# Patient Record
Sex: MALE | Race: WHITE | Employment: OTHER | URBAN - METROPOLITAN AREA
[De-identification: names, ages, dates, MRNs, and addresses within clinical notes are randomized per-mention and may not be internally consistent; named-entity substitution may affect disease eponyms.]

---

## 2017-08-16 ENCOUNTER — OFFICE VISIT (OUTPATIENT)
Dept: INTERNAL MEDICINE CLINIC | Age: 67
End: 2017-08-16

## 2017-08-16 VITALS
HEART RATE: 70 BPM | SYSTOLIC BLOOD PRESSURE: 132 MMHG | TEMPERATURE: 99.1 F | WEIGHT: 235 LBS | DIASTOLIC BLOOD PRESSURE: 87 MMHG | OXYGEN SATURATION: 96 % | BODY MASS INDEX: 34.8 KG/M2 | HEIGHT: 69 IN

## 2017-08-16 DIAGNOSIS — J01.00 ACUTE MAXILLARY SINUSITIS, RECURRENCE NOT SPECIFIED: Primary | ICD-10-CM

## 2017-08-16 DIAGNOSIS — R10.11 RIGHT UPPER QUADRANT ABDOMINAL PAIN: ICD-10-CM

## 2017-08-16 LAB
ALBUMIN SERPL-MCNC: 4.3 G/DL (ref 3.9–5.4)
ALKALINE PHOS POC: 58 U/L (ref 38–126)
ALT SERPL-CCNC: 31 U/L (ref 9–52)
AMYLASE, POCT, AMYLPOCT: 43 U/L (ref 30–100)
AST SERPL-CCNC: 23 U/L (ref 14–36)
BUN BLD-MCNC: 26 MG/DL (ref 9–20)
CALCIUM BLD-MCNC: 9.6 MG/DL (ref 8.4–10.2)
CHLORIDE BLD-SCNC: 103 MMOL/L (ref 98–107)
CO2 POC: 28 MMOL/L (ref 22–32)
CREAT BLD-MCNC: 0.8 MG/DL (ref 0.8–1.5)
EGFR (POC): 93.7
GLUCOSE POC: 68 MG/DL (ref 75–110)
GRAN# POC: 4.2 K/UL (ref 2–7.8)
GRAN% POC: 60.8 % (ref 37–92)
HCT VFR BLD CALC: 42.7 % (ref 37–51)
HGB BLD-MCNC: 14.7 G/DL (ref 12–18)
LY# POC: 2.2 K/UL (ref 0.6–4.1)
LY% POC: 34.7 % (ref 10–58.5)
MCH RBC QN: 31.5 PG (ref 26–32)
MCHC RBC-ENTMCNC: 34.4 G/DL (ref 30–36)
MCV RBC: 92 FL (ref 80–97)
MID #, POC: 0.2 K/UL (ref 0–1.8)
MID% POC: 4.5 % (ref 0.1–24)
PLATELET # BLD: 198 K/UL (ref 140–440)
POTASSIUM SERPL-SCNC: 5 MMOL/L (ref 3.6–5)
PROT SERPL-MCNC: 7 G/DL (ref 6.3–8.2)
RBC # BLD: 4.66 M/UL (ref 4.2–6.3)
SODIUM SERPL-SCNC: 144 MMOL/L (ref 137–145)
TOTAL BILIRUBIN POC: 0.8 MG/DL (ref 0.2–1.3)
WBC # BLD: 6.6 K/UL (ref 4.1–10.9)

## 2017-08-16 RX ORDER — CEFUROXIME AXETIL 500 MG/1
500 TABLET ORAL 2 TIMES DAILY
Qty: 20 TAB | Refills: 0 | Status: SHIPPED | OUTPATIENT
Start: 2017-08-16 | End: 2017-09-07

## 2017-08-16 NOTE — PROGRESS NOTES
Subjective:  Mr. Tasha Maloney is a pleasant 74year old gentleman who comes in today with a 4-5 day history of nasal congestion, greenish nasal drainage and some postnasal drainage. He denies any shortness of breath, cough, wheezing or hemoptysis. Denies any nausea or vomiting. Denies any chills or fever. In addition today, he complains of some right upper quadrant pain. All of his difficulties started on Saturday. Since then it has gradually gotten a little bit worse. He tells me that a few years ago he was seen in the ER because he thought he was having a heart attack and was told at that time that he had gallstones. He has never been bothered since. Physical Examination:  GENERAL:  On exam he is a pleasant gentleman in no acute distress. He is alert and oriented. VITALS:  BP: 132/87. P: 70 and regular. T: 99.1. O2 sat: 96%. HEENT:  Normocephalic, atraumatic. TMs normal.  Mouth mucosa pink. Tongue midline. Pharynx minimally injected without presence of exudates. No sinus tenderness. NECK:  Supple without adenopathy. CHEST:  Lungs were clear to auscultation, no rales or wheezes. CARDIAC:  Heart regular rhythm without murmur or gallop. ABDOMEN:  Bowel sounds present in four quadrants, soft, non distended. He is tender in his right upper quadrant on deep palpation. No rebound or guarding. No  organomegaly or masses. No CVA tenderness. EXTREMITIES:  No edema or calf tenderness. Distal pulses were present. Impression:  1. Acute sinusitis. 2. Right upper quadrant pain, rule out acute cholecystitis. Plan:  1. In terms of his acute sinusitis it was opted to start him on Ceftin 500 mg twice daily for ten days. 2. He may continue with Mucinex. 3. He is to increase fluids and rest.  4. In terms of his abdominal pain, he is referred to [facility] for ultrasound of his gallbladder. We will call him as soon as we have the results.   5. In the meantime we will get a CBC, comprehensive metabolic and amylase.

## 2017-08-16 NOTE — MR AVS SNAPSHOT
Visit Information Date & Time Provider Department Dept. Phone Encounter #  
 8/16/2017  9:15 AM Vu Johnson NP [address] [place] [address] [phone] [id number] Follow-up Instructions Return if symptoms worsen or fail to improve. Your Appointments 10/26/2017  8:30 AM  
Follow Up with MD BRENDON Odom [facility] ([facility]) Appt Note: [address] [id number] [address] [address] Upcoming Health Maintenance Date Due DTaP/Tdap/Td series (1 - Tdap) 6/29/1973 FOBT Q 1 YEAR AGE 50-75 6/29/2002 ZOSTER VACCINE AGE 60> 4/29/2012 GLAUCOMA SCREENING Q2Y 6/29/2017 Pneumococcal 65+ Low/Medium Risk (1 of 2 - PCV13) 6/29/2017 MEDICARE YEARLY EXAM 6/29/2017 INFLUENZA AGE 9 TO ADULT 8/1/2017 Allergies as of 8/16/2017  Review Complete On: 8/16/2017 By: Vu Johnson NP Severity Noted Reaction Type Reactions Claritin-d 12 Hour [Loratadine-pseudoephedrine]  02/01/2012    Shortness of Breath Iodine  02/01/2012    Shortness of Breath Levaquin [Levofloxacin]  02/01/2012    Hives Seafood [Shellfish Containing Products]  02/01/2012    Shortness of Breath Sulfa (Sulfonamide Antibiotics)  02/01/2012    Hives Current Immunizations  Never Reviewed No immunizations on file. Not reviewed this visit You Were Diagnosed With   
  
 Codes Comments Acute maxillary sinusitis, recurrence not specified    -  Primary ICD-10-CM: J01.00 ICD-9-CM: 461.0 Right upper quadrant abdominal pain     ICD-10-CM: R10.11 ICD-9-CM: 789.01 Vitals BP Pulse Temp Height(growth percentile) Weight(growth percentile) SpO2  
 132/87 (BP 1 Location: Left arm, BP Patient Position: Sitting) 70 99.1 °F (37.3 °C) (Oral) 5' 9.25\" (1.759 m) 235 lb (106.6 kg) 96% BMI Smoking Status 34.45 kg/m2 Never Smoker BMI and BSA Data Body Mass Index Body Surface Area 34.45 kg/m 2 2.28 m 2 Preferred Pharmacy Pharmacy Name Phone [id number] [address] [place] [address] [phone] Your Updated Medication List  
  
   
This list is accurate as of: 8/16/17 12:53 PM.  Always use your most recent med list.  
  
  
  
  
 aspirin 81 mg chewable tablet Take 81 mg by mouth daily. cefUROXime 500 mg tablet Commonly known as:  CEFTIN Take 1 Tab by mouth two (2) times a day. coenzyme q10 10 mg Cap Take  by mouth. finasteride 5 mg tablet Commonly known as:  PROSCAR  
  
 levocetirizine 5 mg tablet Commonly known as:  XYZAL  
  
 multivitamin tablet Commonly known as:  ONE A DAY Take 1 Tab by mouth daily. nabumetone 500 mg tablet Commonly known as:  RELAFEN PriLOSEC OTC 20 mg tablet Generic drug:  omeprazole  
  
 spironolactone-hydrochlorothiazide 25-25 mg per tablet Commonly known as:  ALDACTAZIDE  
  
 TAZTIA  mg SR capsule Generic drug:  dilTIAZem WELCHOL 625 mg tablet Generic drug:  colesevelam  
  
 ZETIA 10 mg tablet Generic drug:  ezetimibe Prescriptions Sent to Pharmacy Refills  
 cefUROXime (CEFTIN) 500 mg tablet 0 Sig: Take 1 Tab by mouth two (2) times a day. Class: Normal  
 Pharmacy: [id number] [address] [place] [address] Ph #: [phone] Route: Oral  
  
We Performed the Following AMB POC AMYLASE [82989 CPT(R)] AMB POC COMPLETE CBC,AUTOMATED ENTER [id number] CPT(R)] AMB POC COMPREHENSIVE METABOLIC PANEL [27883 CPT(R)] Follow-up Instructions Return if symptoms worsen or fail to improve. To-Do List   
 08/16/2017 Imaging:  US ABD COMP   
  
 08/17/2017 9:30 AM  
  Appointment with Devaughn Hutchins at [facility] Ultrasound ([phone]) General  NPO DIET RESTICTIONS Please be NPO (nothing by mouth) for 6- 8 hours prior to procedure. GENERAL INSTRUCTIONS 1. Bring any non [facility] facility films/reports pertaining to the area being studied with you on the day of appointment. 2. A written order with a valid diagnosis and Physicians signature is required for all scheduled tests. 3. Check in at registration 30 minutes before your appointment time unless you were instructed to do otherwise. Patient Instructions Abdominal Pain: Care Instructions Your Care Instructions Abdominal pain has many possible causes. Some aren't serious and get better on their own in a few days. Others need more testing and treatment. If your pain continues or gets worse, you need to be rechecked and may need more tests to find out what is wrong. You may need surgery to correct the problem. Don't ignore new symptoms, such as fever, nausea and vomiting, urination problems, pain that gets worse, and dizziness. These may be signs of a more serious problem. Your doctor may have recommended a follow-up visit in the next 8 to 12 hours. If you are not getting better, you may need more tests or treatment. The doctor has checked you carefully, but problems can develop later. If you notice any problems or new symptoms, get medical treatment right away. Follow-up care is a key part of your treatment and safety. Be sure to make and go to all appointments, and call your doctor if you are having problems. It's also a good idea to know your test results and keep a list of the medicines you take. How can you care for yourself at home? · Rest until you feel better. · To prevent dehydration, drink plenty of fluids, enough so that your urine is light yellow or clear like water. Choose water and other caffeine-free clear liquids until you feel better.  If you have kidney, heart, or liver disease and have to limit fluids, talk with your doctor before you increase the amount of fluids you drink. · If your stomach is upset, eat mild foods, such as rice, dry toast or crackers, bananas, and applesauce. Try eating several small meals instead of two or three large ones. · Wait until 48 hours after all symptoms have gone away before you have spicy foods, alcohol, and drinks that contain caffeine. · Do not eat foods that are high in fat. · Avoid anti-inflammatory medicines such as aspirin, ibuprofen (Advil, Motrin), and naproxen (Aleve). These can cause stomach upset. Talk to your doctor if you take daily aspirin for another health problem. When should you call for help? Call 911 anytime you think you may need emergency care. For example, call if: 
· You passed out (lost consciousness). · You pass maroon or very bloody stools. · You vomit blood or what looks like coffee grounds. · You have new, severe belly pain. Call your doctor now or seek immediate medical care if: 
· Your pain gets worse, especially if it becomes focused in one area of your belly. · You have a new or higher fever. · Your stools are black and look like tar, or they have streaks of blood. · You have unexpected vaginal bleeding. · You have symptoms of a urinary tract infection. These may include: 
¨ Pain when you urinate. ¨ Urinating more often than usual. 
¨ Blood in your urine. · You are dizzy or lightheaded, or you feel like you may faint. Watch closely for changes in your health, and be sure to contact your doctor if: 
· You are not getting better after 1 day (24 hours). Where can you learn more? Go to http://sergey-phyllis.info/. Enter C404 in the search box to learn more about \"Abdominal Pain: Care Instructions. \" Current as of: March 20, 2017 Content Version: 11.3 © 1784-4539 [employer].  Care instructions adapted under license by [employer] (which disclaims liability or warranty for this information). If you have questions about a medical condition or this instruction, always ask your healthcare professional. [address] any warranty or liability for your use of this information. Introducing [facility] SERVICES! Foster Gonzales introduces [employer] patient portal. Now you can access parts of your medical record, email your doctor's office, and request medication refills online. 1. In your internet browser, go to [email or web address] 2. Click on the First Time User? Click Here link in the Sign In box. You will see the New Member Sign Up page. 3. Enter your [employer] Access Code exactly as it appears below. You will not need to use this code after youve completed the sign-up process. If you do not sign up before the expiration date, you must request a new code. · [employer] Access Code: [email or web address] Expires: 11/14/2017  8:50 AM 
 
4. Enter the last four digits of your Social Security Number (xxxx) and Date of Birth (mm/dd/yyyy) as indicated and click Submit. You will be taken to the next sign-up page. 5. Create a [employer] ID. This will be your [employer] login ID and cannot be changed, so think of one that is secure and easy to remember. 6. Create a [employer] password. You can change your password at any time. 7. Enter your Password Reset Question and Answer. This can be used at a later time if you forget your password. 8. Enter your e-mail address. You will receive e-mail notification when new information is available in [address] 9. Click Sign Up. You can now view and download portions of your medical record. 10. Click the Download Summary menu link to download a portable copy of your medical information. If you have questions, please visit the Frequently Asked Questions section of the [employer] website. Remember, [employer] is NOT to be used for urgent needs. For medical emergencies, dial 911. Now available from your iPhone and Android! Please provide this summary of care documentation to your next provider. Your primary care clinician is listed as JEANNETTE Son. If you have any questions after today's visit, please call [phone].

## 2017-08-16 NOTE — PATIENT INSTRUCTIONS

## 2017-08-16 NOTE — PROGRESS NOTES
Vince Way presents with   Chief Complaint   Patient presents with    Sinus Infection    Abdominal Pain     Patient of Dr Archana Coreaots here with two complaint. First, sinus pain, drainage & cough. Second, right sided abdominal pain since Saturday, that radiates across stomach area. Also complains of sweating profusely & feeling like his temperature is elevated. 1. Have you been to the ER, urgent care clinic since your last visit? Hospitalized since your last visit? No    2. Have you seen or consulted any other health care providers outside of the [address] [place] [address] since your last visit? Include any pap smears or colon screening.  No

## 2017-08-17 ENCOUNTER — HOSPITAL ENCOUNTER (OUTPATIENT)
Dept: ULTRASOUND IMAGING | Age: 67
Discharge: HOME | End: 2017-08-17
Attending: NURSE PRACTITIONER
Payer: COMMERCIAL

## 2017-08-17 DIAGNOSIS — R10.11 RIGHT UPPER QUADRANT ABDOMINAL PAIN: ICD-10-CM

## 2017-08-17 PROCEDURE — 76700 US EXAM ABDOM COMPLETE: CPT

## 2017-09-07 ENCOUNTER — OFFICE VISIT (OUTPATIENT)
Dept: PRIMARY CARE CLINIC | Age: 67
End: 2017-09-07

## 2017-09-07 VITALS
SYSTOLIC BLOOD PRESSURE: 146 MMHG | RESPIRATION RATE: 16 BRPM | BODY MASS INDEX: 35.6 KG/M2 | TEMPERATURE: 97.8 F | HEIGHT: 69 IN | WEIGHT: 240.4 LBS | DIASTOLIC BLOOD PRESSURE: 80 MMHG | HEART RATE: 63 BPM | OXYGEN SATURATION: 96 %

## 2017-09-07 DIAGNOSIS — J01.01 ACUTE RECURRENT MAXILLARY SINUSITIS: Primary | ICD-10-CM

## 2017-09-07 RX ORDER — DOXYCYCLINE 100 MG/1
100 TABLET ORAL 2 TIMES DAILY
Qty: 20 TAB | Refills: 0 | Status: SHIPPED | OUTPATIENT
Start: 2017-09-07 | End: 2017-09-17

## 2017-09-07 NOTE — PROGRESS NOTES
Subjective:      Mason Landis III is a 74 y.o. male who presents for evaluation of possible sinus infection. He was treated for a sinus infection 3 weeks ago and felt fine after finishing course of ceftin. Symptoms started 6 days ago. Sinus pressure, cough, nasal congestion. Cough productive of thick mucus. He feels chills. Bilateral ear fullness. He has tried mucinex which helped some and nasal saline gel. No sick contacts. Past Medical History:   Diagnosis Date    Arthritis     CAD (coronary artery disease)     Hypercholesterolemia      Family History   Problem Relation Age of Onset    No Known Problems Mother      Current Outpatient Prescriptions   Medication Sig Dispense Refill    WELCHOL 625 mg tablet   0    TAZTIA  mg SR capsule   0    ZETIA 10 mg tablet   0    finasteride (PROSCAR) 5 mg tablet   0    levocetirizine (XYZAL) 5 mg tablet   0    nabumetone (RELAFEN) 500 mg tablet   0    PRILOSEC OTC 20 mg tablet   1    spironolactone-hydrochlorothiazide (ALDACTAZIDE) 25-25 mg per tablet   0    aspirin 81 mg chewable tablet Take 81 mg by mouth daily.  coenzyme q10 10 mg cap Take  by mouth.  multivitamin (ONE A DAY) tablet Take 1 Tab by mouth daily. Allergies   Allergen Reactions    Claritin-D 12 Hour [Loratadine-Pseudoephedrine] Shortness of Breath    Iodine Shortness of Breath    Levaquin [Levofloxacin] Hives    Seafood [Shellfish Containing Products] Shortness of Breath    Sulfa (Sulfonamide Antibiotics) Hives     Social History     Social History    Marital status:      Spouse name: N/A    Number of children: N/A    Years of education: N/A     Occupational History    Not on file.      Social History Main Topics    Smoking status: Never Smoker    Smokeless tobacco: Former User    Alcohol use No    Drug use: No    Sexual activity: Not on file     Other Topics Concern    Not on file     Social History Narrative       Review of Systems   Constitutional: Positive for chills. Negative for fever. HENT: Positive for congestion and sinus pain. Negative for ear pain and sore throat. Eyes: Negative for blurred vision, double vision, photophobia and pain. Respiratory: Positive for cough. Negative for shortness of breath and stridor. Cardiovascular: Negative for chest pain. Gastrointestinal: Negative for abdominal pain, diarrhea, nausea and vomiting. Skin: Negative for rash. Neurological: Negative for tingling, sensory change, focal weakness and headaches. Objective:     Visit Vitals    /80 (BP 1 Location: Left arm, BP Patient Position: Sitting)    Pulse 63    Temp 97.8 °F (36.6 °C) (Oral)    Resp 16    Ht 5' 9.25\" (1.759 m)    Wt 240 lb 6.4 oz (109 kg)    SpO2 96%    BMI 35.25 kg/m2     General: Alert and oriented and in no acute distress. Responds to all questions appropriately. SKIN: No rash. HEAD: bilaterally maxillary sinus tenderness. EYES: Sclera and conjunctiva clear; pupils round and reactive to light. EARS: External normal, canals clear, tympanic membranes normal.     NOSE: Edema and erythema of the turbinates with clear-yellow mucous drainage. OROPHARYNX: Slight tonsil edema and erythema with no exudate. NECK: Supple; no masses; normal lymphadenopathy. LUNGS: Clear to auscultation bilaterally, no wheeze, rales and rhonchi. CARDIOVASCULAR: Regular, rate, and rhythm without murmurs, gallops or rubs. NEUROLOGIC: Speech intact; face symmetrical; moves all extremities equally. Assessment:       ICD-10-CM ICD-9-CM    1. Acute recurrent maxillary sinusitis J01.01 461.0 doxycycline (ADOXA) 100 mg tablet     Was on cefuroxime 3 weeks ago for sinusitis. ?relapse vs new recurrent infection. Advised to use nasal saline spray and local nasal decongestant continue mucinex if not better in 3-4 days may start doxycycline. This note will not be viewable in [address]

## 2017-09-07 NOTE — PATIENT INSTRUCTIONS
Saline Nasal Washes: Care Instructions  Your Care Instructions  Saline nasal washes help keep the nasal passages open by washing out thick or dried mucus. This simple remedy can help relieve symptoms of allergies, sinusitis, and colds. It also can make the nose feel more comfortable by keeping the mucous membranes moist. You may notice a little burning sensation in your nose the first few times you use the solution, but this usually gets better in a few days. Follow-up care is a key part of your treatment and safety. Be sure to make and go to all appointments, and call your doctor if you are having problems. It's also a good idea to know your test results and keep a list of the medicines you take. How can you care for yourself at home? · You can buy premixed saline solution in a squeeze bottle or other sinus rinse products at a drugstore. Read and follow the instructions on the label. · You also can make your own saline solution by adding 1 teaspoon of salt and 1 teaspoon of baking soda to 2 cups of distilled water. · If you use a homemade solution, pour a small amount into a clean bowl. Using a rubber bulb syringe, squeeze the syringe and place the tip in the salt water. Pull a small amount of the salt water into the syringe by relaxing your hand. · Sit down with your head tilted slightly back. Do not lie down. Put the tip of the bulb syringe or the squeeze bottle a little way into one of your nostrils. Gently drip or squirt a few drops into the nostril. Repeat with the other nostril. Some sneezing and gagging are normal at first.  · Gently blow your nose. · Wipe the syringe or bottle tip clean after each use. · Repeat this 2 or 3 times a day. · Use nasal washes gently if you have nosebleeds often. When should you call for help? Watch closely for changes in your health, and be sure to contact your doctor if:  · You often get nosebleeds. · You have problems doing the nasal washes.   Where can you learn more? Go to http://sergey-phyllis.info/. Enter [id number] in the search box to learn more about \"Saline Nasal Washes: Care Instructions. \"  Current as of: May 4, 2017  Content Version: 11.3  © 8188-8880 [employer]. Care instructions adapted under license by [employer] (which disclaims liability or warranty for this information). If you have questions about a medical condition or this instruction, always ask your healthcare professional. [address] any warranty or liability for your use of this information. Sinusitis: Care Instructions  Your Care Instructions    Sinusitis is an infection of the lining of the sinus cavities in your head. Sinusitis often follows a cold. It causes pain and pressure in your head and face. In most cases, sinusitis gets better on its own in 1 to 2 weeks. But some mild symptoms may last for several weeks. Sometimes antibiotics are needed. Follow-up care is a key part of your treatment and safety. Be sure to make and go to all appointments, and call your doctor if you are having problems. It's also a good idea to know your test results and keep a list of the medicines you take. How can you care for yourself at home? · Take an over-the-counter pain medicine, such as acetaminophen (Tylenol), ibuprofen (Advil, Motrin), or naproxen (Aleve). Read and follow all instructions on the label. · If the doctor prescribed antibiotics, take them as directed. Do not stop taking them just because you feel better. You need to take the full course of antibiotics. · Be careful when taking over-the-counter cold or flu medicines and Tylenol at the same time. Many of these medicines have acetaminophen, which is Tylenol. Read the labels to make sure that you are not taking more than the recommended dose. Too much acetaminophen (Tylenol) can be harmful.   · Breathe warm, moist air from a steamy shower, a hot bath, or a sink filled with hot water. Avoid cold, dry air. Using a humidifier in your home may help. Follow the directions for cleaning the machine. · Use saline (saltwater) nasal washes to help keep your nasal passages open and wash out mucus and bacteria. You can buy saline nose drops at a grocery store or drugstore. Or you can make your own at home by adding 1 teaspoon of salt and 1 teaspoon of baking soda to 2 cups of distilled water. If you make your own, fill a bulb syringe with the solution, insert the tip into your nostril, and squeeze gently. Mickiel Sickle your nose. · Put a hot, wet towel or a warm gel pack on your face 3 or 4 times a day for 5 to 10 minutes each time. · Try a decongestant nasal spray like oxymetazoline (Afrin). Do not use it for more than 3 days in a row. Using it for more than 3 days can make your congestion worse. When should you call for help? Call your doctor now or seek immediate medical care if:  · You have new or worse swelling or redness in your face or around your eyes. · You have a new or higher fever. Watch closely for changes in your health, and be sure to contact your doctor if:  · You have new or worse facial pain. · The mucus from your nose becomes thicker (like pus) or has new blood in it. · You are not getting better as expected. Where can you learn more? Go to http://sergey-phyllis.info/. Enter Q281 in the search box to learn more about \"Sinusitis: Care Instructions. \"  Current as of: July 29, 2016  Content Version: 11.3  © 4492-1217 [employer]. Care instructions adapted under license by [employer] (which disclaims liability or warranty for this information). If you have questions about a medical condition or this instruction, always ask your healthcare professional. [address] any warranty or liability for your use of this information.

## 2017-09-07 NOTE — MR AVS SNAPSHOT
Visit Information Date & Time Provider Department Dept. Phone Encounter #  
 9/7/2017  9:30 AM Erika Kiran Shields, [facility] [phone] [id number] Your Appointments 10/26/2017  8:30 AM  
Follow Up with [facility] ([facility]) Appt Note: 6M; 6m  
 [address] [address] [address] [facility] [address] Upcoming Health Maintenance Date Due DTaP/Tdap/Td series (1 - Tdap) 6/29/1973 FOBT Q 1 YEAR AGE 50-75 6/29/2002 ZOSTER VACCINE AGE 60> 4/29/2012 GLAUCOMA SCREENING Q2Y 6/29/2017 Pneumococcal 65+ Low/Medium Risk (1 of 2 - PCV13) 6/29/2017 MEDICARE YEARLY EXAM 6/29/2017 INFLUENZA AGE 9 TO ADULT 8/1/2017 Allergies as of 9/7/2017  Review Complete On: 9/7/2017 By: Erika Shields MD  
  
 Severity Noted Reaction Type Reactions Claritin-d 12 Hour [Loratadine-pseudoephedrine]  02/01/2012    Shortness of Breath Iodine  02/01/2012    Shortness of Breath Levaquin [Levofloxacin]  02/01/2012    Hives Seafood [Shellfish Containing Products]  02/01/2012    Shortness of Breath Sulfa (Sulfonamide Antibiotics)  02/01/2012    Hives Current Immunizations  Never Reviewed No immunizations on file. Not reviewed this visit You Were Diagnosed With   
  
 Codes Comments Acute recurrent maxillary sinusitis    -  Primary ICD-10-CM: J01.01 
ICD-9-CM: 461.0 Vitals BP Pulse Temp Resp Height(growth percentile) Weight(growth percentile) 146/80 (BP 1 Location: Left arm, BP Patient Position: Sitting) 63 97.8 °F (36.6 °C) (Oral) 16 5' 9.25\" (1.759 m) 240 lb 6.4 oz (109 kg) SpO2 BMI Smoking Status 96% 35.25 kg/m2 Never Smoker Vitals History BMI and BSA Data Body Mass Index Body Surface Area  
 35.25 kg/m 2 2.31 m 2 Preferred Pharmacy Pharmacy Name Phone [facility] [address] [place] [address] [phone] Your Updated Medication List  
  
   
This list is accurate as of: 9/7/17  9:56 AM.  Always use your most recent med list.  
  
  
  
  
 aspirin 81 mg chewable tablet Take 81 mg by mouth daily. coenzyme q10 10 mg Cap Take  by mouth. doxycycline 100 mg tablet Commonly known as:  ADOXA Take 1 Tab by mouth two (2) times a day for 10 days. finasteride 5 mg tablet Commonly known as:  PROSCAR  
  
 levocetirizine 5 mg tablet Commonly known as:  XYZAL  
  
 multivitamin tablet Commonly known as:  ONE A DAY Take 1 Tab by mouth daily. nabumetone 500 mg tablet Commonly known as:  RELAFEN PriLOSEC OTC 20 mg tablet Generic drug:  omeprazole  
  
 spironolactone-hydrochlorothiazide 25-25 mg per tablet Commonly known as:  ALDACTAZIDE  
  
 TAZTIA  mg SR capsule Generic drug:  dilTIAZem WELCHOL 625 mg tablet Generic drug:  colesevelam  
  
 ZETIA 10 mg tablet Generic drug:  ezetimibe Prescriptions Sent to Pharmacy Refills  
 doxycycline (ADOXA) 100 mg tablet 0 Sig: Take 1 Tab by mouth two (2) times a day for 10 days. Class: Normal  
 Pharmacy: [address] Ph #: [phone] Route: Oral  
  
Patient Instructions Saline Nasal Washes: Care Instructions Your Care Instructions Saline nasal washes help keep the nasal passages open by washing out thick or dried mucus. This simple remedy can help relieve symptoms of allergies, sinusitis, and colds. It also can make the nose feel more comfortable by keeping the mucous membranes moist. You may notice a little burning sensation in your nose the first few times you use the solution, but this usually gets better in a few days. Follow-up care is a key part of your treatment and safety.  Be sure to make and go to all appointments, and call your doctor if you are having problems. It's also a good idea to know your test results and keep a list of the medicines you take. How can you care for yourself at home? · You can buy premixed saline solution in a squeeze bottle or other sinus rinse products at a drugstore. Read and follow the instructions on the label. · You also can make your own saline solution by adding 1 teaspoon of salt and 1 teaspoon of baking soda to 2 cups of distilled water. · If you use a homemade solution, pour a small amount into a clean bowl. Using a rubber bulb syringe, squeeze the syringe and place the tip in the salt water. Pull a small amount of the salt water into the syringe by relaxing your hand. · Sit down with your head tilted slightly back. Do not lie down. Put the tip of the bulb syringe or the squeeze bottle a little way into one of your nostrils. Gently drip or squirt a few drops into the nostril. Repeat with the other nostril. Some sneezing and gagging are normal at first. 
· Gently blow your nose. · Wipe the syringe or bottle tip clean after each use. · Repeat this 2 or 3 times a day. · Use nasal washes gently if you have nosebleeds often. When should you call for help? Watch closely for changes in your health, and be sure to contact your doctor if: 
· You often get nosebleeds. · You have problems doing the nasal washes. Where can you learn more? Go to http://sergey-phyllis.info/. Enter [id number] in the search box to learn more about \"Saline Nasal Washes: Care Instructions. \" Current as of: May 4, 2017 Content Version: 11.3 © 7989-3237 Quwan.com. Care instructions adapted under license by [employer] (which disclaims liability or warranty for this information).  If you have questions about a medical condition or this instruction, always ask your healthcare professional. [employer] disclaims any warranty or liability for your use of this information. Sinusitis: Care Instructions Your Care Instructions Sinusitis is an infection of the lining of the sinus cavities in your head. Sinusitis often follows a cold. It causes pain and pressure in your head and face. In most cases, sinusitis gets better on its own in 1 to 2 weeks. But some mild symptoms may last for several weeks. Sometimes antibiotics are needed. Follow-up care is a key part of your treatment and safety. Be sure to make and go to all appointments, and call your doctor if you are having problems. It's also a good idea to know your test results and keep a list of the medicines you take. How can you care for yourself at home? · Take an over-the-counter pain medicine, such as acetaminophen (Tylenol), ibuprofen (Advil, Motrin), or naproxen (Aleve). Read and follow all instructions on the label. · If the doctor prescribed antibiotics, take them as directed. Do not stop taking them just because you feel better. You need to take the full course of antibiotics. · Be careful when taking over-the-counter cold or flu medicines and Tylenol at the same time. Many of these medicines have acetaminophen, which is Tylenol. Read the labels to make sure that you are not taking more than the recommended dose. Too much acetaminophen (Tylenol) can be harmful. · Breathe warm, moist air from a steamy shower, a hot bath, or a sink filled with hot water. Avoid cold, dry air. Using a humidifier in your home may help. Follow the directions for cleaning the machine. · Use saline (saltwater) nasal washes to help keep your nasal passages open and wash out mucus and bacteria. You can buy saline nose drops at a grocery store or drugstore. Or you can make your own at home by adding 1 teaspoon of salt and 1 teaspoon of baking soda to 2 cups of distilled water.  If you make your own, fill a bulb syringe with the solution, insert the tip into your nostril, and squeeze gently. Theodor Eisenmenger your nose. · Put a hot, wet towel or a warm gel pack on your face 3 or 4 times a day for 5 to 10 minutes each time. · Try a decongestant nasal spray like oxymetazoline (Afrin). Do not use it for more than 3 days in a row. Using it for more than 3 days can make your congestion worse. When should you call for help? Call your doctor now or seek immediate medical care if: 
· You have new or worse swelling or redness in your face or around your eyes. · You have a new or higher fever. Watch closely for changes in your health, and be sure to contact your doctor if: 
· You have new or worse facial pain. · The mucus from your nose becomes thicker (like pus) or has new blood in it. · You are not getting better as expected. Where can you learn more? Go to http://sergey-phyllis.info/. Enter P889 in the search box to learn more about \"Sinusitis: Care Instructions. \" Current as of: July 29, 2016 Content Version: 11.3 © 5067-8522 Zaplox. Care instructions adapted under license by [employer] (which disclaims liability or warranty for this information). If you have questions about a medical condition or this instruction, always ask your healthcare professional. [address] any warranty or liability for your use of this information. Introducing [facility]! Shai Euceda introduces [employer] patient portal. Now you can access parts of your medical record, email your doctor's office, and request medication refills online. 1. In your internet browser, go to [email or web address] 2. Click on the First Time User? Click Here link in the Sign In box. You will see the New Member Sign Up page. 3. Enter your [employer] Access Code exactly as it appears below. You will not need to use this code after youve completed the sign-up process.  If you do not sign up before the expiration date, you must request a new code. · DiJiPOP Access Code: [email or web address] Expires: 11/14/2017  8:50 AM 
 
4. Enter the last four digits of your Social Security Number (xxxx) and Date of Birth (mm/dd/yyyy) as indicated and click Submit. You will be taken to the next sign-up page. 5. Create a DiJiPOP ID. This will be your DiJiPOP login ID and cannot be changed, so think of one that is secure and easy to remember. 6. Create a DiJiPOP password. You can change your password at any time. 7. Enter your Password Reset Question and Answer. This can be used at a later time if you forget your password. 8. Enter your e-mail address. You will receive e-mail notification when new information is available in [address] 9. Click Sign Up. You can now view and download portions of your medical record. 10. Click the Download Summary menu link to download a portable copy of your medical information. If you have questions, please visit the Frequently Asked Questions section of the DiJiPOP website. Remember, DiJiPOP is NOT to be used for urgent needs. For medical emergencies, dial 911. Now available from your iPhone and Android! Please provide this summary of care documentation to your next provider. Your primary care clinician is listed as JEANNETTE Lancaster. If you have any questions after today's visit, please call [phone].

## 2017-09-07 NOTE — PROGRESS NOTES
Chief Complaint   Patient presents with    Nasal Congestion     c/o continued nasal congestion, sinus pressure and cough, states he was seen at pcp office about a month, abx were given,,states he finshed abx given and now has been taking mucinex for symptom relief     Cough     This note will not be viewable in [address]

## 2017-09-15 PROBLEM — E78.5 DYSLIPIDEMIA: Status: ACTIVE | Noted: 2017-09-15

## 2017-09-15 PROBLEM — L57.0 ACTINIC KERATOSIS: Status: ACTIVE | Noted: 2017-09-15

## 2017-09-15 PROBLEM — Z13.1 DIABETES MELLITUS SCREENING: Status: ACTIVE | Noted: 2017-09-15

## 2017-09-15 PROBLEM — R79.89 ELEVATED LFTS: Status: ACTIVE | Noted: 2017-09-15

## 2017-09-15 PROBLEM — R53.83 FATIGUE: Status: ACTIVE | Noted: 2017-09-15

## 2017-09-15 PROBLEM — E66.9 OBESITY: Status: ACTIVE | Noted: 2017-09-15

## 2017-09-15 PROBLEM — L82.1 SK (SEBORRHEIC KERATOSIS): Status: ACTIVE | Noted: 2017-09-15

## 2017-09-15 PROBLEM — R60.9 EDEMA: Status: ACTIVE | Noted: 2017-09-15

## 2017-09-15 PROBLEM — G89.29 CHRONIC BACK PAIN: Status: ACTIVE | Noted: 2017-09-15

## 2017-09-15 PROBLEM — R05.9 COUGH: Status: ACTIVE | Noted: 2017-09-15

## 2017-09-15 PROBLEM — Z79.899 OTHER LONG TERM (CURRENT) DRUG THERAPY: Status: ACTIVE | Noted: 2017-09-15

## 2017-09-15 PROBLEM — J32.9 SINUSITIS: Status: ACTIVE | Noted: 2017-09-15

## 2017-09-15 PROBLEM — N40.0 HYPERPLASIA OF PROSTATE: Status: ACTIVE | Noted: 2017-09-15

## 2017-09-15 PROBLEM — M54.9 CHRONIC BACK PAIN: Status: ACTIVE | Noted: 2017-09-15

## 2017-09-15 PROBLEM — R61 DIAPHORESIS: Status: ACTIVE | Noted: 2017-09-15

## 2017-09-15 PROBLEM — R07.9 CHEST PAIN: Status: ACTIVE | Noted: 2017-09-15

## 2017-09-15 PROBLEM — I25.10 ASHD (ARTERIOSCLEROTIC HEART DISEASE): Status: ACTIVE | Noted: 2017-09-15

## 2017-09-15 PROBLEM — Z00.00 ANNUAL PHYSICAL EXAM: Status: ACTIVE | Noted: 2017-09-15

## 2017-09-15 RX ORDER — MOMETASONE FUROATE 50 UG/1
2 SPRAY, METERED NASAL DAILY
COMMUNITY
End: 2018-09-26 | Stop reason: SDUPTHER

## 2017-11-15 ENCOUNTER — OFFICE VISIT (OUTPATIENT)
Dept: INTERNAL MEDICINE CLINIC | Age: 67
End: 2017-11-15

## 2017-11-15 VITALS
HEIGHT: 69 IN | SYSTOLIC BLOOD PRESSURE: 131 MMHG | OXYGEN SATURATION: 98 % | WEIGHT: 243.2 LBS | DIASTOLIC BLOOD PRESSURE: 72 MMHG | RESPIRATION RATE: 18 BRPM | TEMPERATURE: 98.8 F | HEART RATE: 65 BPM | BODY MASS INDEX: 36.02 KG/M2

## 2017-11-15 DIAGNOSIS — J01.91 ACUTE RECURRENT SINUSITIS, UNSPECIFIED LOCATION: Primary | ICD-10-CM

## 2017-11-15 RX ORDER — CEFDINIR 300 MG/1
300 CAPSULE ORAL 2 TIMES DAILY
Qty: 20 CAP | Refills: 0 | Status: SHIPPED | OUTPATIENT
Start: 2017-11-15 | End: 2017-11-25

## 2017-11-15 RX ORDER — METHYLPREDNISOLONE 4 MG/1
TABLET ORAL
Qty: 1 DOSE PACK | Refills: 0 | Status: SHIPPED | OUTPATIENT
Start: 2017-11-15 | End: 2018-01-15

## 2017-11-15 NOTE — TELEPHONE ENCOUNTER
Requested Prescriptions     Pending Prescriptions Disp Refills    levocetirizine (XYZAL) 5 mg tablet  0       Last Refill: 03/30/16  Next Appointment:01/15/18

## 2017-11-15 NOTE — PROGRESS NOTES
This note will not be viewable in [address] Celeste Pak III is a 74 y.o. male and presents with Sinus Infection (x 1 week worsening yesterday; ear pain; shortness of breath; fatigue; sweats last night;  Room  5)  . Subjective:  Mr. Jordan Ye presents today with complaint of sinus pressure congestion and drainage for the past week. He has had some subjective fever with rigors and chills. He has mild mucopurulent drainage  He has a slight cough but this is largely nonproductive. He has had some shortness of breath with exertion. He is not taking any over-the-counter medication for this currently. Past Medical History:   Diagnosis Date    Actinic keratosis 9/15/2017    Annual physical exam 9/15/2017    Arthritis     ASHD (arteriosclerotic heart disease) 9/15/2017    CAD (coronary artery disease)     Chest pain 9/15/2017    Chronic back pain 9/15/2017    Cough 9/15/2017    Diabetes mellitus screening 9/15/2017    Diaphoresis 9/15/2017    Dyslipidemia 9/15/2017    Edema 9/15/2017    Elevated LFTs 9/15/2017    Fatigue 9/15/2017    Hypercholesterolemia     Hyperplasia of prostate 9/15/2017    Obesity 9/15/2017    Other long term (current) drug therapy 9/15/2017    Sinusitis 9/15/2017    SK (seborrheic keratosis) 9/15/2017     History reviewed. No pertinent surgical history. Allergies   Allergen Reactions    Claritin-D 12 Hour [Loratadine-Pseudoephedrine] Shortness of Breath    Iodine Shortness of Breath    Levaquin [Levofloxacin] Hives    Seafood [Shellfish Containing Products] Shortness of Breath    Sulfa (Sulfonamide Antibiotics) Hives     Current Outpatient Prescriptions   Medication Sig Dispense Refill    methylPREDNISolone (MEDROL DOSEPACK) 4 mg tablet Take as directed. 1 Dose Pack 0    cefdinir (OMNICEF) 300 mg capsule Take 1 Cap by mouth two (2) times a day for 10 days. 20 Cap 0    mometasone (NASONEX) 50 mcg/actuation nasal spray 2 Sprays daily.       WELCHOL 625 mg tablet   0    TAZTIA  mg SR capsule   0    ZETIA 10 mg tablet   0    finasteride (PROSCAR) 5 mg tablet   0    levocetirizine (XYZAL) 5 mg tablet   0    nabumetone (RELAFEN) 500 mg tablet   0    PRILOSEC OTC 20 mg tablet   1    spironolactone-hydrochlorothiazide (ALDACTAZIDE) 25-25 mg per tablet   0    aspirin 81 mg chewable tablet Take 81 mg by mouth daily.  coenzyme q10 10 mg cap Take  by mouth.  multivitamin (ONE A DAY) tablet Take 1 Tab by mouth daily. Social History     Social History    Marital status:      Spouse name: N/A    Number of children: N/A    Years of education: N/A     Social History Main Topics    Smoking status: Never Smoker    Smokeless tobacco: Former User    Alcohol use No    Drug use: No    Sexual activity: Not Asked     Other Topics Concern    None     Social History Narrative     Family History   Problem Relation Age of Onset    No Known Problems Mother        Review of Systems  Constitutional: negative for fevers, chills, anorexia and weight loss  Eyes:   negative for visual disturbance and irritation  ENT:   Positive for some sinus congestion and post nasal drainage. Respiratory:  Positive for cough and chest congestion without wheezing  CV:   negative for chest pain, palpitations, lower extremity edema  GI:   negative for nausea, vomiting, diarrhea, abdominal pain,melena  Integumentary: negative for rash and pruritus  Neurological:  negative for headaches, dizziness, vertigo, memory problems and gait       Objective:  Visit Vitals    /72 (BP 1 Location: Right arm, BP Patient Position: Sitting)    Pulse 65    Temp 98.8 °F (37.1 °C) (Oral)    Resp 18    Ht 5' 9.25\" (1.759 m)    Wt 243 lb 3.2 oz (110.3 kg)    SpO2 98%    BMI 35.66 kg/m2     Physical Exam:   General appearance - alert, ill appearing, and in no distress  Mental status - alert, oriented to person, place, and time  EYE-KAI, EOMI, conjuctiva clear.  No lid swelling or purulent drainage  ENT- TM's clear without A/F level. Pharynx slightly erythematous with drainage noted  Nose - normal and patent, no erythema,  Neck - supple, no significant adenopathy   Chest - Coarse upper airway rhonchi present without wheezing   Heart - normal rate, regular rhythm, normal S1, S2, no murmurs, rubs, clicks or gallops   Skin-No rash appreciated  Neuro -alert, oriented, normal speech, no focal findings. Assessment/Plan:  Diagnoses and all orders for this visit:    1. Acute recurrent sinusitis, unspecified location    Other orders  -     methylPREDNISolone (MEDROL DOSEPACK) 4 mg tablet; Take as directed. -     cefdinir (OMNICEF) 300 mg capsule; Take 1 Cap by mouth two (2) times a day for 10 days. Other Instructions:  Mucinex as directed    Increase po fluids    Follow-up Disposition:  Return if symptoms worsen or fail to improve. I have reviewed with the patient details of the assessment and plan and all questions were answered. Relevent patient education was performed. An After Visit Summary was printed and given to the patient.     Armand Levy MD

## 2017-11-15 NOTE — MR AVS SNAPSHOT
Visit Information Date & Time Provider Department Dept. Phone Encounter #  
 11/15/2017  3:20 PM JEANNETTE Bernard MD [facility] [id number] Follow-up Instructions Return if symptoms worsen or fail to improve. Your Appointments 1/15/2018  1:20 PM  
Follow Up with JEANNETTE Bernard MD  
[address] ([facility]) Appt Note: 6M; 6m; 6M  
 [address] [address] [address] [address] Upcoming Health Maintenance Date Due DTaP/Tdap/Td series (1 - Tdap) 6/29/1973 FOBT Q 1 YEAR AGE 50-75 6/29/2002 ZOSTER VACCINE AGE 60> 4/29/2012 GLAUCOMA SCREENING Q2Y 6/29/2017 Pneumococcal 65+ Low/Medium Risk (1 of 2 - PCV13) 6/29/2017 MEDICARE YEARLY EXAM 6/29/2017 Influenza Age 5 to Adult 8/1/2017 Allergies as of 11/15/2017  Review Complete On: 56/00/0679 By: Jolanta Marrero RN Severity Noted Reaction Type Reactions Claritin-d 12 Hour [Loratadine-pseudoephedrine]  02/01/2012    Shortness of Breath Iodine  02/01/2012    Shortness of Breath Levaquin [Levofloxacin]  02/01/2012    Hives Seafood [Shellfish Containing Products]  02/01/2012    Shortness of Breath Sulfa (Sulfonamide Antibiotics)  02/01/2012    Hives Current Immunizations  Never Reviewed No immunizations on file. Not reviewed this visit You Were Diagnosed With   
  
 Codes Comments Acute recurrent sinusitis, unspecified location    -  Primary ICD-10-CM: J01.91 
ICD-9-CM: 461.9 Vitals BP Pulse Temp Resp Height(growth percentile) Weight(growth percentile) 131/72 (BP 1 Location: Right arm, BP Patient Position: Sitting) 65 98.8 °F (37.1 °C) (Oral) 18 5' 9.25\" (1.759 m) 243 lb 3.2 oz (110.3 kg) SpO2 BMI Smoking Status 98% 35.66 kg/m2 Never Smoker Vitals History BMI and BSA Data Body Mass Index Body Surface Area  
 35.66 kg/m 2 2.32 m 2 Preferred Pharmacy Pharmacy Name Phone [facility] [address] [place] [address] [phone] Your Updated Medication List  
  
   
This list is accurate as of: 11/15/17  4:59 PM.  Always use your most recent med list.  
  
  
  
  
 aspirin 81 mg chewable tablet Take 81 mg by mouth daily. cefdinir 300 mg capsule Commonly known as:  OMNICEF Take 1 Cap by mouth two (2) times a day for 10 days. coenzyme q10 10 mg Cap Take  by mouth. finasteride 5 mg tablet Commonly known as:  PROSCAR  
  
 levocetirizine 5 mg tablet Commonly known as:  XYZAL  
  
 methylPREDNISolone 4 mg tablet Commonly known as:  Bry Drones Take as directed. multivitamin tablet Commonly known as:  ONE A DAY Take 1 Tab by mouth daily. nabumetone 500 mg tablet Commonly known as:  RELAFEN  
  
 NASONEX 50 mcg/actuation nasal spray Generic drug:  mometasone 2 Sprays daily. PriLOSEC OTC 20 mg tablet Generic drug:  omeprazole  
  
 spironolactone-hydrochlorothiazide 25-25 mg per tablet Commonly known as:  ALDACTAZIDE  
  
 TAZTIA  mg SR capsule Generic drug:  dilTIAZem WELCHOL 625 mg tablet Generic drug:  colesevelam  
  
 ZETIA 10 mg tablet Generic drug:  ezetimibe Prescriptions Sent to Pharmacy Refills  
 methylPREDNISolone (MEDROL DOSEPACK) 4 mg tablet 0 Sig: Take as directed. Class: Normal  
 Pharmacy: [facility] Ph #: [phone]  
 cefdinir (OMNICEF) 300 mg capsule 0 Sig: Take 1 Cap by mouth two (2) times a day for 10 days. Class: Normal  
 Pharmacy: [address] Ph #: [phone] Route: Oral  
  
Follow-up Instructions Return if symptoms worsen or fail to improve. Introducing [facility]! Michelle Loja introduces [employer] patient portal. Now you can access parts of your medical record, email your doctor's office, and request medication refills online. 1. In your internet browser, go to [email or web address] 2. Click on the First Time User? Click Here link in the Sign In box. You will see the New Member Sign Up page. 3. Enter your [employer] Access Code exactly as it appears below. You will not need to use this code after youve completed the sign-up process. If you do not sign up before the expiration date, you must request a new code. · [employer] Access Code: [email or web address] Expires: 2/13/2018  3:08 PM 
 
4. Enter the last four digits of your Social Security Number (xxxx) and Date of Birth (mm/dd/yyyy) as indicated and click Submit. You will be taken to the next sign-up page. 5. Create a [employer] ID. This will be your [employer] login ID and cannot be changed, so think of one that is secure and easy to remember. 6. Create a [employer] password. You can change your password at any time. 7. Enter your Password Reset Question and Answer. This can be used at a later time if you forget your password. 8. Enter your e-mail address. You will receive e-mail notification when new information is available in [address] 9. Click Sign Up. You can now view and download portions of your medical record. 10. Click the Download Summary menu link to download a portable copy of your medical information. If you have questions, please visit the Frequently Asked Questions section of the [employer] website. Remember, [employer] is NOT to be used for urgent needs. For medical emergencies, dial 911. Now available from your iPhone and Android! Please provide this summary of care documentation to your next provider. Your primary care clinician is listed as JEANNETTE Bermeo.  If you have any questions after today's visit, please call [phone].

## 2017-11-16 RX ORDER — LEVOCETIRIZINE DIHYDROCHLORIDE 5 MG/1
5 TABLET, FILM COATED ORAL DAILY
Qty: 30 TAB | Refills: 5 | Status: SHIPPED | OUTPATIENT
Start: 2017-11-16 | End: 2018-08-24 | Stop reason: SDUPTHER

## 2018-01-15 ENCOUNTER — OFFICE VISIT (OUTPATIENT)
Dept: INTERNAL MEDICINE CLINIC | Age: 68
End: 2018-01-15

## 2018-01-15 VITALS
BODY MASS INDEX: 36.43 KG/M2 | HEIGHT: 69 IN | SYSTOLIC BLOOD PRESSURE: 142 MMHG | WEIGHT: 246 LBS | RESPIRATION RATE: 18 BRPM | DIASTOLIC BLOOD PRESSURE: 88 MMHG | HEART RATE: 84 BPM | TEMPERATURE: 98.1 F | OXYGEN SATURATION: 96 %

## 2018-01-15 DIAGNOSIS — Z00.00 ANNUAL PHYSICAL EXAM: Primary | ICD-10-CM

## 2018-01-15 DIAGNOSIS — J01.01 ACUTE RECURRENT MAXILLARY SINUSITIS: ICD-10-CM

## 2018-01-15 DIAGNOSIS — R53.83 FATIGUE, UNSPECIFIED TYPE: ICD-10-CM

## 2018-01-15 LAB
ALBUMIN SERPL-MCNC: 4.4 G/DL (ref 3.9–5.4)
ALKALINE PHOS POC: 42 U/L (ref 38–126)
ALT SERPL-CCNC: 36 U/L (ref 9–52)
AST SERPL-CCNC: 29 U/L (ref 14–36)
BACTERIA UA POCT, BACTPOCT: ABNORMAL
BILIRUB UR QL STRIP: NEGATIVE
BUN BLD-MCNC: 15 MG/DL (ref 9–20)
CALCIUM BLD-MCNC: 9.9 MG/DL (ref 8.4–10.2)
CASTS UA POCT: 0
CHLORIDE BLD-SCNC: 102 MMOL/L (ref 98–107)
CHOLEST SERPL-MCNC: 205 MG/DL (ref 0–200)
CLUE CELLS, CLUEPOCT: NEGATIVE
CO2 POC: 30 MMOL/L (ref 22–32)
CREAT BLD-MCNC: 0.8 MG/DL (ref 0.8–1.5)
CRYSTALS UA POCT, CRYSPOCT: NEGATIVE
EGFR (POC): 93.7
EPITHELIAL CELLS POCT: ABNORMAL
GLUCOSE POC: 91 MG/DL (ref 75–110)
GLUCOSE UR-MCNC: NEGATIVE MG/DL
GRAN# POC: 4.4 K/UL (ref 2–7.8)
GRAN% POC: 65.4 % (ref 37–92)
HCT VFR BLD CALC: 43.9 % (ref 37–51)
HDLC SERPL-MCNC: 49 MG/DL (ref 35–130)
HGB BLD-MCNC: 14.8 G/DL (ref 12–18)
KETONES P FAST UR STRIP-MCNC: NEGATIVE MG/DL
LDL CHOLESTEROL POC: 107.2 MG/DL (ref 0–130)
LY# POC: 1.9 K/UL (ref 0.6–4.1)
LY% POC: 29.7 % (ref 10–58.5)
MCH RBC QN: 30.8 PG (ref 26–32)
MCHC RBC-ENTMCNC: 33.7 G/DL (ref 30–36)
MCV RBC: 91 FL (ref 80–97)
MID #, POC: 0.3 K/UL (ref 0–1.8)
MID% POC: 4.9 % (ref 0.1–24)
MUCUS UA POCT, MUCPOCT: ABNORMAL
PH UR STRIP: 7 [PH] (ref 5–7)
PLATELET # BLD: 230 K/UL (ref 140–440)
POTASSIUM SERPL-SCNC: 5.1 MMOL/L (ref 3.6–5)
PROT SERPL-MCNC: 7.4 G/DL (ref 6.3–8.2)
PROT UR QL STRIP: NEGATIVE
RBC # BLD: 4.81 M/UL (ref 4.2–6.3)
RBC UA POCT, RBCPOCT: 0
SODIUM SERPL-SCNC: 141 MMOL/L (ref 137–145)
SP GR UR STRIP: 1 (ref 1.01–1.02)
TCHOL/HDL RATIO (POC): 4.2 (ref 0–4)
TOTAL BILIRUBIN POC: 0.9 MG/DL (ref 0.2–1.3)
TRICH UA POCT, TRICHPOC: NEGATIVE
TRIGL SERPL-MCNC: 244 MG/DL (ref 0–200)
UA UROBILINOGEN AMB POC: NORMAL (ref 0.2–1)
URINALYSIS CLARITY POC: CLEAR
URINALYSIS COLOR POC: ABNORMAL
URINE BLOOD POC: NEGATIVE
URINE CULT COMMENT, POCT: ABNORMAL
URINE LEUKOCYTES POC: ABNORMAL
URINE NITRITES POC: NEGATIVE
VLDLC SERPL CALC-MCNC: 48.8 MG/DL
WBC # BLD: 6.6 K/UL (ref 4.1–10.9)
WBC UA POCT, WBCPOCT: ABNORMAL
YEAST UA POCT, YEASTPOC: NEGATIVE

## 2018-01-15 RX ORDER — AMOXICILLIN AND CLAVULANATE POTASSIUM 875; 125 MG/1; MG/1
1 TABLET, FILM COATED ORAL EVERY 12 HOURS
Qty: 20 TAB | Refills: 0 | Status: SHIPPED | OUTPATIENT
Start: 2018-01-15 | End: 2018-07-31 | Stop reason: ALTCHOICE

## 2018-01-15 RX ORDER — NABUMETONE 500 MG/1
500 TABLET, FILM COATED ORAL 2 TIMES DAILY
Qty: 60 TAB | Refills: 3 | Status: SHIPPED | OUTPATIENT
Start: 2018-01-15 | End: 2018-06-04 | Stop reason: SDUPTHER

## 2018-01-15 NOTE — MR AVS SNAPSHOT
[address] [address] [address] [phone] Patient: Jean-Claude Arreola 
MRN: [id number]:[id number] Visit Information Date & Time Provider Department Dept. Phone Encounter #  
 1/15/2018  1:20 PM JEANNETTE Torres MD [facility] [id number] Follow-up Instructions Return in about 1 year (around 1/15/2019) for CPE. Upcoming Health Maintenance Date Due DTaP/Tdap/Td series (1 - Tdap) 6/29/1973 FOBT Q 1 YEAR AGE 50-75 6/29/2002 ZOSTER VACCINE AGE 60> 4/29/2012 GLAUCOMA SCREENING Q2Y 6/29/2017 Pneumococcal 65+ Low/Medium Risk (1 of 2 - PCV13) 6/29/2017 MEDICARE YEARLY EXAM 6/29/2017 Influenza Age 5 to Adult 8/1/2017 Allergies as of 1/15/2018  Review Complete On: 1/15/2018 By: Corina Arevalo MD  
  
 Severity Noted Reaction Type Reactions Claritin-d 12 Hour [Loratadine-pseudoephedrine]  02/01/2012    Shortness of Breath Iodine  02/01/2012    Shortness of Breath Levaquin [Levofloxacin]  02/01/2012    Hives Seafood [Shellfish Containing Products]  02/01/2012    Shortness of Breath Sulfa (Sulfonamide Antibiotics)  02/01/2012    Hives Current Immunizations  Never Reviewed No immunizations on file. Not reviewed this visit You Were Diagnosed With   
  
 Codes Comments Annual physical exam    -  Primary ICD-10-CM: Z00.00 ICD-9-CM: V70.0 Fatigue, unspecified type     ICD-10-CM: R53.83 ICD-9-CM: 780.79 Acute recurrent maxillary sinusitis     ICD-10-CM: J01.01 
ICD-9-CM: 461.0 Vitals BP Pulse Temp Resp Height(growth percentile) Weight(growth percentile) 142/88 (BP 1 Location: Right arm, BP Patient Position: Sitting) 84 98.1 °F (36.7 °C) (Oral) 18 5' 9.25\" (1.759 m) 246 lb (111.6 kg) SpO2 BMI Smoking Status 96% 36.07 kg/m2 Never Smoker Vitals History BMI and BSA Data Body Mass Index Body Surface Area 36.07 kg/m 2 2.34 m 2 Preferred Pharmacy Pharmacy Name Phone [id number] [address] [place] [address] [phone] Your Updated Medication List  
  
   
This list is accurate as of: 1/15/18  5:17 PM.  Always use your most recent med list.  
  
  
  
  
 amoxicillin-clavulanate 875-125 mg per tablet Commonly known as:  AUGMENTIN Take 1 Tab by mouth every twelve (12) hours. aspirin 81 mg chewable tablet Take 81 mg by mouth daily. coenzyme q10 10 mg Cap Take  by mouth. finasteride 5 mg tablet Commonly known as:  PROSCAR  
  
 levocetirizine 5 mg tablet Commonly known as:  Areatha Conch Take 1 Tab by mouth daily. multivitamin tablet Commonly known as:  ONE A DAY Take 1 Tab by mouth daily. nabumetone 500 mg tablet Commonly known as:  RELAFEN Take 1 Tab by mouth two (2) times a day. NASONEX 50 mcg/actuation nasal spray Generic drug:  mometasone 2 Sprays daily. PriLOSEC OTC 20 mg tablet Generic drug:  omeprazole  
  
 spironolactone-hydrochlorothiazide 25-25 mg per tablet Commonly known as:  ALDACTAZIDE  
  
 TAZTIA  mg SR capsule Generic drug:  dilTIAZem WELCHOL 625 mg tablet Generic drug:  colesevelam  
  
 ZETIA 10 mg tablet Generic drug:  ezetimibe Prescriptions Sent to Pharmacy Refills  
 amoxicillin-clavulanate (AUGMENTIN) 875-125 mg per tablet 0 Sig: Take 1 Tab by mouth every twelve (12) hours. Class: Normal  
 Pharmacy: [id number] [address] [place] [address] Ph #: [phone] Route: Oral  
  
We Performed the Following AMB POC COMPLETE CBC,AUTOMATED ENTER [id number] CPT(R)] AMB POC COMPREHENSIVE METABOLIC PANEL [67930 CPT(R)] AMB POC LIPID PROFILE [18557 CPT(R)] AMB POC URINALYSIS DIP STICK AUTO W/ MICRO  [38389 CPT(R)] TESTOSTERONE, TOTAL, ADULT MALE [80980 CPT(R)] Follow-up Instructions Return in about 1 year (around 1/15/2019) for CPE. Introducing [facility]! Sabra Stevens introduces [email or web address] patient portal. Now you can access parts of your medical record, email your doctor's office, and request medication refills online. 1. In your internet browser, go to [email or web address] 2. Click on the First Time User? Click Here link in the Sign In box. You will see the New Member Sign Up page. 3. Enter your [email or web address] Access Code exactly as it appears below. You will not need to use this code after youve completed the sign-up process. If you do not sign up before the expiration date, you must request a new code. · [email or web address] Access Code: [email or web address] Expires: 2/13/2018  3:08 PM 
 
4. Enter the last four digits of your Social Security Number (xxxx) and Date of Birth (mm/dd/yyyy) as indicated and click Submit. You will be taken to the next sign-up page. 5. Create a [email or web address] ID. This will be your [email or web address] login ID and cannot be changed, so think of one that is secure and easy to remember. 6. Create a [email or web address] password. You can change your password at any time. 7. Enter your Password Reset Question and Answer. This can be used at a later time if you forget your password. 8. Enter your e-mail address. You will receive e-mail notification when new information is available in [address] 9. Click Sign Up. You can now view and download portions of your medical record. 10. Click the Download Summary menu link to download a portable copy of your medical information. If you have questions, please visit the Frequently Asked Questions section of the [email or web address] website. Remember, [email or web address] is NOT to be used for urgent needs. For medical emergencies, dial 911. Now available from your iPhone and Android! Please provide this summary of care documentation to your next provider. Your primary care clinician is listed as JEANNETTE Mendoza. If you have any questions after today's visit, please call [phone].

## 2018-01-15 NOTE — PROGRESS NOTES
This note will not be viewable in [address] Daly Alicea III is a 74 y.o. male and presents with Sinus Infection (6 month follow up; patient complains of sinus issues and intermittent bilateral ear \"echoing\")  . Subjective:  Mr. hernandez presents today for conference of physical exam.  He notes sinus congestion and pressure and drainage that is a recurring problem for him. His last treated for sinus infection last month. He also notes intermittent back discomfort for which she has an inversion table and uses to stretch his back. After doing this a couple of times the other day he became quite dizzy and nauseated. This was definitively associated with change in position. It has not reoccurred since that time. He denies any shortness of breath, PND, orthopnea, pedal edema, chest pain, palpitations. Review of Systems  Constitutional: negative for fevers, chills, anorexia and weight loss  Eyes:   negative for visual disturbance and irritation  ENT:   negative for tinnitus, positive for fullness in left ear and nasal drainage  Respiratory:  negative for cough, hemoptysis, dyspnea,wheezing  CV:   negative for chest pain, palpitations, lower extremity edema  GI:   negative for nausea, vomiting, diarrhea, abdominal pain,melena  Endo:               negative for polyuria,polydipsia,polyphagia,heat intolerance  Genitourinary: negative for frequency, dysuria and hematuria  Integumentary: negative for rash and pruritus  Hematologic:  negative for easy bruising and gum/nose bleeding  Musculoskel: negative for myalgias, arthralgias, back pain,  joint pain, positive for  generalized fatigue and muscle weakness.   Neurological:  negative for headaches, dizziness, vertigo, memory problems and gait   Behavl/Psych: negative for feelings of anxiety, depression, mood changes    Past Medical History:   Diagnosis Date    Actinic keratosis 9/15/2017    Annual physical exam 9/15/2017    Arthritis     ASHD (arteriosclerotic heart disease) 9/15/2017    CAD (coronary artery disease)     Chest pain 9/15/2017    Chronic back pain 9/15/2017    Cough 9/15/2017    Diabetes mellitus screening 9/15/2017    Diaphoresis 9/15/2017    Dyslipidemia 9/15/2017    Edema 9/15/2017    Elevated LFTs 9/15/2017    Fatigue 9/15/2017    Hypercholesterolemia     Hyperplasia of prostate 9/15/2017    Obesity 9/15/2017    Other long term (current) drug therapy 9/15/2017    Sinusitis 9/15/2017    SK (seborrheic keratosis) 9/15/2017     History reviewed. No pertinent surgical history. Social History     Social History    Marital status:      Spouse name: N/A    Number of children: N/A    Years of education: N/A     Social History Main Topics    Smoking status: Never Smoker    Smokeless tobacco: Former User    Alcohol use No    Drug use: No    Sexual activity: Not Asked     Other Topics Concern    None     Social History Narrative     Family History   Problem Relation Age of Onset    No Known Problems Mother      Current Outpatient Prescriptions   Medication Sig Dispense Refill    amoxicillin-clavulanate (AUGMENTIN) 875-125 mg per tablet Take 1 Tab by mouth every twelve (12) hours. 20 Tab 0    levocetirizine (XYZAL) 5 mg tablet Take 1 Tab by mouth daily. 30 Tab 5    WELCHOL 625 mg tablet   0    TAZTIA  mg SR capsule   0    ZETIA 10 mg tablet   0    finasteride (PROSCAR) 5 mg tablet   0    PRILOSEC OTC 20 mg tablet   1    aspirin 81 mg chewable tablet Take 81 mg by mouth daily.  coenzyme q10 10 mg cap Take  by mouth.  multivitamin (ONE A DAY) tablet Take 1 Tab by mouth daily.  nabumetone (RELAFEN) 500 mg tablet Take 1 Tab by mouth two (2) times a day. 60 Tab 3    mometasone (NASONEX) 50 mcg/actuation nasal spray 2 Sprays daily.       spironolactone-hydrochlorothiazide (ALDACTAZIDE) 25-25 mg per tablet   0     Allergies   Allergen Reactions    Claritin-D 12 Hour [Loratadine-Pseudoephedrine] Shortness of Breath    Iodine Shortness of Breath    Levaquin [Levofloxacin] Hives    Seafood [Shellfish Containing Products] Shortness of Breath    Sulfa (Sulfonamide Antibiotics) Hives       Objective:  Visit Vitals    /88 (BP 1 Location: Right arm, BP Patient Position: Sitting)    Pulse 84    Temp 98.1 °F (36.7 °C) (Oral)    Resp 18    Ht 5' 9.25\" (1.759 m)    Wt 246 lb (111.6 kg)    SpO2 96%    BMI 36.07 kg/m2     Physical Exam:   General appearance - alert, well appearing, and in no distress  Mental status - alert, oriented to person, place, and time  EYE-KAI, EOMI, fundi normal, corneas normal, no foreign bodies  ENT-ENT exam normal, no neck nodes or sinus tenderness  Nose -  nares are erythematous and boggy bilaterally. Mouth - mucous membranes moist, pharynx normal without lesions  Neck - supple, no significant adenopathy   Chest - clear to auscultation, no wheezes, rales or rhonchi, symmetric air entry   Heart - normal rate, regular rhythm, normal S1, S2, no murmurs, rubs, clicks or gallops   Abdomen - soft, nontender, nondistended, no masses or organomegaly  Lymph- no adenopathy palpable  Ext-peripheral pulses normal, no pedal edema, no clubbing or cyanosis  Skin-Warm and dry. no hyperpigmentation, vitiligo, or suspicious lesions  Neuro -alert, oriented, normal speech, no focal findings or movement disorder noted  Musculoskeletal- FROM, no bony abnormalities, no point tenderness   -  normal external genitalia, no inguinal hernia, normal rectal tone, prostate normal size and consistency, no blood per rectum, no hemorrhoids.     Results for orders placed or performed in visit on 01/15/18   AMB POC COMPLETE CBC,AUTOMATED ENTER   Result Value Ref Range    WBC (POC)  4.1 - 10.9 K/uL    RBC (POC)  4.20 - 6.30 M/uL    HGB (POC)  12.0 - 18.0 g/dL    HCT (POC)  37.0 - 51.0 %    MCV (POC)  80.0 - 97.0 fL    MCH (POC)  26.0 - 32.0 pg    MCHC (POC)  30.0 - 36.0 g/dL PLATELET (POC)  588.0 - 440.0 K/uL    ABS. LYMPHS (POC)  0.6 - 4.1 K/uL    LYMPHOCYTES (POC)  10.0 - 58.5 %    Mid # (POC)  0.0 - 1.8 K/uL    MID% POC  0.1 - 24.0 %    ABS.  GRANS (POC)  2.0 - 7.8 K/uL    GRANULOCYTES (POC)  37.0 - 92.0 %   AMB POC COMPREHENSIVE METABOLIC PANEL   Result Value Ref Range    GLUCOSE  75 - 110 mg/dL    BUN  9 - 20 mg/dL    Creatinine (POC)  0.8 - 1.5 mg/dL    Sodium (POC)  137 - 145 MMOL/L    Potassium (POC)  3.6 - 5.0 MMOL/L    CHLORIDE  98 - 107 MMOL/L    CO2  22 - 32 MMOL/L    CALCIUM  8.4 - 10.2 mg/dL    TOTAL PROTEIN  6.3 - 8.2 g/dL    ALBUMIN  3.9 - 5.4 g/dL    AST (POC)  14 - 36 U/L    ALT (POC)  9 - 52 U/L    ALKALINE PHOS  38 - 126 U/L    TOTAL BILIRUBIN  0.2 - 1.3 mg/dL    eGFR (POC)     AMB POC LIPID PROFILE   Result Value Ref Range    Cholesterol (POC)  0 - 200 mg/dL    Triglycerides (POC)  0 - 200 mg/dL    HDL Cholesterol (POC)  35 - 130 mg/dL    VLDL (POC)  MG/DL    LDL Cholesterol (POC)  0.0 - 130.0 MG/DL    TChol/HDL Ratio (POC)  0.0 - 4.0   AMB POC URINALYSIS DIP STICK AUTO W/ MICRO    Result Value Ref Range    Color (UA POC)      Clarity (UA POC)      Glucose (UA POC)  Negative    Bilirubin (UA POC)  Negative    Ketones (UA POC)  Negative    Specific gravity (UA POC)  1.010 - 1.025    Blood (UA POC)  Negative    pH (UA POC)  5.0 - 7.0    Protein (UA POC)  Negative    Urobilinogen (UA POC)  0.2 - 1    Nitrites (UA POC)  Negative    Leukocyte esterase (UA POC)  Negative    Epithelial cells (UA POC)      Mucus (UA POC)      WBCs (UA POC)      RBCs (UA POC)      Casts (UA POC)  Negative    Crystals (UA POC)  Negative    Clue Cells (UA POC)      Trichomonas (UA POC)      Yeast (UA POC)      Bacteria (UA POC)  Negative    URINE CULT COMMENT (UA POC)         Assessment/Plan:    Orders Placed This Encounter    TESTOSTERONE, TOTAL, ADULT MALE    AMB POC COMPLETE CBC,AUTOMATED ENTER    AMB POC COMPREHENSIVE METABOLIC PANEL    AMB POC LIPID PROFILE    AMB POC URINALYSIS DIP STICK AUTO W/ MICRO     amoxicillin-clavulanate (AUGMENTIN) 875-125 mg per tablet     Sig: Take 1 Tab by mouth every twelve (12) hours. Dispense:  20 Tab     Refill:  0       Problem List Items Addressed This Visit     Annual physical exam - Primary    Relevant Orders    AMB POC COMPLETE CBC,AUTOMATED ENTER (Completed)    AMB POC COMPREHENSIVE METABOLIC PANEL (Completed)    AMB POC LIPID PROFILE (Completed)    AMB POC URINALYSIS DIP STICK AUTO W/ MICRO  (Completed)    Fatigue    Relevant Orders    TESTOSTERONE, TOTAL, ADULT MALE    Sinusitis      Plan:    Normal routine health maintenance exam.  Follow-up labs as ordered. Hyperlipidemia will be monitored on current regimen. Augmentin 875 mg twice daily for 10 days for recurrent sinus infection. There are no Patient Instructions on file for this visit. Follow-up Disposition:  Return in about 1 year (around 1/15/2019) for CPE. I have reviewed with the patient details of the assessment and plan and all questions were answered. Relevent patient education was performed. The most recent lab findings were reviewed with the patient. An After Visit Summary was printed and given to the patient.       Olesya Oneal MD

## 2018-01-15 NOTE — TELEPHONE ENCOUNTER
Requested Prescriptions     Pending Prescriptions Disp Refills    nabumetone (RELAFEN) 500 mg tablet 60 Tab 0     Sig: Take 1 Tab by mouth two (2) times a day.        Last Refill: 12/18/17  Next Appointment:none

## 2018-01-15 NOTE — PROGRESS NOTES
Chief Complaint   Patient presents with    Sinus Infection     6 month follow up; patient complains of sinus issues and intermittent bilateral ear \"echoing\"     1. Have you been to the ER, urgent care clinic since your last visit? Hospitalized since your last visit? No    2. Have you seen or consulted any other health care providers outside of the [address] [place] [address] since your last visit? Include any pap smears or colon screening.  No

## 2018-01-16 LAB — TESTOST SERPL-MCNC: 448 NG/DL (ref 264–916)

## 2018-02-16 RX ORDER — EZETIMIBE 10 MG
10 TABLET ORAL DAILY
Qty: 90 TAB | Refills: 3 | Status: SHIPPED | OUTPATIENT
Start: 2018-02-16

## 2018-02-16 NOTE — TELEPHONE ENCOUNTER
Requested Prescriptions     Pending Prescriptions Disp Refills    ZETIA 10 mg tablet  0     Sig: Take 1 Tab by mouth daily.        Last Refill: 01/16/18  Next Appointment:none scheduled

## 2018-06-04 RX ORDER — NABUMETONE 500 MG/1
500 TABLET, FILM COATED ORAL 2 TIMES DAILY
Qty: 180 TAB | Refills: 3 | Status: SHIPPED | OUTPATIENT
Start: 2018-06-04 | End: 2019-07-18 | Stop reason: SDUPTHER

## 2018-06-04 NOTE — TELEPHONE ENCOUNTER
Requested Prescriptions     Pending Prescriptions Disp Refills    nabumetone (RELAFEN) 500 mg tablet 60 Tab 3     Sig: Take 1 Tab by mouth two (2) times a day.        Last Refill: 05/03/18 Next Appointment:none scheduled

## 2018-07-31 ENCOUNTER — OFFICE VISIT (OUTPATIENT)
Dept: INTERNAL MEDICINE CLINIC | Age: 68
End: 2018-07-31

## 2018-07-31 VITALS
OXYGEN SATURATION: 95 % | DIASTOLIC BLOOD PRESSURE: 76 MMHG | WEIGHT: 244 LBS | BODY MASS INDEX: 36.14 KG/M2 | TEMPERATURE: 98.9 F | HEART RATE: 67 BPM | SYSTOLIC BLOOD PRESSURE: 144 MMHG | HEIGHT: 69 IN

## 2018-07-31 DIAGNOSIS — J20.9 ACUTE BRONCHITIS, UNSPECIFIED ORGANISM: Primary | ICD-10-CM

## 2018-07-31 PROBLEM — E66.01 SEVERE OBESITY (BMI 35.0-39.9): Status: ACTIVE | Noted: 2018-07-31

## 2018-07-31 RX ORDER — METHYLPREDNISOLONE 4 MG/1
4 TABLET ORAL
Qty: 1 DOSE PACK | Refills: 0 | Status: SHIPPED | OUTPATIENT
Start: 2018-07-31 | End: 2018-09-12 | Stop reason: ALTCHOICE

## 2018-07-31 RX ORDER — CEFUROXIME AXETIL 500 MG/1
500 TABLET ORAL 2 TIMES DAILY
Qty: 14 TAB | Refills: 0 | Status: SHIPPED | OUTPATIENT
Start: 2018-07-31 | End: 2018-09-12 | Stop reason: ALTCHOICE

## 2018-07-31 RX ORDER — OMEPRAZOLE 40 MG/1
40 CAPSULE, DELAYED RELEASE ORAL DAILY
Refills: 1 | COMMUNITY
Start: 2018-07-22

## 2018-07-31 NOTE — MR AVS SNAPSHOT
Bridget [id number] [phone] Patient: Vernell Newman 
MRN: [id number] Visit Information Date & Time Provider Department Dept. Phone Encounter #  
 7/31/2018  2:15 PM Mayra Barrientos NP [facility] [id number] Follow-up Instructions Return if symptoms worsen or fail to improve. Follow-up and Disposition History Upcoming Health Maintenance Date Due DTaP/Tdap/Td series (1 - Tdap) 6/29/1973 FOBT Q 1 YEAR AGE 50-75 6/29/2002 ZOSTER VACCINE AGE 60> 4/29/2012 GLAUCOMA SCREENING Q2Y 6/29/2017 Pneumococcal 65+ Low/Medium Risk (1 of 2 - PCV13) 6/29/2017 Influenza Age 5 to Adult 8/1/2018 Allergies as of 7/31/2018  Review Complete On: 7/31/2018 By: Mayra Barrientos NP Severity Noted Reaction Type Reactions Claritin-d 12 Hour [Loratadine-pseudoephedrine]  02/01/2012    Shortness of Breath Iodine  02/01/2012    Shortness of Breath Levaquin [Levofloxacin]  02/01/2012    Hives Seafood [Shellfish Containing Products]  02/01/2012    Shortness of Breath Sulfa (Sulfonamide Antibiotics)  02/01/2012    Hives Current Immunizations  Never Reviewed No immunizations on file. Not reviewed this visit You Were Diagnosed With   
  
 Codes Comments Acute bronchitis, unspecified organism    -  Primary ICD-10-CM: J20.9 ICD-9-CM: 466.0 Vitals BP Pulse Temp Height(growth percentile) Weight(growth percentile) SpO2  
 144/76 (BP 1 Location: Left arm, BP Patient Position: Sitting) 67 98.9 °F (37.2 °C) (Oral) 5' 9.25\" (1.759 m) 244 lb (110.7 kg) 95% BMI Smoking Status 35.77 kg/m2 Never Smoker Vitals History BMI and BSA Data Body Mass Index Body Surface Area 35.77 kg/m 2 2.33 m 2 Preferred Pharmacy Pharmacy Name Phone [facility] [address] [place] [address] [phone] Your Updated Medication List  
  
   
This list is accurate as of 7/31/18  3:02 PM.  Always use your most recent med list.  
  
  
  
  
 aspirin 81 mg chewable tablet Take 81 mg by mouth daily. cefUROXime 500 mg tablet Commonly known as:  CEFTIN Take 1 Tab by mouth two (2) times a day. coenzyme q10 10 mg Cap Take  by mouth. finasteride 5 mg tablet Commonly known as:  PROSCAR  
  
 levocetirizine 5 mg tablet Commonly known as:  Graylon Joey Take 1 Tab by mouth daily. methylPREDNISolone 4 mg tablet Commonly known as:  John Gull Take 1 Tab by mouth Specific Days and Specific Times. multivitamin tablet Commonly known as:  ONE A DAY Take 1 Tab by mouth daily. nabumetone 500 mg tablet Commonly known as:  RELAFEN Take 1 Tab by mouth two (2) times a day. NASONEX 50 mcg/actuation nasal spray Generic drug:  mometasone 2 Sprays daily. omeprazole 40 mg capsule Commonly known as:  PRILOSEC  
  
 TAZTIA  mg SR capsule Generic drug:  dilTIAZem WELCHOL 625 mg tablet Generic drug:  colesevelam  
  
 ZETIA 10 mg tablet Generic drug:  ezetimibe Take 1 Tab by mouth daily. Prescriptions Sent to Pharmacy Refills  
 methylPREDNISolone (MEDROL DOSEPACK) 4 mg tablet 0 Sig: Take 1 Tab by mouth Specific Days and Specific Times. Class: Normal  
 Pharmacy: [address] Ph #: [phone] Route: Oral  
 cefUROXime (CEFTIN) 500 mg tablet 0 Sig: Take 1 Tab by mouth two (2) times a day. Class: Normal  
 Pharmacy: [address] Ph #: [phone] Route: Oral  
  
Follow-up Instructions Return if symptoms worsen or fail to improve. Patient Instructions Bronchitis: Care Instructions Your Care Instructions Bronchitis is inflammation of the bronchial tubes, which carry air to the lungs. The tubes swell and produce mucus, or phlegm. The mucus and inflamed bronchial tubes make you cough. You may have trouble breathing. Most cases of bronchitis are caused by viruses like those that cause colds. Antibiotics usually do not help and they may be harmful. Bronchitis usually develops rapidly and lasts about 2 to 3 weeks in otherwise healthy people. Follow-up care is a key part of your treatment and safety. Be sure to make and go to all appointments, and call your doctor if you are having problems. It's also a good idea to know your test results and keep a list of the medicines you take. How can you care for yourself at home? · Take all medicines exactly as prescribed. Call your doctor if you think you are having a problem with your medicine. · Get some extra rest. 
· Take an over-the-counter pain medicine, such as acetaminophen (Tylenol), ibuprofen (Advil, Motrin), or naproxen (Aleve) to reduce fever and relieve body aches. Read and follow all instructions on the label. · Do not take two or more pain medicines at the same time unless the doctor told you to. Many pain medicines have acetaminophen, which is Tylenol. Too much acetaminophen (Tylenol) can be harmful. · Take an over-the-counter cough medicine that contains dextromethorphan to help quiet a dry, hacking cough so that you can sleep. Avoid cough medicines that have more than one active ingredient. Read and follow all instructions on the label. · Breathe moist air from a humidifier, hot shower, or sink filled with hot water. The heat and moisture will thin mucus so you can cough it out. · Do not smoke. Smoking can make bronchitis worse. If you need help quitting, talk to your doctor about stop-smoking programs and medicines. These can increase your chances of quitting for good. When should you call for help? Call 911 anytime you think you may need emergency care. For example, call if: 
  · You have severe trouble breathing.  
 Call your doctor now or seek immediate medical care if: 
  · You have new or worse trouble breathing.  
  · You cough up dark brown or bloody mucus (sputum).  
  · You have a new or higher fever.  
  · You have a new rash.  
 Watch closely for changes in your health, and be sure to contact your doctor if: 
  · You cough more deeply or more often, especially if you notice more mucus or a change in the color of your mucus.  
  · You are not getting better as expected. Where can you learn more? Go to http://sergey-phyllis.info/. Enter H333 in the search box to learn more about \"Bronchitis: Care Instructions. \" Current as of: December 6, 2017 Content Version: 11.7 © 0226-6423 Insmed. Care instructions adapted under license by [employer]wl (which disclaims liability or warranty for this information). If you have questions about a medical condition or this instruction, always ask your healthcare professional. [address] any warranty or liability for your use of this information. Patient Instructions History Introducing [facility]! Dario Angelo introduces [employer] patient portal. Now you can access parts of your medical record, email your doctor's office, and request medication refills online. 1. In your internet browser, go to [email or web address] 2. Click on the First Time User? Click Here link in the Sign In box. You will see the New Member Sign Up page. 3. Enter your [employer] Access Code exactly as it appears below. You will not need to use this code after youve completed the sign-up process. If you do not sign up before the expiration date, you must request a new code. · [employer] Access Code: [email or web address] Expires: 10/29/2018  2:00 PM 
 
 4. Enter the last four digits of your Social Security Number (xxxx) and Date of Birth (mm/dd/yyyy) as indicated and click Submit. You will be taken to the next sign-up page. 5. Create a BlackArrow ID. This will be your BlackArrow login ID and cannot be changed, so think of one that is secure and easy to remember. 6. Create a BlackArrow password. You can change your password at any time. 7. Enter your Password Reset Question and Answer. This can be used at a later time if you forget your password. 8. Enter your e-mail address. You will receive e-mail notification when new information is available in [address] 9. Click Sign Up. You can now view and download portions of your medical record. 10. Click the Download Summary menu link to download a portable copy of your medical information. If you have questions, please visit the Frequently Asked Questions section of the BlackArrow website. Remember, BlackArrow is NOT to be used for urgent needs. For medical emergencies, dial 911. Now available from your iPhone and Android! Please provide this summary of care documentation to your next provider. Your primary care clinician is listed as JEANNETTE Roy. If you have any questions after today's visit, please call [phone].

## 2018-07-31 NOTE — PROGRESS NOTES
Dayday Montague presents with   Chief Complaint   Patient presents with    Cough    Sweats    Cold Symptoms    Sore Throat    Ear Fullness   Patient of Dr Main Ch here with complaint of sweats, nasal congestion (yellow), cough, ear fullness & scratchy throat for a couple of weeks. Hasn't taken temperature. 1. Have you been to the ER, urgent care clinic since your last visit? Hospitalized since your last visit? No    2. Have you seen or consulted any other health care providers outside of the [address] [place] [address] since your last visit? Include any pap smears or colon screening.  No

## 2018-07-31 NOTE — PROGRESS NOTES
Subjective:  Mr. Frederick Hong is a pleasant 79year old gentleman, patient of Dr. Rajan Burnette, who comes in today with a two week history of what started out as some nasal congestion, postnasal drainage, has now settled down in his lungs. He is now coughing this yellowish sputum. He denies any shortness of breath, has had occasional wheezing, but denies any hemoptysis. He denies any fever, but has been sweating a lot. He denies nausea or vomiting. Denies past history of asthma or pneumonia. Physical Examination:  GENERAL:  Pleasant gentleman in no acute distress. He is alert and oriented. VITALS:  BP: 144/76. P: 67.  O2 sat: 95%. T: 98.9. HEENT:  Normocephalic, atraumatic. TMs normal.  Mouth mucosa pink. Tongue midline. Pharynx minimally injected without presence of exudates. No sinus tenderness. NECK:  Supple without adenopathy or thyromegaly. CHEST:  Lungs clear to auscultation except for occasional expiratory wheeze, no rales, good chest excursion. CV:  Heart regular rhythm without murmur. EXTREMITIES:  No edema or calf tenderness. Distal pulses were present. Impression:  1. Acute bronchitis. Plan:  1. It was opted to start him on Ceftin 500 mg twice daily for seven days along with a Medrol Dosepak. 2. He is to increase fluids and rest.  3. He will call us should he fail to improve or if his condition worsens.             Past Medical History:   Diagnosis Date    Actinic keratosis 9/15/2017    Annual physical exam 9/15/2017    Arthritis     ASHD (arteriosclerotic heart disease) 9/15/2017    CAD (coronary artery disease)     Chest pain 9/15/2017    Chronic back pain 9/15/2017    Cough 9/15/2017    Diabetes mellitus screening 9/15/2017    Diaphoresis 9/15/2017    Dyslipidemia 9/15/2017    Edema 9/15/2017    Elevated LFTs 9/15/2017    Fatigue 9/15/2017    Hypercholesterolemia     Hyperplasia of prostate 9/15/2017    Obesity 9/15/2017    Other long term (current) drug therapy 9/15/2017  Sinusitis 9/15/2017    SK (seborrheic keratosis) 9/15/2017     No past surgical history on file. Current Outpatient Prescriptions on File Prior to Visit   Medication Sig Dispense Refill    nabumetone (RELAFEN) 500 mg tablet Take 1 Tab by mouth two (2) times a day. 180 Tab 3    ZETIA 10 mg tablet Take 1 Tab by mouth daily. 90 Tab 3    levocetirizine (XYZAL) 5 mg tablet Take 1 Tab by mouth daily. 30 Tab 5    WELCHOL 625 mg tablet   0    TAZTIA  mg SR capsule   0    aspirin 81 mg chewable tablet Take 81 mg by mouth daily.  coenzyme q10 10 mg cap Take  by mouth.  multivitamin (ONE A DAY) tablet Take 1 Tab by mouth daily.  mometasone (NASONEX) 50 mcg/actuation nasal spray 2 Sprays daily.  finasteride (PROSCAR) 5 mg tablet   0     No current facility-administered medications on file prior to visit.       Allergies   Allergen Reactions    Claritin-D 12 Hour [Loratadine-Pseudoephedrine] Shortness of Breath    Iodine Shortness of Breath    Levaquin [Levofloxacin] Hives    Seafood [Shellfish Containing Products] Shortness of Breath    Sulfa (Sulfonamide Antibiotics) Hives

## 2018-07-31 NOTE — PATIENT INSTRUCTIONS
Bronchitis: Care Instructions  Your Care Instructions    Bronchitis is inflammation of the bronchial tubes, which carry air to the lungs. The tubes swell and produce mucus, or phlegm. The mucus and inflamed bronchial tubes make you cough. You may have trouble breathing. Most cases of bronchitis are caused by viruses like those that cause colds. Antibiotics usually do not help and they may be harmful. Bronchitis usually develops rapidly and lasts about 2 to 3 weeks in otherwise healthy people. Follow-up care is a key part of your treatment and safety. Be sure to make and go to all appointments, and call your doctor if you are having problems. It's also a good idea to know your test results and keep a list of the medicines you take. How can you care for yourself at home? · Take all medicines exactly as prescribed. Call your doctor if you think you are having a problem with your medicine. · Get some extra rest.  · Take an over-the-counter pain medicine, such as acetaminophen (Tylenol), ibuprofen (Advil, Motrin), or naproxen (Aleve) to reduce fever and relieve body aches. Read and follow all instructions on the label. · Do not take two or more pain medicines at the same time unless the doctor told you to. Many pain medicines have acetaminophen, which is Tylenol. Too much acetaminophen (Tylenol) can be harmful. · Take an over-the-counter cough medicine that contains dextromethorphan to help quiet a dry, hacking cough so that you can sleep. Avoid cough medicines that have more than one active ingredient. Read and follow all instructions on the label. · Breathe moist air from a humidifier, hot shower, or sink filled with hot water. The heat and moisture will thin mucus so you can cough it out. · Do not smoke. Smoking can make bronchitis worse. If you need help quitting, talk to your doctor about stop-smoking programs and medicines. These can increase your chances of quitting for good.   When should you call for help? Call 911 anytime you think you may need emergency care. For example, call if:    · You have severe trouble breathing.    Call your doctor now or seek immediate medical care if:    · You have new or worse trouble breathing.     · You cough up dark brown or bloody mucus (sputum).     · You have a new or higher fever.     · You have a new rash.    Watch closely for changes in your health, and be sure to contact your doctor if:    · You cough more deeply or more often, especially if you notice more mucus or a change in the color of your mucus.     · You are not getting better as expected. Where can you learn more? Go to http://sergey-phyllis.info/. Enter H333 in the search box to learn more about \"Bronchitis: Care Instructions. \"  Current as of: December 6, 2017  Content Version: 11.7  © 3275-8816 [employer] Access. Care instructions adapted under license by [employer] (which disclaims liability or warranty for this information). If you have questions about a medical condition or this instruction, always ask your healthcare professional. [address] any warranty or liability for your use of this information.

## 2018-08-24 RX ORDER — LEVOCETIRIZINE DIHYDROCHLORIDE 5 MG/1
5 TABLET, FILM COATED ORAL DAILY
Qty: 30 TAB | Refills: 5 | Status: SHIPPED | OUTPATIENT
Start: 2018-08-24 | End: 2019-04-09 | Stop reason: SDUPTHER

## 2018-08-24 NOTE — TELEPHONE ENCOUNTER
Requested Prescriptions     Pending Prescriptions Disp Refills    levocetirizine (XYZAL) 5 mg tablet 30 Tab 5     Sig: Take 1 Tab by mouth daily.        Last Refill: 11/16/17  Next Appointment: none

## 2018-09-12 ENCOUNTER — OFFICE VISIT (OUTPATIENT)
Dept: INTERNAL MEDICINE CLINIC | Age: 68
End: 2018-09-12

## 2018-09-12 VITALS
DIASTOLIC BLOOD PRESSURE: 77 MMHG | SYSTOLIC BLOOD PRESSURE: 129 MMHG | HEIGHT: 69 IN | WEIGHT: 248 LBS | OXYGEN SATURATION: 97 % | BODY MASS INDEX: 36.73 KG/M2 | HEART RATE: 66 BPM | TEMPERATURE: 98.9 F

## 2018-09-12 DIAGNOSIS — R05.9 COUGH: Primary | ICD-10-CM

## 2018-09-12 DIAGNOSIS — J01.90 ACUTE SINUSITIS, RECURRENCE NOT SPECIFIED, UNSPECIFIED LOCATION: ICD-10-CM

## 2018-09-12 RX ORDER — METHYLPREDNISOLONE 4 MG/1
4 TABLET ORAL
Qty: 1 DOSE PACK | Refills: 0 | Status: SHIPPED | OUTPATIENT
Start: 2018-09-12 | End: 2018-09-26

## 2018-09-12 RX ORDER — CEFUROXIME AXETIL 500 MG/1
500 TABLET ORAL 2 TIMES DAILY
Qty: 14 TAB | Refills: 0 | Status: SHIPPED | OUTPATIENT
Start: 2018-09-12 | End: 2018-09-26

## 2018-09-12 NOTE — PROGRESS NOTES
Vitaliy Tiffanie presents with   Chief Complaint   Patient presents with    Cough    Fever    Ear Fullness    Shortness of Breath    Headache   Patient of Dr Rosey Medellin here with complaints of cough, fever, ear fullness, shortness of breath, headache & fatigue for \"awhile. \"  States he seems to break out in a sweat & feels feverish daily. 1. Have you been to the ER, urgent care clinic since your last visit? Hospitalized since your last visit? No    2. Have you seen or consulted any other health care providers outside of the [address] [place] [address] since your last visit? Include any pap smears or colon screening.  No

## 2018-09-12 NOTE — PROGRESS NOTES
Subjective:  Mr. Jordan Ye is a pleasant 79year old gentleman who comes in today for evaluation of 3-4 day history of frontal headache, bilateral ear fullness, scratchy throat and a nonproductive cough. He does feel a little short of breath, but denies any wheezing or hemoptysis. He has had some sweating, but no fever. Denies any nausea or vomiting. Denies any past history of asthma or pneumonia. He is a nonsmoker. Past Medical History:   Diagnosis Date    Actinic keratosis 9/15/2017    Annual physical exam 9/15/2017    Arthritis     ASHD (arteriosclerotic heart disease) 9/15/2017    CAD (coronary artery disease)     Chest pain 9/15/2017    Chronic back pain 9/15/2017    Cough 9/15/2017    Diabetes mellitus screening 9/15/2017    Diaphoresis 9/15/2017    Dyslipidemia 9/15/2017    Edema 9/15/2017    Elevated LFTs 9/15/2017    Fatigue 9/15/2017    Hypercholesterolemia     Hyperplasia of prostate 9/15/2017    Obesity 9/15/2017    Other long term (current) drug therapy 9/15/2017    Sinusitis 9/15/2017    SK (seborrheic keratosis) 9/15/2017     No past surgical history on file. Current Outpatient Prescriptions on File Prior to Visit   Medication Sig Dispense Refill    levocetirizine (XYZAL) 5 mg tablet Take 1 Tab by mouth daily. 30 Tab 5    omeprazole (PRILOSEC) 40 mg capsule   1    nabumetone (RELAFEN) 500 mg tablet Take 1 Tab by mouth two (2) times a day. 180 Tab 3    ZETIA 10 mg tablet Take 1 Tab by mouth daily. 90 Tab 3    mometasone (NASONEX) 50 mcg/actuation nasal spray 2 Sprays daily.  WELCHOL 625 mg tablet   0    TAZTIA  mg SR capsule   0    finasteride (PROSCAR) 5 mg tablet   0    aspirin 81 mg chewable tablet Take 81 mg by mouth daily.  coenzyme q10 10 mg cap Take  by mouth.  multivitamin (ONE A DAY) tablet Take 1 Tab by mouth daily. No current facility-administered medications on file prior to visit.       Allergies   Allergen Reactions    Claritin-D 12 Hour [Loratadine-Pseudoephedrine] Shortness of Breath    Iodine Shortness of Breath    Levaquin [Levofloxacin] Hives    Seafood [Shellfish Containing Products] Shortness of Breath    Sulfa (Sulfonamide Antibiotics) Hives   Physical Examination:  GENERAL:  Pleasant gentleman in no acute distress. He is alert and oriented. VITALS:  BP: 129/77. P: 66.  O2 sat: 97.  T: 98.9. HEENT:  Normocephalic, atraumatic. TMs normal.  Mouth mucosa pink. Tongue midline. Pharynx minimally injected without presence of exudates. No sinus tenderness. CHEST:  Lungs clear except for occasional expiratory wheeze. No rales. CV:  Heart regular rhythm without murmur. EXTREMITIES:  No edema or calf tenderness. Distal pulses were present. Studies:  Two views of the chest failed to reveal any pneumonia. Impression:  1. Acute sinusitis. Plan:  1. It was opted to start him on Ceftin 500 mg twice daily for seven days along with a Medrol Dosepak. 2. He may increase fluids and rest.  3. He certainly will call us if he fails to improve or if his condition worsens.

## 2018-09-12 NOTE — PATIENT INSTRUCTIONS
Cough: Care Instructions  Your Care Instructions    A cough is your body's response to something that bothers your throat or airways. Many things can cause a cough. You might cough because of a cold or the flu, bronchitis, or asthma. Smoking, postnasal drip, allergies, and stomach acid that backs up into your throat also can cause coughs. A cough is a symptom, not a disease. Most coughs stop when the cause, such as a cold, goes away. You can take a few steps at home to cough less and feel better. Follow-up care is a key part of your treatment and safety. Be sure to make and go to all appointments, and call your doctor if you are having problems. It's also a good idea to know your test results and keep a list of the medicines you take. How can you care for yourself at home? · Drink lots of water and other fluids. This helps thin the mucus and soothes a dry or sore throat. Honey or lemon juice in hot water or tea may ease a dry cough. · Take cough medicine as directed by your doctor. · Prop up your head on pillows to help you breathe and ease a dry cough. · Try cough drops to soothe a dry or sore throat. Cough drops don't stop a cough. Medicine-flavored cough drops are no better than candy-flavored drops or hard candy. · Do not smoke. Avoid secondhand smoke. If you need help quitting, talk to your doctor about stop-smoking programs and medicines. These can increase your chances of quitting for good. When should you call for help? Call 911 anytime you think you may need emergency care.  For example, call if:    · You have severe trouble breathing.    Call your doctor now or seek immediate medical care if:    · You cough up blood.     · You have new or worse trouble breathing.     · You have a new or higher fever.     · You have a new rash.    Watch closely for changes in your health, and be sure to contact your doctor if:    · You cough more deeply or more often, especially if you notice more mucus or a change in the color of your mucus.     · You have new symptoms, such as a sore throat, an earache, or sinus pain.     · You do not get better as expected. Where can you learn more? Go to http://sergey-phyllis.info/. Enter D279 in the search box to learn more about \"Cough: Care Instructions. \"  Current as of: December 6, 2017  Content Version: 11.7  © 6103-8624 [employer]ite[employer]. Care instructions adapted under license by [employer] (which disclaims liability or warranty for this information). If you have questions about a medical condition or this instruction, always ask your healthcare professional. [address] any warranty or liability for your use of this information.

## 2018-09-12 NOTE — MR AVS SNAPSHOT
[address] [facility] [address] [address] [phone] Patient: Huseyin Sexton 
MRN: [id number] WED:8/99/8209 Visit Information Date & Time Provider Department Dept. Phone Encounter #  
 9/12/2018  2:45 PM Thomas Sanon  [employer] [employer] [facility] [phone] [id number] Follow-up Instructions Return if symptoms worsen or fail to improve. Follow-up and Disposition History Upcoming Health Maintenance Date Due DTaP/Tdap/Td series (1 - Tdap) 6/29/1973 FOBT Q 1 YEAR AGE 50-75 6/29/2002 ZOSTER VACCINE AGE 60> 4/29/2012 GLAUCOMA SCREENING Q2Y 6/29/2017 Pneumococcal 65+ Low/Medium Risk (1 of 2 - PCV13) 6/29/2017 Influenza Age 5 to Adult 8/1/2018 MEDICARE YEARLY EXAM 9/6/2018 Allergies as of 9/12/2018  Review Complete On: 9/12/2018 By: Thomas Sanon NP Severity Noted Reaction Type Reactions Claritin-d 12 Hour [Loratadine-pseudoephedrine]  02/01/2012    Shortness of Breath Iodine  02/01/2012    Shortness of Breath Levaquin [Levofloxacin]  02/01/2012    Hives Seafood [Shellfish Containing Products]  02/01/2012    Shortness of Breath Sulfa (Sulfonamide Antibiotics)  02/01/2012    Hives Current Immunizations  Never Reviewed No immunizations on file. Not reviewed this visit You Were Diagnosed With   
  
 Codes Comments Cough    -  Primary ICD-10-CM: J21 ICD-9-CM: 786.2 Acute sinusitis, recurrence not specified, unspecified location     ICD-10-CM: J01.90 ICD-9-CM: 461.9 Vitals BP Pulse Temp Height(growth percentile) Weight(growth percentile) SpO2  
 129/77 (BP 1 Location: Left arm, BP Patient Position: Sitting) 66 98.9 °F (37.2 °C) (Oral) 5' 9.25\" (1.759 m) 248 lb (112.5 kg) 97% BMI Smoking Status 36.36 kg/m2 Never Smoker BMI and BSA Data Body Mass Index Body Surface Area 36.36 kg/m 2 2.34 m 2 Preferred Pharmacy Pharmacy Name Phone [id number] [address] [place] [address] [phone] Your Updated Medication List  
  
   
This list is accurate as of 9/12/18  4:07 PM.  Always use your most recent med list.  
  
  
  
  
 aspirin 81 mg chewable tablet Take 81 mg by mouth daily. cefUROXime 500 mg tablet Commonly known as:  CEFTIN Take 1 Tab by mouth two (2) times a day. coenzyme q10 10 mg Cap Take  by mouth. finasteride 5 mg tablet Commonly known as:  PROSCAR  
  
 levocetirizine 5 mg tablet Commonly known as:  Sona Kallie Take 1 Tab by mouth daily. methylPREDNISolone 4 mg tablet Commonly known as:  Rajiv Dennis Take 1 Tab by mouth Specific Days and Specific Times. multivitamin tablet Commonly known as:  ONE A DAY Take 1 Tab by mouth daily. nabumetone 500 mg tablet Commonly known as:  RELAFEN Take 1 Tab by mouth two (2) times a day. NASONEX 50 mcg/actuation nasal spray Generic drug:  mometasone 2 Sprays daily. omeprazole 40 mg capsule Commonly known as:  PRILOSEC  
  
 TAZTIA  mg SR capsule Generic drug:  dilTIAZem WELCHOL 625 mg tablet Generic drug:  colesevelam  
  
 ZETIA 10 mg tablet Generic drug:  ezetimibe Take 1 Tab by mouth daily. Prescriptions Sent to Pharmacy Refills  
 methylPREDNISolone (MEDROL DOSEPACK) 4 mg tablet 0 Sig: Take 1 Tab by mouth Specific Days and Specific Times. Class: Normal  
 [facility]acy: [facility] Ph #: [phone] Route: Oral  
 cefUROXime (CEFTIN) 500 mg tablet 0 Sig: Take 1 Tab by mouth two (2) times a day. Class: Normal  
 Pharmacy: [address] Ph #: [phone] Route: Oral  
  
Follow-up Instructions Return if symptoms worsen or fail to improve. To-Do List   
 09/12/2018 Imaging:  XR CHEST PA LAT Patient Instructions Cough: Care Instructions Your Care Instructions A cough is your body's response to something that bothers your throat or airways. Many things can cause a cough. You might cough because of a cold or the flu, bronchitis, or asthma. Smoking, postnasal drip, allergies, and stomach acid that backs up into your throat also can cause coughs. A cough is a symptom, not a disease. Most coughs stop when the cause, such as a cold, goes away. You can take a few steps at home to cough less and feel better. Follow-up care is a key part of your treatment and safety. Be sure to make and go to all appointments, and call your doctor if you are having problems. It's also a good idea to know your test results and keep a list of the medicines you take. How can you care for yourself at home? · Drink lots of water and other fluids. This helps thin the mucus and soothes a dry or sore throat. Honey or lemon juice in hot water or tea may ease a dry cough. · Take cough medicine as directed by your doctor. · Prop up your head on pillows to help you breathe and ease a dry cough. · Try cough drops to soothe a dry or sore throat. Cough drops don't stop a cough. Medicine-flavored cough drops are no better than candy-flavored drops or hard candy. · Do not smoke. Avoid secondhand smoke. If you need help quitting, talk to your doctor about stop-smoking programs and medicines. These can increase your chances of quitting for good. When should you call for help? Call 911 anytime you think you may need emergency care. For example, call if: 
  · You have severe trouble breathing.  
 Call your doctor now or seek immediate medical care if: 
  · You cough up blood.  
  · You have new or worse trouble breathing.  
  · You have a new or higher fever.  
  · You have a new rash.  Watch closely for changes in your health, and be sure to contact your doctor if: 
  · You cough more deeply or more often, especially if you notice more mucus or a change in the color of your mucus.  
  · You have new symptoms, such as a sore throat, an earache, or sinus pain.  
  · You do not get better as expected. Where can you learn more? Go to http://sergey-phyllis.info/. Enter D279 in the search box to learn more about \"Cough: Care Instructions. \" Current as of: December 6, 2017 Content Version: 11.7 © 4728-0945 Genomas. Care instructions adapted under license by YourListen.com (which disclaims liability or warranty for this information). If you have questions about a medical condition or this instruction, always ask your healthcare professional. [address] any warranty or liability for your use of this information. Patient Instructions History Introducing [facility] & HEALTH SERVICES! [facility] introduces Client24 patient portal. Now you can access parts of your medical record, email your doctor's office, and request medication refills online. 1. In your internet browser, go to [email or web address] 2. Click on the First Time User? Click Here link in the Sign In box. You will see the New Member Sign Up page. 3. Enter your Client24 Access Code exactly as it appears below. You will not need to use this code after youve completed the sign-up process. If you do not sign up before the expiration date, you must request a new code. · Client24 Access Code: [email or web address] Expires: 10/29/2018  2:00 PM 
 
4. Enter the last four digits of your Social Security Number (xxxx) and Date of Birth (mm/dd/yyyy) as indicated and click Submit. You will be taken to the next sign-up page. 5. Create a Client24 ID. This will be your Client24 login ID and cannot be changed, so think of one that is secure and easy to remember. 6. Create a enStage password. You can change your password at any time. 7. Enter your Password Reset Question and Answer. This can be used at a later time if you forget your password. 8. Enter your e-mail address. You will receive e-mail notification when new information is available in [address] 9. Click Sign Up. You can now view and download portions of your medical record. 10. Click the Download Summary menu link to download a portable copy of your medical information. If you have questions, please visit the Frequently Asked Questions section of the enStage website. Remember, enStage is NOT to be used for urgent needs. For medical emergencies, dial 911. Now available from your iPhone and Android! Please provide this summary of care documentation to your next provider. Your primary care clinician is listed as JEANNETTE Rodrigues. If you have any questions after today's visit, please call [phone].

## 2018-09-26 ENCOUNTER — OFFICE VISIT (OUTPATIENT)
Dept: URGENT CARE | Age: 68
End: 2018-09-26

## 2018-09-26 VITALS
DIASTOLIC BLOOD PRESSURE: 81 MMHG | SYSTOLIC BLOOD PRESSURE: 181 MMHG | HEART RATE: 71 BPM | RESPIRATION RATE: 16 BRPM | OXYGEN SATURATION: 97 % | TEMPERATURE: 98.7 F | HEIGHT: 71 IN | BODY MASS INDEX: 34.16 KG/M2 | WEIGHT: 244 LBS

## 2018-09-26 DIAGNOSIS — J01.90 SUBACUTE SINUSITIS, UNSPECIFIED LOCATION: Primary | ICD-10-CM

## 2018-09-26 RX ORDER — MOMETASONE FUROATE 50 UG/1
2 SPRAY, METERED NASAL DAILY
Qty: 1 CONTAINER | Refills: 0 | Status: SHIPPED | OUTPATIENT
Start: 2018-09-26 | End: 2019-02-12

## 2018-09-26 RX ORDER — AMOXICILLIN AND CLAVULANATE POTASSIUM 875; 125 MG/1; MG/1
1 TABLET, FILM COATED ORAL 2 TIMES DAILY
Qty: 20 TAB | Refills: 0 | Status: SHIPPED | OUTPATIENT
Start: 2018-09-26 | End: 2018-10-05 | Stop reason: ALTCHOICE

## 2018-09-26 NOTE — PATIENT INSTRUCTIONS
Saline Nasal Washes: Care Instructions  Your Care Instructions  Saline nasal washes help keep the nasal passages open by washing out thick or dried mucus. This simple remedy can help relieve symptoms of allergies, sinusitis, and colds. It also can make the nose feel more comfortable by keeping the mucous membranes moist. You may notice a little burning sensation in your nose the first few times you use the solution, but this usually gets better in a few days. Follow-up care is a key part of your treatment and safety. Be sure to make and go to all appointments, and call your doctor if you are having problems. It's also a good idea to know your test results and keep a list of the medicines you take. How can you care for yourself at home? · You can buy premixed saline solution in a squeeze bottle or other sinus rinse products at a drugstore. Read and follow the instructions on the label. · You also can make your own saline solution by adding 1 teaspoon of salt and 1 teaspoon of baking soda to 2 cups of distilled water. · If you use a homemade solution, pour a small amount into a clean bowl. Using a rubber bulb syringe, squeeze the syringe and place the tip in the salt water. Pull a small amount of the salt water into the syringe by relaxing your hand. · Sit down with your head tilted slightly back. Do not lie down. Put the tip of the bulb syringe or the squeeze bottle a little way into one of your nostrils. Gently drip or squirt a few drops into the nostril. Repeat with the other nostril. Some sneezing and gagging are normal at first.  · Gently blow your nose. · Wipe the syringe or bottle tip clean after each use. · Repeat this 2 or 3 times a day. · Use nasal washes gently if you have nosebleeds often. When should you call for help? Watch closely for changes in your health, and be sure to contact your doctor if:    · You often get nosebleeds.     · You have problems doing the nasal washes.    Where can you learn more? Go to http://sregey-phyllis.info/. Enter 071 [id number] 42 [id number] in the search box to learn more about \"Saline Nasal Washes: Care Instructions. \"  Current as of: May 12, 2017  Content Version: 11.7  © 7955-5166 [employer]zzuf, Personal Style Finder. Care instructions adapted under license by Novawise (which disclaims liability or warranty for this information). If you have questions about a medical condition or this instruction, always ask your healthcare professional. [address] any warranty or liability for your use of this information.

## 2018-09-26 NOTE — MR AVS SNAPSHOT
[address] [address] 
[phone] Patient: Marybel Drew 
MRN: [id number] WTP:9/17/[birth year]36 Visit Information Date & Time Provider Department Dept. Phone Encounter #  
 9/26/2018 10:15 AM [address] [facility] [phone] [id number] Upcoming Health Maintenance Date Due DTaP/Tdap/Td series (1 - Tdap) 6/29/1973 Shingrix Vaccine Age 50> (1 of 2) 6/29/2002 FOBT Q 1 YEAR AGE 50-75 6/29/2002 GLAUCOMA SCREENING Q2Y 6/29/2017 Pneumococcal 65+ Low/Medium Risk (1 of 2 - PCV13) 6/29/2017 Influenza Age 5 to Adult 8/1/2018 Allergies as of 9/26/2018  Review Complete On: 9/12/2018 By: Nette Enciso NP Severity Noted Reaction Type Reactions Claritin-d 12 Hour [Loratadine-pseudoephedrine]  02/01/2012    Shortness of Breath Iodine  02/01/2012    Shortness of Breath Levaquin [Levofloxacin]  02/01/2012    Hives Seafood [Shellfish Containing Products]  02/01/2012    Shortness of Breath Sulfa (Sulfonamide Antibiotics)  02/01/2012    Hives Current Immunizations  Never Reviewed No immunizations on file. Not reviewed this visit You Were Diagnosed With   
  
 Codes Comments Subacute sinusitis, unspecified location    -  Primary ICD-10-CM: J01.90 ICD-9-CM: 461.9 Vitals BP Pulse Temp Resp Height(growth percentile) Weight(growth percentile) 181/81 71 98.7 °F (37.1 °C) 16 5' 11\" (1.803 m) 244 lb (110.7 kg) SpO2 BMI Smoking Status 97% 34.03 kg/m2 Never Smoker BMI and BSA Data Body Mass Index Body Surface Area 34.03 kg/m 2 2.35 m 2 Preferred Pharmacy Pharmacy Name Phone [facility] [address] [phone] Your Updated Medication List  
  
   
This list is accurate as of 9/26/18 10:34 AM.  Always use your most recent med list.  
  
  
  
  
 amoxicillin-clavulanate 875-125 mg per tablet Commonly known as:  AUGMENTIN Take 1 Tab by mouth two (2) times a day. aspirin 81 mg chewable tablet Take 81 mg by mouth daily. coenzyme q10 10 mg Cap Take  by mouth. finasteride 5 mg tablet Commonly known as:  PROSCAR  
  
 levocetirizine 5 mg tablet Commonly known as:  Emanuel Dakins Take 1 Tab by mouth daily. mometasone 50 mcg/actuation nasal spray Commonly known as:  NASONEX  
2 Sprays by Both Nostrils route daily. multivitamin tablet Commonly known as:  ONE A DAY Take 1 Tab by mouth daily. nabumetone 500 mg tablet Commonly known as:  RELAFEN Take 1 Tab by mouth two (2) times a day. omeprazole 40 mg capsule Commonly known as:  PRILOSEC  
  
 TAZTIA  mg SR capsule Generic drug:  dilTIAZem WELCHOL 625 mg tablet Generic drug:  colesevelam  
  
 ZETIA 10 mg tablet Generic drug:  ezetimibe Take 1 Tab by mouth daily. Prescriptions Sent to Pharmacy Refills  
 amoxicillin-clavulanate (AUGMENTIN) 875-125 mg per tablet 0 Sig: Take 1 Tab by mouth two (2) times a day. Class: Normal  
 Pharmacy: [address] Ph #: [phone] Route: Oral  
 mometasone (NASONEX) 50 mcg/actuation nasal spray 0 Si Sprays by Both Nostrils route daily. Class: Normal  
 Pharmacy: [address] Ph #: [phone] Route: Both Nostrils Patient Instructions Saline Nasal Washes: Care Instructions Your Care Instructions Saline nasal washes help keep the nasal passages open by washing out thick or dried mucus. This simple remedy can help relieve symptoms of allergies, sinusitis, and colds.  It also can make the nose feel more comfortable by keeping the mucous membranes moist. You may notice a little burning sensation in your nose the first few times you use the solution, but this usually gets better in a few days. Follow-up care is a key part of your treatment and safety. Be sure to make and go to all appointments, and call your doctor if you are having problems. It's also a good idea to know your test results and keep a list of the medicines you take. How can you care for yourself at home? · You can buy premixed saline solution in a squeeze bottle or other sinus rinse products at a drugstore. Read and follow the instructions on the label. · You also can make your own saline solution by adding 1 teaspoon of salt and 1 teaspoon of baking soda to 2 cups of distilled water. · If you use a homemade solution, pour a small amount into a clean bowl. Using a rubber bulb syringe, squeeze the syringe and place the tip in the salt water. Pull a small amount of the salt water into the syringe by relaxing your hand. · Sit down with your head tilted slightly back. Do not lie down. Put the tip of the bulb syringe or the squeeze bottle a little way into one of your nostrils. Gently drip or squirt a few drops into the nostril. Repeat with the other nostril. Some sneezing and gagging are normal at first. 
· Gently blow your nose. · Wipe the syringe or bottle tip clean after each use. · Repeat this 2 or 3 times a day. · Use nasal washes gently if you have nosebleeds often. When should you call for help? Watch closely for changes in your health, and be sure to contact your doctor if: 
  · You often get nosebleeds.  
  · You have problems doing the nasal washes. Where can you learn more? Go to http://sergey-phyllis.info/. Enter [id number] [phone] in the search box to learn more about \"Saline Nasal Washes: Care Instructions. \" Current as of: May 12, 2017 Content Version: 11.7 © 4689-8451 Global Integrity.  Care instructions adapted under license by VendAsta (which disclaims liability or warranty for this information). If you have questions about a medical condition or this instruction, always ask your healthcare professional. [address] any warranty or liability for your use of this information. Introducing [facility] & [facility] SERVICES! [facility] Blake introduces Iceni Technology patient portal. Now you can access parts of your medical record, email your doctor's office, and request medication refills online. 1. In your internet browser, go to [email or web address] 2. Click on the First Time User? Click Here link in the Sign In box. You will see the New Member Sign Up page. 3. Enter your Iceni Technology Access Code exactly as it appears below. You will not need to use this code after youve completed the sign-up process. If you do not sign up before the expiration date, you must request a new code. · Iceni Technology Access Code: [email or web address] Expires: 10/29/2018  2:00 PM 
 
4. Enter the last four digits of your Social Security Number (xxxx) and Date of Birth (mm/dd/yyyy) as indicated and click Submit. You will be taken to the next sign-up page. 5. Create a Iceni Technology ID. This will be your Iceni Technology login ID and cannot be changed, so think of one that is secure and easy to remember. 6. Create a Iceni Technology password. You can change your password at any time. 7. Enter your Password Reset Question and Answer. This can be used at a later time if you forget your password. 8. Enter your e-mail address. You will receive e-mail notification when new information is available in [address] 9. Click Sign Up. You can now view and download portions of your medical record. 10. Click the Download Summary menu link to download a portable copy of your medical information. If you have questions, please visit the Frequently Asked Questions section of the Iceni Technology website. Remember, Iceni Technology is NOT to be used for urgent needs. For medical emergencies, dial 911. Now available from your iPhone and Android! Please provide this summary of care documentation to your next provider. Your primary care clinician is listed as JEANNETTE Arteaga. If you have any questions after today's visit, please call [phone].

## 2018-09-26 NOTE — PROGRESS NOTES
Patient is a 79 y.o. male presenting with sinus problems. Sinus Infection   This is a recurrent problem. The current episode started more than 1 week ago. The problem occurs constantly. The problem has not changed since onset. Associated symptoms include headaches. Pertinent negatives include no shortness of breath. Nothing aggravates the symptoms. Nothing relieves the symptoms. Treatments tried: ceftin  x 7 days. The treatment provided moderate relief. Past Medical History:   Diagnosis Date    Actinic keratosis 9/15/2017    Annual physical exam 9/15/2017    Arthritis     ASHD (arteriosclerotic heart disease) 9/15/2017    CAD (coronary artery disease)     Chest pain 9/15/2017    Chronic back pain 9/15/2017    Cough 9/15/2017    Diabetes mellitus screening 9/15/2017    Diaphoresis 9/15/2017    Dyslipidemia 9/15/2017    Edema 9/15/2017    Elevated LFTs 9/15/2017    Fatigue 9/15/2017    Hypercholesterolemia     Hyperplasia of prostate 9/15/2017    Obesity 9/15/2017    Other long term (current) drug therapy 9/15/2017    Sinusitis 9/15/2017    SK (seborrheic keratosis) 9/15/2017        History reviewed. No pertinent surgical history. Family History   Problem Relation Age of Onset    No Known Problems Mother         Social History     Social History    Marital status:      Spouse name: N/A    Number of children: N/A    Years of education: N/A     Occupational History    Not on file. Social History Main Topics    Smoking status: Never Smoker    Smokeless tobacco: Former User    Alcohol use No    Drug use: No    Sexual activity: Not on file     Other Topics Concern    Not on file     Social History Narrative                ALLERGIES: Claritin-d 12 hour [loratadine-pseudoephedrine]; Iodine; Levaquin [levofloxacin];  Seafood [shellfish containing products]; and Sulfa (sulfonamide antibiotics)    Review of Systems   HENT: Positive for congestion, sinus pain and sinus pressure. Respiratory: Negative for shortness of breath. Neurological: Positive for headaches. All other systems reviewed and are negative. Vitals:    18 1019   BP: 181/81   Pulse: 71   Resp: 16   Temp: 98.7 °F (37.1 °C)   SpO2: 97%   Weight: 244 lb (110.7 kg)   Height: 5' 11\" (1.803 m)       Physical Exam   Constitutional: He appears well-developed and well-nourished. No distress. HENT:   Right Ear: Tympanic membrane and ear canal normal.   Left Ear: Tympanic membrane and ear canal normal.   Nose: Right sinus exhibits maxillary sinus tenderness. Left sinus exhibits maxillary sinus tenderness. Mouth/Throat: No oropharyngeal exudate, posterior oropharyngeal edema or posterior oropharyngeal erythema. Nasal Discharge   Eyes: Conjunctivae are normal. Pupils are equal, round, and reactive to light. Right eye exhibits no discharge. Left eye exhibits no discharge. Neck: Normal range of motion. Neck supple. Cardiovascular: Normal rate and regular rhythm. Pulmonary/Chest: Effort normal and breath sounds normal. No respiratory distress. He has no wheezes. He has no rales. Abdominal: Soft. Musculoskeletal: Normal range of motion. Lymphadenopathy:     He has no cervical adenopathy. Skin: Skin is warm. No rash noted. Nursing note and vitals reviewed. MDM    Procedures    ICD-10-CM ICD-9-CM    1. Subacute sinusitis, unspecified location J01.90 461.9        Fluids/ gargles  Claritin/ allegra   Tylenol cold-sinus - max strength 1-2 tab 4 times/ day    with Advil as needed        Medications Ordered Today   Medications    amoxicillin-clavulanate (AUGMENTIN) 875-125 mg per tablet     Sig: Take 1 Tab by mouth two (2) times a day. Dispense:  20 Tab     Refill:  0    mometasone (NASONEX) 50 mcg/actuation nasal spray     Si Sprays by Both Nostrils route daily. Dispense:  1 Container     Refill:  0     No results found for any visits on 18.   The patients condition was discussed with the patient and they understand. The patient is to follow up with primary care doctor. If signs and symptoms become worse the pt is to go to the ER. The patient is to take medications as prescribed.

## 2018-10-05 ENCOUNTER — OFFICE VISIT (OUTPATIENT)
Dept: URGENT CARE | Age: 68
End: 2018-10-05

## 2018-10-05 VITALS
OXYGEN SATURATION: 99 % | RESPIRATION RATE: 16 BRPM | SYSTOLIC BLOOD PRESSURE: 138 MMHG | WEIGHT: 244 LBS | TEMPERATURE: 97.1 F | HEART RATE: 88 BPM | BODY MASS INDEX: 34.16 KG/M2 | HEIGHT: 71 IN | DIASTOLIC BLOOD PRESSURE: 84 MMHG

## 2018-10-05 DIAGNOSIS — J40 BRONCHITIS: ICD-10-CM

## 2018-10-05 DIAGNOSIS — R07.89 CHEST TIGHTNESS: ICD-10-CM

## 2018-10-05 DIAGNOSIS — J01.00 ACUTE NON-RECURRENT MAXILLARY SINUSITIS: Primary | ICD-10-CM

## 2018-10-05 RX ORDER — PREDNISONE 10 MG/1
TABLET ORAL
Qty: 21 TAB | Refills: 0 | Status: SHIPPED | OUTPATIENT
Start: 2018-10-05 | End: 2019-02-12

## 2018-10-05 RX ORDER — DOXYCYCLINE 100 MG/1
100 CAPSULE ORAL 2 TIMES DAILY
Qty: 20 CAP | Refills: 0 | Status: SHIPPED | OUTPATIENT
Start: 2018-10-05 | End: 2018-10-15

## 2018-10-05 NOTE — PROGRESS NOTES
Patient is a 79 y.o. male presenting with cold symptoms. Cold Symptoms   The history is provided by the patient. This is a new problem. Episode onset: 1 week ago; has had sinus problems for the past month, has been on ceftin and augmentin with temporary improvement. The problem occurs constantly. The problem has been gradually worsening. The cough is productive of sputum. There has been no fever. Associated symptoms include ear congestion and rhinorrhea. Pertinent negatives include no chest pain, no chills, no sweats, no ear pain, no headaches, no sore throat, no myalgias, no shortness of breath and no wheezing. Associated symptoms comments: Bilateral max sinus pressure and pain  Chest tightness when coughing  Dizziness for past 2 days described as room spinning when getting up after laying down. He has tried antibiotics for the symptoms. He is not a smoker. His past medical history does not include bronchitis or asthma. Past Medical History:   Diagnosis Date    Actinic keratosis 9/15/2017    Annual physical exam 9/15/2017    Arthritis     ASHD (arteriosclerotic heart disease) 9/15/2017    CAD (coronary artery disease)     Chest pain 9/15/2017    Chronic back pain 9/15/2017    Cough 9/15/2017    Diabetes mellitus screening 9/15/2017    Diaphoresis 9/15/2017    Dyslipidemia 9/15/2017    Edema 9/15/2017    Elevated LFTs 9/15/2017    Fatigue 9/15/2017    Hypercholesterolemia     Hyperplasia of prostate 9/15/2017    Obesity 9/15/2017    Other long term (current) drug therapy 9/15/2017    Sinusitis 9/15/2017    SK (seborrheic keratosis) 9/15/2017        History reviewed. No pertinent surgical history. Family History   Problem Relation Age of Onset    No Known Problems Mother         Social History     Social History    Marital status:      Spouse name: N/A    Number of children: N/A    Years of education: N/A     Occupational History    Not on file.      Social History Main Topics    Smoking status: Never Smoker    Smokeless tobacco: Former User    Alcohol use No    Drug use: No    Sexual activity: Not on file     Other Topics Concern    Not on file     Social History Narrative                ALLERGIES: Claritin-d 12 hour [loratadine-pseudoephedrine]; Iodine; Levaquin [levofloxacin]; Seafood [shellfish containing products]; and Sulfa (sulfonamide antibiotics)    Review of Systems   Constitutional: Negative for chills and fever. HENT: Positive for congestion, rhinorrhea, sinus pain and sinus pressure. Negative for ear pain and sore throat. Respiratory: Positive for cough and chest tightness (with coughing). Negative for shortness of breath and wheezing. Cardiovascular: Negative for chest pain and palpitations. Musculoskeletal: Negative for myalgias. Skin: Negative for rash. Neurological: Positive for dizziness (with getting up, past 2 days). Negative for headaches. Hematological: Negative for adenopathy. Vitals:    10/05/18 0919   BP: 138/84   Pulse: 88   Resp: 16   Temp: 97.1 °F (36.2 °C)   SpO2: 99%   Weight: 244 lb (110.7 kg)   Height: 5' 11\" (1.803 m)       Physical Exam   Constitutional: He appears well-developed and well-nourished. No distress. HENT:   Right Ear: Tympanic membrane, external ear and ear canal normal.   Left Ear: Tympanic membrane, external ear and ear canal normal.   Nose: Mucosal edema and rhinorrhea present. Right sinus exhibits maxillary sinus tenderness and frontal sinus tenderness. Left sinus exhibits maxillary sinus tenderness and frontal sinus tenderness. Mouth/Throat: Mucous membranes are normal. Posterior oropharyngeal erythema present. No oropharyngeal exudate, posterior oropharyngeal edema or tonsillar abscesses. Cardiovascular: Normal rate, regular rhythm and normal heart sounds. Pulmonary/Chest: Effort normal and breath sounds normal. No respiratory distress. He has no wheezes. He has no rales.  He exhibits no tenderness. Lymphadenopathy:     He has no cervical adenopathy. Neurological: He is alert. Skin: He is not diaphoretic. Psychiatric: He has a normal mood and affect. His behavior is normal. Judgment and thought content normal.   Nursing note and vitals reviewed. [facility]    ICD-10-CM ICD-9-CM    1. Acute non-recurrent maxillary sinusitis J01.00 461.0    2. Bronchitis J40 490    3. Chest tightness R07.89 786.59 EKG, 12 LEAD, INITIAL     Medications Ordered Today   Medications    predniSONE (STERAPRED DS) 10 mg dose pack     Sig: Take as directed for 6 days, with food     Dispense:  21 Tab     Refill:  0    doxycycline (VIBRAMYCIN) 100 mg capsule     Sig: Take 1 Cap by mouth two (2) times a day for 10 days. Dispense:  20 Cap     Refill:  0     The patients condition was discussed with the patient and they understand. The patient is to follow up with PCP. If signs and symptoms become worse the pt is to go to the ER. The patient is to take medications as prescribed.              EKG    Date/Time: 10/5/2018 9:39 AM  Performed by: Tato Doty  Authorized by: Barrett COUCH   Rhythm: sinus rhythm  Rate: normal  BPM: 79  QRS axis: left  Conduction: conduction normal  ST Segments: ST segments normal  T Waves: T waves normal  Clinical impression: abnormal ECG

## 2018-10-05 NOTE — MR AVS SNAPSHOT
[address] [place] [address] 
[phone] Patient: Ericka Panda 
MRN: [id number] QPI:[id number][id number] Visit Information Date & Time Provider Department Dept. Phone Encounter #  
 10/5/2018  9:15 AM [address] [facility] [phone] [id number] Upcoming Health Maintenance Date Due DTaP/Tdap/Td series (1 - Tdap) 6/29/1973 Shingrix Vaccine Age 50> (1 of 2) 6/29/2002 FOBT Q 1 YEAR AGE 50-75 6/29/2002 GLAUCOMA SCREENING Q2Y 6/29/2017 Pneumococcal 65+ Low/Medium Risk (1 of 2 - PCV13) 6/29/2017 Influenza Age 5 to Adult 8/1/2018 Allergies as of 10/5/2018  Review Complete On: 10/5/2018 By: Mone Laughlin MD  
  
 Severity Noted Reaction Type Reactions Claritin-d 12 Hour [Loratadine-pseudoephedrine]  02/01/2012    Shortness of Breath Iodine  02/01/2012    Shortness of Breath Levaquin [Levofloxacin]  02/01/2012    Hives Seafood [Shellfish Containing Products]  02/01/2012    Shortness of Breath Sulfa (Sulfonamide Antibiotics)  02/01/2012    Hives Current Immunizations  Never Reviewed No immunizations on file. Not reviewed this visit You Were Diagnosed With   
  
 Codes Comments Acute non-recurrent maxillary sinusitis    -  Primary ICD-10-CM: J01.00 ICD-9-CM: 461.0 Bronchitis     ICD-10-CM: J40 ICD-9-CM: 972 Chest tightness     ICD-10-CM: R07.89 ICD-9-CM: 786.59 Vitals BP Pulse Temp Resp Height(growth percentile) Weight(growth percentile) 138/84 88 97.1 °F (36.2 °C) 16 5' 11\" (1.803 m) 244 lb (110.7 kg) SpO2 BMI Smoking Status 99% 34.03 kg/m2 Never Smoker Vitals History BMI and BSA Data Body Mass Index Body Surface Area 34.03 kg/m 2 2.35 m 2 Preferred Pharmacy Pharmacy Name Phone [id number] [phone] Your Updated Medication List  
  
   
This list is accurate as of 10/5/18  9:42 AM.  Always use your most recent med list.  
  
  
  
  
 aspirin 81 mg chewable tablet Take 81 mg by mouth daily. coenzyme q10 10 mg Cap Take  by mouth. doxycycline 100 mg capsule Commonly known as:  VIBRAMYCIN Take 1 Cap by mouth two (2) times a day for 10 days. finasteride 5 mg tablet Commonly known as:  PROSCAR  
  
 levocetirizine 5 mg tablet Commonly known as:  Bry Bun Take 1 Tab by mouth daily. mometasone 50 mcg/actuation nasal spray Commonly known as:  NASONEX  
2 Sprays by Both Nostrils route daily. multivitamin tablet Commonly known as:  ONE A DAY Take 1 Tab by mouth daily. nabumetone 500 mg tablet Commonly known as:  RELAFEN Take 1 Tab by mouth two (2) times a day. omeprazole 40 mg capsule Commonly known as:  PRILOSEC  
  
 predniSONE 10 mg dose pack Commonly known as:  STERAPRED DS Take as directed for 6 days, with food TAZTIA  mg SR capsule Generic drug:  dilTIAZem WELCHOL 625 mg tablet Generic drug:  colesevelam  
  
 ZETIA 10 mg tablet Generic drug:  ezetimibe Take 1 Tab by mouth daily. Prescriptions Sent to Pharmacy Refills  
 predniSONE (STERAPRED DS) 10 mg dose pack 0 Sig: Take as directed for 6 days, with food Class: Normal  
 Pharmacy: [address] Ph #: [phone]  
 doxycycline (VIBRAMYCIN) 100 mg capsule 0 Sig: Take 1 Cap by mouth two (2) times a day for 10 days. Class: Normal  
 Pharmacy: [address] Ph #: [phone] Route: Oral  
  
We Performed the Following EKG NOTEWRITER(ASAP ONLY) [HTY1085 Custom] Comments: This order was created via procedure documentation To-Do List   
 10/05/2018 ECG:  EKG, 12 LEAD, INITIAL Patient Instructions Bronchitis: Care Instructions Your Care Instructions Bronchitis is inflammation of the bronchial tubes, which carry air to the lungs. The tubes swell and produce mucus, or phlegm. The mucus and inflamed bronchial tubes make you cough. You may have trouble breathing. Most cases of bronchitis are caused by viruses like those that cause colds. Antibiotics usually do not help and they may be harmful. Bronchitis usually develops rapidly and lasts about 2 to 3 weeks in otherwise healthy people. Follow-up care is a key part of your treatment and safety. Be sure to make and go to all appointments, and call your doctor if you are having problems. It's also a good idea to know your test results and keep a list of the medicines you take. How can you care for yourself at home? · Take all medicines exactly as prescribed. Call your doctor if you think you are having a problem with your medicine. · Get some extra rest. 
· Take an over-the-counter pain medicine, such as acetaminophen (Tylenol), ibuprofen (Advil, Motrin), or naproxen (Aleve) to reduce fever and relieve body aches. Read and follow all instructions on the label. · Do not take two or more pain medicines at the same time unless the doctor told you to. Many pain medicines have acetaminophen, which is Tylenol. Too much acetaminophen (Tylenol) can be harmful. · Take an over-the-counter cough medicine that contains dextromethorphan to help quiet a dry, hacking cough so that you can sleep. Avoid cough medicines that have more than one active ingredient. Read and follow all instructions on the label. · Breathe moist air from a humidifier, hot shower, or sink filled with hot water. The heat and moisture will thin mucus so you can cough it out. · Do not smoke. Smoking can make bronchitis worse. If you need help quitting, talk to your doctor about stop-smoking programs and medicines. These can increase your chances of quitting for good. When should you call for help? Call 911 anytime you think you may need emergency care. For example, call if: 
  · You have severe trouble breathing.  
 Call your doctor now or seek immediate medical care if: 
  · You have new or worse trouble breathing.  
  · You cough up dark brown or bloody mucus (sputum).  
  · You have a new or higher fever.  
  · You have a new rash.  
 Watch closely for changes in your health, and be sure to contact your doctor if: 
  · You cough more deeply or more often, especially if you notice more mucus or a change in the color of your mucus.  
  · You are not getting better as expected. Where can you learn more? Go to http://sergey-phyllis.info/. Enter H333 in the search box to learn more about \"Bronchitis: Care Instructions. \" Current as of: December 6, 2017 Content Version: 11.8 © 7325-3375 [employer]. Care instructions adapted under license by [employer] (which disclaims liability or warranty for this information). If you have questions about a medical condition or this instruction, always ask your healthcare professional. [place] [address] any warranty or liability for your use of this information. Sinusitis: Care Instructions Your Care Instructions Sinusitis is an infection of the lining of the sinus cavities in your head. Sinusitis often follows a cold. It causes pain and pressure in your head and face. In most cases, sinusitis gets better on its own in 1 to 2 weeks. But some mild symptoms may last for several weeks. Sometimes antibiotics are needed. Follow-up care is a key part of your treatment and safety. Be sure to make and go to all appointments, and call your doctor if you are having problems. It's also a good idea to know your test results and keep a list of the medicines you take. How can you care for yourself at home? · Take an over-the-counter pain medicine, such as acetaminophen (Tylenol), ibuprofen (Advil, Motrin), or naproxen (Aleve). Read and follow all instructions on the label. · If the doctor prescribed antibiotics, take them as directed. Do not stop taking them just because you feel better. You need to take the full course of antibiotics. · Be careful when taking over-the-counter cold or flu medicines and Tylenol at the same time. Many of these medicines have acetaminophen, which is Tylenol. Read the labels to make sure that you are not taking more than the recommended dose. Too much acetaminophen (Tylenol) can be harmful. · Breathe warm, moist air from a steamy shower, a hot bath, or a sink filled with hot water. Avoid cold, dry air. Using a humidifier in your home may help. Follow the directions for cleaning the machine. · Use saline (saltwater) nasal washes to help keep your nasal passages open and wash out mucus and bacteria. You can buy saline nose drops at a grocery store or drugstore. Or you can make your own at home by adding 1 teaspoon of salt and 1 teaspoon of baking soda to 2 cups of distilled water. If you make your own, fill a bulb syringe with the solution, insert the tip into your nostril, and squeeze gently. Kendyan Lipa your nose. · Put a hot, wet towel or a warm gel pack on your face 3 or 4 times a day for 5 to 10 minutes each time. · Try a decongestant nasal spray like oxymetazoline (Afrin). Do not use it for more than 3 days in a row. Using it for more than 3 days can make your congestion worse. When should you call for help? Call your doctor now or seek immediate medical care if: 
  · You have new or worse swelling or redness in your face or around your eyes.  
  · You have a new or higher fever.  
 Watch closely for changes in your health, and be sure to contact your doctor if: 
  · You have new or worse facial pain.  
  · The mucus from your nose becomes thicker (like pus) or has new blood in it.   · You are not getting better as expected. Where can you learn more? Go to http://sergey-phyllis.info/. Enter E601 in the search box to learn more about \"Sinusitis: Care Instructions. \" Current as of: March 28, 2018 Content Version: 11.8 © 1453-5359 [employer]overati. Care instructions adapted under license by [employer] (which disclaims liability or warranty for this information). If you have questions about a medical condition or this instruction, always ask your healthcare professional. [address] any warranty or liability for your use of this information. Introducing [facility]! [address] introduces Crowdery patient portal. Now you can access parts of your medical record, email your doctor's office, and request medication refills online. 1. In your internet browser, go to [email or web address] 2. Click on the First Time User? Click Here link in the Sign In box. You will see the New Member Sign Up page. 3. Enter your Crowdery Access Code exactly as it appears below. You will not need to use this code after youve completed the sign-up process. If you do not sign up before the expiration date, you must request a new code. · Crowdery Access Code: [email or web address] Expires: 10/29/2018  2:00 PM 
 
4. Enter the last four digits of your Social Security Number (xxxx) and Date of Birth (mm/dd/yyyy) as indicated and click Submit. You will be taken to the next sign-up page. 5. Create a Crowdery ID. This will be your Crowdery login ID and cannot be changed, so think of one that is secure and easy to remember. 6. Create a Crowdery password. You can change your password at any time. 7. Enter your Password Reset Question and Answer. This can be used at a later time if you forget your password. 8. Enter your e-mail address. You will receive e-mail notification when new information is available in [address] 9. Click Sign Up. You can now view and download portions of your medical record. 10. Click the Download Summary menu link to download a portable copy of your medical information. If you have questions, please visit the Frequently Asked Questions section of the iList website. Remember, iList is NOT to be used for urgent needs. For medical emergencies, dial 911. Now available from your iPhone and Android! Please provide this summary of care documentation to your next provider. Your primary care clinician is listed as JEANNETTE Flanagan. If you have any questions after today's visit, please call [phone].

## 2018-10-05 NOTE — PATIENT INSTRUCTIONS
Bronchitis: Care Instructions  Your Care Instructions    Bronchitis is inflammation of the bronchial tubes, which carry air to the lungs. The tubes swell and produce mucus, or phlegm. The mucus and inflamed bronchial tubes make you cough. You may have trouble breathing. Most cases of bronchitis are caused by viruses like those that cause colds. Antibiotics usually do not help and they may be harmful. Bronchitis usually develops rapidly and lasts about 2 to 3 weeks in otherwise healthy people. Follow-up care is a key part of your treatment and safety. Be sure to make and go to all appointments, and call your doctor if you are having problems. It's also a good idea to know your test results and keep a list of the medicines you take. How can you care for yourself at home? · Take all medicines exactly as prescribed. Call your doctor if you think you are having a problem with your medicine. · Get some extra rest.  · Take an over-the-counter pain medicine, such as acetaminophen (Tylenol), ibuprofen (Advil, Motrin), or naproxen (Aleve) to reduce fever and relieve body aches. Read and follow all instructions on the label. · Do not take two or more pain medicines at the same time unless the doctor told you to. Many pain medicines have acetaminophen, which is Tylenol. Too much acetaminophen (Tylenol) can be harmful. · Take an over-the-counter cough medicine that contains dextromethorphan to help quiet a dry, hacking cough so that you can sleep. Avoid cough medicines that have more than one active ingredient. Read and follow all instructions on the label. · Breathe moist air from a humidifier, hot shower, or sink filled with hot water. The heat and moisture will thin mucus so you can cough it out. · Do not smoke. Smoking can make bronchitis worse. If you need help quitting, talk to your doctor about stop-smoking programs and medicines. These can increase your chances of quitting for good.   When should you call for help? Call 911 anytime you think you may need emergency care. For example, call if:    · You have severe trouble breathing.    Call your doctor now or seek immediate medical care if:    · You have new or worse trouble breathing.     · You cough up dark brown or bloody mucus (sputum).     · You have a new or higher fever.     · You have a new rash.    Watch closely for changes in your health, and be sure to contact your doctor if:    · You cough more deeply or more often, especially if you notice more mucus or a change in the color of your mucus.     · You are not getting better as expected. Where can you learn more? Go to http://sergey-phyllis.info/. Enter H333 in the search box to learn more about \"Bronchitis: Care Instructions. \"  Current as of: December 6, 2017  Content Version: 11.8  © 7684-1829 Vpon. Care instructions adapted under license by [employer] (which disclaims liability or warranty for this information). If you have questions about a medical condition or this instruction, always ask your healthcare professional. [place] [address] any warranty or liability for your use of this information. Sinusitis: Care Instructions  Your Care Instructions    Sinusitis is an infection of the lining of the sinus cavities in your head. Sinusitis often follows a cold. It causes pain and pressure in your head and face. In most cases, sinusitis gets better on its own in 1 to 2 weeks. But some mild symptoms may last for several weeks. Sometimes antibiotics are needed. Follow-up care is a key part of your treatment and safety. Be sure to make and go to all appointments, and call your doctor if you are having problems. It's also a good idea to know your test results and keep a list of the medicines you take. How can you care for yourself at home?   · Take an over-the-counter pain medicine, such as acetaminophen (Tylenol), ibuprofen (Advil, Motrin), or naproxen (Aleve). Read and follow all instructions on the label. · If the doctor prescribed antibiotics, take them as directed. Do not stop taking them just because you feel better. You need to take the full course of antibiotics. · Be careful when taking over-the-counter cold or flu medicines and Tylenol at the same time. Many of these medicines have acetaminophen, which is Tylenol. Read the labels to make sure that you are not taking more than the recommended dose. Too much acetaminophen (Tylenol) can be harmful. · Breathe warm, moist air from a steamy shower, a hot bath, or a sink filled with hot water. Avoid cold, dry air. Using a humidifier in your home may help. Follow the directions for cleaning the machine. · Use saline (saltwater) nasal washes to help keep your nasal passages open and wash out mucus and bacteria. You can buy saline nose drops at a grocery store or drugstore. Or you can make your own at home by adding 1 teaspoon of salt and 1 teaspoon of baking soda to 2 cups of distilled water. If you make your own, fill a bulb syringe with the solution, insert the tip into your nostril, and squeeze gently. Thedore Seashore your nose. · Put a hot, wet towel or a warm gel pack on your face 3 or 4 times a day for 5 to 10 minutes each time. · Try a decongestant nasal spray like oxymetazoline (Afrin). Do not use it for more than 3 days in a row. Using it for more than 3 days can make your congestion worse. When should you call for help? Call your doctor now or seek immediate medical care if:    · You have new or worse swelling or redness in your face or around your eyes.     · You have a new or higher fever.    Watch closely for changes in your health, and be sure to contact your doctor if:    · You have new or worse facial pain.     · The mucus from your nose becomes thicker (like pus) or has new blood in it.     · You are not getting better as expected. Where can you learn more?   Go to http://sergey-phyllis.info/. Enter Q308 in the search box to learn more about \"Sinusitis: Care Instructions. \"  Current as of: March 28, 2018  Content Version: 11.8  © 8647-5385 Healthwise, [employer]. Care instructions adapted under license by [employer] (which disclaims liability or warranty for this information). If you have questions about a medical condition or this instruction, always ask your healthcare professional. [place] [address] any warranty or liability for your use of this information.

## 2018-12-31 ENCOUNTER — OFFICE VISIT (OUTPATIENT)
Dept: URGENT CARE | Age: 68
End: 2018-12-31

## 2018-12-31 VITALS
WEIGHT: 244 LBS | OXYGEN SATURATION: 96 % | TEMPERATURE: 97.3 F | BODY MASS INDEX: 34.16 KG/M2 | SYSTOLIC BLOOD PRESSURE: 159 MMHG | HEART RATE: 68 BPM | DIASTOLIC BLOOD PRESSURE: 71 MMHG | RESPIRATION RATE: 18 BRPM | HEIGHT: 71 IN

## 2018-12-31 DIAGNOSIS — J01.90 ACUTE NON-RECURRENT SINUSITIS, UNSPECIFIED LOCATION: Primary | ICD-10-CM

## 2018-12-31 RX ORDER — GUAIFENESIN, PSEUDOEPHEDRINE HYDROCHLORIDE 600; 60 MG/1; MG/1
1 TABLET, EXTENDED RELEASE ORAL EVERY 12 HOURS
Qty: 20 TAB | Refills: 0 | Status: SHIPPED | OUTPATIENT
Start: 2018-12-31 | End: 2019-02-12

## 2018-12-31 RX ORDER — AMOXICILLIN AND CLAVULANATE POTASSIUM 875; 125 MG/1; MG/1
1 TABLET, FILM COATED ORAL 2 TIMES DAILY
Qty: 14 TAB | Refills: 0 | Status: SHIPPED | OUTPATIENT
Start: 2018-12-31 | End: 2019-02-12

## 2018-12-31 NOTE — PATIENT INSTRUCTIONS
Sinusitis: Care Instructions  Your Care Instructions    Sinusitis is an infection of the lining of the sinus cavities in your head. Sinusitis often follows a cold. It causes pain and pressure in your head and face. In most cases, sinusitis gets better on its own in 1 to 2 weeks. But some mild symptoms may last for several weeks. Sometimes antibiotics are needed. Follow-up care is a key part of your treatment and safety. Be sure to make and go to all appointments, and call your doctor if you are having problems. It's also a good idea to know your test results and keep a list of the medicines you take. How can you care for yourself at home? · Take an over-the-counter pain medicine, such as acetaminophen (Tylenol), ibuprofen (Advil, Motrin), or naproxen (Aleve). Read and follow all instructions on the label. · If the doctor prescribed antibiotics, take them as directed. Do not stop taking them just because you feel better. You need to take the full course of antibiotics. · Be careful when taking over-the-counter cold or flu medicines and Tylenol at the same time. Many of these medicines have acetaminophen, which is Tylenol. Read the labels to make sure that you are not taking more than the recommended dose. Too much acetaminophen (Tylenol) can be harmful. · Breathe warm, moist air from a steamy shower, a hot bath, or a sink filled with hot water. Avoid cold, dry air. Using a humidifier in your home may help. Follow the directions for cleaning the machine. · Use saline (saltwater) nasal washes to help keep your nasal passages open and wash out mucus and bacteria. You can buy saline nose drops at a grocery store or drugstore. Or you can make your own at home by adding 1 teaspoon of salt and 1 teaspoon of baking soda to 2 cups of distilled water. If you make your own, fill a bulb syringe with the solution, insert the tip into your nostril, and squeeze gently. Ezequiel Brakeman your nose.   · Put a hot, wet towel or a warm gel pack on your face 3 or 4 times a day for 5 to 10 minutes each time. · Try a decongestant nasal spray like oxymetazoline (Afrin). Do not use it for more than 3 days in a row. Using it for more than 3 days can make your congestion worse. When should you call for help? Call your doctor now or seek immediate medical care if:    · You have new or worse swelling or redness in your face or around your eyes.     · You have a new or higher fever.    Watch closely for changes in your health, and be sure to contact your doctor if:    · You have new or worse facial pain.     · The mucus from your nose becomes thicker (like pus) or has new blood in it.     · You are not getting better as expected. Where can you learn more? Go to http://sergey-phyllis.info/. Enter W443 in the search box to learn more about \"Sinusitis: Care Instructions. \"  Current as of: March 28, 2018  Content Version: 11.8  © 3731-4721 [employer]. Care instructions adapted under license by [employer] (which disclaims liability or warranty for this information). If you have questions about a medical condition or this instruction, always ask your healthcare professional. [place] [address] any warranty or liability for your use of this information.

## 2018-12-31 NOTE — PROGRESS NOTES
The history is provided by the patient. Cold Symptoms   The history is provided by the patient. This is a new problem. The current episode started more than 1 week ago. The problem occurs constantly. The problem has been gradually worsening. The cough is productive of sputum. There has been no fever. Associated symptoms include chills, sweats, rhinorrhea, sore throat and myalgias. Pertinent negatives include no chest pain, no eye redness, no ear congestion, no ear pain, no headaches, no shortness of breath, no wheezing, no nausea and no vomiting. He has tried nothing for the symptoms. The treatment provided no relief. Past Medical History:   Diagnosis Date    Actinic keratosis 9/15/2017    Annual physical exam 9/15/2017    Arthritis     ASHD (arteriosclerotic heart disease) 9/15/2017    CAD (coronary artery disease)     Chest pain 9/15/2017    Chronic back pain 9/15/2017    Cough 9/15/2017    Diabetes mellitus screening 9/15/2017    Diaphoresis 9/15/2017    Dyslipidemia 9/15/2017    Edema 9/15/2017    Elevated LFTs 9/15/2017    Fatigue 9/15/2017    Hypercholesterolemia     Hyperplasia of prostate 9/15/2017    Obesity 9/15/2017    Other long term (current) drug therapy 9/15/2017    Sinusitis 9/15/2017    SK (seborrheic keratosis) 9/15/2017        History reviewed. No pertinent surgical history.       Family History   Problem Relation Age of Onset    No Known Problems Mother         Social History     Socioeconomic History    Marital status:      Spouse name: Not on file    Number of children: Not on file    Years of education: Not on file    Highest education level: Not on file   Social Needs    Financial resource strain: Not on file    Food insecurity - worry: Not on file    Food insecurity - inability: Not on file   [employer] needs - medical: Not on file   [employer] needs - non-medical: Not on file   Occupational History    Not on file   Tobacco Use    Smoking status: Never Smoker    Smokeless tobacco: Former User   Substance and Sexual Activity    Alcohol use: No     Alcohol/week: 0.0 oz    Drug use: No    Sexual activity: Not on file   Other Topics Concern    Not on file   Social History Narrative    Not on file                ALLERGIES: Claritin-d 12 hour [loratadine-pseudoephedrine]; Iodine; Levaquin [levofloxacin]; Seafood [shellfish containing products]; and Sulfa (sulfonamide antibiotics)    Review of Systems   Constitutional: Positive for chills and fatigue. Negative for fever. HENT: Positive for congestion, rhinorrhea, sinus pressure, sinus pain and sore throat. Negative for ear pain, trouble swallowing and voice change. Eyes: Negative for redness. Respiratory: Negative for shortness of breath and wheezing. Cardiovascular: Negative for chest pain. Gastrointestinal: Negative for abdominal pain, nausea and vomiting. Musculoskeletal: Positive for myalgias. Neurological: Negative for headaches. Vitals:    12/31/18 0942   BP: 159/71   Pulse: 68   Resp: 18   Temp: 97.3 °F (36.3 °C)   SpO2: 96%   Weight: 244 lb (110.7 kg)   Height: 5' 11\" (1.803 m)       Physical Exam   Constitutional: He is oriented to person, place, and time. He appears well-developed and well-nourished. No distress. HENT:   Nose: Right sinus exhibits maxillary sinus tenderness and frontal sinus tenderness. Left sinus exhibits maxillary sinus tenderness and frontal sinus tenderness. Mouth/Throat: Oropharynx is clear and moist. No oropharyngeal exudate. Eyes: Conjunctivae and EOM are normal. Pupils are equal, round, and reactive to light. Right eye exhibits no discharge. Left eye exhibits no discharge. No scleral icterus. Neck: Normal range of motion. Neck supple. No JVD present. Cardiovascular: Normal rate, regular rhythm, normal heart sounds and intact distal pulses. No murmur heard. Pulmonary/Chest: Effort normal and breath sounds normal. No stridor. No respiratory distress. He has no wheezes. He has no rales. Abdominal: Soft. Bowel sounds are normal. He exhibits no distension. There is no tenderness. There is no rebound and no guarding. Musculoskeletal: He exhibits no edema or tenderness. Neurological: He is alert and oriented to person, place, and time. Skin: Skin is warm and dry. He is not diaphoretic. Psychiatric: He has a normal mood and affect. Nursing note and vitals reviewed. MDM     Differential Diagnosis; Clinical Impression; Plan:     Sinusitis/URI.  Pt does meet abx targets due to duration of sxs.  - mucinex-d  - augmentin  - rest, fluids      Procedures

## 2019-02-12 ENCOUNTER — OFFICE VISIT (OUTPATIENT)
Dept: URGENT CARE | Age: 69
End: 2019-02-12

## 2019-02-12 VITALS
SYSTOLIC BLOOD PRESSURE: 133 MMHG | OXYGEN SATURATION: 98 % | WEIGHT: 245 LBS | HEART RATE: 68 BPM | DIASTOLIC BLOOD PRESSURE: 69 MMHG | HEIGHT: 71 IN | BODY MASS INDEX: 34.3 KG/M2 | TEMPERATURE: 97.8 F | RESPIRATION RATE: 18 BRPM

## 2019-02-12 DIAGNOSIS — R05.9 COUGH: ICD-10-CM

## 2019-02-12 DIAGNOSIS — K21.9 GASTROESOPHAGEAL REFLUX DISEASE, ESOPHAGITIS PRESENCE NOT SPECIFIED: ICD-10-CM

## 2019-02-12 DIAGNOSIS — R10.12 LEFT UPPER QUADRANT PAIN: ICD-10-CM

## 2019-02-12 DIAGNOSIS — J32.9 SINUSITIS, UNSPECIFIED CHRONICITY, UNSPECIFIED LOCATION: Primary | ICD-10-CM

## 2019-02-12 LAB
FLUAV+FLUBV AG NOSE QL IA.RAPID: NEGATIVE POS/NEG
FLUAV+FLUBV AG NOSE QL IA.RAPID: NEGATIVE POS/NEG
VALID INTERNAL CONTROL?: YES

## 2019-02-12 RX ORDER — CEFDINIR 300 MG/1
300 CAPSULE ORAL 2 TIMES DAILY
Qty: 20 CAP | Refills: 0 | Status: SHIPPED | OUTPATIENT
Start: 2019-02-12 | End: 2019-02-22

## 2019-02-12 NOTE — PATIENT INSTRUCTIONS
Abdominal Pain: Care Instructions  Your Care Instructions    Abdominal pain has many possible causes. Some aren't serious and get better on their own in a few days. Others need more testing and treatment. If your pain continues or gets worse, you need to be rechecked and may need more tests to find out what is wrong. You may need surgery to correct the problem. Don't ignore new symptoms, such as fever, nausea and vomiting, urination problems, pain that gets worse, and dizziness. These may be signs of a more serious problem. Your doctor may have recommended a follow-up visit in the next 8 to 12 hours. If you are not getting better, you may need more tests or treatment. The doctor has checked you carefully, but problems can develop later. If you notice any problems or new symptoms, get medical treatment right away. Follow-up care is a key part of your treatment and safety. Be sure to make and go to all appointments, and call your doctor if you are having problems. It's also a good idea to know your test results and keep a list of the medicines you take. How can you care for yourself at home? · Rest until you feel better. · To prevent dehydration, drink plenty of fluids, enough so that your urine is light yellow or clear like water. Choose water and other caffeine-free clear liquids until you feel better. If you have kidney, heart, or liver disease and have to limit fluids, talk with your doctor before you increase the amount of fluids you drink. · If your stomach is upset, eat mild foods, such as rice, dry toast or crackers, bananas, and applesauce. Try eating several small meals instead of two or three large ones. · Wait until 48 hours after all symptoms have gone away before you have spicy foods, alcohol, and drinks that contain caffeine. · Do not eat foods that are high in fat. · Avoid anti-inflammatory medicines such as aspirin, ibuprofen (Advil, Motrin), and naproxen (Aleve).  These can cause stomach upset. Talk to your doctor if you take daily aspirin for another health problem. When should you call for help? Call 911 anytime you think you may need emergency care. For example, call if:    · You passed out (lost consciousness).     · You pass maroon or very bloody stools.     · You vomit blood or what looks like coffee grounds.     · You have new, severe belly pain.    Call your doctor now or seek immediate medical care if:    · Your pain gets worse, especially if it becomes focused in one area of your belly.     · You have a new or higher fever.     · Your stools are black and look like tar, or they have streaks of blood.     · You have unexpected vaginal bleeding.     · You have symptoms of a urinary tract infection. These may include:  ? Pain when you urinate. ? Urinating more often than usual.  ? Blood in your urine.     · You are dizzy or lightheaded, or you feel like you may faint.    Watch closely for changes in your health, and be sure to contact your doctor if:    · You are not getting better after 1 day (24 hours). Where can you learn more? Go to http://sergey-phyllis.info/. Enter O325 in the search box to learn more about \"Abdominal Pain: Care Instructions. \"  Current as of: September 23, 2018  Content Version: 11.9  © 1211-1656 [employer]. Care instructions adapted under license by [employer] (which disclaims liability or warranty for this information). If you have questions about a medical condition or this instruction, always ask your healthcare professional. [place] [address] any warranty or liability for your use of this information. Gastroesophageal Reflux Disease (GERD): Care Instructions  Your Care Instructions    Gastroesophageal reflux disease (GERD) is the backward flow of stomach acid into the esophagus. The esophagus is the tube that leads from your throat to your stomach.  A one-way valve prevents the stomach acid from moving up into this tube. When you have GERD, this valve does not close tightly enough. If you have mild GERD symptoms including heartburn, you may be able to control the problem with antacids or over-the-counter medicine. Changing your diet, losing weight, and making other lifestyle changes can also help reduce symptoms. Follow-up care is a key part of your treatment and safety. Be sure to make and go to all appointments, and call your doctor if you are having problems. It's also a good idea to know your test results and keep a list of the medicines you take. How can you care for yourself at home? · Take your medicines exactly as prescribed. Call your doctor if you think you are having a problem with your medicine. · Your doctor may recommend over-the-counter medicine. For mild or occasional indigestion, antacids, such as Tums, Gaviscon, Mylanta, or Maalox, may help. Your doctor also may recommend over-the-counter acid reducers, such as Pepcid AC, Tagamet HB, Zantac 75, or Prilosec. Read and follow all instructions on the label. If you use these medicines often, talk with your doctor. · Change your eating habits. ? It's best to eat several small meals instead of two or three large meals. ? After you eat, wait 2 to 3 hours before you lie down. ? Chocolate, mint, and alcohol can make GERD worse. ? Spicy foods, foods that have a lot of acid (like tomatoes and oranges), and coffee can make GERD symptoms worse in some people. If your symptoms are worse after you eat a certain food, you may want to stop eating that food to see if your symptoms get better. · Do not smoke or chew tobacco. Smoking can make GERD worse. If you need help quitting, talk to your doctor about stop-smoking programs and medicines. These can increase your chances of quitting for good.   · If you have GERD symptoms at night, raise the head of your bed 6 to 8 inches by putting the frame on blocks or placing a foam wedge under the head of your mattress. (Adding extra pillows does not work.)  · Do not wear tight clothing around your middle. · Lose weight if you need to. Losing just 5 to 10 pounds can help. When should you call for help? Call your doctor now or seek immediate medical care if:    · You have new or different belly pain.     · Your stools are black and tarlike or have streaks of blood.    Watch closely for changes in your health, and be sure to contact your doctor if:    · Your symptoms have not improved after 2 days.     · Food seems to catch in your throat or chest.   Where can you learn more? Go to http://sergey-phyllis.info/. Enter T434 in the search box to learn more about \"Gastroesophageal Reflux Disease (GERD): Care Instructions. \"  Current as of: March 27, 2018  Content Version: 11.9  © 9425-3130 [employer]. Care instructions adapted under license by [employer] (which disclaims liability or warranty for this information). If you have questions about a medical condition or this instruction, always ask your healthcare professional. [address] any warranty or liability for your use of this information. Sinusitis: Care Instructions  Your Care Instructions    Sinusitis is an infection of the lining of the sinus cavities in your head. Sinusitis often follows a cold. It causes pain and pressure in your head and face. In most cases, sinusitis gets better on its own in 1 to 2 weeks. But some mild symptoms may last for several weeks. Sometimes antibiotics are needed. Follow-up care is a key part of your treatment and safety. Be sure to make and go to all appointments, and call your doctor if you are having problems. It's also a good idea to know your test results and keep a list of the medicines you take. How can you care for yourself at home?   · Take an over-the-counter pain medicine, such as acetaminophen (Tylenol), ibuprofen (Advil, Motrin), or naproxen (Aleve). Read and follow all instructions on the label. · If the doctor prescribed antibiotics, take them as directed. Do not stop taking them just because you feel better. You need to take the full course of antibiotics. · Be careful when taking over-the-counter cold or flu medicines and Tylenol at the same time. Many of these medicines have acetaminophen, which is Tylenol. Read the labels to make sure that you are not taking more than the recommended dose. Too much acetaminophen (Tylenol) can be harmful. · Breathe warm, moist air from a steamy shower, a hot bath, or a sink filled with hot water. Avoid cold, dry air. Using a humidifier in your home may help. Follow the directions for cleaning the machine. · Use saline (saltwater) nasal washes to help keep your nasal passages open and wash out mucus and bacteria. You can buy saline nose drops at a grocery store or drugstore. Or you can make your own at home by adding 1 teaspoon of salt and 1 teaspoon of baking soda to 2 cups of distilled water. If you make your own, fill a bulb syringe with the solution, insert the tip into your nostril, and squeeze gently. [place] Courts your nose. · Put a hot, wet towel or a warm gel pack on your face 3 or 4 times a day for 5 to 10 minutes each time. · Try a decongestant nasal spray like oxymetazoline (Afrin). Do not use it for more than 3 days in a row. Using it for more than 3 days can make your congestion worse. When should you call for help? Call your doctor now or seek immediate medical care if:    · You have new or worse swelling or redness in your face or around your eyes.     · You have a new or higher fever.    Watch closely for changes in your health, and be sure to contact your doctor if:    · You have new or worse facial pain.     · The mucus from your nose becomes thicker (like pus) or has new blood in it.     · You are not getting better as expected. Where can you learn more?   Go to http://sergey-phyllis.info/. Enter B932 in the search box to learn more about \"Sinusitis: Care Instructions. \"  Current as of: March 27, 2018  Content Version: 11.9  © 0331-5248 [employer] [facility]. Care instructions adapted under license by [employer] (which disclaims liability or warranty for this information). If you have questions about a medical condition or this instruction, always ask your healthcare professional. [place] [address] any warranty or liability for your use of this information.

## 2019-02-12 NOTE — PROGRESS NOTES
Cough   The history is provided by the patient. This is a new problem. Episode onset: 3 weeks ago. The problem occurs every few minutes. The problem has not changed since onset. The cough is productive of sputum. There has been no fever. Associated symptoms include chest pain (described as \"heartburn\"), ear congestion, rhinorrhea and myalgias. Pertinent negatives include no chills, no sweats, no ear pain, no headaches, no sore throat, no shortness of breath, no wheezing, no nausea and no vomiting. Associated symptoms comments: Upper abdominal pain. He has tried nothing for the symptoms. He is not a smoker. Past Medical History:   Diagnosis Date    Actinic keratosis 9/15/2017    Annual physical exam 9/15/2017    Arthritis     ASHD (arteriosclerotic heart disease) 9/15/2017    CAD (coronary artery disease)     Chest pain 9/15/2017    Chronic back pain 9/15/2017    Cough 9/15/2017    Diabetes mellitus screening 9/15/2017    Diaphoresis 9/15/2017    Dyslipidemia 9/15/2017    Edema 9/15/2017    Elevated LFTs 9/15/2017    Fatigue 9/15/2017    Hypercholesterolemia     Hyperplasia of prostate 9/15/2017    Obesity 9/15/2017    Other long term (current) drug therapy 9/15/2017    Sinusitis 9/15/2017    SK (seborrheic keratosis) 9/15/2017        No past surgical history on file.       Family History   Problem Relation Age of Onset    No Known Problems Mother         Social History     Socioeconomic History    Marital status:      Spouse name: Not on file    Number of children: Not on file    Years of education: Not on file    Highest education level: Not on file   Social Needs    Financial resource strain: Not on file    Food insecurity - worry: Not on file    Food insecurity - inability: Not on file   [employer] needs - medical: Not on file   [employer] needs - non-medical: Not on file   Occupational History    Not on file   Tobacco Use    Smoking status: Never Smoker    Smokeless tobacco: Former User   Substance and Sexual Activity    Alcohol use: No     Alcohol/week: 0.0 oz    Drug use: No    Sexual activity: Not on file   Other Topics Concern    Not on file   Social History Narrative    Not on file                ALLERGIES: Claritin-d 12 hour [loratadine-pseudoephedrine]; Iodine; Levaquin [levofloxacin]; Seafood [shellfish containing products]; and Sulfa (sulfonamide antibiotics)    Review of Systems   Constitutional: Negative for chills and fever. HENT: Positive for congestion, rhinorrhea and sinus pressure. Negative for ear pain and sore throat. Respiratory: Positive for cough. Negative for shortness of breath and wheezing. Cardiovascular: Positive for chest pain (described as \"heartburn\"). Negative for palpitations. Gastrointestinal: Positive for abdominal pain. Negative for diarrhea, nausea and vomiting. Musculoskeletal: Positive for myalgias. Skin: Negative for rash. Neurological: Negative for headaches. Hematological: Negative for adenopathy. Vitals:    02/12/19 1110   BP: 133/69   Pulse: 68   Resp: 18   Temp: 97.8 °F (36.6 °C)   SpO2: 98%   Weight: 245 lb (111.1 kg)   Height: 5' 11\" (1.803 m)       Physical Exam   Constitutional: He appears well-developed and well-nourished. No distress. HENT:   Right Ear: Tympanic membrane, external ear and ear canal normal.   Left Ear: Tympanic membrane, external ear and ear canal normal.   Nose: Rhinorrhea present. Right sinus exhibits no maxillary sinus tenderness and no frontal sinus tenderness. Left sinus exhibits no maxillary sinus tenderness and no frontal sinus tenderness. Mouth/Throat: Mucous membranes are normal. Posterior oropharyngeal erythema present. No oropharyngeal exudate, posterior oropharyngeal edema or tonsillar abscesses. Cardiovascular: Normal rate, regular rhythm and normal heart sounds. Pulmonary/Chest: Effort normal and breath sounds normal. No respiratory distress.  He has no wheezes. He has no rales. Abdominal: Soft. Bowel sounds are normal. He exhibits no distension. There is tenderness in the left upper quadrant. There is no rigidity, no rebound, no guarding and no CVA tenderness. Lymphadenopathy:     He has cervical adenopathy. Neurological: He is alert. Skin: He is not diaphoretic. Psychiatric: He has a normal mood and affect. His behavior is normal. Judgment and thought content normal.   Nursing note and vitals reviewed. MDM    ICD-10-CM ICD-9-CM    1. Sinusitis, unspecified chronicity, unspecified location J32.9 473.9    2. Left upper quadrant pain R10.12 789.02    3. Gastroesophageal reflux disease, esophagitis presence not specified K21.9 530.81    4. Cough R05 786.2 AMB POC DIONE INFLUENZA A/B TEST      XR CHEST PA LAT     Medications Ordered Today   Medications    cefdinir (OMNICEF) 300 mg capsule     Sig: Take 1 Cap by mouth two (2) times a day for 10 days. Dispense:  20 Cap     Refill:  0       The patients condition was discussed with the patient and they understand. The patient is to follow up with PCP. If signs and symptoms become worse the pt is to go to the ER. The patient is to take medications as prescribed. Results for orders placed or performed in visit on 02/12/19   AMB POC DIONE INFLUENZA A/B TEST   Result Value Ref Range    VALID INTERNAL CONTROL POC Yes     Influenza A Ag POC Negative Negative Pos/Neg    Influenza B Ag POC Negative Negative Pos/Neg     XR Results (most recent):  Results from Appointment encounter on 02/12/19   XR CHEST PA LAT    Narrative Exam:  2 view chest    Indication: Cough    Comparison to 9/12/2018. PA and lateral views demonstrate normal heart size. There is no acute process in  the lung fields. The osseous structures are unremarkable.       Impression Impression: No acute process or change compared to the prior exam.         Procedures

## 2019-04-09 RX ORDER — LEVOCETIRIZINE DIHYDROCHLORIDE 5 MG/1
TABLET, FILM COATED ORAL
Qty: 30 TAB | Refills: 6 | Status: SHIPPED | OUTPATIENT
Start: 2019-04-09 | End: 2019-07-18 | Stop reason: SDUPTHER

## 2019-05-29 ENCOUNTER — HOSPITAL ENCOUNTER (OUTPATIENT)
Dept: MRI IMAGING | Age: 69
Discharge: HOME | End: 2019-05-29
Attending: ORTHOPAEDIC SURGERY
Payer: MEDICARE

## 2019-05-29 DIAGNOSIS — M23.91 INTERNAL DERANGEMENT OF KNEE, RIGHT: ICD-10-CM

## 2019-05-29 PROCEDURE — 73721 MRI JNT OF LWR EXTRE W/O DYE: CPT

## 2019-06-04 ENCOUNTER — OFFICE VISIT (OUTPATIENT)
Dept: URGENT CARE | Age: 69
End: 2019-06-04

## 2019-06-04 VITALS
SYSTOLIC BLOOD PRESSURE: 139 MMHG | RESPIRATION RATE: 18 BRPM | OXYGEN SATURATION: 98 % | DIASTOLIC BLOOD PRESSURE: 81 MMHG | HEIGHT: 71 IN | HEART RATE: 96 BPM | WEIGHT: 245 LBS | TEMPERATURE: 97.8 F | BODY MASS INDEX: 34.3 KG/M2

## 2019-06-04 DIAGNOSIS — H65.93 OME (OTITIS MEDIA WITH EFFUSION), BILATERAL: ICD-10-CM

## 2019-06-04 DIAGNOSIS — B96.89 ACUTE BACTERIAL SINUSITIS: Primary | ICD-10-CM

## 2019-06-04 DIAGNOSIS — J01.90 ACUTE BACTERIAL SINUSITIS: Primary | ICD-10-CM

## 2019-06-04 RX ORDER — AMOXICILLIN AND CLAVULANATE POTASSIUM 875; 125 MG/1; MG/1
1 TABLET, FILM COATED ORAL 2 TIMES DAILY
Qty: 14 TAB | Refills: 0 | Status: SHIPPED | OUTPATIENT
Start: 2019-06-04 | End: 2019-06-10 | Stop reason: ALTCHOICE

## 2019-06-04 NOTE — PATIENT INSTRUCTIONS
Follow up with PCP without improvement over next 5-7 days sooner/immediately for new or worsening       Sinusitis: Care Instructions  Your Care Instructions    Sinusitis is an infection of the lining of the sinus cavities in your head. Sinusitis often follows a cold. It causes pain and pressure in your head and face. In most cases, sinusitis gets better on its own in 1 to 2 weeks. But some mild symptoms may last for several weeks. Sometimes antibiotics are needed. Follow-up care is a key part of your treatment and safety. Be sure to make and go to all appointments, and call your doctor if you are having problems. It's also a good idea to know your test results and keep a list of the medicines you take. How can you care for yourself at home? · Take an over-the-counter pain medicine, such as acetaminophen (Tylenol), ibuprofen (Advil, Motrin), or naproxen (Aleve). Read and follow all instructions on the label. · If the doctor prescribed antibiotics, take them as directed. Do not stop taking them just because you feel better. You need to take the full course of antibiotics. · Be careful when taking over-the-counter cold or flu medicines and Tylenol at the same time. Many of these medicines have acetaminophen, which is Tylenol. Read the labels to make sure that you are not taking more than the recommended dose. Too much acetaminophen (Tylenol) can be harmful. · Breathe warm, moist air from a steamy shower, a hot bath, or a sink filled with hot water. Avoid cold, dry air. Using a humidifier in your home may help. Follow the directions for cleaning the machine. · Use saline (saltwater) nasal washes to help keep your nasal passages open and wash out mucus and bacteria. You can buy saline nose drops at a grocery store or drugstore. Or you can make your own at home by adding 1 teaspoon of salt and 1 teaspoon of baking soda to 2 cups of distilled water.  If you make your own, fill a bulb syringe with the solution, insert the tip into your nostril, and squeeze gently. [place] Pickup your nose. · Put a hot, wet towel or a warm gel pack on your face 3 or 4 times a day for 5 to 10 minutes each time. · Try a decongestant nasal spray like oxymetazoline (Afrin). Do not use it for more than 3 days in a row. Using it for more than 3 days can make your congestion worse. When should you call for help? Call your doctor now or seek immediate medical care if:    · You have new or worse swelling or redness in your face or around your eyes.     · You have a new or higher fever.    Watch closely for changes in your health, and be sure to contact your doctor if:    · You have new or worse facial pain.     · The mucus from your nose becomes thicker (like pus) or has new blood in it.     · You are not getting better as expected. Where can you learn more? Go to http://sergey-phyllis.info/. Enter G695 in the search box to learn more about \"Sinusitis: Care Instructions. \"  Current as of: March 27, 2018  Content Version: 11.9  © 1346-3259 [employer]. Care instructions adapted under license by [employer] (which disclaims liability or warranty for this information). If you have questions about a medical condition or this instruction, always ask your healthcare professional. [address] any warranty or liability for your use of this information.

## 2019-06-04 NOTE — PROGRESS NOTES
Close to 1 week of what he believes is a sinus infection  Identical to infections in past  symptoms have included: sinus pain, thick nasal discharge, R and L ear pressure and pain, post nasal drip  Did start nasal saline sprays that have gotten \"a bunch of green junk\" out of his sinuses but he doesn't feel better. Not has chills today  Kamila cough or SOB  Overall worsening. Is not a smoker  PMH of frequent sinus issues           Past Medical History:   Diagnosis Date    Actinic keratosis 9/15/2017    Annual physical exam 9/15/2017    Arthritis     ASHD (arteriosclerotic heart disease) 9/15/2017    CAD (coronary artery disease)     Chest pain 9/15/2017    Chronic back pain 9/15/2017    Cough 9/15/2017    Diabetes mellitus screening 9/15/2017    Diaphoresis 9/15/2017    Dyslipidemia 9/15/2017    Edema 9/15/2017    Elevated LFTs 9/15/2017    Fatigue 9/15/2017    Hypercholesterolemia     Hyperplasia of prostate 9/15/2017    Obesity 9/15/2017    Other long term (current) drug therapy 9/15/2017    Sinusitis 9/15/2017    SK (seborrheic keratosis) 9/15/2017        History reviewed. No pertinent surgical history.       Family History   Problem Relation Age of Onset    No Known Problems Mother         Social History     Socioeconomic History    Marital status:      Spouse name: Not on file    Number of children: Not on file    Years of education: Not on file    Highest education level: Not on file   Occupational History    Not on file   Social Needs    Financial resource strain: Not on file    Food insecurity:     Worry: Not on file     Inability: Not on file    Transportation needs:     Medical: Not on file     Non-medical: Not on file   Tobacco Use    Smoking status: Never Smoker    Smokeless tobacco: Former User   Substance and Sexual Activity    Alcohol use: No     Alcohol/week: 0.0 oz    Drug use: No    Sexual activity: Not on file   Lifestyle    Physical activity: Days per week: Not on file     Minutes per session: Not on file    Stress: Not on file   Relationships    Social connections:     Talks on phone: Not on file     Gets together: Not on file     Attends Shinto service: Not on file     Active member of club or organization: Not on file     Attends meetings of clubs or organizations: Not on file     Relationship status: Not on file    Intimate partner violence:     Fear of current or ex partner: Not on file     Emotionally abused: Not on file     Physically abused: Not on file     Forced sexual activity: Not on file   Other Topics Concern    Not on file   Social History Narrative    Not on file                ALLERGIES: Claritin-d 12 hour [loratadine-pseudoephedrine]; Iodine; Levaquin [levofloxacin]; Seafood [shellfish containing products]; and Sulfa (sulfonamide antibiotics)    Review of Systems   Constitutional: Negative for fatigue. Gastrointestinal: Negative for nausea. Neurological: Negative for dizziness and headaches. All other systems reviewed and are negative. Vitals:    06/04/19 1248 06/04/19 1308   BP: 139/81    Pulse: (!) 104 96   Resp: 18    Temp: 97.8 °F (36.6 °C)    SpO2: 98%    Weight: 245 lb (111.1 kg)    Height: 5' 11\" (1.803 m)        Physical Exam   Constitutional: He is oriented to person, place, and time. No distress. Non-toxic appearing, well hydrated   HENT:   Swollen nasal turbinates bilat with thick yellowish mucus in passages bilat. Maxillary sinus TTP. OP with thick post nasal drip; otherwise normal appearing. TMs dull bilat with air fluid level noted; no pus or erythema. Eyes: Pupils are equal, round, and reactive to light. Conjunctivae and EOM are normal. No scleral icterus. Neck: Normal range of motion. Neck supple. Cardiovascular: Normal rate, regular rhythm and normal heart sounds. Exam reveals no gallop and no friction rub. No murmur heard.   Pulmonary/Chest: Effort normal and breath sounds normal. No respiratory distress. He has no wheezes. He has no rales. Lymphadenopathy:     He has no cervical adenopathy. Neurological: He is alert and oriented to person, place, and time. Skin: Skin is warm and dry. No rash noted. He is not diaphoretic. No erythema. No pallor. Psychiatric: He has a normal mood and affect. His behavior is normal. Thought content normal.   Nursing note and vitals reviewed. MDM     Differential Diagnosis; Clinical Impression; Plan:       CLINICAL IMPRESSION:  (J01.90,  B96.89) Acute bacterial sinusitis  (primary encounter diagnosis)  (H65.93) OME (otitis media with effusion), bilateral    Orders Placed This Encounter      amoxicillin-clavulanate (AUGMENTIN) 875-125 mg per tablet          Sig: Take 1 Tab by mouth two (2) times a day for 7 days. Dispense:  14 Tab          Refill:  0      Plan:  Start augmentin for bacterial sinusitis  Continue nasal saline  Review handouts  OME- may self improve with treatment of sinuses      We have reviewed concerning signs/symptoms, normal vs abnormal progression of medical condition and when to seek immediate medical attention. Schedule with PCP or Urgent Care immediately for worsening or new symptoms. See your PCP if there is not at least some improvement in symptoms within the next 5-7 days. You should see your PCP for updates on your routine health maintenance.              Procedures

## 2019-06-07 ENCOUNTER — OFFICE VISIT (OUTPATIENT)
Dept: INTERNAL MEDICINE CLINIC | Age: 69
End: 2019-06-07

## 2019-06-07 VITALS
HEART RATE: 84 BPM | SYSTOLIC BLOOD PRESSURE: 134 MMHG | WEIGHT: 244.7 LBS | RESPIRATION RATE: 19 BRPM | HEIGHT: 71 IN | OXYGEN SATURATION: 95 % | BODY MASS INDEX: 34.26 KG/M2 | TEMPERATURE: 98.6 F | DIASTOLIC BLOOD PRESSURE: 82 MMHG

## 2019-06-07 DIAGNOSIS — E78.5 DYSLIPIDEMIA: ICD-10-CM

## 2019-06-07 DIAGNOSIS — M17.11 PRIMARY OSTEOARTHRITIS OF RIGHT KNEE: ICD-10-CM

## 2019-06-07 DIAGNOSIS — I10 ESSENTIAL HYPERTENSION: ICD-10-CM

## 2019-06-07 DIAGNOSIS — I25.10 ASHD (ARTERIOSCLEROTIC HEART DISEASE): ICD-10-CM

## 2019-06-07 DIAGNOSIS — J01.01 ACUTE RECURRENT MAXILLARY SINUSITIS: Primary | ICD-10-CM

## 2019-06-07 RX ORDER — CHOLESTYRAMINE 4 G/4.8G
4 POWDER, FOR SUSPENSION ORAL 2 TIMES DAILY
COMMUNITY
End: 2019-10-28 | Stop reason: ALTCHOICE

## 2019-06-07 NOTE — PROGRESS NOTES
This note will not be viewable in [address] Yessenia Ann is a 79 y.o. male and presents with Allergies (follow up from sinus infections)  . Subjective:      Mr. Murray Perez presents today for follow-up of perennial rhinitis with recurring sinus infections. He was recently seen in the urgent care center and placed on an antibiotic for sinus infection. He is feeling somewhat improved with regard to this today. He has seen Dr. Mayra Quevedo his cardiologist within the past couple of months and had follow-up labs done at that time since he had not been seen by us in several months. Labs are pending review. He denies any shortness of breath, chest pain, palpitations, PND, orthopnea, or pedal edema. He is scheduled to undergo surgery with Dr. Erica Alvarez on his right knee in the near future. He has been cleared by cardiology for this. Review of Systems  Constitutional:   Eyes:   negative for visual disturbance and irritation  ENT:   negative for tinnitus,sore throat,nasal congestion,ear pains. hoarseness  Respiratory:  negative for cough, hemoptysis, dyspnea,wheezing  CV:   negative for chest pain, palpitations, lower extremity edema  GI:   negative for nausea, vomiting, diarrhea, abdominal pain,melena  Endo:               negative for polyuria,polydipsia,polyphagia,heat intolerance  Genitourinary: negative for frequency, dysuria and hematuria  Integumentary: negative for rash and pruritus  Hematologic:  negative for easy bruising and gum/nose bleeding  Musculoskel: negative for myalgias, arthralgias, back pain, muscle weakness  Neurological:  negative for headaches, dizziness, vertigo, memory problems and gait   Behavl/Psych: negative for feelings of anxiety, depression, mood changes    Past Medical History:   Diagnosis Date    Actinic keratosis 9/15/2017    Annual physical exam 9/15/2017    Arthritis     ASHD (arteriosclerotic heart disease) 9/15/2017    CAD (coronary artery disease)     Chest pain 9/15/2017    Chronic back pain 9/15/2017    Cough 9/15/2017    Diabetes mellitus screening 9/15/2017    Diaphoresis 9/15/2017    Dyslipidemia 9/15/2017    Edema 9/15/2017    Elevated LFTs 9/15/2017    Essential hypertension 6/7/2019    Fatigue 9/15/2017    Hypercholesterolemia     Hyperplasia of prostate 9/15/2017    Obesity 9/15/2017    Other long term (current) drug therapy 9/15/2017    Sinusitis 9/15/2017    SK (seborrheic keratosis) 9/15/2017     History reviewed. No pertinent surgical history. Social History     Socioeconomic History    Marital status:      Spouse name: Not on file    Number of children: Not on file    Years of education: Not on file    Highest education level: Not on file   Tobacco Use    Smoking status: Never Smoker    Smokeless tobacco: Former User   Substance and Sexual Activity    Alcohol use: No     Alcohol/week: 0.0 oz    Drug use: No     Family History   Problem Relation Age of Onset    No Known Problems Mother      Current Outpatient Medications   Medication Sig Dispense Refill    cholestyramine-aspartame (QUESTRAN LIGHT) 4 gram packet Take 4 g by mouth two (2) times a day.  amoxicillin-clavulanate (AUGMENTIN) 875-125 mg per tablet Take 1 Tab by mouth two (2) times a day for 7 days. 14 Tab 0    levocetirizine (XYZAL) 5 mg tablet TAKE 1 TABLET BY MOUTH ONCE DAILY 30 Tab 6    omeprazole (PRILOSEC) 40 mg capsule   1    nabumetone (RELAFEN) 500 mg tablet Take 1 Tab by mouth two (2) times a day. 180 Tab 3    ZETIA 10 mg tablet Take 1 Tab by mouth daily. 90 Tab 3    TAZTIA  mg SR capsule   0    finasteride (PROSCAR) 5 mg tablet   0    aspirin 81 mg chewable tablet Take 81 mg by mouth daily.  coenzyme q10 10 mg cap Take  by mouth.  multivitamin (ONE A DAY) tablet Take 1 Tab by mouth daily.       WELCHOL 625 mg tablet   0     Allergies   Allergen Reactions    Claritin-D 12 Hour [Loratadine-Pseudoephedrine] Shortness of Breath    Iodine Shortness of Breath    Levaquin [Levofloxacin] Hives    Seafood [Shellfish Containing Products] Shortness of Breath    Sulfa (Sulfonamide Antibiotics) Hives       Objective:  Visit Vitals  /82 (BP 1 Location: Right arm, BP Patient Position: Sitting)   Pulse 84   Temp 98.6 °F (37 °C) (Oral)   Resp 19   Ht 5' 11\" (1.803 m)   Wt 244 lb 11.2 oz (111 kg)   SpO2 95%   BMI 34.13 kg/m²     Physical Exam:   General appearance - alert, well appearing, and in no distress  Mental status - alert, oriented to person, place, and time  EYE-KAI, EOMI, fundi normal, corneas normal, no foreign bodies  ENT-ENT exam normal, no neck nodes or sinus tenderness  Nose - normal and patent, no erythema, discharge or polyps  Mouth - mucous membranes moist, pharynx normal without lesions  Neck - supple, no significant adenopathy   Chest - clear to auscultation, no wheezes, rales or rhonchi, symmetric air entry   Heart - normal rate, regular rhythm, normal S1, S2, no murmurs, rubs, clicks or gallops   Abdomen - soft, nontender, nondistended, no masses or organomegaly  Lymph- no adenopathy palpable  Ext-peripheral pulses normal, no pedal edema, no clubbing or cyanosis  Skin-Warm and dry. no hyperpigmentation, vitiligo, or suspicious lesions  Neuro -alert, oriented, normal speech, no focal findings or movement disorder noted  Musculoskeletal- FROM, no bony abnormalities, no point tenderness    No results found for this visit on 06/07/19. All results for lab orders may not have been returned by the time this encountered was closed. Assessment/Plan:       ICD-10-CM ICD-9-CM    1. Acute recurrent maxillary sinusitis J01.01 461.0    2. ASHD (arteriosclerotic heart disease) I25.10 414.00    3. Essential hypertension I10 401.9    4. Dyslipidemia E78.5 272.4    5.  Primary osteoarthritis of right knee M17.11 715.16        Orders Placed This Encounter    cholestyramine-aspartame (QUESTRAN LIGHT) 4 gram packet     Sig: Take 4 g by mouth two (2) times a day.       Follow-up and Dispositions    · Return in about 3 months (around 9/7/2019) for [address] Plan:    Continue current medical regimen as outlined above. Follow-up with orthopedics for partial knee replacement of right knee. Cardiology clearance per Dr. Deon Anaya. Complete course of antibiotics as prescribed for recurring sinusitis. Call if symptoms have not resolved. I have reviewed with the patient details of the assessment and plan and all questions were answered. Relevent patient education was performed. Verbal and/or written instructions (see AVS) provided. The most recent lab findings were reviewed with the patient. Plan was discussed with patient who verbal expressed understanding. An After Visit Summary was printed and given to the patient.       Elizabeth Khan MD

## 2019-06-07 NOTE — PROGRESS NOTES
Chief Complaint   Patient presents with    Allergies     follow up from sinus infections     1. Have you been to the ER, urgent care clinic since your last visit? Urgent Care on Monday for sinus infection  Hospitalized since your last visit? No     2. Have you seen or consulted any other health care providers outside of the [address] [place] [address] since your last visit?  No

## 2019-06-10 ENCOUNTER — TELEPHONE (OUTPATIENT)
Dept: INTERNAL MEDICINE CLINIC | Age: 69
End: 2019-06-10

## 2019-06-10 RX ORDER — CEFDINIR 300 MG/1
300 CAPSULE ORAL 2 TIMES DAILY
Qty: 20 CAP | Refills: 0 | Status: SHIPPED | OUTPATIENT
Start: 2019-06-10 | End: 2019-07-20

## 2019-06-10 NOTE — TELEPHONE ENCOUNTER
Patient called stating he is still \"clogged up\" and has knee surgery scheduled for next week. He said you told him to call if things didn't clear up.

## 2019-06-10 NOTE — TELEPHONE ENCOUNTER
Patient states he only received 7 days worth of antibiotics from [address] [place] [address] on 6/4/19 and only has one pill left. Per Dr Jenna Crump- Start Omnicef 300mg BID x 10 days. Rx sent to [facility].

## 2019-07-19 RX ORDER — LEVOCETIRIZINE DIHYDROCHLORIDE 5 MG/1
TABLET, FILM COATED ORAL
Qty: 90 TAB | Refills: 5 | Status: SHIPPED | OUTPATIENT
Start: 2019-07-19 | End: 2020-04-27

## 2019-07-19 RX ORDER — NABUMETONE 500 MG/1
TABLET, FILM COATED ORAL
Qty: 180 TAB | Refills: 0 | Status: SHIPPED | OUTPATIENT
Start: 2019-07-19 | End: 2019-12-23

## 2019-07-20 ENCOUNTER — OFFICE VISIT (OUTPATIENT)
Dept: URGENT CARE | Age: 69
End: 2019-07-20

## 2019-07-20 VITALS
TEMPERATURE: 98.5 F | HEIGHT: 71 IN | HEART RATE: 81 BPM | DIASTOLIC BLOOD PRESSURE: 66 MMHG | SYSTOLIC BLOOD PRESSURE: 132 MMHG | OXYGEN SATURATION: 99 % | BODY MASS INDEX: 34.16 KG/M2 | WEIGHT: 244 LBS | RESPIRATION RATE: 18 BRPM

## 2019-07-20 DIAGNOSIS — J01.01 ACUTE RECURRENT MAXILLARY SINUSITIS: Primary | ICD-10-CM

## 2019-07-20 RX ORDER — CEFDINIR 300 MG/1
300 CAPSULE ORAL 2 TIMES DAILY
Qty: 20 CAP | Refills: 0 | Status: SHIPPED | OUTPATIENT
Start: 2019-07-20 | End: 2019-07-30

## 2019-07-20 RX ORDER — FLUTICASONE PROPIONATE 50 MCG
2 SPRAY, SUSPENSION (ML) NASAL DAILY
Qty: 1 BOTTLE | Refills: 0 | Status: SHIPPED | OUTPATIENT
Start: 2019-07-20 | End: 2019-11-20

## 2019-07-20 RX ORDER — CEFDINIR 300 MG/1
300 CAPSULE ORAL 2 TIMES DAILY
Qty: 20 CAP | Refills: 0 | Status: SHIPPED | OUTPATIENT
Start: 2019-07-20 | End: 2019-08-27

## 2019-07-20 NOTE — PATIENT INSTRUCTIONS
Sinusitis: Care Instructions  Your Care Instructions    Sinusitis is an infection of the lining of the sinus cavities in your head. Sinusitis often follows a cold. It causes pain and pressure in your head and face. In most cases, sinusitis gets better on its own in 1 to 2 weeks. But some mild symptoms may last for several weeks. Sometimes antibiotics are needed. Follow-up care is a key part of your treatment and safety. Be sure to make and go to all appointments, and call your doctor if you are having problems. It's also a good idea to know your test results and keep a list of the medicines you take. How can you care for yourself at home? · Take an over-the-counter pain medicine, such as acetaminophen (Tylenol), ibuprofen (Advil, Motrin), or naproxen (Aleve). Read and follow all instructions on the label. · If the doctor prescribed antibiotics, take them as directed. Do not stop taking them just because you feel better. You need to take the full course of antibiotics. · Be careful when taking over-the-counter cold or flu medicines and Tylenol at the same time. Many of these medicines have acetaminophen, which is Tylenol. Read the labels to make sure that you are not taking more than the recommended dose. Too much acetaminophen (Tylenol) can be harmful. · Breathe warm, moist air from a steamy shower, a hot bath, or a sink filled with hot water. Avoid cold, dry air. Using a humidifier in your home may help. Follow the directions for cleaning the machine. · Use saline (saltwater) nasal washes to help keep your nasal passages open and wash out mucus and bacteria. You can buy saline nose drops at a grocery store or drugstore. Or you can make your own at home by adding 1 teaspoon of salt and 1 teaspoon of baking soda to 2 cups of distilled water. If you make your own, fill a bulb syringe with the solution, insert the tip into your nostril, and squeeze gently. [place]leshia Shirts your nose.   · Put a hot, wet towel or a warm gel pack on your face 3 or 4 times a day for 5 to 10 minutes each time. · Try a decongestant nasal spray like oxymetazoline (Afrin). Do not use it for more than 3 days in a row. Using it for more than 3 days can make your congestion worse. When should you call for help? Call your doctor now or seek immediate medical care if:    · You have new or worse swelling or redness in your face or around your eyes.     · You have a new or higher fever.    Watch closely for changes in your health, and be sure to contact your doctor if:    · You have new or worse facial pain.     · The mucus from your nose becomes thicker (like pus) or has new blood in it.     · You are not getting better as expected. Where can you learn more? Go to http://sergey-phyllis.info/. Enter L729 in the search box to learn more about \"Sinusitis: Care Instructions. \"  Current as of: October 21, 2018  Content Version: 12.1  © 2717-0550 [employer]. Care instructions adapted under license by [employer] (which disclaims liability or warranty for this information). If you have questions about a medical condition or this instruction, always ask your healthcare professional. [place] [address] any warranty or liability for your use of this information.

## 2019-07-20 NOTE — PROGRESS NOTES
Subjective:      History was provided by the patient. Archana Berkowitz III is a 81 y.o. male who presents for evaluation of symptoms of a URI. Symptoms include sinus and nasal congestion, sore throat, nasal blockage and post nasal drip. Onset of symptoms was a few days ago, gradually worsening since that time. He also c/o 4 days congestion. He is drinking plenty of fluids. . Evaluation to date: none. Treatment to date: none  Past Medical History:   Diagnosis Date    Actinic keratosis 9/15/2017    Annual physical exam 9/15/2017    Arthritis     ASHD (arteriosclerotic heart disease) 9/15/2017    CAD (coronary artery disease)     Chest pain 9/15/2017    Chronic back pain 9/15/2017    Cough 9/15/2017    Diabetes mellitus screening 9/15/2017    Diaphoresis 9/15/2017    Dyslipidemia 9/15/2017    Edema 9/15/2017    Elevated LFTs 9/15/2017    Essential hypertension 6/7/2019    Fatigue 9/15/2017    Hypercholesterolemia     Hyperplasia of prostate 9/15/2017    Obesity 9/15/2017    Other long term (current) drug therapy 9/15/2017    Sinusitis 9/15/2017    SK (seborrheic keratosis) 9/15/2017     Current Outpatient Medications   Medication Sig Dispense Refill    levocetirizine (XYZAL) 5 mg tablet TAKE 1 TABLET BY MOUTH ONCE DAILY 90 Tab 5    nabumetone (RELAFEN) 500 mg tablet TAKE 1 TABLET BY MOUTH TWICE DAILY 180 Tab 0    cefdinir (OMNICEF) 300 mg capsule Take 1 Cap by mouth two (2) times a day. 20 Cap 0    cholestyramine-aspartame (QUESTRAN LIGHT) 4 gram packet Take 4 g by mouth two (2) times a day.  omeprazole (PRILOSEC) 40 mg capsule   1    ZETIA 10 mg tablet Take 1 Tab by mouth daily. 90 Tab 3    WELCHOL 625 mg tablet   0    TAZTIA  mg SR capsule   0    finasteride (PROSCAR) 5 mg tablet   0    aspirin 81 mg chewable tablet Take 81 mg by mouth daily.  coenzyme q10 10 mg cap Take  by mouth.  multivitamin (ONE A DAY) tablet Take 1 Tab by mouth daily.        Allergies   Allergen Reactions    Claritin-D 12 Hour [Loratadine-Pseudoephedrine] Shortness of Breath    Iodine Shortness of Breath    Levaquin [Levofloxacin] Hives    Seafood [Shellfish Containing Products] Shortness of Breath    Sulfa (Sulfonamide Antibiotics) Hives     Social History     Socioeconomic History    Marital status:      Spouse name: Not on file    Number of children: Not on file    Years of education: Not on file    Highest education level: Not on file   Occupational History    Not on file   Social Needs    Financial resource strain: Not on file    Food insecurity:     Worry: Not on file     Inability: Not on file    Transportation needs:     Medical: Not on file     Non-medical: Not on file   Tobacco Use    Smoking status: Never Smoker    Smokeless tobacco: Former User   Substance and Sexual Activity    Alcohol use: No     Alcohol/week: 0.0 standard drinks    Drug use: No    Sexual activity: Not on file   Lifestyle    Physical activity:     Days per week: Not on file     Minutes per session: Not on file    Stress: Not on file   Relationships    Social connections:     Talks on phone: Not on file     Gets together: Not on file     Attends Yarsanism service: Not on file     Active member of club or organization: Not on file     Attends meetings of clubs or organizations: Not on file     Relationship status: Not on file    Intimate partner violence:     Fear of current or ex partner: Not on file     Emotionally abused: Not on file     Physically abused: Not on file     Forced sexual activity: Not on file   Other Topics Concern    Not on file   Social History Narrative    Not on file     Review of Systems  A comprehensive review of systems was negative except for that written in the HPI. Objective: There were no vitals taken for this visit.   General:  alert, cooperative, no distress, appears stated age   Head:       Eyes: negative   Ears: normal TM's and external ear canals AU   Sinus tender: negative   Mouth:  Lips, mucosa, and tongue normal. Teeth and gums normal   Neck: supple, symmetrical, trachea midline, no adenopathy, thyroid: not enlarged, symmetric, no tenderness/mass/nodules, no carotid bruit and no JVD. Lungs: clear to auscultation bilaterally   Abdomen: soft, non-tender. Bowel sounds normal. No masses,  no organomegaly        Assessment:     sinusitis    Plan:   Discussed the dx and tx of sinusitis. Suggested symptomatic OTC remedies. Antibiotics per orders. Nasal steroids per orders.   RTC one week

## 2019-08-27 ENCOUNTER — OFFICE VISIT (OUTPATIENT)
Dept: URGENT CARE | Age: 69
End: 2019-08-27

## 2019-08-27 VITALS
TEMPERATURE: 98.1 F | HEART RATE: 69 BPM | HEIGHT: 71 IN | SYSTOLIC BLOOD PRESSURE: 139 MMHG | BODY MASS INDEX: 35.7 KG/M2 | RESPIRATION RATE: 20 BRPM | DIASTOLIC BLOOD PRESSURE: 73 MMHG | OXYGEN SATURATION: 96 % | WEIGHT: 255 LBS

## 2019-08-27 DIAGNOSIS — B96.89 ACUTE BACTERIAL SINUSITIS: Primary | ICD-10-CM

## 2019-08-27 DIAGNOSIS — J01.90 ACUTE BACTERIAL SINUSITIS: Primary | ICD-10-CM

## 2019-08-27 DIAGNOSIS — J30.9 ALLERGIC RHINITIS, UNSPECIFIED SEASONALITY, UNSPECIFIED TRIGGER: ICD-10-CM

## 2019-08-27 RX ORDER — AMOXICILLIN 875 MG/1
875 TABLET, FILM COATED ORAL 2 TIMES DAILY
Qty: 14 TAB | Refills: 0 | Status: SHIPPED | OUTPATIENT
Start: 2019-08-27 | End: 2019-09-03

## 2019-08-27 NOTE — PATIENT INSTRUCTIONS
Continue xyzal (your anti-histamine)  Start amoxicillin  Maintain adequate fluid intake  See PCP if not improved over next 5-7 days; sooner for new or worsening. Sinusitis: Care Instructions  Your Care Instructions    Sinusitis is an infection of the lining of the sinus cavities in your head. Sinusitis often follows a cold. It causes pain and pressure in your head and face. In most cases, sinusitis gets better on its own in 1 to 2 weeks. But some mild symptoms may last for several weeks. Sometimes antibiotics are needed. Follow-up care is a key part of your treatment and safety. Be sure to make and go to all appointments, and call your doctor if you are having problems. It's also a good idea to know your test results and keep a list of the medicines you take. How can you care for yourself at home? · Take an over-the-counter pain medicine, such as acetaminophen (Tylenol), ibuprofen (Advil, Motrin), or naproxen (Aleve). Read and follow all instructions on the label. · If the doctor prescribed antibiotics, take them as directed. Do not stop taking them just because you feel better. You need to take the full course of antibiotics. · Be careful when taking over-the-counter cold or flu medicines and Tylenol at the same time. Many of these medicines have acetaminophen, which is Tylenol. Read the labels to make sure that you are not taking more than the recommended dose. Too much acetaminophen (Tylenol) can be harmful. · Breathe warm, moist air from a steamy shower, a hot bath, or a sink filled with hot water. Avoid cold, dry air. Using a humidifier in your home may help. Follow the directions for cleaning the machine. · Use saline (saltwater) nasal washes to help keep your nasal passages open and wash out mucus and bacteria. You can buy saline nose drops at a grocery store or drugstore.  Or you can make your own at home by adding 1 teaspoon of salt and 1 teaspoon of baking soda to 2 cups of distilled water. If you make your own, fill a bulb syringe with the solution, insert the tip into your nostril, and squeeze gently. Martene  your nose. · Put a hot, wet towel or a warm gel pack on your face 3 or 4 times a day for 5 to 10 minutes each time. · Try a decongestant nasal spray like oxymetazoline (Afrin). Do not use it for more than 3 days in a row. Using it for more than 3 days can make your congestion worse. When should you call for help? Call your doctor now or seek immediate medical care if:    · You have new or worse swelling or redness in your face or around your eyes.     · You have a new or higher fever.    Watch closely for changes in your health, and be sure to contact your doctor if:    · You have new or worse facial pain.     · The mucus from your nose becomes thicker (like pus) or has new blood in it.     · You are not getting better as expected. Where can you learn more? Go to http://sergey-phyllis.info/. Enter S842 in the search box to learn more about \"Sinusitis: Care Instructions. \"  Current as of: October 21, 2018  Content Version: 12.1  © 3756-0717 [employer]. Care instructions adapted under license by [employer] (which disclaims liability or warranty for this information). If you have questions about a medical condition or this instruction, always ask your healthcare professional. [place] [address] any warranty or liability for your use of this information.

## 2019-08-27 NOTE — PROGRESS NOTES
Sinus pain and pressure with sore throat, watery eyes, cough and post nasal drip for past 2 weeks  Thought it was allergies at first. Was taking xyzal antihistamine. Felt better for a few days then has worsened with worsening sinus pressure and pain. Denies fever, chills, dizziness, SOB or pleuritic pain             Past Medical History:   Diagnosis Date    Actinic keratosis 9/15/2017    Annual physical exam 9/15/2017    Arthritis     ASHD (arteriosclerotic heart disease) 9/15/2017    CAD (coronary artery disease)     Chest pain 9/15/2017    Chronic back pain 9/15/2017    Cough 9/15/2017    Diabetes mellitus screening 9/15/2017    Diaphoresis 9/15/2017    Dyslipidemia 9/15/2017    Edema 9/15/2017    Elevated LFTs 9/15/2017    Essential hypertension 6/7/2019    Fatigue 9/15/2017    Hypercholesterolemia     Hyperplasia of prostate 9/15/2017    Obesity 9/15/2017    Other long term (current) drug therapy 9/15/2017    Sinusitis 9/15/2017    SK (seborrheic keratosis) 9/15/2017        History reviewed. No pertinent surgical history.       Family History   Problem Relation Age of Onset    No Known Problems Mother         Social History     Socioeconomic History    Marital status:      Spouse name: Not on file    Number of children: Not on file    Years of education: Not on file    Highest education level: Not on file   Occupational History    Not on file   Social Needs    Financial resource strain: Not on file    Food insecurity:     Worry: Not on file     Inability: Not on file    Transportation needs:     Medical: Not on file     Non-medical: Not on file   Tobacco Use    Smoking status: Never Smoker    Smokeless tobacco: Former User   Substance and Sexual Activity    Alcohol use: No     Alcohol/week: 0.0 standard drinks    Drug use: No    Sexual activity: Not on file   Lifestyle    Physical activity:     Days per week: Not on file     Minutes per session: Not on file    Stress: Not on file   Relationships    Social connections:     Talks on phone: Not on file     Gets together: Not on file     Attends Rastafarian service: Not on file     Active member of club or organization: Not on file     Attends meetings of clubs or organizations: Not on file     Relationship status: Not on file    Intimate partner violence:     Fear of current or ex partner: Not on file     Emotionally abused: Not on file     Physically abused: Not on file     Forced sexual activity: Not on file   Other Topics Concern    Not on file   Social History Narrative    Not on file                ALLERGIES: Claritin-d 12 hour [loratadine-pseudoephedrine]; Iodine; Levaquin [levofloxacin]; Seafood [shellfish containing products]; and Sulfa (sulfonamide antibiotics)    Review of Systems   Constitutional: Negative for chills, fatigue and fever. All other systems reviewed and are negative. Vitals:    08/27/19 1348   BP: 139/73   Pulse: 69   Resp: 20   Temp: 98.1 °F (36.7 °C)   SpO2: 96%   Weight: 255 lb (115.7 kg)   Height: 5' 11\" (1.803 m)       Physical Exam   Constitutional: He is oriented to person, place, and time. No distress. Non-toxic appearing, well hydrated   HENT:   Mouth/Throat: No oropharyngeal exudate. Pale swollen boggy turbinates bilat with greenish mucus in nasal passages bilat. OP with post nasal drip and cobblestoning. mucus membranes moist.   Eyes: Pupils are equal, round, and reactive to light. Conjunctivae and EOM are normal. No scleral icterus. Neck: Normal range of motion. Neck supple. Cardiovascular: Normal rate, regular rhythm and normal heart sounds. Exam reveals no gallop and no friction rub. No murmur heard. Pulmonary/Chest: Effort normal and breath sounds normal. No respiratory distress. He has no wheezes. He has no rales. Lymphadenopathy:     He has no cervical adenopathy. Neurological: He is alert and oriented to person, place, and time. No cranial nerve deficit. Skin: Skin is warm and dry. No rash noted. He is not diaphoretic. No erythema. No pallor. Psychiatric: He has a normal mood and affect. His behavior is normal. Thought content normal.   Nursing note and vitals reviewed. MDM     Differential Diagnosis; Clinical Impression; Plan:       CLINICAL IMPRESSION:  (J01.90,  B96.89) Acute bacterial sinusitis  (primary encounter diagnosis)  (J30.9) Allergic rhinitis, unspecified seasonality, unspecified trigger    Orders Placed This Encounter      amoxicillin (AMOXIL) 875 mg tablet          Sig: Take 1 Tab by mouth two (2) times a day for 7 days. Dispense:  14 Tab          Refill:  0      Plan:  Continue xyzal (your anti-histamine)  Start amoxicillin- will treat with abx given bi-phasic illness pattern  Maintain adequate fluid intake  See PCP if not improved over next 5-7 days; sooner for new or worsening. We have reviewed concerning signs/symptoms, normal vs abnormal progression of medical condition and when to seek immediate medical attention. Schedule with PCP or Urgent Care immediately for worsening or new symptoms. See your PCP if there is not at least some improvement in symptoms within the next 1 week  You should see your PCP for updates on your routine health maintenance.              Procedures

## 2019-09-12 ENCOUNTER — OFFICE VISIT (OUTPATIENT)
Dept: INTERNAL MEDICINE CLINIC | Age: 69
End: 2019-09-12

## 2019-09-12 VITALS
RESPIRATION RATE: 16 BRPM | TEMPERATURE: 98.2 F | SYSTOLIC BLOOD PRESSURE: 134 MMHG | BODY MASS INDEX: 35.59 KG/M2 | DIASTOLIC BLOOD PRESSURE: 70 MMHG | HEART RATE: 62 BPM | WEIGHT: 254.2 LBS | OXYGEN SATURATION: 95 % | HEIGHT: 71 IN

## 2019-09-12 DIAGNOSIS — Z23 NEED FOR VACCINATION AGAINST STREPTOCOCCUS PNEUMONIAE: ICD-10-CM

## 2019-09-12 DIAGNOSIS — I10 ESSENTIAL HYPERTENSION: ICD-10-CM

## 2019-09-12 DIAGNOSIS — G89.29 CHRONIC MIDLINE LOW BACK PAIN WITHOUT SCIATICA: ICD-10-CM

## 2019-09-12 DIAGNOSIS — I25.10 ASHD (ARTERIOSCLEROTIC HEART DISEASE): Primary | ICD-10-CM

## 2019-09-12 DIAGNOSIS — E78.5 DYSLIPIDEMIA: ICD-10-CM

## 2019-09-12 DIAGNOSIS — M54.50 CHRONIC MIDLINE LOW BACK PAIN WITHOUT SCIATICA: ICD-10-CM

## 2019-09-12 LAB
A-G RATIO,AGRAT: 1.5 RATIO
ALBUMIN SERPL-MCNC: 4.3 G/DL (ref 3.9–5.4)
ALP SERPL-CCNC: 50 U/L (ref 38–126)
ALT SERPL-CCNC: 40 U/L (ref 0–50)
ANION GAP SERPL CALC-SCNC: 9 MMOL/L
AST SERPL W P-5'-P-CCNC: 29 U/L (ref 14–36)
BILIRUB SERPL-MCNC: 0.5 MG/DL (ref 0.2–1.3)
BILIRUB UR QL: NEGATIVE
BUN SERPL-MCNC: 20 MG/DL (ref 9–20)
BUN/CREATININE RATIO,BUCR: 29 RATIO
CALCIUM SERPL-MCNC: 9.3 MG/DL (ref 8.4–10.2)
CHLORIDE SERPL-SCNC: 102 MMOL/L (ref 98–107)
CHOL/HDL RATIO,CHHD: 4 RATIO (ref 0–4)
CHOLEST SERPL-MCNC: 197 MG/DL (ref 0–200)
CLARITY: CLEAR
CO2 SERPL-SCNC: 32 MMOL/L (ref 22–32)
COLOR UR: NORMAL
CREAT SERPL-MCNC: 0.7 MG/DL (ref 0.8–1.5)
GLOBULIN,GLOB: 2.8
GLUCOSE 24H UR-MRATE: NEGATIVE G/(24.H)
GLUCOSE SERPL-MCNC: 110 MG/DL (ref 75–110)
HDLC SERPL-MCNC: 50 MG/DL (ref 35–130)
HGB UR QL STRIP: NEGATIVE
KETONES UR QL STRIP.AUTO: NEGATIVE
LDL/HDL RATIO,LDHD: 2 RATIO
LDLC SERPL CALC-MCNC: 102 MG/DL (ref 0–130)
LEUKOCYTE ESTERASE: NEGATIVE
NITRITE UR QL STRIP.AUTO: NEGATIVE
PH UR STRIP: 7 [PH] (ref 5–7)
POTASSIUM SERPL-SCNC: 5.1 MMOL/L (ref 3.6–5)
PROT SERPL-MCNC: 7.1 G/DL (ref 6.3–8.2)
PROT UR STRIP-MCNC: NEGATIVE MG/DL
SODIUM SERPL-SCNC: 143 MMOL/L (ref 137–145)
SP GR UR REFRACTOMETRY: 1.01 (ref 1–1.03)
TRIGL SERPL-MCNC: 226 MG/DL (ref 0–200)
UROBILINOGEN UR QL STRIP.AUTO: NEGATIVE
VLDLC SERPL CALC-MCNC: 45 MG/DL

## 2019-09-12 RX ORDER — DICLOFENAC SODIUM 75 MG/1
TABLET, DELAYED RELEASE ORAL
Refills: 3 | COMMUNITY
Start: 2019-08-23 | End: 2019-10-28 | Stop reason: ALTCHOICE

## 2019-09-12 RX ORDER — DILTIAZEM HYDROCHLORIDE 360 MG/1
360 CAPSULE, EXTENDED RELEASE ORAL
Refills: 2 | COMMUNITY
Start: 2019-06-17

## 2019-09-12 NOTE — PROGRESS NOTES
This note will not be viewable in [address] Dorena Merlin is a 81 y.o. male and presents with Annual Wellness Visit; Hypertension; Cholesterol Problem; and Coronary Artery Disease  . Subjective:      Mr. Jessie Rivas presents today for follow-up of hypertension, hyperlipidemia, and coronary artery disease. His last Medicare annual wellness review was in October 2018 so this is not due today. He has not had any chest pain, shortness of breath, PND, orthopnea, palpitations, pedal edema. He has seen his cardiologist within the past 6 months. He was cleared for surgery for a right partial knee replacement which she tolerated well. He has some residual discomfort but improved range of motion. His most significant problem is with worsening degenerative arthritis of left knee. Review of Systems  Constitutional:   Eyes:   negative for visual disturbance and irritation  ENT:   negative for tinnitus,sore throat,nasal congestion,ear pains. hoarseness  Respiratory:  negative for cough, hemoptysis, dyspnea,wheezing  CV:   negative for chest pain, palpitations, lower extremity edema  GI:   negative for nausea, vomiting, diarrhea, abdominal pain,melena  Endo:               negative for polyuria,polydipsia,polyphagia,heat intolerance  Genitourinary: negative for frequency, dysuria and hematuria  Integumentary: negative for rash and pruritus  Hematologic:  negative for easy bruising and gum/nose bleeding  Musculoskel: negative for myalgias, arthralgias, back pain, muscle weakness, joint pain  Neurological:  negative for headaches, dizziness, vertigo, memory problems and gait   Behavl/Psych: negative for feelings of anxiety, depression, mood changes    Past Medical History:   Diagnosis Date    Actinic keratosis 9/15/2017    Annual physical exam 9/15/2017    Arthritis     ASHD (arteriosclerotic heart disease) 9/15/2017    CAD (coronary artery disease)     Chest pain 9/15/2017    Chronic back pain 9/15/2017    Cough 9/15/2017    Diabetes mellitus screening 9/15/2017    Diaphoresis 9/15/2017    Dyslipidemia 9/15/2017    Edema 9/15/2017    Elevated LFTs 9/15/2017    Essential hypertension 6/7/2019    Fatigue 9/15/2017    Hypercholesterolemia     Hyperplasia of prostate 9/15/2017    Obesity 9/15/2017    Other long term (current) drug therapy 9/15/2017    Sinusitis 9/15/2017    SK (seborrheic keratosis) 9/15/2017     No past surgical history on file. Social History     Socioeconomic History    Marital status:      Spouse name: Not on file    Number of children: Not on file    Years of education: Not on file    Highest education level: Not on file   Tobacco Use    Smoking status: Never Smoker    Smokeless tobacco: Former User   Substance and Sexual Activity    Alcohol use: No     Alcohol/week: 0.0 standard drinks    Drug use: No     Family History   Problem Relation Age of Onset    No Known Problems Mother      Current Outpatient Medications   Medication Sig Dispense Refill    diclofenac EC (VOLTAREN) 75 mg EC tablet   3    dilTIAZem (TIAZAC) 360 mg SR capsule TK ONE C PO QD  2    levocetirizine (XYZAL) 5 mg tablet TAKE 1 TABLET BY MOUTH ONCE DAILY 90 Tab 5    nabumetone (RELAFEN) 500 mg tablet TAKE 1 TABLET BY MOUTH TWICE DAILY 180 Tab 0    cholestyramine-aspartame (QUESTRAN LIGHT) 4 gram packet Take 4 g by mouth two (2) times a day.  omeprazole (PRILOSEC) 40 mg capsule   1    ZETIA 10 mg tablet Take 1 Tab by mouth daily. 90 Tab 3    finasteride (PROSCAR) 5 mg tablet   0    aspirin 81 mg chewable tablet Take 81 mg by mouth daily.  coenzyme q10 10 mg cap Take  by mouth.  multivitamin (ONE A DAY) tablet Take 1 Tab by mouth daily.  fluticasone propionate (FLONASE) 50 mcg/actuation nasal spray 2 Sprays by Both Nostrils route daily.  1 Bottle 0    WELCHOL 625 mg tablet   0     Allergies   Allergen Reactions    Claritin-D 12 Hour [Loratadine-Pseudoephedrine] Shortness of Breath  Iodine Shortness of Breath    Levaquin [Levofloxacin] Hives    Seafood [Shellfish Containing Products] Shortness of Breath    Statins-Hmg-Coa Reductase Inhibitors Unknown (comments)    Sulfa (Sulfonamide Antibiotics) Hives       Objective:  Visit Vitals  /70 (BP 1 Location: Left arm, BP Patient Position: Sitting)   Pulse 62   Temp 98.2 °F (36.8 °C) (Oral)   Resp 16   Ht 5' 11\" (1.803 m)   Wt 254 lb 3.2 oz (115.3 kg)   SpO2 95%   BMI 35.45 kg/m²     Physical Exam:   General appearance - alert, well appearing, and in no distress  Mental status - alert, oriented to person, place, and time  EYE-KAI, EOMI, fundi normal, corneas normal, no foreign bodies  ENT-ENT exam normal, no neck nodes or sinus tenderness  Nose - normal and patent, no erythema, discharge or polyps  Mouth - mucous membranes moist, pharynx normal without lesions  Neck - supple, no significant adenopathy   Chest - clear to auscultation, no wheezes, rales or rhonchi, symmetric air entry   Heart - normal rate, regular rhythm, normal S1, S2, no murmurs, rubs, clicks or gallops   Abdomen - soft, nontender, nondistended, no masses or organomegaly  Lymph- no adenopathy palpable  Ext-peripheral pulses normal, no pedal edema, no clubbing or cyanosis  Skin-Warm and dry. no hyperpigmentation, vitiligo, or suspicious lesions  Neuro -alert, oriented, normal speech, no focal findings or movement disorder noted  Musculoskeletal- FROM, crepitus and bony abnormalities of the left knee, minimal residual swelling of the right knee, no point tenderness    No results found for this visit on 09/12/19. All results for lab orders may not have been returned by the time this encountered was closed. Assessment/Plan:       ICD-10-CM ICD-9-CM    1. ASHD (arteriosclerotic heart disease) I25.10 414.00 LIPID PANEL      METABOLIC PANEL, COMPREHENSIVE      URINALYSIS W/O MICRO   2.  Essential hypertension A11 338.4 METABOLIC PANEL, COMPREHENSIVE      URINALYSIS W/O MICRO   3. Dyslipidemia E78.5 272.4 LIPID PANEL   4. Chronic midline low back pain without sciatica M54.5 724.2     G89.29 338.29    5. Need for vaccination against Streptococcus pneumoniae Z23 V03.82 PNEUMOCOCCAL CONJ VACCINE 13 VALENT IM       Orders Placed This Encounter    PNEUMOCOCCAL CONJ VACCINE 13 VALENT IM    LIPID PANEL ([facility])    [facility]    URINALYSIS W/O MICRO ([facility])    diclofenac EC (VOLTAREN) 75 mg EC tablet     Refill:  3    dilTIAZem (TIAZAC) 360 mg SR capsule     Sig: TK ONE C PO QD     Refill:  2       Follow-up and Dispositions    · Return in about 6 months (around 3/12/2020) for [facility]. Plan:    The patient will receive a Prevnar 13 vaccine today. He will continue his current medical regimen as outlined above. Further recommendations based on labs as ordered. Return to clinic for annual wellness review in 6 months. I have reviewed with the patient details of the assessment and plan and all questions were answered. Relevent patient education was performed. Verbal and/or written instructions (see AVS) provided. The most recent lab findings were reviewed with the patient. Plan was discussed with patient who verbal expressed understanding. An After Visit Summary was printed and given to the patient.       Lennox Math, MD

## 2019-09-12 NOTE — PROGRESS NOTES
Chief Complaint   Patient presents with    Annual Wellness Visit    Hypertension    Cholesterol Problem    Coronary Artery Disease       Depression Risk Factor Screening:     3 most recent PHQ Screens 2019   Little interest or pleasure in doing things Not at all   Feeling down, depressed, irritable, or hopeless Not at all   Total Score PHQ 2 0       Functional Ability and Level of Safety:     Activities of Daily Living  ADL Assessment 2019   Feeding yourself No Help Needed   Getting from bed to chair No Help Needed   Getting dressed No Help Needed   Bathing or showering No Help Needed   Walk across the room (includes cane/walker) No Help Needed   Using the telphone No Help Needed   Taking your medications No Help Needed   Preparing meals No Help Needed   Managing money (expenses/bills) No Help Needed   Moderately strenuous housework (laundry) No Help Needed   Shopping for personal items (toiletries/medicines) No Help Needed   Shopping for groceries No Help Needed   Driving No Help Needed   Climbing a flight of stairs No Help Needed   Getting to places beyond walking distances No Help Needed       Fall Risk  Fall Risk Assessment, last 12 mths 2019   Able to walk? Yes   Fall in past 12 months? No       Abuse Screen  Abuse Screening Questionnaire 2019   Do you ever feel afraid of your partner? N   Are you in a relationship with someone who physically or mentally threatens you? N   Is it safe for you to go home? Y     Reviewed record in preparation for visit and have obtained necessary documentation. Identified pt with two pt identifiers(name and ).     Chief Complaint   Patient presents with    Annual Wellness Visit    Hypertension    Cholesterol Problem    Coronary Artery Disease      Wt Readings from Last 3 Encounters:   19 254 lb 3.2 oz (115.3 kg)   19 255 lb (115.7 kg)   19 244 lb (110.7 kg)     Temp Readings from Last 3 Encounters:   19 98.2 °F (36.8 °C) (Oral)   08/27/19 98.1 °F (36.7 °C)   07/20/19 98.5 °F (36.9 °C)     BP Readings from Last 3 Encounters:   09/12/19 164/88   08/27/19 139/73   07/20/19 132/66     Pulse Readings from Last 3 Encounters:   09/12/19 62   08/27/19 69   07/20/19 81       Coordination of Care Questionnaire:  :     1) Have you been to an emergency room, urgent care clinic since your last visit? Yes [facility]    Hospitalized since your last visit? no            2) Have you seen or consulted any other health care providers outside of [address] [place] [address] since your last visit?  Yes Dr Surinder Laughlin and Dr Flavia Early          Patient Care Team   Patient Care Team:  Kecia Camacho MD as PCP - General (Internal Medicine)

## 2019-09-12 NOTE — PROGRESS NOTES
After obtaining written consent and per orders of Dr. Jocelynn Ye, injection of Prevnar 13 given by Mecca Mcginnis LPN. Order and injection/medication verified by second nurse/ma review by Vidhya Boone CMA. Patient tolerated procedure well. VIS was given to them. No reactions noted.

## 2019-10-28 ENCOUNTER — OFFICE VISIT (OUTPATIENT)
Dept: INTERNAL MEDICINE CLINIC | Age: 69
End: 2019-10-28

## 2019-10-28 VITALS
RESPIRATION RATE: 18 BRPM | HEIGHT: 71 IN | WEIGHT: 259.8 LBS | DIASTOLIC BLOOD PRESSURE: 78 MMHG | TEMPERATURE: 98.1 F | SYSTOLIC BLOOD PRESSURE: 138 MMHG | HEART RATE: 64 BPM | OXYGEN SATURATION: 94 % | BODY MASS INDEX: 36.37 KG/M2

## 2019-10-28 DIAGNOSIS — R06.09 DOE (DYSPNEA ON EXERTION): Primary | ICD-10-CM

## 2019-10-28 DIAGNOSIS — I10 ESSENTIAL HYPERTENSION: ICD-10-CM

## 2019-10-28 DIAGNOSIS — J20.9 ACUTE BRONCHITIS, UNSPECIFIED ORGANISM: ICD-10-CM

## 2019-10-28 DIAGNOSIS — I25.10 ASHD (ARTERIOSCLEROTIC HEART DISEASE): ICD-10-CM

## 2019-10-28 RX ORDER — CEFUROXIME AXETIL 500 MG/1
500 TABLET ORAL 2 TIMES DAILY
Qty: 20 TAB | Refills: 0 | Status: SHIPPED | OUTPATIENT
Start: 2019-10-28 | End: 2019-11-20

## 2019-10-28 NOTE — PROGRESS NOTES
Subjective:   David Bullard III is a 81 y.o. male      Chief Complaint   Patient presents with    Ear Fullness        History of present illness: He presents today with an initial complaint per his call in appointment and the nurses note \"ear fullness \", however when I talk to him he is really complaining of some shortness of breath with exertion and episodes of breaking out in a sweat. He noted last night when he was coming up the steps he got extremely short of breath and it took 2 minutes to catch his breath. He did not get diaphoresis with that episode. He does not get chest pain with any of these episodes. He does not always get diaphoresis and the diaphoresis can at times but not associated with shortness of breath. Of note is he does have a history of coronary artery disease and said he had a stent placed 11 years ago. He did have a stress test done earlier this year by his cardiologist Dr. Toi Chowdhury and he was told that was normal.  As far as his ear she does note a little head and nasal congestion and get some popping in his ears. He notes no fevers or chills but does have some cough. Patient Active Problem List   Diagnosis Code    Actinic keratosis L57.0    Annual physical exam Z00.00    ASHD (arteriosclerotic heart disease) I25.10    Chest pain R07.9    Chronic back pain M54.9, G89.29    Cough R05    Diabetes mellitus screening Z13.1    Diaphoresis R61    Dyslipidemia E78.5    Edema R60.9    Elevated LFTs R94.5    Fatigue R53.83    Hyperplasia of prostate N40.0    Obesity E66.9    Other long term (current) drug therapy Z79.899    Sinusitis J32.9    SK (seborrheic keratosis) L82.1    Severe obesity (BMI 35.0-39. 9) E66.01    Essential hypertension I10    SANDERS (dyspnea on exertion) R06.09    Acute bronchitis J20.9      Past Medical History:   Diagnosis Date    Actinic keratosis 9/15/2017    Annual physical exam 9/15/2017    Arthritis     ASHD (arteriosclerotic heart disease) 9/15/2017    CAD (coronary artery disease)     Chest pain 9/15/2017    Chronic back pain 9/15/2017    Cough 9/15/2017    Diabetes mellitus screening 9/15/2017    Diaphoresis 9/15/2017    Dyslipidemia 9/15/2017    Edema 9/15/2017    Elevated LFTs 9/15/2017    Essential hypertension 6/7/2019    Fatigue 9/15/2017    Hypercholesterolemia     Hyperplasia of prostate 9/15/2017    Obesity 9/15/2017    Other long term (current) drug therapy 9/15/2017    Sinusitis 9/15/2017    SK (seborrheic keratosis) 9/15/2017      Allergies   Allergen Reactions    Claritin-D 12 Hour [Loratadine-Pseudoephedrine] Shortness of Breath    Iodine Shortness of Breath    Levaquin [Levofloxacin] Hives    Seafood [Shellfish Containing Products] Shortness of Breath    Statins-Hmg-Coa Reductase Inhibitors Unknown (comments)    Sulfa (Sulfonamide Antibiotics) Hives      Family History   Problem Relation Age of Onset    No Known Problems Mother       Social History     Socioeconomic History    Marital status:      Spouse name: Not on file    Number of children: Not on file    Years of education: Not on file    Highest education level: Not on file   Occupational History    Not on file   Social Needs    Financial resource strain: Not on file    Food insecurity:     Worry: Not on file     Inability: Not on file    Transportation needs:     Medical: Not on file     Non-medical: Not on file   Tobacco Use    Smoking status: Never Smoker    Smokeless tobacco: Former User   Substance and Sexual Activity    Alcohol use: No     Alcohol/week: 0.0 standard drinks    Drug use: No    Sexual activity: Not on file   Lifestyle    Physical activity:     Days per week: Not on file     Minutes per session: Not on file    Stress: Not on file   Relationships    Social connections:     Talks on phone: Not on file     Gets together: Not on file     Attends Hoahaoism service: Not on file     Active member of club or organization: Not on file     Attends meetings of clubs or organizations: Not on file     Relationship status: Not on file    Intimate partner violence:     Fear of current or ex partner: Not on file     Emotionally abused: Not on file     Physically abused: Not on file     Forced sexual activity: Not on file   Other Topics Concern    Not on file   Social History Narrative    Not on file     Prior to Admission medications    Medication Sig Start Date End Date Taking? Authorizing Provider   cefUROXime (CEFTIN) 500 mg tablet Take 1 Tab by mouth two (2) times a day. 10/28/19  Yes Caity Curran MD   dilTIAZem [facility]) 360 mg SR capsule TK ONE C PO QD 6/17/19  Yes Provider, Historical   fluticasone propionate (FLONASE) 50 mcg/actuation nasal spray 2 Sprays by Both Nostrils route daily. 7/20/19  Yes Luna Olmedo MD   levocetirizine (XYZAL) 5 mg tablet TAKE 1 TABLET BY MOUTH ONCE DAILY 7/19/19  Yes Irene Wilson MD   nabumetone (RELAFEN) 500 mg tablet TAKE 1 TABLET BY MOUTH TWICE DAILY 7/19/19  Yes Irene Wilson MD   omeprazole (PRILOSEC) 40 mg capsule  7/22/18  Yes Provider, Historical   ZETIA 10 mg tablet Take 1 Tab by mouth daily. 2/16/18  Yes Irene Wilson MD   [facility] 625 mg tablet  4/25/16  Yes Provider, Historical   finasteride (PROSCAR) 5 mg tablet  3/25/16  Yes Provider, Historical   aspirin 81 mg chewable tablet Take 81 mg by mouth daily. Yes Provider, Historical   coenzyme q10 10 mg cap Take  by mouth. Yes Provider, Historical   multivitamin (ONE A DAY) tablet Take 1 Tab by mouth daily. Yes Provider, Historical        Review of Systems              Constitutional:  He denies fever, weight loss, sweats or fatigue. EYES: No blurred or double vision,               ENT: Popping his ear showed some head and nasal congestion, no headache or dizziness. No difficulty with swallowing, taste, speech or smell. Respiratory: Positive and cough without wheezing or shortness of breath.   No sputum production. Cardiac:  Denies chest pain, palpitations, unexplained indigestion, syncope, edema, PND or orthopnea. Positive for some dyspnea on exertion intermittently breaks out in sweat  GI:  No changes in bowel movements, no abdominal pain, no bloating, anorexia, nausea, vomiting or heartburn. :  No frequency or dysuria. Denies incontinence or sexual dysfunction. Extremities:  No joint pain, stiffness or swelling  Back:.no pain or soreness  Skin:  No recent rashes or mole changes. Neurological:  No numbness, tingling, burning paresthesias or loss of motor strength. No syncope, dizziness, frequent headaches or memory loss. Hematologic:  No easy bruising  Lymphatic: No lymph node enlargement    Objective:     Vitals:    10/28/19 1511   BP: 138/78   Pulse: 64   Resp: 18   Temp: 98.1 °F (36.7 °C)   TempSrc: Oral   SpO2: 94%   Weight: 259 lb 12.8 oz (117.8 kg)   Height: 5' 11\" (1.803 m)   PainSc:   0 - No pain       Body mass index is 36.23 kg/m². Physical Examination:              General Appearance:  Well-developed, well-nourished, no acute distress. HEENT:      Ears:  The TMs and ear canals were clear. Eyes:  The pupillary responses were normal.  Extraocular muscle function intact. Lids and conjunctiva not injected. Funduscopic exam revealed sharp disc margins. Nares: Clear w/o edema or erythema  Pharynx:  Clear with teeth in good repair. No masses were noted. Neck:  Supple without thyromegaly or adenopathy. No JVD noted. No carotid                bruits. Lungs:  Clear to auscultation and percussion. Cardiac:  Regular rate and rhythm without murmur. PMI not displaced. No gallop, rub or click. Abdominal: Soft, non-tender, no hepata-spleenomegally or masses  Extremities:  No clubbing, cyanosis or edema. Skin:  No rash or unusual mole changes noted. Lymph Nodes:  None felt in the cervical, supraclavicular, axillary or inguinal region. Neurological: .  DTRs 2+ and symmetric. Station and gait normal.   Hematologic:   No purpura or petechiae        Assessment/Plan:         1. SANDERS (dyspnea on exertion)    2. Essential hypertension    3. ASHD (arteriosclerotic heart disease)    4. Acute bronchitis, unspecified organism        Impressions/Plan:  Impression  1. Dyspnea on exertion I think this is probably related to an acute bronchitis as he had several coughing paroxysms while here in the office. Chest x-ray obtained today reveals no acute process and will place him on Ceftin twice a day for 10 days. I cannot be sure that is not a cardiac component at this time even though he had a normal stress test earlier this year he does have a history of cardiac disease with his stents I will set him up for cardiology evaluation. 2.  Hypertension blood pressure is adequate control  3   ASHD as noted allergy appointment to be scheduled  4. Acute bronchitis we will treat with Ceftin  I did clearly indicate him that should he develop any increased symptoms prior to seeing Dr. Jannette Anderson for cardiology evaluation to notify office or go to the emergency room. Orders Placed This Encounter    XR CHEST PA LAT    REFERRAL TO CARDIOLOGY    AMB POC EKG ROUTINE W/ 12 LEADS, INTER & REP    cefUROXime (CEFTIN) 500 mg tablet           Results for orders placed or performed in visit on 10/28/19   XR CHEST PA LAT    Narrative    EXAM: XR CHEST PA LAT    INDICATION: cough    COMPARISON: 2/12/2019. FINDINGS: PA and lateral radiographs of the chest demonstrate clear lungs. The  cardiac and mediastinal contours and pulmonary vascularity are normal. There is  mild tortuosity of the descending aorta. The bones and soft tissues are within  normal limits. Impression    IMPRESSION: No acute cardiopulmonary process seen         Wilmer Mcdonald MD    The patient was given after the visit summary the patient verbalized an understanding of the plans and problems as explained.

## 2019-10-28 NOTE — PATIENT INSTRUCTIONS
Body Mass Index: Care Instructions  Your Care Instructions    Body mass index (BMI) can help you see if your weight is raising your risk for health problems. It uses a formula to compare how much you weigh with how tall you are. · A BMI lower than 18.5 is considered underweight. · A BMI between 18.5 and 24.9 is considered healthy. · A BMI between 25 and 29.9 is considered overweight. A BMI of 30 or higher is considered obese. If your BMI is in the normal range, it means that you have a lower risk for weight-related health problems. If your BMI is in the overweight or obese range, you may be at increased risk for weight-related health problems, such as high blood pressure, heart disease, stroke, arthritis or joint pain, and diabetes. If your BMI is in the underweight range, you may be at increased risk for health problems such as fatigue, lower protection (immunity) against illness, muscle loss, bone loss, hair loss, and hormone problems. BMI is just one measure of your risk for weight-related health problems. You may be at higher risk for health problems if you are not active, you eat an unhealthy diet, or you drink too much alcohol or use tobacco products. Follow-up care is a key part of your treatment and safety. Be sure to make and go to all appointments, and call your doctor if you are having problems. It's also a good idea to know your test results and keep a list of the medicines you take. How can you care for yourself at home? · Practice healthy eating habits. This includes eating plenty of fruits, vegetables, whole grains, lean protein, and low-fat dairy. · If your doctor recommends it, get more exercise. Walking is a good choice. Bit by bit, increase the amount you walk every day. Try for at least 30 minutes on most days of the week. · Do not smoke. Smoking can increase your risk for health problems. If you need help quitting, talk to your doctor about stop-smoking programs and medicines. These can increase your chances of quitting for good. · Limit alcohol to 2 drinks a day for men and 1 drink a day for women. Too much alcohol can cause health problems. If you have a BMI higher than 25  · Your doctor may do other tests to check your risk for weight-related health problems. This may include measuring the distance around your waist. A waist measurement of more than 40 inches in men or 35 inches in women can increase the risk of weight-related health problems. · Talk with your doctor about steps you can take to stay healthy or improve your health. You may need to make lifestyle changes to lose weight and stay healthy, such as changing your diet and getting regular exercise. If you have a BMI lower than 18.5  · Your doctor may do other tests to check your risk for health problems. · Talk with your doctor about steps you can take to stay healthy or improve your health. You may need to make lifestyle changes to gain or maintain weight and stay healthy, such as getting more healthy foods in your diet and doing exercises to build muscle. Where can you learn more? Go to http://sergey-phyllis.info/. Enter S176 in the search box to learn more about \"Body Mass Index: Care Instructions. \"  Current as of: March 28, 2019  Content Version: 12.2  © 3724-6216 [employer]. Care instructions adapted under license by [employer] (which disclaims liability or warranty for this information). If you have questions about a medical condition or this instruction, always ask your healthcare professional. [address] any warranty or liability for your use of this information.

## 2019-10-28 NOTE — PROGRESS NOTES
Fransisco Semen III is a 81 y.o. male presenting for Ear Fullness  . 1. Have you been to the ER, urgent care clinic since your last visit? Hospitalized since your last visit? No    2. Have you seen or consulted any other health care providers outside of the [address] [place] [address] since your last visit? Include any pap smears or colon screening. No    Fall Risk Assessment, last 12 mths 9/12/2019   Able to walk? Yes   Fall in past 12 months? No         Abuse Screening Questionnaire 9/12/2019   Do you ever feel afraid of your partner? N   Are you in a relationship with someone who physically or mentally threatens you? N   Is it safe for you to go home? Y       3 most recent PHQ Screens 9/12/2019   Little interest or pleasure in doing things Not at all   Feeling down, depressed, irritable, or hopeless Not at all   Total Score PHQ 2 0       There are no discontinued medications.

## 2019-11-20 RX ORDER — CEPHALEXIN 500 MG/1
1000 CAPSULE ORAL ONCE
Status: ON HOLD | COMMUNITY
End: 2021-10-14

## 2019-11-20 RX ORDER — ALBUTEROL SULFATE 90 UG/1
2 AEROSOL, METERED RESPIRATORY (INHALATION)
COMMUNITY
End: 2022-05-24 | Stop reason: SDUPTHER

## 2019-11-20 NOTE — PERIOP NOTES
[facility]  Ambulatory Surgery Unit  Pre-operative Instructions for Endo Procedures    Procedure Date  11/27            Tentative Arrival Time 07:15 am      1. On the day of your procedure, please report to the Ambulatory Surgery Unit Registration Desk and sign in at your designated time. The Ambulatory Surgery Unit is located in [facility] on the [facility] side of the [facility] across from the [address] [place] [address]. Please have all of your health insurance cards and a photo ID. 2. You must have someone with you to drive you home, as you should not drive a car for 24 hours following anesthesia. Please make arrangements for a responsible adult friend or family member to stay with you for at least the first 24 hours after your procedure. 3. Do not have anything to eat or drink (including water, gum, mints, coffee, juice) after 11:59 PM 11/26. This may not apply to medications prescribed by your physician. (Please note below the special instructions with medications to take the morning of your procedure.)    4. If applicable, follow the clear liquid diet and bowel prep instructions provided by your physician's office. If you do not have this information, or have any questions, please contact your physician's office. 5. We recommend you do not drink any alcoholic beverages for 24 hours before and after your procedure. 6. Contact your surgeons office for instructions on the following medications: non-steroidal anti-inflammatory drugs (i.e. Advil, Aleve), vitamins, and supplements. (Some surgeons will want you to stop these medications prior to surgery and others may allow you to take them)   **If you are currently taking Plavix, Coumadin, Aspirin and/or other blood-thinning agents, contact your surgeon for instructions. ** Your surgeon will partner with the physician prescribing these medications to determine if it is safe to stop or if you need to continue taking.  Please do not stop taking these medications without instructions from your surgeon. 7. In an effort to help prevent surgical site infection, we ask that you shower with an anti-bacterial soap (i.e. Dial or Safeguard) on the morning of your procedure. Do not apply any lotions, powders, or deodorants after showering. 8. Wear comfortable clothes. Wear glasses instead of contacts. Do not bring any jewelry or money (other than copays or fees as instructed). Do not wear make-up, particularly mascara, the morning of your procedure. Wear your hair loose or down, no ponytails, buns, mathew pins or clips. All body piercings must be removed. 9. You should understand that if you do not follow these instructions your procedure may be cancelled. If your physical condition changes (i.e. fever, cold or flu) please contact your surgeon as soon as possible. 10. It is important that you be on time. If a situation occurs where you may be late, or if you have any questions or problems, please call [phone]. 11. Your procedure time may be subject to change. You will receive a phone call the day prior to confirm your arrival time. Special Instructions: Take all medications and inhalers, as prescribed, on the morning of surgery with a sip of water EXCEPT: none (bring inhaler)  I understand a pre-operative phone call will be made to verify my procedure time. In the event that I am not available, I give permission for a message to be left on my answering service and/or with another person? yes      Reviewed by phone with pt, verbalized understanding.  Also left on pt's VM per pt request.   ___________________      ___________________      ___________________  (Signature of Patient)          (Witness)                   (Date and Time)

## 2019-11-20 NOTE — PERIOP NOTES
PAT call to patient. States that Dr. Db Lynn office has instructed him to take abx 1 hour prior to his colonoscopy, as he had a partial knee replacement 6/2019. Pt is unsure of name, but has them at home. Call to [facility], they do not show on their med list.      Call to patient. He will RC to PAT with name of abx. VM from patient.  PTA med list updated with abx

## 2019-11-21 NOTE — ADVANCED PRACTICE NURSE
MI 5 in PAT phone assessment. Pt denies snoring loud enough to be heard through a closed door, witnessed apnea while sleeping or ever having been referred for a sleep apnea evaluation. Msg sent to PCP w/ results with request for further recommendations regarding sleep apnea evaluation.

## 2019-11-26 ENCOUNTER — ANESTHESIA EVENT (OUTPATIENT)
Dept: SURGERY | Age: 69
End: 2019-11-26
Payer: MEDICARE

## 2019-11-26 RX ORDER — SODIUM CHLORIDE 0.9 % (FLUSH) 0.9 %
5-40 SYRINGE (ML) INJECTION EVERY 8 HOURS
Status: CANCELLED | OUTPATIENT
Start: 2019-11-26

## 2019-11-26 RX ORDER — MORPHINE SULFATE 10 MG/ML
2 INJECTION, SOLUTION INTRAMUSCULAR; INTRAVENOUS
Status: CANCELLED | OUTPATIENT
Start: 2019-11-26

## 2019-11-26 RX ORDER — HYDROMORPHONE HYDROCHLORIDE 1 MG/ML
.2-.5 INJECTION, SOLUTION INTRAMUSCULAR; INTRAVENOUS; SUBCUTANEOUS
Status: CANCELLED | OUTPATIENT
Start: 2019-11-26

## 2019-11-26 RX ORDER — DIPHENHYDRAMINE HYDROCHLORIDE 50 MG/ML
12.5 INJECTION, SOLUTION INTRAMUSCULAR; INTRAVENOUS AS NEEDED
Status: CANCELLED | OUTPATIENT
Start: 2019-11-26 | End: 2019-11-26

## 2019-11-26 RX ORDER — SODIUM CHLORIDE 0.9 % (FLUSH) 0.9 %
5-40 SYRINGE (ML) INJECTION AS NEEDED
Status: CANCELLED | OUTPATIENT
Start: 2019-11-26

## 2019-11-26 RX ORDER — FENTANYL CITRATE 50 UG/ML
25 INJECTION, SOLUTION INTRAMUSCULAR; INTRAVENOUS
Status: CANCELLED | OUTPATIENT
Start: 2019-11-26

## 2019-11-26 RX ORDER — ONDANSETRON 2 MG/ML
4 INJECTION INTRAMUSCULAR; INTRAVENOUS AS NEEDED
Status: CANCELLED | OUTPATIENT
Start: 2019-11-26

## 2019-11-26 RX ORDER — SODIUM CHLORIDE, SODIUM LACTATE, POTASSIUM CHLORIDE, CALCIUM CHLORIDE 600; 310; 30; 20 MG/100ML; MG/100ML; MG/100ML; MG/100ML
25 INJECTION, SOLUTION INTRAVENOUS CONTINUOUS
Status: CANCELLED | OUTPATIENT
Start: 2019-11-26

## 2019-11-26 NOTE — ANESTHESIA PREPROCEDURE EVALUATION
Relevant Problems   No relevant active problems       Anesthetic History   No history of anesthetic complications            Review of Systems / Medical History  Patient summary reviewed, nursing notes reviewed and pertinent labs reviewed    Pulmonary  Within defined limits                 Neuro/Psych              Cardiovascular    Hypertension          CAD, cardiac stents and hyperlipidemia    Exercise tolerance: >4 METS  Comments: SANDERS   GI/Hepatic/Renal     GERD          Comments: H/O colon polyps Endo/Other        Obesity and arthritis  Pertinent negatives: No diabetes and morbid obesity   Other Findings   Comments: Actinic keratosis  Chronic Back Pain         Physical Exam    Airway  Mallampati: II  TM Distance: > 6 cm  Neck ROM: normal range of motion   Mouth opening: Normal     Cardiovascular  Regular rate and rhythm,  S1 and S2 normal,  no murmur, click, rub, or gallop             Dental    Dentition: Caps/crowns  Comments: Several missing teeth, none loose.    Pulmonary  Breath sounds clear to auscultation               Abdominal  GI exam deferred       Other Findings            Anesthetic Plan    ASA: 3  Anesthesia type: MAC and total IV anesthesia          Induction: Intravenous  Anesthetic plan and risks discussed with: Patient

## 2019-11-27 ENCOUNTER — HOSPITAL ENCOUNTER (OUTPATIENT)
Age: 69
Setting detail: OUTPATIENT SURGERY
Discharge: HOME | End: 2019-11-27
Attending: SURGERY | Admitting: SURGERY
Payer: MEDICARE

## 2019-11-27 ENCOUNTER — ANESTHESIA (OUTPATIENT)
Dept: SURGERY | Age: 69
End: 2019-11-27
Payer: MEDICARE

## 2019-11-27 VITALS
BODY MASS INDEX: 35.14 KG/M2 | SYSTOLIC BLOOD PRESSURE: 130 MMHG | HEART RATE: 65 BPM | RESPIRATION RATE: 14 BRPM | DIASTOLIC BLOOD PRESSURE: 83 MMHG | TEMPERATURE: 98.3 F | WEIGHT: 251 LBS | HEIGHT: 71 IN | OXYGEN SATURATION: 96 %

## 2019-11-27 PROCEDURE — 74011250636 HC RX REV CODE- 250/636: Performed by: NURSE ANESTHETIST, CERTIFIED REGISTERED

## 2019-11-27 PROCEDURE — 77030020255 HC SOL INJ LR 1000ML BG: Performed by: SURGERY

## 2019-11-27 PROCEDURE — 76060000073 HC AMB SURG ANES FIRST 0.5 HR: Performed by: SURGERY

## 2019-11-27 PROCEDURE — 77030021352 HC CBL LD SYS DISP COVD -B: Performed by: SURGERY

## 2019-11-27 PROCEDURE — 76210000046 HC AMBSU PH II REC FIRST 0.5 HR: Performed by: SURGERY

## 2019-11-27 PROCEDURE — 74011000250 HC RX REV CODE- 250: Performed by: NURSE ANESTHETIST, CERTIFIED REGISTERED

## 2019-11-27 PROCEDURE — 74011250636 HC RX REV CODE- 250/636: Performed by: ANESTHESIOLOGY

## 2019-11-27 PROCEDURE — 76210000040 HC AMBSU PH I REC FIRST 0.5 HR: Performed by: SURGERY

## 2019-11-27 PROCEDURE — 76030000002 HC AMB SURG OR TIME FIRST 0.: Performed by: SURGERY

## 2019-11-27 RX ORDER — LIDOCAINE HYDROCHLORIDE 20 MG/ML
INJECTION, SOLUTION EPIDURAL; INFILTRATION; INTRACAUDAL; PERINEURAL AS NEEDED
Status: DISCONTINUED | OUTPATIENT
Start: 2019-11-27 | End: 2019-11-27 | Stop reason: HOSPADM

## 2019-11-27 RX ORDER — SODIUM CHLORIDE, SODIUM LACTATE, POTASSIUM CHLORIDE, CALCIUM CHLORIDE 600; 310; 30; 20 MG/100ML; MG/100ML; MG/100ML; MG/100ML
25 INJECTION, SOLUTION INTRAVENOUS CONTINUOUS
Status: DISCONTINUED | OUTPATIENT
Start: 2019-11-27 | End: 2019-11-27 | Stop reason: HOSPADM

## 2019-11-27 RX ORDER — PROPOFOL 10 MG/ML
INJECTION, EMULSION INTRAVENOUS AS NEEDED
Status: DISCONTINUED | OUTPATIENT
Start: 2019-11-27 | End: 2019-11-27 | Stop reason: HOSPADM

## 2019-11-27 RX ORDER — SODIUM CHLORIDE 0.9 % (FLUSH) 0.9 %
5-40 SYRINGE (ML) INJECTION AS NEEDED
Status: DISCONTINUED | OUTPATIENT
Start: 2019-11-27 | End: 2019-11-27 | Stop reason: HOSPADM

## 2019-11-27 RX ORDER — SODIUM CHLORIDE 0.9 % (FLUSH) 0.9 %
5-40 SYRINGE (ML) INJECTION EVERY 8 HOURS
Status: DISCONTINUED | OUTPATIENT
Start: 2019-11-27 | End: 2019-11-27 | Stop reason: HOSPADM

## 2019-11-27 RX ADMIN — LIDOCAINE HYDROCHLORIDE 40 MG: 20 INJECTION, SOLUTION EPIDURAL; INFILTRATION; INTRACAUDAL; PERINEURAL at 08:47

## 2019-11-27 RX ADMIN — SODIUM CHLORIDE, SODIUM LACTATE, POTASSIUM CHLORIDE, AND CALCIUM CHLORIDE 25 ML/HR: 600; 310; 30; 20 INJECTION, SOLUTION INTRAVENOUS at 07:39

## 2019-11-27 RX ADMIN — PROPOFOL 50 MG: 10 INJECTION, EMULSION INTRAVENOUS at 08:49

## 2019-11-27 RX ADMIN — PROPOFOL 50 MG: 10 INJECTION, EMULSION INTRAVENOUS at 08:47

## 2019-11-27 NOTE — PERIOP NOTES
Permission received to review discharge instructions and discuss private health information with Florinda Augustin, sister.

## 2019-11-27 NOTE — PERIOP NOTES
Heidi Coelho [facility]  1950  [id number]    Situation:  Verbal report given from: RN and CRNA  Procedure: Procedure(s): FLEXIBLE SIGMOIDOSCOPY    Background:    Preoperative diagnosis: HX OF POLYPS    Postoperative diagnosis: INADEQUATE PREP    :  Dr. Colette Goode    Assistant(s): Circ-1: Renny Kawasaki, RN  Circ-2: Shahbaz Ferguson RN  Scrub Tech-1: Po Vallejo    Specimens: * No specimens in log *    Assessment:  Intra-procedure medications   Propofol       Anesthesia gave intra-procedure sedation and medications, see anesthesia flow sheet     Intravenous fluids: LR@ KVO     Vital signs stable    Abdominal assessment: round and soft-belching      Recommendation:    Permission to share finding with sister Sola/note yes    All side rails up, bed in low position, wheels locked. Nurse at bedside.

## 2019-11-27 NOTE — DISCHARGE INSTRUCTIONS
Radha Martin III  [id number]  1950    COLON DISCHARGE INSTRUCTIONS  Discomfort:  Redness at IV site- apply warm compress to area; if redness or soreness persist- contact your physician  There may be a slight amount of blood passed from the rectum  Gaseous discomfort- walking, belching will help relieve any discomfort  You may not operate a vehicle for 24 hours  You may not engage in an occupation involving machinery or appliances for rest of today  You may not drink alcoholic beverages for at least 24 hours  Avoid making any critical decisions for at least 24 hour  DIET:   Regular diet. - however -  remember your colon is empty and a heavy meal will produce gas. Avoid these foods:  vegetables, fried / greasy foods, carbonated drinks for today       ACTIVITY:  You may resume your normal daily activities it is recommended that you spend the remainder of the day resting -  avoid any strenuous activity. CALL M.D. ANY SIGN OF:   Increasing pain, nausea, vomiting  Abdominal distension (swelling)  New increased bleeding (oral or rectal)  Fever (chills)  Pain in chest area  Bloody discharge from nose or mouth  Shortness of breath     Follow-up Instructions:   Call Kelly Alarcon MD if any questions or problems. Telephone # [phone]  Should have a repeat colonoscopy with a longer bowel prep. COLONOSCOPY FINDINGS:  Your colonoscopy showed: unable to complete colonoscopy due to poor visualization. DO NOT TAKE SLEEPING MEDICATIONS OR ANTIANXIETY MEDICATIONS WHILE TAKING NARCOTIC PAIN MEDICATIONS,  ESPECIALLY THE NIGHT OF ANESTHESIA. CPAP PATIENTS BE SURE TO WEAR MACHINE WHENEVER NAPPING OR SLEEPING.     DISCHARGE SUMMARY from Nurse    The following personal items collected during your admission are returned to you:   Dental Appliance: Dental Appliances: None  Vision: Visual Aid: Glasses(sister)  Hearing Aid:    Jewelry:    Clothing:    Other Valuables:    Valuables sent to safe:        PATIENT INSTRUCTIONS:    After General Anesthesia or Intravenous Sedation, for 24 hours or while taking prescription Narcotics:        Someone should be with you for the next 24 hours. For your own safety, a responsible adult must drive you home. · Limit your activities  · Recommended activity: Rest today, up with assistance today. Do not climb stairs or shower unattended for the next 24 hours. · Please start with a soft bland diet and advance as tolerated (no nausea) to regular diet. · If you have a sore throat you should try the following: fluids, warm salt water gargles, or throat lozenges. If it does not improve after several days please follow up with your primary physician. · Do not drive and operate hazardous machinery  · Do not make important personal or business decisions  · Do  not drink alcoholic beverages  · If you have not urinated within 8 hours after discharge, please contact your surgeon on call. Report the following to your surgeon:  · Excessive pain, swelling, redness or odor of or around the surgical area  · Temperature over 100.5  · Nausea and vomiting lasting longer than 4 hours or if unable to take medications    · You will receive a Post Operative Call from one of the Recovery Room Nurses on the day after your surgery to check on you. It is very important for us to know how you are recovering after your surgery. If you have an issue or need to speak with someone, please call your surgeon, do not wait for the post operative call. · You may receive an e-mail or letter in the mail from [employer] regarding your experience with us in the Ambulatory Surgery Unit. Your feedback is valuable to us and we appreciate your participation in the survey. · If the above instructions are not adequate, please contact MYRA Cornell, RN Perianesthesia Manager at [phone]. If you are having problems after your surgery, call the physician at his office number.     · We wish you a speedy recovery ? What to do at Home:      *  Please give a list of your current medications to your Primary Care Provider. *  Please update this list whenever your medications are discontinued, doses are      changed, or new medications (including over-the-counter products) are added. *  Please carry medication information at all times in case of emergency situations. If you have not received your influenza and/or pneumococcal vaccine, please follow up with your primary care physician. The discharge information has been reviewed with the patient and caregiver. The patient and caregiver verbalized understanding.

## 2019-11-27 NOTE — PROCEDURES
Flexible Sigmoidoscopy Procedure Note    Procedure: Flexible Sigmoidoscopy    Pre-Procedure Diagnosis: HX OF POLYPS    Post-Procedure Diagnosis: HX OF POLYPS    Indications: personal history of colon polyps    Procedure in Detail:   Informed consent was obtained for the procedure. Risks of perforation and hemorrhage were discussed. The patient was placed in the left lateral decubitus position. The anal region was examined and a rectal exam was performed. Then the 60cm flexible sigmoidoscope was inserted and advanced with difficulty to a distance of 30 cm. The preparation was unsatisfactory. The instrument was withdrawn and retroflexed with inadequate views throughout. Findings:    - Unable to safely pass scope beyond 30cm due to large particulate matter (broccoli?). Could not suction this out and it covered at least 50% of the luminal surface. Specimens: None           EBL:  None    Complications:  None; patient tolerated the procedure well.     Impression:      - Inadequate bowel prep    Recommendations:  Colonoscopy repeat with 2 day bowel prep and low fiber diet x 5 days before next colonoscopy    Attending Attestation: I was present and scrubbed for the entire procedure

## 2019-11-27 NOTE — PERIOP NOTES
Pt. Alert. Denies pain or chill. Discharge instructions reviewed with caregiver and patient. Allowed and answered questions. Tolerating PO fluids. Both state ready for discharge. [id number] Discharged to car without any incidents. Sister has glasses and pt has inhaler in coat pocket.

## 2019-11-27 NOTE — H&P
History and Physical    Patient: Toshia Nielsen MRN: [id number]  SSN: [id number]    YOB: 1950  Age: 81 y.o. Sex: male      Subjective:      Toshia Nielsen is a 81 y.o. male who presents for colonoscopy. Past Medical History:   Diagnosis Date    Actinic keratosis 9/15/2017    Annual physical exam 9/15/2017    Arthritis     ASHD (arteriosclerotic heart disease) 9/15/2017    At risk for sleep apnea 11/20/2019    STOP BANG 5    Guerrier's esophagus     CAD (coronary artery disease)     Chest pain 9/15/2017    Chronic back pain 9/15/2017    Cough 9/15/2017    Diabetes mellitus screening 9/15/2017    Diaphoresis 9/15/2017    SANDERS (dyspnea on exertion)     Dyslipidemia 9/15/2017    Edema 9/15/2017    Elevated LFTs 9/15/2017    Essential hypertension 6/7/2019    Fatigue 9/15/2017    GERD (gastroesophageal reflux disease)     Hypercholesterolemia     Hyperplasia of prostate 9/15/2017    Obesity 9/15/2017    Other long term (current) drug therapy 9/15/2017    Sinusitis 9/15/2017    SK (seborrheic keratosis) 9/15/2017     Past Surgical History:   Procedure Laterality Date    HX APPENDECTOMY      HX COLONOSCOPY      HX ENDOSCOPY      HX HEART CATHETERIZATION  ~2008    stent    HX KNEE REPLACEMENT Right 06/2019    partial knee replacement Dr. Armand Gaitan      Family History   Problem Relation Age of Onset    No Known Problems Mother      Social History     Tobacco Use    Smoking status: Never Smoker    Smokeless tobacco: Former User   Substance Use Topics    Alcohol use: No     Alcohol/week: 0.0 standard drinks      Prior to Admission medications    Medication Sig Start Date End Date Taking? Authorizing Provider   Cholestyramine-Aspartame (CHOLESTYRAMINE LIGHT) 4 gram powder Take 4 g by mouth daily. Yes Provider, Historical   albuterol (VENTOLIN HFA) 90 mcg/actuation inhaler Take 2 Puffs by inhalation every four (4) hours as needed for Wheezing.    Yes Provider, Historical   tiotropium-olodaterol (STIOLTO RESPIMAT) 2.5-2.5 mcg/actuation inhaler Take 2 Puffs by inhalation daily. Yes Provider, Historical   cephALEXin (KEFLEX) 500 mg capsule Take 1,000 mg by mouth once. Indications: 1 hour prior to dental procedure. s/p partial knee replacement   Yes Provider, Historical   dilTIAZem (TIAZAC) 360 mg SR capsule Take 360 mg by mouth daily. 6/17/19  Yes Provider, Historical   levocetirizine (XYZAL) 5 mg tablet TAKE 1 TABLET BY MOUTH ONCE DAILY 7/19/19  Yes Flakito Fitzgerald MD   nabumetone (RELAFEN) 500 mg tablet TAKE 1 TABLET BY MOUTH TWICE DAILY 7/19/19  Yes Flakito Fitzgerald MD   omeprazole (PRILOSEC) 40 mg capsule Take 40 mg by mouth daily. Indications: gastroesophageal reflux disease 7/22/18  Yes Provider, Historical   ZETIA 10 mg tablet Take 1 Tab by mouth daily. 2/16/18  Yes Flakito Fitzgerald MD   finasteride (PROSCAR) 5 mg tablet Take 5 mg by mouth every Monday, Wednesday, Friday. 3/25/16  Yes Provider, Historical   aspirin 81 mg chewable tablet Take 81 mg by mouth daily. Yes Provider, Historical   coenzyme q10 10 mg cap Take 1 Tab by mouth daily. Yes Provider, Historical   multivitamin (ONE A DAY) tablet Take 1 Tab by mouth daily. Yes Provider, Historical        Allergies   Allergen Reactions    Claritin-D 12 Hour [Loratadine-Pseudoephedrine] Shortness of Breath    Iodine Shortness of Breath    Levaquin [Levofloxacin] Hives    Seafood [Shellfish Containing Products] Shortness of Breath    Statins-Hmg-Coa Reductase Inhibitors Other (comments)     \"liver problems\"    Sulfa (Sulfonamide Antibiotics) Hives       Review of Systems:  A comprehensive review of systems was negative except for that written in the History of Present Illness.     Objective:     Vitals:    11/20/19 1212 11/27/19 0735   BP:  145/79   Pulse:  66   Resp:  19   Temp:  98.3 °F (36.8 °C)   SpO2:  96%   Weight: 117.5 kg (259 lb) 113.9 kg (251 lb)   Height: 5' 11\" (1.803 m) 5' 11\" (1.803 m)        Physical Exam:  General:  Alert, cooperative, no distress, appears stated age. Eyes:  Conjunctivae/corneas clear. PERRL, EOMs intact. Fundi benign   Ears:  Normal TMs and external ear canals both ears. Nose: Nares normal. Septum midline. Mucosa normal. No drainage or sinus tenderness. Mouth/Throat: Lips, mucosa, and tongue normal. Teeth and gums normal.   Neck: Supple, symmetrical, trachea midline, no adenopathy, thyroid: no enlargment/tenderness/nodules, no carotid bruit and no JVD. Back:   Symmetric, no curvature. ROM normal. No CVA tenderness. Lungs:   Clear to auscultation bilaterally. Heart:  Regular rate and rhythm, S1, S2 normal, no murmur, click, rub or gallop. Abdomen:   Soft, non-tender. Bowel sounds normal. No masses,  No organomegaly. Extremities: Extremities normal, atraumatic, no cyanosis or edema. Pulses: 2+ and symmetric all extremities. Skin: Skin color, texture, turgor normal. No rashes or lesions   Lymph nodes: Cervical, supraclavicular, and axillary nodes normal.   Neurologic: CNII-XII intact. Normal strength, sensation and reflexes throughout. Assessment:     Hospital Problems  Date Reviewed: 11/26/2019    None          Plan:     Colonoscopy. History of polyps.   Last colonoscopy was in 2016    Signed By: Rajwinder Baldwin MD     November 27, 2019

## 2019-11-27 NOTE — ANESTHESIA POSTPROCEDURE EVALUATION
Procedure(s): FLEXIBLE SIGMOIDOSCOPY.    MAC, total IV anesthesia    Anesthesia Post Evaluation        Patient location during evaluation: PACU  Note status: Adequate. Level of consciousness: responsive to verbal stimuli and sleepy but conscious  Pain management: satisfactory to patient  Airway patency: patent  Anesthetic complications: no  Cardiovascular status: acceptable  Respiratory status: acceptable  Hydration status: acceptable  Comments: +Post-Anesthesia Evaluation and Assessment    Patient: Urmila Palma MRN: [id number]  SSN: [id number]   YOB: 1950  Age: 81 y.o. Sex: male      Cardiovascular Function/Vital Signs    /71   Pulse 62   Temp 36.8 °C (98.3 °F)   Resp 12   Ht 5' 11\" (1.803 m)   Wt 113.9 kg (251 lb)   SpO2 98%   BMI 35.01 kg/m²     Patient is status post Procedure(s): FLEXIBLE SIGMOIDOSCOPY. Nausea/Vomiting: Controlled. Postoperative hydration reviewed and adequate. Pain:  Pain Scale 1: Numeric (0 - 10) (11/27/19 0729)  Pain Intensity 1: 0 (11/27/19 0729)   Managed. Neurological Status: At baseline. Mental Status and Level of Consciousness: Arousable. Pulmonary Status:   O2 Device: Nasal cannula (11/27/19 0857)   Adequate oxygenation and airway patent. Complications related to anesthesia: None    Post-anesthesia assessment completed. No concerns.     Signed By: Benedicto Simpson MD    11/27/2019  Post anesthesia nausea and vomiting:  controlled      Vitals Value Taken Time   /71 11/27/2019  8:57 AM   Temp 36.8 °C (98.3 °F) 11/27/2019  8:57 AM   Pulse 62 11/27/2019  8:57 AM   Resp 12 11/27/2019  8:57 AM   SpO2 98 % 11/27/2019  8:57 AM

## 2019-11-30 ENCOUNTER — OFFICE VISIT (OUTPATIENT)
Dept: URGENT CARE | Age: 69
End: 2019-11-30

## 2019-11-30 VITALS
HEIGHT: 71 IN | OXYGEN SATURATION: 97 % | DIASTOLIC BLOOD PRESSURE: 75 MMHG | TEMPERATURE: 97.7 F | WEIGHT: 248 LBS | BODY MASS INDEX: 34.72 KG/M2 | SYSTOLIC BLOOD PRESSURE: 142 MMHG | RESPIRATION RATE: 18 BRPM | HEART RATE: 79 BPM

## 2019-11-30 DIAGNOSIS — R05.9 COUGH: ICD-10-CM

## 2019-11-30 DIAGNOSIS — R52 BODY ACHES: ICD-10-CM

## 2019-11-30 DIAGNOSIS — B96.89 BACTERIAL SINUSITIS: ICD-10-CM

## 2019-11-30 DIAGNOSIS — J02.9 SORE THROAT: Primary | ICD-10-CM

## 2019-11-30 DIAGNOSIS — J32.9 BACTERIAL SINUSITIS: ICD-10-CM

## 2019-11-30 LAB
FLUAV+FLUBV AG NOSE QL IA.RAPID: NEGATIVE POS/NEG
FLUAV+FLUBV AG NOSE QL IA.RAPID: NEGATIVE POS/NEG
S PYO AG THROAT QL: NEGATIVE
VALID INTERNAL CONTROL?: YES
VALID INTERNAL CONTROL?: YES

## 2019-11-30 RX ORDER — AMOXICILLIN AND CLAVULANATE POTASSIUM 875; 125 MG/1; MG/1
1 TABLET, FILM COATED ORAL 2 TIMES DAILY
Qty: 14 TAB | Refills: 0 | Status: SHIPPED | OUTPATIENT
Start: 2019-11-30 | End: 2019-12-07

## 2019-11-30 RX ORDER — HYDROCODONE POLISTIREX AND CHLORPHENIRAMINE POLISTIREX 10; 8 MG/5ML; MG/5ML
1 SUSPENSION, EXTENDED RELEASE ORAL
Qty: 30 ML | Refills: 0 | Status: SHIPPED | OUTPATIENT
Start: 2019-11-30 | End: 2019-12-03

## 2019-11-30 NOTE — PROGRESS NOTES
The history is provided by the patient. Cold Symptoms   The history is provided by the patient. This is a new problem. Episode onset: 1 week. The problem occurs constantly. The problem has not changed since onset. The cough is non-productive. There has been no fever. Associated symptoms include ear congestion and rhinorrhea. Pertinent negatives include no chest pain, no chills, no headaches, no sore throat, no shortness of breath, no wheezing, no nausea and no vomiting. Associated symptoms comments: Post nasal drainage and sinus pain with pressure. He has tried cough syrup and decongestants for the symptoms. The treatment provided no relief.         Past Medical History:   Diagnosis Date    Actinic keratosis 9/15/2017    Annual physical exam 9/15/2017    Arthritis     ASHD (arteriosclerotic heart disease) 9/15/2017    At risk for sleep apnea 11/20/2019    STOP BANG 5    Guerrier's esophagus     CAD (coronary artery disease)     Chest pain 9/15/2017    Chronic back pain 9/15/2017    Cough 9/15/2017    Diabetes mellitus screening 9/15/2017    Diaphoresis 9/15/2017    SANDERS (dyspnea on exertion)     Dyslipidemia 9/15/2017    Edema 9/15/2017    Elevated LFTs 9/15/2017    Essential hypertension 6/7/2019    Fatigue 9/15/2017    GERD (gastroesophageal reflux disease)     Hypercholesterolemia     Hyperplasia of prostate 9/15/2017    Obesity 9/15/2017    Other long term (current) drug therapy 9/15/2017    Sinusitis 9/15/2017    SK (seborrheic keratosis) 9/15/2017        Past Surgical History:   Procedure Laterality Date    COLONOSCOPY N/A 11/27/2019    FLEXIBLE SIGMOIDOSCOPY performed [facility] AM[facility]    HX APPENDECTOMY      HX COLONOSCOPY      HX ENDOSCOPY      HX HEART CATHETERIZATION  ~2008    stent    HX KNEE REPLACEMENT Right 06/2019    partial knee replacement Dr. Roya Lynn         Family History   Problem Relation Age of Onset    No Known Problems Mother Social History     Socioeconomic History    Marital status:      Spouse name: Not on file    Number of children: Not on file    Years of education: Not on file    Highest education level: Not on file   Occupational History    Not on file   Social Needs    Financial resource strain: Not on file    Food insecurity:     Worry: Not on file     Inability: Not on file    Transportation needs:     Medical: Not on file     Non-medical: Not on file   Tobacco Use    Smoking status: Never Smoker    Smokeless tobacco: Former User   Substance and Sexual Activity    Alcohol use: No     Alcohol/week: 0.0 standard drinks    Drug use: No    Sexual activity: Not on file   Lifestyle    Physical activity:     Days per week: Not on file     Minutes per session: Not on file    Stress: Not on file   Relationships    Social connections:     Talks on phone: Not on file     Gets together: Not on file     Attends Mandaeism service: Not on file     Active member of club or organization: Not on file     Attends meetings of clubs or organizations: Not on file     Relationship status: Not on file    Intimate partner violence:     Fear of current or ex partner: Not on file     Emotionally abused: Not on file     Physically abused: Not on file     Forced sexual activity: Not on file   Other Topics Concern    Not on file   Social History Narrative    Not on file                ALLERGIES: Claritin-d 12 hour [loratadine-pseudoephedrine]; Iodine; Levaquin [levofloxacin]; Seafood [shellfish containing products]; Statins-hmg-coa reductase inhibitors; and Sulfa (sulfonamide antibiotics)    Review of Systems   Constitutional: Negative for chills, fatigue and fever. HENT: Positive for congestion, postnasal drip, rhinorrhea and sinus pressure. Negative for sore throat and trouble swallowing. Respiratory: Positive for cough. Negative for chest tightness, shortness of breath and wheezing. Cardiovascular: Negative. Negative for chest pain. Gastrointestinal: Negative. Negative for nausea and vomiting. Genitourinary: Negative. Musculoskeletal: Negative. Skin: Negative. Neurological: Negative for dizziness, syncope, weakness, light-headedness, numbness and headaches. Vitals:    11/30/19 1051   BP: 142/75   Pulse: 79   Resp: 18   Temp: 97.7 °F (36.5 °C)   SpO2: 97%   Weight: 248 lb (112.5 kg)   Height: 5' 11\" (1.803 m)       Physical Exam  Constitutional:       Appearance: He is well-developed. HENT:      Head:      Comments: Maxillary and ethmoid pressure upon palpation     Right Ear: Tympanic membrane normal.      Left Ear: Tympanic membrane normal.      Nose: Congestion present. Mouth/Throat:      Pharynx: No oropharyngeal exudate or posterior oropharyngeal erythema. Comments: Post nasal drainage  Eyes:      Conjunctiva/sclera: Conjunctivae normal.      Pupils: Pupils are equal, round, and reactive to light. Neck:      Musculoskeletal: Normal range of motion. Cardiovascular:      Rate and Rhythm: Normal rate and regular rhythm. Heart sounds: Normal heart sounds. Pulmonary:      Effort: Pulmonary effort is normal.      Breath sounds: Normal breath sounds. Abdominal:      General: Bowel sounds are normal.      Palpations: Abdomen is soft. Musculoskeletal: Normal range of motion. Lymphadenopathy:      Cervical: No cervical adenopathy. Skin:     General: Skin is warm and dry. Neurological:      Mental Status: He is alert and oriented to person, place, and time. MDM     Differential Diagnosis; Clinical Impression; Plan:     (J02.9) Sore throat  (primary encounter diagnosis)  (R52) Body aches  (J32.9,  B96.89) Bacterial sinusitis  (R05) Cough  Orders Placed This Encounter      amoxicillin-clavulanate (AUGMENTIN) 875-125 mg per tablet          Sig: Take 1 Tab by mouth two (2) times a day for 7 days.           Dispense:  14 Tab          Refill:  0 chlorpheniramine-HYDROcodone (TUSSIONEX) 10-8 mg/5 mL suspension          Sig: Take 5 mL by mouth every twelve (12) hours as needed for Cough for up to 3 days. Max Daily Amount: 10 mL. Dispense:  30 mL          Refill:  0    Advised to increase fluids and use a OTC nasal saline spray. The patients condition was discussed with the patient and they understand. The patient is to follow up with PCP. If signs and symptoms become worse the pt is to go to the ER. The patient is to take medications as prescribed. AVS given with patient instructions upon discharge.                   Procedures

## 2019-11-30 NOTE — PATIENT INSTRUCTIONS

## 2019-12-23 ENCOUNTER — OFFICE VISIT (OUTPATIENT)
Dept: URGENT CARE | Age: 69
End: 2019-12-23

## 2019-12-23 VITALS
HEIGHT: 70 IN | WEIGHT: 255 LBS | SYSTOLIC BLOOD PRESSURE: 146 MMHG | BODY MASS INDEX: 36.51 KG/M2 | TEMPERATURE: 98.4 F | DIASTOLIC BLOOD PRESSURE: 63 MMHG | RESPIRATION RATE: 18 BRPM | HEART RATE: 72 BPM | OXYGEN SATURATION: 96 %

## 2019-12-23 DIAGNOSIS — J45.40 MODERATE PERSISTENT ASTHMATIC BRONCHITIS WITHOUT COMPLICATION: Primary | ICD-10-CM

## 2019-12-23 RX ORDER — PREDNISONE 10 MG/1
TABLET ORAL
Qty: 21 TAB | Refills: 0 | Status: SHIPPED | OUTPATIENT
Start: 2019-12-23 | End: 2020-01-14

## 2019-12-23 RX ORDER — BENZONATATE 200 MG/1
200 CAPSULE ORAL
Qty: 21 CAP | Refills: 0 | Status: SHIPPED | OUTPATIENT
Start: 2019-12-23 | End: 2019-12-30

## 2019-12-23 RX ORDER — NABUMETONE 500 MG/1
TABLET, FILM COATED ORAL
Qty: 180 TAB | Refills: 0 | Status: SHIPPED | OUTPATIENT
Start: 2019-12-23 | End: 2020-01-25

## 2019-12-23 RX ORDER — IPRATROPIUM BROMIDE AND ALBUTEROL SULFATE 2.5; .5 MG/3ML; MG/3ML
3 SOLUTION RESPIRATORY (INHALATION)
Status: COMPLETED | OUTPATIENT
Start: 2019-12-23 | End: 2019-12-23

## 2019-12-23 RX ADMIN — IPRATROPIUM BROMIDE AND ALBUTEROL SULFATE 3 ML: 2.5; .5 SOLUTION RESPIRATORY (INHALATION) at 17:39

## 2019-12-23 NOTE — PATIENT INSTRUCTIONS
Use your as needed inhaler. If your symptoms worsen, go to the ED. Asthma Attack: Care Instructions  Your Care Instructions    During an asthma attack, the airways swell and narrow. This makes it hard to breathe. Severe asthma attacks can be life-threatening, but you can help prevent them by keeping your asthma under control and treating symptoms before they get bad. Symptoms include being short of breath, having chest tightness, coughing, and wheezing. Noting and treating these symptoms can also help you avoid future trips to the emergency room. The doctor has checked you carefully, but problems can develop later. If you notice any problems or new symptoms, get medical treatment right away. Follow-up care is a key part of your treatment and safety. Be sure to make and go to all appointments, and call your doctor if you are having problems. It's also a good idea to know your test results and keep a list of the medicines you take. How can you care for yourself at home? · Follow your asthma action plan to prevent and treat attacks. If you don't have an asthma action plan, work with your doctor to create one. · Take your asthma medicines exactly as prescribed. Talk to your doctor right away if you have any questions about how to take them. ? Use your quick-relief medicine when you have symptoms of an attack. Quick-relief medicine is usually an albuterol inhaler. Some people need to use quick-relief medicine before they exercise. ? Take your controller medicine every day, not just when you have symptoms. Controller medicine is usually an inhaled corticosteroid. The goal is to prevent problems before they occur. Don't use your controller medicine to treat an attack that has already started. It doesn't work fast enough to help. ? If your doctor prescribed corticosteroid pills to use during an attack, take them exactly as prescribed.  It may take hours for the pills to work, but they may make the episode shorter and help you breathe better. ? Keep your quick-relief medicine with you at all times. · Talk to your doctor before using other medicines. Some medicines, such as aspirin, can cause asthma attacks in some people. · If you have a peak flow meter, use it to check how well you are breathing. This can help you predict when an asthma attack is going to occur. Then you can take medicine to prevent the asthma attack or make it less severe. · Do not smoke or allow others to smoke around you. Avoid smoky places. Smoking makes asthma worse. If you need help quitting, talk to your doctor about stop-smoking programs and medicines. These can increase your chances of quitting for good. · Learn what triggers an asthma attack for you, and avoid the triggers when you can. Common triggers include colds, smoke, air pollution, dust, pollen, mold, pets, cockroaches, stress, and cold air. · Avoid colds and the flu. Get a pneumococcal vaccine shot. If you have had one before, ask your doctor if you need a second dose. Get a flu vaccine every fall. If you must be around people with colds or the flu, wash your hands often. When should you call for help? Call 911 anytime you think you may need emergency care.  For example, call if:    · You have severe trouble breathing.    Call your doctor now or seek immediate medical care if:    · Your symptoms do not get better after you have followed your asthma action plan.     · You have new or worse trouble breathing.     · Your coughing and wheezing get worse.     · You cough up dark brown or bloody mucus (sputum).     · You have a new or higher fever.    Watch closely for changes in your health, and be sure to contact your doctor if:    · You need to use quick-relief medicine on more than 2 days a week (unless it is just for exercise).     · You cough more deeply or more often, especially if you notice more mucus or a change in the color of your mucus.     · You are not getting better as expected. Where can you learn more? Go to http://sergey-phyllis.info/. Enter M807 in the search box to learn more about \"Asthma Attack: Care Instructions. \"  Current as of: June 9, 2019  Content Version: 12.2  © 8512-6165 [employer]. Care instructions adapted under license by [employer] (which disclaims liability or warranty for this information). If you have questions about a medical condition or this instruction, always ask your healthcare professional. [address] any warranty or liability for your use of this information.

## 2019-12-23 NOTE — PROGRESS NOTES
The patient presents with cough that has been persistent. He was treated for sinusitis recently and has seen by pulmonology. He has stopped taking his daily inhaler due to side effects and has not used his albuterol, feeling that this episode does not constitute \"difficulty breathing. \"     The history is provided by the patient. Cold Symptoms   The history is provided by the patient. This is a new problem. The current episode started more than 1 week ago. The problem occurs constantly. The problem has been gradually worsening. The cough is productive of sputum and productive of brown sputum. There has been no fever. Associated symptoms include sore throat. Pertinent negatives include no chills, no ear congestion, no rhinorrhea and no shortness of breath. He has tried inhalers and decongestants for the symptoms. The treatment provided no relief. He is not a smoker (Second hand exposure).         Past Medical History:   Diagnosis Date    Actinic keratosis 9/15/2017    Annual physical exam 9/15/2017    Arthritis     ASHD (arteriosclerotic heart disease) 9/15/2017    At risk for sleep apnea 11/20/2019    STOP BANG 5    Guerrier's esophagus     CAD (coronary artery disease)     Chest pain 9/15/2017    Chronic back pain 9/15/2017    Cough 9/15/2017    Diabetes mellitus screening 9/15/2017    Diaphoresis 9/15/2017    SANDERS (dyspnea on exertion)     Dyslipidemia 9/15/2017    Edema 9/15/2017    Elevated LFTs 9/15/2017    Essential hypertension 6/7/2019    Fatigue 9/15/2017    GERD (gastroesophageal reflux disease)     Hypercholesterolemia     Hyperplasia of prostate 9/15/2017    Obesity 9/15/2017    Other long term (current) drug therapy 9/15/2017    Sinusitis 9/15/2017    SK (seborrheic keratosis) 9/15/2017        Past Surgical History:   Procedure Laterality Date    COLONOSCOPY N/A 11/27/2019    FLEXIBLE SIGMOIDOSCOPY performed by Adenike Tarango MD at [facility] AMBULATORY OR    HX APPENDECTOMY      HX COLONOSCOPY      HX ENDOSCOPY      HX HEART CATHETERIZATION  ~2008    stent    HX KNEE REPLACEMENT Right 06/2019    partial knee replacement Dr. Roya Lynn         Family History   Problem Relation Age of Onset    No Known Problems Mother         Social History     Socioeconomic History    Marital status:      Spouse name: Not on file    Number of children: Not on file    Years of education: Not on file    Highest education level: Not on file   Occupational History    Not on file   Social Needs    Financial resource strain: Not on file    Food insecurity:     Worry: Not on file     Inability: Not on file    Transportation needs:     Medical: Not on file     Non-medical: Not on file   Tobacco Use    Smoking status: Never Smoker    Smokeless tobacco: Former User   Substance and Sexual Activity    Alcohol use: No     Alcohol/week: 0.0 standard drinks    Drug use: No    Sexual activity: Not on file   Lifestyle    Physical activity:     Days per week: Not on file     Minutes per session: Not on file    Stress: Not on file   Relationships    Social connections:     Talks on phone: Not on file     Gets together: Not on file     Attends [employer] service: Not on file     Active member of club or organization: Not on file     Attends meetings of clubs or organizations: Not on file     Relationship status: Not on file    Intimate partner violence:     Fear of current or ex partner: Not on file     Emotionally abused: Not on file     Physically abused: Not on file     Forced sexual activity: Not on file   Other Topics Concern    Not on file   Social History Narrative    Not on file                ALLERGIES: Claritin-d 12 hour [loratadine-pseudoephedrine]; Iodine; Levaquin [levofloxacin]; Seafood [shellfish containing products];  Statins-hmg-coa reductase inhibitors; and Sulfa (sulfonamide antibiotics)    Review of Systems   Constitutional: Negative for activity change, appetite change, chills and fever.   HENT: Positive for congestion and sore throat. Negative for rhinorrhea, sinus pressure and sinus pain. Respiratory: Positive for cough and chest tightness. Negative for shortness of breath. Vitals:    12/23/19 1733   BP: 146/63   Pulse: 72   Resp: 18   Temp: 98.4 °F (36.9 °C)   SpO2: 96%   Weight: 255 lb (115.7 kg)   Height: 5' 10\" (1.778 m)       Physical Exam  Constitutional:       General: He is not in acute distress. Appearance: He is well-developed. He is ill-appearing. HENT:      Head: Normocephalic. Right Ear: Ear canal normal. No drainage. Tympanic membrane is not erythematous or bulging. Left Ear: Tympanic membrane and ear canal normal. No drainage. Tympanic membrane is not erythematous or bulging. Nose: Nose normal. No congestion or rhinorrhea. Mouth/Throat:      Pharynx: Posterior oropharyngeal erythema present. No oropharyngeal exudate. Cardiovascular:      Rate and Rhythm: Normal rate and regular rhythm. Heart sounds: Normal heart sounds. Pulmonary:      Effort: Pulmonary effort is normal. No respiratory distress. Breath sounds: Normal breath sounds. No decreased breath sounds or rhonchi. Comments: High pitched cough, tight upper respiratory sounds. Lymphadenopathy:      Cervical: No cervical adenopathy. MDM    ICD-10-CM ICD-9-CM    1. Moderate persistent asthmatic bronchitis without complication P30.54 032.29 predniSONE (STERAPRED DS) 10 mg dose pack      benzonatate (TESSALON) 200 mg capsule     Medications Ordered Today   Medications    albuterol-ipratropium (DUO-NEB) 2.5 MG-0.5 MG/3 ML     Order Specific Question:   MODE OF DELIVERY     Answer:   Nebulizer    predniSONE (STERAPRED DS) 10 mg dose pack     Sig: See administration instruction per 10mg dose pack     Dispense:  21 Tab     Refill:  0    benzonatate (TESSALON) 200 mg capsule     Sig: Take 1 Cap by mouth three (3) times daily as needed for Cough for up to 7 days. Dispense:  21 Cap     Refill:  0     No results found for any visits on 12/23/19. The patients condition was discussed with the patient and they understand. The patient is to follow up with primary care doctor. If signs and symptoms become worse the pt is to go to the ER. The patient is to take medications as prescribed. Advised f/u with pulmonologist. Educated on warning signs for dyspnea and when to present to ED.      Procedures

## 2019-12-26 ENCOUNTER — HOSPITAL ENCOUNTER (OUTPATIENT)
Dept: GENERAL RADIOLOGY | Age: 69
Discharge: HOME | End: 2019-12-26
Payer: MEDICARE

## 2019-12-26 DIAGNOSIS — R05.9 COUGH: ICD-10-CM

## 2019-12-26 PROCEDURE — 71046 X-RAY EXAM CHEST 2 VIEWS: CPT

## 2020-01-14 ENCOUNTER — HOSPITAL ENCOUNTER (OUTPATIENT)
Dept: PREADMISSION TESTING | Age: 70
Discharge: HOME | End: 2020-01-14
Attending: ORTHOPAEDIC SURGERY
Payer: MEDICARE

## 2020-01-14 VITALS
RESPIRATION RATE: 20 BRPM | OXYGEN SATURATION: 95 % | WEIGHT: 248.46 LBS | SYSTOLIC BLOOD PRESSURE: 140 MMHG | TEMPERATURE: 98.7 F | HEART RATE: 66 BPM | HEIGHT: 70 IN | DIASTOLIC BLOOD PRESSURE: 59 MMHG | BODY MASS INDEX: 35.57 KG/M2

## 2020-01-14 LAB
ABO + RH BLD: NORMAL
ALBUMIN SERPL-MCNC: 3.5 G/DL (ref 3.5–5)
ALBUMIN/GLOB SERPL: 0.9 {RATIO} (ref 1.1–2.2)
ALP SERPL-CCNC: 56 U/L (ref 45–117)
ALT SERPL-CCNC: 55 U/L (ref 12–78)
ANION GAP SERPL CALC-SCNC: 4 MMOL/L (ref 5–15)
APPEARANCE UR: CLEAR
AST SERPL-CCNC: 29 U/L (ref 15–37)
BACTERIA URNS QL MICRO: ABNORMAL /HPF
BILIRUB SERPL-MCNC: 1.3 MG/DL (ref 0.2–1)
BILIRUB UR QL CFM: NEGATIVE
BLOOD GROUP ANTIBODIES SERPL: NORMAL
BUN SERPL-MCNC: 14 MG/DL (ref 6–20)
BUN/CREAT SERPL: 16 (ref 12–20)
CALCIUM SERPL-MCNC: 8.9 MG/DL (ref 8.5–10.1)
CHLORIDE SERPL-SCNC: 104 MMOL/L (ref 97–108)
CO2 SERPL-SCNC: 29 MMOL/L (ref 21–32)
COLOR UR: ABNORMAL
CREAT SERPL-MCNC: 0.86 MG/DL (ref 0.7–1.3)
EPITH CASTS URNS QL MICRO: ABNORMAL /LPF
ERYTHROCYTE [DISTWIDTH] IN BLOOD BY AUTOMATED COUNT: 12.7 % (ref 11.5–14.5)
EST. AVERAGE GLUCOSE BLD GHB EST-MCNC: 126 MG/DL
GLOBULIN SER CALC-MCNC: 4 G/DL (ref 2–4)
GLUCOSE SERPL-MCNC: 102 MG/DL (ref 65–100)
GLUCOSE UR STRIP.AUTO-MCNC: NEGATIVE MG/DL
HBA1C MFR BLD: 6 % (ref 4–5.6)
HCT VFR BLD AUTO: 43.4 % (ref 36.6–50.3)
HGB BLD-MCNC: 14.3 G/DL (ref 12.1–17)
HGB UR QL STRIP: NEGATIVE
INR PPP: 1 (ref 0.9–1.1)
KETONES UR QL STRIP.AUTO: ABNORMAL MG/DL
LEUKOCYTE ESTERASE UR QL STRIP.AUTO: ABNORMAL
MCH RBC QN AUTO: 30.5 PG (ref 26–34)
MCHC RBC AUTO-ENTMCNC: 32.9 G/DL (ref 30–36.5)
MCV RBC AUTO: 92.5 FL (ref 80–99)
NITRITE UR QL STRIP.AUTO: NEGATIVE
NRBC # BLD: 0 K/UL (ref 0–0.01)
NRBC BLD-RTO: 0 PER 100 WBC
PH UR STRIP: 6.5 [PH] (ref 5–8)
PLATELET # BLD AUTO: 185 K/UL (ref 150–400)
PMV BLD AUTO: 9.2 FL (ref 8.9–12.9)
POTASSIUM SERPL-SCNC: 4.2 MMOL/L (ref 3.5–5.1)
PROT SERPL-MCNC: 7.5 G/DL (ref 6.4–8.2)
PROT UR STRIP-MCNC: NEGATIVE MG/DL
PROTHROMBIN TIME: 10.1 SEC (ref 9–11.1)
RBC # BLD AUTO: 4.69 M/UL (ref 4.1–5.7)
RBC #/AREA URNS HPF: ABNORMAL /HPF (ref 0–5)
SODIUM SERPL-SCNC: 137 MMOL/L (ref 136–145)
SP GR UR REFRACTOMETRY: 1.02 (ref 1–1.03)
SPECIMEN EXP DATE BLD: NORMAL
UA: UC IF INDICATED,UAUC: ABNORMAL
UROBILINOGEN UR QL STRIP.AUTO: 0.2 EU/DL (ref 0.2–1)
WBC # BLD AUTO: 6.9 K/UL (ref 4.1–11.1)
WBC URNS QL MICRO: ABNORMAL /HPF (ref 0–4)

## 2020-01-14 PROCEDURE — 86900 BLOOD TYPING SEROLOGIC ABO: CPT

## 2020-01-14 PROCEDURE — 83036 HEMOGLOBIN GLYCOSYLATED A1C: CPT

## 2020-01-14 PROCEDURE — 81001 URINALYSIS AUTO W/SCOPE: CPT

## 2020-01-14 PROCEDURE — 85027 COMPLETE CBC AUTOMATED: CPT

## 2020-01-14 PROCEDURE — 80053 COMPREHEN METABOLIC PANEL: CPT

## 2020-01-14 PROCEDURE — 36415 COLL VENOUS BLD VENIPUNCTURE: CPT

## 2020-01-14 PROCEDURE — 85610 PROTHROMBIN TIME: CPT

## 2020-01-14 PROCEDURE — 87086 URINE CULTURE/COLONY COUNT: CPT

## 2020-01-14 RX ORDER — SODIUM CHLORIDE, SODIUM LACTATE, POTASSIUM CHLORIDE, CALCIUM CHLORIDE 600; 310; 30; 20 MG/100ML; MG/100ML; MG/100ML; MG/100ML
25 INJECTION, SOLUTION INTRAVENOUS CONTINUOUS
Status: CANCELLED | OUTPATIENT
Start: 2020-01-23

## 2020-01-14 RX ORDER — PREGABALIN 150 MG/1
150 CAPSULE ORAL ONCE
Status: CANCELLED | OUTPATIENT
Start: 2020-01-14 | End: 2020-01-14

## 2020-01-14 RX ORDER — ACETAMINOPHEN 500 MG
1000 TABLET ORAL ONCE
Status: CANCELLED | OUTPATIENT
Start: 2020-01-23 | End: 2020-01-23

## 2020-01-14 RX ORDER — CELECOXIB 200 MG/1
400 CAPSULE ORAL ONCE
Status: CANCELLED | OUTPATIENT
Start: 2020-01-23 | End: 2020-01-23

## 2020-01-14 NOTE — ADVANCED PRACTICE NURSE
PAT Nurse Practitioner   Pre-Operative Chart Review/Assessment:-ORTHOPEDIC/NEUROSURGICAL SPINE                Patient Name:  Swapnil Kenney                                                         Age:   81 y.o.    :  1950     Today's Date:  2020     Date of PAT:   20      Date of Surgery:    20     Procedure(s):  Left total knee arthroplasty     Surgeon:   Kyle Richmond     Medical Clearance:  Dr. Khushi Berg 20                   PLAN:      1)  Cardiac Clearance:  Dr. Alberto Mijares       2)  Diabetic Treatment Consult:  Not indicated-A1C 6.0      3)  Sleep Apnea evaluation:   MI 6 in PAT assessment. PCP notified prior to preop clearance appt.        4) Treatment for MRSA/Staph Aureus:  Negative      5) Additional Concerns:  CAD w/ stent , COPD, chronic pain, T2DM, GERD, at risk for MI                 Vital Signs:         Vitals:    20 0808   BP: 140/59   Pulse: 66   Resp: 20   Temp: 98.7 °F (37.1 °C)   SpO2: 95%   Weight: 112.7 kg (248 lb 7.3 oz)   Height: 5' 10\" (1.778 m)            ____________________________________________  PAST MEDICAL HISTORY  Past Medical History:   Diagnosis Date    Actinic keratosis 9/15/2017    Annual physical exam 9/15/2017    Arthritis     ASHD (arteriosclerotic heart disease) 9/15/2017    Asthma     At risk for sleep apnea 2019    STOP BANG 5    Guerrier's esophagus     CAD (coronary artery disease)     Chest pain 9/15/2017    Chronic back pain 9/15/2017    Chronic obstructive pulmonary disease (HCC)     Chronic pain     Cough 9/15/2017    Diabetes ([address]     hypoglycemia hx    Diabetes mellitus screening 9/15/2017    Diaphoresis 9/15/2017    SANDERS (dyspnea on exertion)     Dyslipidemia 9/15/2017    Edema 9/15/2017    Elevated LFTs 9/15/2017    Essential hypertension 2019    Fatigue 9/15/2017    GERD (gastroesophageal reflux disease)     Guerrier's esophagus    Hypercholesterolemia     Hyperplasia of prostate 9/15/2017    Obesity 9/15/2017    Other long term (current) drug therapy 9/15/2017    Sinusitis 9/15/2017    SK (seborrheic keratosis) 9/15/2017      ____________________________________________  PAST SURGICAL HISTORY  Past Surgical History:   Procedure Laterality Date    COLONOSCOPY N/A 11/27/2019    FLEXIBLE SIGMOIDOSCOPY performed by Dae Farr MD at [facility] AMBULATORY OR    COLONOSCOPY N/A 1/15/2020    COLONOSCOPY performed by Dae Farr MD at [facility] ENDOSCOPY    HX APPENDECTOMY      HX COLONOSCOPY      HX ENDOSCOPY      HX HEART CATHETERIZATION  ~2008    stent    HX KNEE REPLACEMENT Right 06/2019    partial knee replacement Dr. Erica Alvarez      ____________________________________________  HOME MEDICATIONS    Current Outpatient Medications   Medication Sig    COQ10, UBIQUINOL, PO Take 100 mg by mouth daily.  nabumetone (RELAFEN) 500 mg tablet TAKE 1 TABLET BY MOUTH TWICE DAILY    Cholestyramine-Aspartame (CHOLESTYRAMINE LIGHT) 4 gram powder Take 4 g by mouth every morning.  albuterol (VENTOLIN HFA) 90 mcg/actuation inhaler Take 2 Puffs by inhalation every four (4) hours as needed for Wheezing.  cephALEXin (KEFLEX) 500 mg capsule Take 1,000 mg by mouth once. Indications: 1 hour prior to dental procedure. s/p partial knee replacement    dilTIAZem (TIAZAC) 360 mg SR capsule Take 360 mg by mouth every morning.  levocetirizine (XYZAL) 5 mg tablet TAKE 1 TABLET BY MOUTH ONCE DAILY    omeprazole (PRILOSEC) 40 mg capsule Take 40 mg by mouth daily. Indications: gastroesophageal reflux disease    ZETIA 10 mg tablet Take 1 Tab by mouth daily.  finasteride (PROSCAR) 5 mg tablet Take 5 mg by mouth every Monday, Wednesday, Friday.  aspirin 81 mg chewable tablet Take 81 mg by mouth nightly.  multivitamin (ONE A DAY) tablet Take 1 Tab by mouth daily.      No current facility-administered medications for this encounter.       ____________________________________________  ALLERGIES  Allergies   Allergen Reactions  Claritin-D 12 Hour [Loratadine-Pseudoephedrine] Shortness of Breath    Iodine Shortness of Breath    Levaquin [Levofloxacin] Hives    Seafood [Shellfish Containing Products] Shortness of Breath    Statins-Hmg-Coa Reductase Inhibitors Other (comments)     \"liver problems\"    Sulfa (Sulfonamide Antibiotics) Hives      ____________________________________________  SOCIAL HISTORY  Social History     Tobacco Use    Smoking status: Never Smoker    Smokeless tobacco: Former User   Substance Use Topics    Alcohol use: No     Alcohol/week: 0.0 standard drinks      ____________________________________________        Labs:     Results for Ham Garcia (MRN [id number]) as of 1/16/2020 13:50   Ref.  Range 1/14/2020 08:34 1/15/2020 00:00   WBC Latest Ref Range: 4.1 - 11.1 K/uL 6.9    NRBC Latest Ref Range: 0  WBC 0.0    RBC Latest Ref Range: 4.10 - 5.70 M/uL 4.69    HGB Latest Ref Range: 12.1 - 17.0 g/dL 14.3    HCT Latest Ref Range: 36.6 - 50.3 % 43.4    MCV Latest Ref Range: 80.0 - 99.0 FL 92.5    MCH Latest Ref Range: 26.0 - 34.0 PG 30.5    MCHC Latest Ref Range: 30.0 - 36.5 g/dL 32.9    RDW Latest Ref Range: 11.5 - 14.5 % 12.7    PLATELET Latest Ref Range: 150 - 400 K/uL 185    MPV Latest Ref Range: 8.9 - 12.9 FL 9.2    ABSOLUTE NRBC Latest Ref Range: 0.00 - 0.01 K/uL 0.00    Color Latest Units:   DARK YELLOW    Appearance Latest Ref Range: CLEAR   CLEAR    Specific gravity Latest Ref Range: 1.003 - 1.030   1.022    pH (UA) Latest Ref Range: 5.0 - 8.0   6.5    Protein Latest Ref Range: NEG mg/dL NEGATIVE    Glucose Latest Ref Range: NEG mg/dL NEGATIVE    Ketone Latest Ref Range: NEG mg/dL TRACE (A)    Blood Latest Ref Range: NEG   NEGATIVE    Bilirubin UA, confirm Latest Ref Range: NEG   NEGATIVE    Urobilinogen Latest Ref Range: 0.2 - 1.0 EU/dL 0.2    Nitrites Latest Ref Range: NEG   NEGATIVE    Leukocyte Esterase Latest Ref Range: NEG   SMALL (A)    Epithelial cells Latest Ref Range: FEW /lpf FEW WBC Latest Ref Range: 0 - 4 /hpf 5-10    RBC Latest Ref Range: 0 - 5 /hpf 0-5    Bacteria Latest Ref Range: NEG /hpf 1+ (A)    INR Latest Ref Range: 0.9 - 1.1   1.0    Prothrombin time Latest Ref Range: 9.0 - 11.1 sec 10.1    Sodium Latest Ref Range: 136 - 145 mmol/L 137    Potassium Latest Ref Range: 3.5 - 5.1 mmol/L 4.2    Chloride Latest Ref Range: 97 - 108 mmol/L 104    CO2 Latest Ref Range: 21 - 32 mmol/L 29    Anion gap Latest Ref Range: 5 - 15 mmol/L 4 (L)    Glucose Latest Ref Range: 65 - 100 mg/dL 102 (H)    BUN Latest Ref Range: 6 - 20 MG/DL 14    Creatinine Latest Ref Range: 0.70 - 1.30 MG/DL 0.86    BUN/Creatinine ratio Latest Ref Range: 12 - 20   16    Calcium Latest Ref Range: 8.5 - 10.1 MG/DL 8.9    GFR est non-AA Latest Ref Range: >60 ml/min/1.73m2 >60    GFR est AA Latest Ref Range: >60 ml/min/1.73m2 >60    Bilirubin, total Latest Ref Range: 0.2 - 1.0 MG/DL 1.3 (H)    Protein, total Latest Ref Range: 6.4 - 8.2 g/dL 7.5    Albumin Latest Ref Range: 3.5 - 5.0 g/dL 3.5    Globulin Latest Ref Range: 2.0 - 4.0 g/dL 4.0    A-G Ratio Latest Ref Range: 1.1 - 2.2   0.9 (L)    ALT (SGPT) Latest Ref Range: 12 - 78 U/L 55    AST Latest Ref Range: 15 - 37 U/L 29    Alk. phosphatase Latest Ref Range: 45 - 117 U/L 56    Hemoglobin A1c, (calculated) Latest Ref Range: 4.0 - 5.6 % 6.0 (H)    Est. average glucose Latest Units: mg/dL 126    CULTURE, URINE Unknown Rpt    CULTURE, MRSA Unknown Rpt    Crossmatch Expiration Unknown 01/26/2020    TYPE & SCREEN Unknown Rpt    SURGICAL PATHOLOGY Unknown  Rpt       Skin:     Denies open wounds, cuts, sores, rashes or other areas of concern in PAT assessment. Giacomo Mcclelland NP      MI 6 in PAT assessment. Pt admits to snoring loudly but denies ever having been advised that he has pauses in breathing while sleeping or ever having been referred for a sleep apnea evaluation. Denies decreased cervical ROM. Denies loose teeth, partial plates or dentures.   Denies prior complications from anesthesia. Msg sent to PCP regarding MI results with request for FU/further recommendations regarding sleep apnea evaluation.

## 2020-01-14 NOTE — PERIOP NOTES
[facility]  Joint/Spine Preoperative Instructions        Surgery Date 01/23/2020          Time of Arrival to be called with time  Contact # [phone]    1. On the day of your surgery, please report to the Surgical Services Registration Desk and sign in at your designated time. The Surgery Center is located to the right of the Emergency Room. 2. You must have someone with you to drive you home. You should not drive a car for 24 hours following surgery. Please make arrangements for a friend or family member to stay with you for the first 24 hours after your surgery. 3. No food after midnight 01/22/20. Medications morning of surgery should be taken with a sip of water. Please follow pre-surgery drink instructions that were given at your Pre Admission Testing appointment. 4. We recommend you do not drink any alcoholic beverages for 24 hours before and after your surgery. 5. Contact your surgeons office for instructions on the following medications: non-steroidal anti-inflammatory drugs (i.e. Advil, Aleve), vitamins, and supplements. (Some surgeons will want you to stop these medications prior to surgery and others may allow you to take them)  **If you are currently taking Plavix, Coumadin, Aspirin and/or other blood-thinning agents, contact your surgeon for instructions. ** Your surgeon will partner with the physician prescribing these medications to determine if it is safe to stop or if you need to continue taking. Please do not stop taking these medications without instructions from your surgeon    6. Wear comfortable clothes. Wear glasses instead of contacts. Do not bring any money or jewelry. Please bring picture ID, insurance card, and any prearranged co-payment or hospital payment. Do not wear make-up, particularly mascara the morning of your surgery. Do not wear nail polish, particularly if you are having foot /hand surgery.   Wear your hair loose or down, no ponytails, buns, mathew pins or clips. All body piercings must be removed. Please shower with antibacterial soap for three consecutive days before and on the morning of surgery, but do not apply any lotions, powders or deodorants after the shower on the day of surgery. Please use a fresh towels after each shower. Please sleep in clean clothes and change bed linens the night before surgery. Please do not shave for 48 hours prior to surgery. Shaving of the face is acceptable. 7. You should understand that if you do not follow these instructions your surgery may be cancelled. If your physical condition changes (I.e. fever, cold or flu) please contact your surgeon as soon as possible. 8. It is important that you be on time. If a situation occurs where you may be late, please call [phone] (OR Holding Area). 9. If you have any questions and or problems, please call [phone] (Pre-admission Testing). 10. Your surgery time may be subject to change. You will receive a phone call the evening prior if your time changes. 11.  If having outpatient surgery, you must have someone to drive you here, stay with you during the duration of your stay, and to drive you home at time of discharge. 12.   In an effort to improve the efficiency, privacy, and safety for all of our Pre-op patients visitors are not allowed in the Holding area. Once you arrive and are registered your family/visitors will be asked to remain in the waiting room. The Pre-op staff will get you from the Surgical Waiting Area and will explain to you and your family/visitors that the Pre-op phase is beginning. The staff will answer any questions and provide instructions for tracking of the patient, by use of the existing tracking number and color-coded status board in the waiting room.   At this time the staff will also ask for your designated spokesperson information in the event that the physician or staff need to provide an update or obtain any pertinent information. The designated spokesperson will be notified if the physician needs to speak to family during the pre-operative phase. If at any time your family/visitors has questions or concerns they may approach the volunteer desk in the waiting area for assistance. Special Instructions:  Practice with incentive spirometry/please bring incentive spirometry back to the hospital    TAKE ALL MEDICATIONS THE DAY OF SURGERY EXCEPT:cholestyramine  Follow instructions per surgeon for stopping aspirin nabumetone prior to surgery--verify with surgeon for stopping vitamins/supplements    I understand a pre-operative phone call will be made to verify my surgery time. In the event that I am not available, I give permission for a message to be left on my answering service and/or with another person?   yes          ___________________      __________   _________    (Signature of Patient)             (Witness)                (Date and Time)

## 2020-01-14 NOTE — PERIOP NOTES
Orthopedic and Spine Patients: Instructions on When You Can   Eat or Drink Before Surgery      You have been provided 2 pre-surgery drinks received at your pre-admission testing appointment.  Night before surgery:  o You should drink one pre-surgery drink at bedtime. No food after midnight!    o    Day of Surgery:  o Complete 2nd pre-surgery drink 1 hour prior to arrival at hospital.  For questions call Pre-Admission Testing at [phone]. They are available from 8:00am-5:00pm, Monday through Friday.

## 2020-01-14 NOTE — PROGRESS NOTES
Incentive Spirometer        Using the incentive spirometer helps expand the small air sacs of your lungs, helps you breathe deeply, and helps improve your lung function. Use your incentive spirometer twice a day (10 breaths each time) prior to surgery. How to Use Your Incentive Spirometer:  1. Hold the incentive spirometer in an upright position. 2. Breathe out as usual.   3. Place the mouthpiece in your mouth and seal your lips tightly around it. 4. Take a deep breath. Breathe in slowly and as deeply as possible. Keep the blue flow rate guide between the arrows. 5. Hold your breath as long as possible. Then exhale slowly and allow the piston to fall to the bottom of the column. 6. Rest for a few seconds and repeat steps one through five at least 10 times. PAT Tidal Volume__2500________________  x______2__________  Date___01/14/2020____________________    Cam Brew THE INCENTIVE SPIROMETER WITH YOU TO THE HOSPITAL ON THE DAY OF YOUR SURGERY. Opportunity given to ask and answer questions as well as to observe return demonstration.     Patient signature_____________________________    Witness____________________________

## 2020-01-14 NOTE — PERIOP NOTES
Preventing Infections Before and After - Your Surgery    IMPORTANT INSTRUCTIONS    Please read and follow these instructions carefully. If you are unable to comply with the below instructions your procedure will be cancelled. Every Night for Three (3) nights before your surgery:  1. Shower with an antibacterial soap, such as Dial, or the soap provided at your preassessment appointment. A shower is better than a bath for cleaning your skin. 2. If needed, ask someone to help you reach all areas of your body. Dont forget to clean your belly button with every shower. The night before your surgery: If you lose your Hibiclens/chlorhexidine please contact surgery center or you can purchase it at a local pharmacy  1. On the night before your surgery, shower with an antibacterial soap, such as Dial, or the soap provided at your preassessment appointment. 2. With one packet of Hibiclens/Chlorhexidine in hand, turn water off.  3. Apply Hibiclens antiseptic skin cleanser with a clean, freshly washed washcloth. ? Gently apply to your body from chin to toes (except the genital area) and especially the area(s) where your incision(s) will be. ? Leave Hibiclens/Chlorhexidine on your skin for at least 20 seconds. CAUTION: If needed, Hibiclens/chlorhexidine may be used to clean the folds of skin of the legs (such as in the area of the groin) and on your buttocks and hips. However, do not use Hibiclens/Chlorhexidine above the neck or in the genital area (your bottom) or put inside any area of your body. 4. Turn the water back on and rinse. 5. Dry gently with a clean, freshly washed towel. 6. After your shower, do not use any powder, deodorant, perfumes or lotion. 7. Use clean, freshly washed towels and washcloths every time you shower. 8. Wear clean, freshly washed pajamas to bed the night before surgery. 9. Sleep on clean, freshly washed sheets. 10. Do not allow pets to sleep in your bed with you.     The Morning of your surgery:  1. Shower again thoroughly with an antibacterial soap, such as Dial or the soap provided at your preassessment appointment. If needed, ask someone for help to reach all areas of your body. Dont forget to clean your belly button! Rinse. 2. Dry gently with a clean, freshly washed towel. 3. After your shower, do not use any powder, deodorant, perfumes or lotion prior to surgery. 4. Put on clean, freshly washed clothing. Tips to help prevent infections after your surgery:  1. Protect your surgical wound from germs:  ? Hand washing is the most important thing you and your caregivers can do to prevent infections. ? Keep your bandage clean and dry! ? Do not touch your surgical wound. 2. Use clean, freshly washed towels and washcloths every time you shower; do not share bath linens with others. 3. Until your surgical wound is healed, wear clothing and sleep on bed linens each day that are clean and freshly washed. 4. Do not allow pets to sleep in your bed with you or touch your surgical wound. 5. Do not smoke - smoking delays wound healing. This may be a good time to stop smoking. 6. If you have diabetes, it is important for you to manage your blood sugar levels properly before your surgery as well as after your surgery. Poorly managed blood sugar levels slow down wound healing and prevent you from healing completely. If you lose your Hibiclens/chlorhexidine, please call the [facility], or it is available for purchase at your pharmacy.                ___________________      ___________________      ________________  (Signature of Patient)          (Witness)                   (Date and Time)

## 2020-01-15 ENCOUNTER — ANESTHESIA EVENT (OUTPATIENT)
Dept: ENDOSCOPY | Age: 70
End: 2020-01-15
Payer: MEDICARE

## 2020-01-15 ENCOUNTER — HOSPITAL ENCOUNTER (OUTPATIENT)
Age: 70
Setting detail: OUTPATIENT SURGERY
Discharge: HOME | End: 2020-01-15
Attending: SURGERY | Admitting: SURGERY
Payer: MEDICARE

## 2020-01-15 ENCOUNTER — ANESTHESIA (OUTPATIENT)
Dept: ENDOSCOPY | Age: 70
End: 2020-01-15
Payer: MEDICARE

## 2020-01-15 VITALS
HEART RATE: 73 BPM | BODY MASS INDEX: 35.08 KG/M2 | HEIGHT: 70 IN | DIASTOLIC BLOOD PRESSURE: 86 MMHG | TEMPERATURE: 98.1 F | WEIGHT: 245.06 LBS | RESPIRATION RATE: 16 BRPM | OXYGEN SATURATION: 96 % | SYSTOLIC BLOOD PRESSURE: 143 MMHG

## 2020-01-15 LAB
BACTERIA SPEC CULT: NO GROWTH
BACTERIA SPEC CULT: NORMAL
BACTERIA SPEC CULT: NORMAL
CC UR VC: NORMAL
SERVICE CMNT-IMP: NORMAL
SERVICE CMNT-IMP: NORMAL

## 2020-01-15 PROCEDURE — 76040000007: Performed by: SURGERY

## 2020-01-15 PROCEDURE — 77030013992 HC SNR POLYP ENDOSC BSC -B: Performed by: SURGERY

## 2020-01-15 PROCEDURE — 88305 TISSUE EXAM BY PATHOLOGIST: CPT

## 2020-01-15 PROCEDURE — 74011250636 HC RX REV CODE- 250/636: Performed by: SURGERY

## 2020-01-15 PROCEDURE — 77030009426 HC FCPS BIOP ENDOSC BSC -B: Performed by: SURGERY

## 2020-01-15 PROCEDURE — 74011000250 HC RX REV CODE- 250: Performed by: NURSE ANESTHETIST, CERTIFIED REGISTERED

## 2020-01-15 PROCEDURE — 76060000032 HC ANESTHESIA 0.5 TO 1 HR: Performed by: SURGERY

## 2020-01-15 PROCEDURE — 74011250636 HC RX REV CODE- 250/636: Performed by: NURSE ANESTHETIST, CERTIFIED REGISTERED

## 2020-01-15 RX ORDER — DEXTROMETHORPHAN/PSEUDOEPHED 2.5-7.5/.8
1.2 DROPS ORAL
Status: DISCONTINUED | OUTPATIENT
Start: 2020-01-15 | End: 2020-01-15 | Stop reason: HOSPADM

## 2020-01-15 RX ORDER — SODIUM CHLORIDE 9 MG/ML
75 INJECTION, SOLUTION INTRAVENOUS CONTINUOUS
Status: DISCONTINUED | OUTPATIENT
Start: 2020-01-15 | End: 2020-01-15 | Stop reason: HOSPADM

## 2020-01-15 RX ORDER — ATROPINE SULFATE 0.1 MG/ML
0.5 INJECTION INTRAVENOUS
Status: DISCONTINUED | OUTPATIENT
Start: 2020-01-15 | End: 2020-01-15 | Stop reason: HOSPADM

## 2020-01-15 RX ORDER — EPINEPHRINE 0.1 MG/ML
1 INJECTION INTRACARDIAC; INTRAVENOUS
Status: DISCONTINUED | OUTPATIENT
Start: 2020-01-15 | End: 2020-01-15 | Stop reason: HOSPADM

## 2020-01-15 RX ORDER — NALOXONE HYDROCHLORIDE 0.4 MG/ML
0.4 INJECTION, SOLUTION INTRAMUSCULAR; INTRAVENOUS; SUBCUTANEOUS
Status: DISCONTINUED | OUTPATIENT
Start: 2020-01-15 | End: 2020-01-15 | Stop reason: HOSPADM

## 2020-01-15 RX ORDER — SODIUM CHLORIDE 0.9 % (FLUSH) 0.9 %
5-40 SYRINGE (ML) INJECTION EVERY 8 HOURS
Status: DISCONTINUED | OUTPATIENT
Start: 2020-01-15 | End: 2020-01-15 | Stop reason: HOSPADM

## 2020-01-15 RX ORDER — FLUMAZENIL 0.1 MG/ML
0.2 INJECTION INTRAVENOUS
Status: DISCONTINUED | OUTPATIENT
Start: 2020-01-15 | End: 2020-01-15 | Stop reason: HOSPADM

## 2020-01-15 RX ORDER — SODIUM CHLORIDE 0.9 % (FLUSH) 0.9 %
5-40 SYRINGE (ML) INJECTION AS NEEDED
Status: DISCONTINUED | OUTPATIENT
Start: 2020-01-15 | End: 2020-01-15 | Stop reason: HOSPADM

## 2020-01-15 RX ORDER — SODIUM CHLORIDE 9 MG/ML
50 INJECTION, SOLUTION INTRAVENOUS CONTINUOUS
Status: DISCONTINUED | OUTPATIENT
Start: 2020-01-15 | End: 2020-01-15 | Stop reason: HOSPADM

## 2020-01-15 RX ORDER — PROPOFOL 10 MG/ML
INJECTION, EMULSION INTRAVENOUS AS NEEDED
Status: DISCONTINUED | OUTPATIENT
Start: 2020-01-15 | End: 2020-01-15 | Stop reason: HOSPADM

## 2020-01-15 RX ORDER — LIDOCAINE HYDROCHLORIDE 20 MG/ML
INJECTION, SOLUTION EPIDURAL; INFILTRATION; INTRACAUDAL; PERINEURAL AS NEEDED
Status: DISCONTINUED | OUTPATIENT
Start: 2020-01-15 | End: 2020-01-15 | Stop reason: HOSPADM

## 2020-01-15 RX ADMIN — SODIUM CHLORIDE 75 ML/HR: 900 INJECTION, SOLUTION INTRAVENOUS at 07:13

## 2020-01-15 RX ADMIN — PROPOFOL 50 MG: 10 INJECTION, EMULSION INTRAVENOUS at 07:42

## 2020-01-15 RX ADMIN — PROPOFOL 50 MG: 10 INJECTION, EMULSION INTRAVENOUS at 07:45

## 2020-01-15 RX ADMIN — PROPOFOL 100 MG: 10 INJECTION, EMULSION INTRAVENOUS at 07:30

## 2020-01-15 RX ADMIN — PROPOFOL 50 MG: 10 INJECTION, EMULSION INTRAVENOUS at 07:39

## 2020-01-15 RX ADMIN — PROPOFOL 50 MG: 10 INJECTION, EMULSION INTRAVENOUS at 07:36

## 2020-01-15 RX ADMIN — PROPOFOL 50 MG: 10 INJECTION, EMULSION INTRAVENOUS at 07:33

## 2020-01-15 RX ADMIN — LIDOCAINE HYDROCHLORIDE 100 MG: 20 INJECTION, SOLUTION EPIDURAL; INFILTRATION; INTRACAUDAL; PERINEURAL at 07:29

## 2020-01-15 NOTE — DISCHARGE INSTRUCTIONS
Juan Miguel Bryson III  [id number]  1950    COLON DISCHARGE INSTRUCTIONS  Discomfort:  Redness at IV site- apply warm compress to area; if redness or soreness persist- contact your physician  There may be a slight amount of blood passed from the rectum  Gaseous discomfort- walking, belching will help relieve any discomfort  You may not operate a vehicle for 12 hours  You may not engage in an occupation involving machinery or appliances for rest of today  You may not drink alcoholic beverages for at least 12 hours  Avoid making any critical decisions for at least 24 hour  DIET:   Regular diet. - however -  remember your colon is empty and a heavy meal will produce gas. Avoid these foods:  vegetables, fried / greasy foods, carbonated drinks for today       ACTIVITY:  You may resume your normal daily activities it is recommended that you spend the remainder of the day resting -  avoid any strenuous activity. CALL M.D. ANY SIGN OF:   Increasing pain, nausea, vomiting  Abdominal distension (swelling)  New increased bleeding (oral or rectal)  Fever (chills)  Pain in chest area  Bloody discharge from nose or mouth  Shortness of breath     Follow-up Instructions:   Call Jaynie Apley, MD if any questions or problems. Telephone # [phone]  Biopsy results will be available in  7 to10 days  Should have a repeat colonoscopy in 3 years.     COLONOSCOPY FINDINGS:  Your colonoscopy showed: diverticulosis and 3 polyps

## 2020-01-15 NOTE — ANESTHESIA POSTPROCEDURE EVALUATION
Procedure(s):  COLONOSCOPY  ENDOSCOPIC POLYPECTOMY. MAC, total IV anesthesia    Anesthesia Post Evaluation        Patient location during evaluation: PACU  Note status: Adequate. Level of consciousness: responsive to verbal stimuli and sleepy but conscious  Pain management: satisfactory to patient  Airway patency: patent  Anesthetic complications: no  Cardiovascular status: acceptable  Respiratory status: acceptable  Hydration status: acceptable  Comments: +Post-Anesthesia Evaluation and Assessment    Patient: Marybel Drew MRN: [id number]  SSN: [id number]   YOB: 1950  Age: 81 y.o. Sex: male      Cardiovascular Function/Vital Signs    /86   Pulse 73   Temp 36.7 °C (98.1 °F)   Resp 16   Ht 5' 10\" (1.778 m)   Wt 111.2 kg (245 lb 1 oz)   SpO2 96%   BMI 35.16 kg/m²     Patient is status post Procedure(s):  COLONOSCOPY  ENDOSCOPIC POLYPECTOMY. Nausea/Vomiting: Controlled. Postoperative hydration reviewed and adequate. Pain:  Pain Scale 1: Numeric (0 - 10) (01/15/20 6247)  Pain Intensity 1: 0 (01/15/20 5045)   Managed. Neurological Status: At baseline. Mental Status and Level of Consciousness: Arousable. Pulmonary Status:   O2 Device: Room air (01/15/20 1423)   Adequate oxygenation and airway patent. Complications related to anesthesia: None    Post-anesthesia assessment completed. No concerns. Signed By: Migdalia Amado MD    1/15/2020  Post anesthesia nausea and vomiting:  controlled      Vitals Value Taken Time   /90 1/15/2020  8:27 AM   Temp 36.7 °C (98.1 °F) 1/15/2020  8:13 AM   Pulse 0 1/15/2020  8:28 AM   Resp 14 1/15/2020  8:28 AM   SpO2 98 % 1/15/2020  8:28 AM   Vitals shown include unvalidated device data.

## 2020-01-15 NOTE — PROCEDURES
Colonoscopy Procedure Note    Indications: Previous adenomatous polyp    Anesthesia/Sedation: MAC anesthesia Propofol    Pre-Procedure Exam:  Airway: clear   Heart: normal S1and S2    Lungs: clear bilateral  Abdomen: soft, nontender, bowel sounds present and normal in all quadrants   Mental Status: awake, alert, and oriented to person, place, and time      Procedure in Detail:  Informed consent was obtained for the procedure, including sedation. Risks of perforation, hemorrhage, adverse drug reaction, and aspiration were discussed. The patient was placed in the left lateral decubitus position. Based on the pre-procedure assessment, including review of the patient's medical history, medications, allergies, and review of systems, he had been deemed to be an appropriate candidate for moderate sedation; he was therefore sedated with the medications listed above. The patient was monitored continuously with ECG tracing, pulse oximetry, blood pressure monitoring, and direct observations. A rectal examination was performed. The [id number] was inserted into the rectum and advanced under direct vision to the cecum, which was identified by the appendiceal orifice. The quality of the colonic preparation was good. A careful inspection was made as the colonoscope was withdrawn, including a retroflexed view of the rectum; findings and interventions are described below. Appropriate photodocumentation was obtained. Findings:   Rectum:   Normal  Sigmoid:     - Diverticulosis  Descending Colon:     - Diverticulosis  Transverse Colon:     - 3 Small sessile polyps. 2 Removed and retrieved with hot biopsy forceps and one with hot snare   - Diverticulosis  Ascending Colon:     - Diverticulosis  Cecum:   Normal          Specimens: Specimens were collected and sent to pathology. EBL: None    Complications: None; patient tolerated the procedure well. Attending Attestation: I performed the procedure.     Recommendations: - Repeat colonoscopy in 3 years.

## 2020-01-15 NOTE — PERIOP NOTES
Anesthesia reports 350mg Propofol, 100mg Lidocaine and 500mL NS given during procedure. Received report from anesthesia staff on vital signs and status of patient. Endoscope was pre-cleaned at the bedside immediately following procedure by [facility]. post procedure Glasses returned to patient.

## 2020-01-15 NOTE — H&P
History and Physical    Patient: Son Mello MRN: [id number]  SSN: [id number]    YOB: 1950  Age: 81 y.o. Sex: male      Subjective:      Son Mello is a 81 y.o. male who presents for colonoscopy.      Past Medical History:   Diagnosis Date    Actinic keratosis 9/15/2017    Annual physical exam 9/15/2017    Arthritis     ASHD (arteriosclerotic heart disease) 9/15/2017    Asthma     At risk for sleep apnea 11/20/2019    STOP BANG 5    Guerrier's esophagus     CAD (coronary artery disease)     Chest pain 9/15/2017    Chronic back pain 9/15/2017    Chronic obstructive pulmonary disease (HCC)     Chronic pain     Cough 9/15/2017    Diabetes ([address]     hypoglycemia hx    Diabetes mellitus screening 9/15/2017    Diaphoresis 9/15/2017    SANDERS (dyspnea on exertion)     Dyslipidemia 9/15/2017    Edema 9/15/2017    Elevated LFTs 9/15/2017    Essential hypertension 6/7/2019    Fatigue 9/15/2017    GERD (gastroesophageal reflux disease)     Guerrier's esophagus    Hypercholesterolemia     Hyperplasia of prostate 9/15/2017    Obesity 9/15/2017    Other long term (current) drug therapy 9/15/2017    Sinusitis 9/15/2017    SK (seborrheic keratosis) 9/15/2017     Past Surgical History:   Procedure Laterality Date    COLONOSCOPY N/A 11/27/2019    FLEXIBLE SIGMOIDOSCOPY performed by Jennifer Mead MD at [facility] AMBULATORY [facility]    HX APPENDECTOMY      HX COLONOSCOPY      HX ENDOSCOPY      HX HEART CATHETERIZATION  ~2008    stent    HX KNEE REPLACEMENT Right 06/2019    partial knee replacement Dr. Esquivel Master      Family History   Problem Relation Age of Onset    Lung Disease Mother         COPD    Heart Disease Mother         pacemaker    Cancer Father         prostate/liver     Social History     Tobacco Use    Smoking status: Never Smoker    Smokeless tobacco: Former User   Substance Use Topics    Alcohol use: No     Alcohol/week: 0.0 standard drinks      Prior to Admission medications    Medication Sig Start Date End Date Taking? Authorizing Provider   COQ10, UBIQUINOL, PO Take 100 mg by mouth daily. Yes Provider, Historical   nabumetone (RELAFEN) 500 mg tablet TAKE 1 TABLET BY MOUTH TWICE DAILY 12/23/19  Yes Teodora Lemus MD   Cholestyramine-Aspartame (CHOLESTYRAMINE LIGHT) 4 gram powder Take 4 g by mouth every morning. Yes Provider, Historical   albuterol (VENTOLIN HFA) 90 mcg/actuation inhaler Take 2 Puffs by inhalation every four (4) hours as needed for Wheezing. Yes Provider, Historical   cephALEXin (KEFLEX) 500 mg capsule Take 1,000 mg by mouth once. Indications: 1 hour prior to dental procedure. s/p partial knee replacement   Yes Provider, Historical   dilTIAZem (TIAZAC) 360 mg SR capsule Take 360 mg by mouth every morning. 6/17/19  Yes Provider, Historical   levocetirizine (XYZAL) 5 mg tablet TAKE 1 TABLET BY MOUTH ONCE DAILY 7/19/19  Yes Teodora Lemus MD   omeprazole (PRILOSEC) 40 mg capsule Take 40 mg by mouth daily. Indications: gastroesophageal reflux disease 7/22/18  Yes Provider, Historical   ZETIA 10 mg tablet Take 1 Tab by mouth daily. 2/16/18  Yes Teodora Lemus MD   finasteride (PROSCAR) 5 mg tablet Take 5 mg by mouth every Monday, Wednesday, Friday. 3/25/16  Yes Provider, Historical   aspirin 81 mg chewable tablet Take 81 mg by mouth nightly. Provider, Historical   multivitamin (ONE A DAY) tablet Take 1 Tab by mouth daily.     Provider, Historical        Allergies   Allergen Reactions    Claritin-D 12 Hour [Loratadine-Pseudoephedrine] Shortness of Breath    Iodine Shortness of Breath    Levaquin [Levofloxacin] Hives    Seafood [Shellfish Containing Products] Shortness of Breath    Statins-Hmg-Coa Reductase Inhibitors Other (comments)     \"liver problems\"    Sulfa (Sulfonamide Antibiotics) Hives       Review of Systems:  A comprehensive review of systems was negative except for that written in the History of Present Illness. Objective:     Vitals:    01/15/20 0654 01/15/20 0707   BP:  135/87   Pulse:  78   Resp:  17   Temp:  98.4 °F (36.9 °C)   SpO2:  96%   Weight: 111.2 kg (245 lb 1 oz)    Height: 5' 10\" (1.778 m)         Physical Exam:  General:  Alert, cooperative, no distress, appears stated age. Eyes:  Conjunctivae/corneas clear. PERRL, EOMs intact. Fundi benign   Ears:  Normal TMs and external ear canals both ears. Nose: Nares normal. Septum midline. Mucosa normal. No drainage or sinus tenderness. Mouth/Throat: Lips, mucosa, and tongue normal. Teeth and gums normal.   Neck: Supple, symmetrical, trachea midline, no adenopathy, thyroid: no enlargment/tenderness/nodules, no carotid bruit and no JVD. Back:   Symmetric, no curvature. ROM normal. No CVA tenderness. Lungs:   Clear to auscultation bilaterally. Heart:  Regular rate and rhythm, S1, S2 normal, no murmur, click, rub or gallop. Abdomen:   Soft, non-tender. Bowel sounds normal. No masses,  No organomegaly. Extremities: Extremities normal, atraumatic, no cyanosis or edema. Pulses: 2+ and symmetric all extremities. Skin: Skin color, texture, turgor normal. No rashes or lesions   Lymph nodes: Cervical, supraclavicular, and axillary nodes normal.   Neurologic: CNII-XII intact. Normal strength, sensation and reflexes throughout.        Assessment:     Hospital Problems  Date Reviewed: 11/26/2019    None          Plan:     Colonoscopy    Signed By: Steven Enciso MD     January 15, 2020

## 2020-01-15 NOTE — ROUTINE PROCESS
Mady Osler Long III  1950  [id number]    Situation:  Verbal report received from: Anette Garcia  Procedure: Procedure(s):  COLONOSCOPY  ENDOSCOPIC POLYPECTOMY    Background:    Preoperative diagnosis: HISTORY OF POLYPS  Postoperative diagnosis: Diverticulosis, Colon Polyp    :  Dr. Lissett Baxter  Assistant(s): Endoscopy RN-1: Khai Cheatham RN  Endoscopy RN-2: Christain Eisenmenger, RN; Shea Tierney    Specimens:   ID Type Source Tests Collected by Time Destination   1 : Transverse Colon Polyp Biopsies Preservative Colon, Transverse  Ilir Murrieta MD 1/15/2020 4331 Pathology     H. Pylori  no    Assessment:  Intra-procedure medications     Anesthesia gave intra-procedure sedation and medications, see anesthesia flow sheet yes    Intravenous fluids: NS@ KVO     Vital signs stable     Abdominal assessment: round and soft     Recommendation:  Discharge patient per MD order.   Family or Friend   Permission to share finding with family or friend yes yes

## 2020-01-15 NOTE — ANESTHESIA PREPROCEDURE EVALUATION
Relevant Problems   No relevant active problems       Anesthetic History   No history of anesthetic complications            Review of Systems / Medical History  Patient summary reviewed, nursing notes reviewed and pertinent labs reviewed    Pulmonary    COPD        Asthma        Neuro/Psych   Within defined limits           Cardiovascular    Hypertension          CAD, cardiac stents and hyperlipidemia    Exercise tolerance: >4 METS  Comments: Saw Cardiologist last week, ECHO performed, pt told everything was okay for knee surgery next week. Pt denies problems with hrt or chest pain since stent placed in 2008   GI/Hepatic/Renal     GERD          Comments: H/O colon polyps Endo/Other        Obesity and arthritis  Pertinent negatives: No diabetes and morbid obesity   Other Findings   Comments: Actinic keratosis  Chronic Back Pain         Physical Exam    Airway  Mallampati: II  TM Distance: > 6 cm  Neck ROM: normal range of motion   Mouth opening: Normal     Cardiovascular  Regular rate and rhythm,  S1 and S2 normal,  no murmur, click, rub, or gallop             Dental    Dentition: Caps/crowns  Comments: Several missing teeth, none loose.    Pulmonary  Breath sounds clear to auscultation               Abdominal  GI exam deferred       Other Findings            Anesthetic Plan    ASA: 3  Anesthesia type: MAC and total IV anesthesia          Induction: Intravenous  Anesthetic plan and risks discussed with: Patient

## 2020-01-17 ENCOUNTER — OFFICE VISIT (OUTPATIENT)
Dept: INTERNAL MEDICINE CLINIC | Age: 70
End: 2020-01-17

## 2020-01-17 VITALS
BODY MASS INDEX: 36.16 KG/M2 | HEIGHT: 70 IN | HEART RATE: 68 BPM | TEMPERATURE: 98 F | RESPIRATION RATE: 20 BRPM | OXYGEN SATURATION: 97 % | SYSTOLIC BLOOD PRESSURE: 138 MMHG | WEIGHT: 252.6 LBS | DIASTOLIC BLOOD PRESSURE: 82 MMHG

## 2020-01-17 DIAGNOSIS — I10 ESSENTIAL HYPERTENSION: ICD-10-CM

## 2020-01-17 DIAGNOSIS — I25.10 ASHD (ARTERIOSCLEROTIC HEART DISEASE): ICD-10-CM

## 2020-01-17 DIAGNOSIS — R73.02 IMPAIRED GLUCOSE TOLERANCE: ICD-10-CM

## 2020-01-17 DIAGNOSIS — Z01.818 PREOP EXAMINATION: Primary | ICD-10-CM

## 2020-01-17 RX ORDER — AMOXICILLIN AND CLAVULANATE POTASSIUM 875; 125 MG/1; MG/1
1 TABLET, FILM COATED ORAL 2 TIMES DAILY
Qty: 14 TAB | Refills: 0 | Status: SHIPPED | OUTPATIENT
Start: 2020-01-17 | End: 2020-01-25

## 2020-01-17 NOTE — PROGRESS NOTES
Reviewed record in preparation for visit and have obtained necessary documentation. Identified pt with two pt identifiers(name and ). Chief Complaint   Patient presents with    Pre-op Exam     left knee replacement    Ear Pain        Coordination of Care Questionnaire:  :     1) Have you been to an emergency room, urgent care clinic since your last visit? No     Hospitalized since your last visit? No               2) Have you seen or consulted any other health care providers outside of [address] [place] [address] since your last visit?  Yes Dr Emely Monatño for colonoscopy 1/15/2020

## 2020-01-17 NOTE — PROGRESS NOTES
This note will not be viewable in [address] Rosa Mane is a 81 y.o. male and presents with Pre-op Exam (left knee replacement)  . Subjective:    Mr. Vaughn Paredes presents today for preoperative evaluation for left total knee replacement. Risk factors include hypertension, coronary disease. He has some mild asthma and allergic rhinitis. He has been seen by cardiology and had a follow-up EKG and echocardiogram and has been cleared for surgery. He has been seen by pulmonary. He does not complain of any current chest pain, palpitations, PND, orthopnea, or pedal edema. He does have risk factors for sleep apnea. Although he has had a recent colonoscopy as well as a right partial knee replacement within the past few months without any complications. Review of Systems  Constitutional:   Eyes:   negative for visual disturbance and irritation  ENT:   negative for tinnitus,sore throat,nasal congestion,ear pains. hoarseness  Respiratory:  negative for cough, hemoptysis, dyspnea,wheezing  CV:   negative for chest pain, palpitations, lower extremity edema  GI:   negative for nausea, vomiting, diarrhea, abdominal pain,melena  Endo:               negative for polyuria,polydipsia,polyphagia,heat intolerance  Genitourinary: negative for frequency, dysuria and hematuria  Integumentary: negative for rash and pruritus  Hematologic:  negative for easy bruising and gum/nose bleeding  Musculoskel: negative for myalgias, arthralgias, back pain, muscle weakness, joint pain  Neurological:  negative for headaches, dizziness, vertigo, memory problems and gait   Behavl/Psych: negative for feelings of anxiety, depression, mood changes    Past Medical History:   Diagnosis Date    Actinic keratosis 9/15/2017    Annual physical exam 9/15/2017    Arthritis     ASHD (arteriosclerotic heart disease) 9/15/2017    Asthma     At risk for sleep apnea 11/20/2019    STOP BANG 5    Guerrier's esophagus     CAD (coronary artery disease)     Chest pain 9/15/2017    Chronic back pain 9/15/2017    Chronic obstructive pulmonary disease (HCC)     Chronic pain     Cough 9/15/2017    Diabetes ([address]     hypoglycemia hx    Diabetes mellitus screening 9/15/2017    Diaphoresis 9/15/2017    SANDERS (dyspnea on exertion)     Dyslipidemia 9/15/2017    Edema 9/15/2017    Elevated LFTs 9/15/2017    Essential hypertension 6/7/2019    Fatigue 9/15/2017    GERD (gastroesophageal reflux disease)     Guerrier's esophagus    Hypercholesterolemia     Hyperplasia of prostate 9/15/2017    Impaired glucose tolerance 1/17/2020    Obesity 9/15/2017    Other long term (current) drug therapy 9/15/2017    Sinusitis 9/15/2017    SK (seborrheic keratosis) 9/15/2017     Past Surgical History:   Procedure Laterality Date    COLONOSCOPY N/A 11/27/2019    FLEXIBLE SIGMOIDOSCOPY performed [facility]in [facility] at [facility] AM[facility]ULAT[facility] OR    COLONOSCOPY N/A 1/15/2020    COLONOSCOPY performed by Gianna Guerin MD at [facility] ENDOSCOPY    HX APPENDECTOMY      HX COLONOSCOPY      HX ENDOSCOPY      HX HEART CATHETERIZATION  ~2008    stent    HX KNEE REPLACEMENT Right 06/2019    partial knee replacement Dr. Powell Spore History     Socioeconomic History    Marital status:      Spouse name: Not on file    Number of children: Not on file    Years of education: Not on file    Highest education level: Not on file   Tobacco Use    Smoking status: Never Smoker    Smokeless tobacco: Former User   Substance and Sexual Activity    Alcohol use: No     Alcohol/week: 0.0 standard drinks    Drug use: No     Family History   Problem Relation Age of Onset    Lung Disease Mother         COPD    Heart Disease Mother         pacemaker    Cancer Father         prostate/liver     Current Outpatient Medications   Medication Sig Dispense Refill    COQ10, UBIQUINOL, PO Take 100 mg by mouth daily.       nabumetone (RELAFEN) 500 mg tablet TAKE 1 TABLET BY MOUTH TWICE DAILY 180 Tab 0    Cholestyramine-Aspartame (CHOLESTYRAMINE LIGHT) 4 gram powder Take 4 g by mouth every morning.  albuterol (VENTOLIN HFA) 90 mcg/actuation inhaler Take 2 Puffs by inhalation every four (4) hours as needed for Wheezing.  cephALEXin (KEFLEX) 500 mg capsule Take 1,000 mg by mouth once. Indications: 1 hour prior to dental procedure. s/p partial knee replacement      dilTIAZem (TIAZAC) 360 mg SR capsule Take 360 mg by mouth every morning. 2    levocetirizine (XYZAL) 5 mg tablet TAKE 1 TABLET BY MOUTH ONCE DAILY 90 Tab 5    omeprazole (PRILOSEC) 40 mg capsule Take 40 mg by mouth daily. Indications: gastroesophageal reflux disease  1    ZETIA 10 mg tablet Take 1 Tab by mouth daily. 90 Tab 3    finasteride (PROSCAR) 5 mg tablet Take 5 mg by mouth every Monday, Wednesday, Friday. 0    aspirin 81 mg chewable tablet Take 81 mg by mouth nightly.  multivitamin (ONE A DAY) tablet Take 1 Tab by mouth daily.        Allergies   Allergen Reactions    Claritin-D 12 Hour [Loratadine-Pseudoephedrine] Shortness of Breath    Iodine Shortness of Breath    Levaquin [Levofloxacin] Hives    Seafood [Shellfish Containing Products] Shortness of Breath    Statins-Hmg-Coa Reductase Inhibitors Other (comments)     \"liver problems\"    Sulfa (Sulfonamide Antibiotics) Hives       Objective:  Visit Vitals  /82 (BP 1 Location: Left arm, BP Patient Position: Sitting)   Pulse 68   Temp 98 °F (36.7 °C) (Oral)   Resp 20   Ht 5' 10\" (1.778 m)   Wt 252 lb 9.6 oz (114.6 kg)   SpO2 97%   BMI 36.24 kg/m²     Physical Exam:   General appearance - alert, well appearing, and in no distress  Mental status - alert, oriented to person, place, and time  EYE-KAI, EOMI, fundi normal, corneas normal, no foreign bodies  ENT-ENT exam normal, no neck nodes or sinus tenderness  Nose - normal and patent, no erythema, discharge or polyps  Mouth - mucous membranes moist, pharynx normal without lesions  Neck - supple, no significant adenopathy   Chest - clear to auscultation, no wheezes, rales or rhonchi, symmetric air entry   Heart - normal rate, regular rhythm, normal S1, S2, no murmurs, rubs, clicks or gallops   Abdomen - soft, nontender, nondistended, no masses or organomegaly  Lymph- no adenopathy palpable  Ext-peripheral pulses normal, no pedal edema, no clubbing or cyanosis  Skin-Warm and dry. no hyperpigmentation, vitiligo, or suspicious lesions  Neuro -alert, oriented, normal speech, no focal findings or movement disorder noted  Musculoskeletal- FROM, no bony abnormalities, no point tenderness    No results found for this visit on 01/17/20. All results for lab orders may not have been returned by the time this encountered was closed. Assessment/Plan:       ICD-10-CM ICD-9-CM    1. Preop examination Z01.818 V72.84    2. ASHD (arteriosclerotic heart disease) I25.10 414.00    3. Essential hypertension I10 401.9    4. Impaired glucose tolerance R73.02 790.22        No orders of the defined types were placed in this encounter. Plan:    The patient is medically stable for his planned surgical procedure. No further re-stratification is indicated at this time. Patient does have a previous history of coronary disease as well as asthma. He has risk factors for sleep apnea and should be evaluated further on follow-up with pulmonology. This should not preclude him from having his surgical procedure at this time. I have reviewed with the patient details of the assessment and plan and all questions were answered. Relevent patient education was performed. Verbal and/or written instructions (see AVS) provided. The most recent lab findings were reviewed with the patient. Plan was discussed with patient who verbal expressed understanding. An After Visit Summary was printed and given to the patient.       Raphael Sever, MD

## 2020-01-20 ENCOUNTER — DOCUMENTATION ONLY (OUTPATIENT)
Dept: INTERNAL MEDICINE CLINIC | Age: 70
End: 2020-01-20

## 2020-01-22 ENCOUNTER — ANESTHESIA EVENT (OUTPATIENT)
Dept: SURGERY | Age: 70
DRG: 470 | End: 2020-01-22
Payer: MEDICARE

## 2020-01-23 ENCOUNTER — HOSPITAL ENCOUNTER (INPATIENT)
Age: 70
LOS: 2 days | Discharge: HOME HEALTH | DRG: 470 | End: 2020-01-25
Attending: ORTHOPAEDIC SURGERY | Admitting: ORTHOPAEDIC SURGERY
Payer: MEDICARE

## 2020-01-23 ENCOUNTER — ANESTHESIA (OUTPATIENT)
Dept: SURGERY | Age: 70
DRG: 470 | End: 2020-01-23
Payer: MEDICARE

## 2020-01-23 DIAGNOSIS — Z96.652 STATUS POST TOTAL LEFT KNEE REPLACEMENT: Primary | ICD-10-CM

## 2020-01-23 PROBLEM — Z96.659 STATUS POST TOTAL KNEE REPLACEMENT: Status: ACTIVE | Noted: 2020-01-23

## 2020-01-23 LAB
GLUCOSE BLD STRIP.AUTO-MCNC: 124 MG/DL (ref 65–100)
GLUCOSE BLD STRIP.AUTO-MCNC: 129 MG/DL (ref 65–100)
GLUCOSE BLD STRIP.AUTO-MCNC: 129 MG/DL (ref 65–100)
GLUCOSE BLD STRIP.AUTO-MCNC: 144 MG/DL (ref 65–100)
GLUCOSE BLD STRIP.AUTO-MCNC: 75 MG/DL (ref 65–100)
SERVICE CMNT-IMP: ABNORMAL
SERVICE CMNT-IMP: NORMAL

## 2020-01-23 PROCEDURE — 77030011640 HC PAD GRND REM COVD -A: Performed by: ORTHOPAEDIC SURGERY

## 2020-01-23 PROCEDURE — 77030033067 HC SUT PDO STRATFX SPIR J&J -B: Performed by: ORTHOPAEDIC SURGERY

## 2020-01-23 PROCEDURE — 8E0YXBF COMPUTER ASSISTED PROCEDURE OF LOWER EXTREMITY, WITH FLUOROSCOPY: ICD-10-PCS | Performed by: ORTHOPAEDIC SURGERY

## 2020-01-23 PROCEDURE — 97116 GAIT TRAINING THERAPY: CPT

## 2020-01-23 PROCEDURE — 77030013708 HC HNDPC SUC IRR PULS STRY –B: Performed by: ORTHOPAEDIC SURGERY

## 2020-01-23 PROCEDURE — 77030006835 HC BLD SAW SAG STRY -B: Performed by: ORTHOPAEDIC SURGERY

## 2020-01-23 PROCEDURE — 77030008684 HC TU ET CUF COVD -B: Performed by: ANESTHESIOLOGY

## 2020-01-23 PROCEDURE — 74011000250 HC RX REV CODE- 250: Performed by: PHYSICIAN ASSISTANT

## 2020-01-23 PROCEDURE — 74011250636 HC RX REV CODE- 250/636: Performed by: NURSE ANESTHETIST, CERTIFIED REGISTERED

## 2020-01-23 PROCEDURE — 77030022704 HC SUT VLOC COVD -B: Performed by: ORTHOPAEDIC SURGERY

## 2020-01-23 PROCEDURE — 74011250636 HC RX REV CODE- 250/636: Performed by: ORTHOPAEDIC SURGERY

## 2020-01-23 PROCEDURE — 77030036638 HC ACC KT GPS KNE V2 EXAC -D: Performed by: ORTHOPAEDIC SURGERY

## 2020-01-23 PROCEDURE — 77030040361 HC SLV COMPR DVT MDII -B: Performed by: ORTHOPAEDIC SURGERY

## 2020-01-23 PROCEDURE — 77030028907 HC WRP KNEE WO BGS SOLM -B

## 2020-01-23 PROCEDURE — 76010000161 HC OR TIME 1 TO 1.5 HR INTENSV-TIER 1: Performed by: ORTHOPAEDIC SURGERY

## 2020-01-23 PROCEDURE — 74011000250 HC RX REV CODE- 250: Performed by: NURSE ANESTHETIST, CERTIFIED REGISTERED

## 2020-01-23 PROCEDURE — 77030036722: Performed by: ORTHOPAEDIC SURGERY

## 2020-01-23 PROCEDURE — C1713 ANCHOR/SCREW BN/BN,TIS/BN: HCPCS | Performed by: ORTHOPAEDIC SURGERY

## 2020-01-23 PROCEDURE — 74011250636 HC RX REV CODE- 250/636: Performed by: ANESTHESIOLOGY

## 2020-01-23 PROCEDURE — 77030019707 HC TBNG LAVG CRBJT KINM -C: Performed by: ORTHOPAEDIC SURGERY

## 2020-01-23 PROCEDURE — 94762 N-INVAS EAR/PLS OXIMTRY CONT: CPT

## 2020-01-23 PROCEDURE — 77030031139 HC SUT VCRL2 J&J -A: Performed by: ORTHOPAEDIC SURGERY

## 2020-01-23 PROCEDURE — 74011250637 HC RX REV CODE- 250/637: Performed by: PHYSICIAN ASSISTANT

## 2020-01-23 PROCEDURE — 77030013079 HC BLNKT BAIR HGGR 3M -A: Performed by: ANESTHESIOLOGY

## 2020-01-23 PROCEDURE — 97162 PT EVAL MOD COMPLEX 30 MIN: CPT

## 2020-01-23 PROCEDURE — 74011000250 HC RX REV CODE- 250: Performed by: ORTHOPAEDIC SURGERY

## 2020-01-23 PROCEDURE — 0SRD0JA REPLACEMENT OF LEFT KNEE JOINT WITH SYNTHETIC SUBSTITUTE, UNCEMENTED, OPEN APPROACH: ICD-10-PCS | Performed by: ORTHOPAEDIC SURGERY

## 2020-01-23 PROCEDURE — 65270000029 HC RM PRIVATE

## 2020-01-23 PROCEDURE — 77030039267 HC ADH SKN EXOFIN S2SG -B: Performed by: ORTHOPAEDIC SURGERY

## 2020-01-23 PROCEDURE — 77030040922 HC BLNKT HYPOTHRM STRY -A

## 2020-01-23 PROCEDURE — 77030021668 HC NEB PREFIL KT VYRM -A

## 2020-01-23 PROCEDURE — 77030033138 HC SUT PGA STRATFX J&J -B: Performed by: ORTHOPAEDIC SURGERY

## 2020-01-23 PROCEDURE — 77030018836 HC SOL IRR NACL ICUM -A: Performed by: ORTHOPAEDIC SURGERY

## 2020-01-23 PROCEDURE — 74011000258 HC RX REV CODE- 258: Performed by: NURSE ANESTHETIST, CERTIFIED REGISTERED

## 2020-01-23 PROCEDURE — C1776 JOINT DEVICE (IMPLANTABLE): HCPCS | Performed by: ORTHOPAEDIC SURGERY

## 2020-01-23 PROCEDURE — 76210000016 HC OR PH I REC 1 TO 1.5 HR: Performed by: ORTHOPAEDIC SURGERY

## 2020-01-23 PROCEDURE — 77030000032 HC CUF TRNQT ZIMM -B: Performed by: ORTHOPAEDIC SURGERY

## 2020-01-23 PROCEDURE — 76060000033 HC ANESTHESIA 1 TO 1.5 HR: Performed by: ORTHOPAEDIC SURGERY

## 2020-01-23 PROCEDURE — 74011250636 HC RX REV CODE- 250/636

## 2020-01-23 PROCEDURE — 74011250637 HC RX REV CODE- 250/637: Performed by: ORTHOPAEDIC SURGERY

## 2020-01-23 PROCEDURE — 77030010785: Performed by: ORTHOPAEDIC SURGERY

## 2020-01-23 PROCEDURE — 77010033678 HC OXYGEN DAILY

## 2020-01-23 PROCEDURE — 74011250636 HC RX REV CODE- 250/636: Performed by: PHYSICIAN ASSISTANT

## 2020-01-23 PROCEDURE — 82962 GLUCOSE BLOOD TEST: CPT

## 2020-01-23 PROCEDURE — 77030018846 HC SOL IRR STRL H20 ICUM -A: Performed by: ORTHOPAEDIC SURGERY

## 2020-01-23 PROCEDURE — 51798 US URINE CAPACITY MEASURE: CPT

## 2020-01-23 DEVICE — COMPONENT KNEE CEM POLYETH PREMIER: Type: IMPLANTABLE DEVICE | Status: FUNCTIONAL

## 2020-01-23 DEVICE — IMPLANTABLE DEVICE
Type: IMPLANTABLE DEVICE | Site: KNEE | Status: FUNCTIONAL
Brand: TRULIANT

## 2020-01-23 DEVICE — CEMENT BNE BIOMET HV R1X40GM --: Type: IMPLANTABLE DEVICE | Site: KNEE | Status: FUNCTIONAL

## 2020-01-23 DEVICE — IMPLANTABLE DEVICE
Type: IMPLANTABLE DEVICE | Site: KNEE | Status: FUNCTIONAL
Brand: OPTETRAK LOGIC

## 2020-01-23 RX ORDER — EPHEDRINE SULFATE/0.9% NACL/PF 50 MG/5 ML
SYRINGE (ML) INTRAVENOUS AS NEEDED
Status: DISCONTINUED | OUTPATIENT
Start: 2020-01-23 | End: 2020-01-23 | Stop reason: HOSPADM

## 2020-01-23 RX ORDER — NALOXONE HYDROCHLORIDE 0.4 MG/ML
0.4 INJECTION, SOLUTION INTRAMUSCULAR; INTRAVENOUS; SUBCUTANEOUS AS NEEDED
Status: DISCONTINUED | OUTPATIENT
Start: 2020-01-23 | End: 2020-01-25 | Stop reason: HOSPADM

## 2020-01-23 RX ORDER — ALBUTEROL SULFATE 90 UG/1
2 AEROSOL, METERED RESPIRATORY (INHALATION)
Status: DISCONTINUED | OUTPATIENT
Start: 2020-01-23 | End: 2020-01-23

## 2020-01-23 RX ORDER — OXYCODONE HYDROCHLORIDE 5 MG/1
5-10 TABLET ORAL
Qty: 60 TAB | Refills: 0 | Status: SHIPPED | OUTPATIENT
Start: 2020-01-23 | End: 2020-02-06

## 2020-01-23 RX ORDER — FINASTERIDE 5 MG/1
5 TABLET, FILM COATED ORAL
Status: DISCONTINUED | OUTPATIENT
Start: 2020-01-24 | End: 2020-01-25 | Stop reason: HOSPADM

## 2020-01-23 RX ORDER — DEXAMETHASONE SODIUM PHOSPHATE 4 MG/ML
10 INJECTION, SOLUTION INTRA-ARTICULAR; INTRALESIONAL; INTRAMUSCULAR; INTRAVENOUS; SOFT TISSUE ONCE
Status: COMPLETED | OUTPATIENT
Start: 2020-01-24 | End: 2020-01-24

## 2020-01-23 RX ORDER — LIDOCAINE HYDROCHLORIDE 20 MG/ML
INJECTION, SOLUTION EPIDURAL; INFILTRATION; INTRACAUDAL; PERINEURAL AS NEEDED
Status: DISCONTINUED | OUTPATIENT
Start: 2020-01-23 | End: 2020-01-23 | Stop reason: HOSPADM

## 2020-01-23 RX ORDER — SODIUM CHLORIDE 0.9 % (FLUSH) 0.9 %
5-40 SYRINGE (ML) INJECTION AS NEEDED
Status: DISCONTINUED | OUTPATIENT
Start: 2020-01-23 | End: 2020-01-25 | Stop reason: HOSPADM

## 2020-01-23 RX ORDER — DIPHENHYDRAMINE HCL 12.5MG/5ML
12.5 LIQUID (ML) ORAL
Status: DISCONTINUED | OUTPATIENT
Start: 2020-01-23 | End: 2020-01-25 | Stop reason: HOSPADM

## 2020-01-23 RX ORDER — ONDANSETRON 4 MG/1
4 TABLET, ORALLY DISINTEGRATING ORAL
Status: DISCONTINUED | OUTPATIENT
Start: 2020-01-25 | End: 2020-01-25 | Stop reason: HOSPADM

## 2020-01-23 RX ORDER — GUAIFENESIN 100 MG/5ML
81 LIQUID (ML) ORAL 2 TIMES DAILY
Qty: 60 TAB | Refills: 0 | Status: SHIPPED | OUTPATIENT
Start: 2020-01-23 | End: 2020-02-22

## 2020-01-23 RX ORDER — MORPHINE SULFATE 10 MG/ML
2 INJECTION, SOLUTION INTRAMUSCULAR; INTRAVENOUS
Status: DISCONTINUED | OUTPATIENT
Start: 2020-01-23 | End: 2020-01-23 | Stop reason: HOSPADM

## 2020-01-23 RX ORDER — SODIUM CHLORIDE, SODIUM LACTATE, POTASSIUM CHLORIDE, CALCIUM CHLORIDE 600; 310; 30; 20 MG/100ML; MG/100ML; MG/100ML; MG/100ML
25 INJECTION, SOLUTION INTRAVENOUS CONTINUOUS
Status: DISCONTINUED | OUTPATIENT
Start: 2020-01-23 | End: 2020-01-23 | Stop reason: HOSPADM

## 2020-01-23 RX ORDER — SODIUM CHLORIDE 0.9 % (FLUSH) 0.9 %
5-40 SYRINGE (ML) INJECTION EVERY 8 HOURS
Status: DISCONTINUED | OUTPATIENT
Start: 2020-01-23 | End: 2020-01-23 | Stop reason: HOSPADM

## 2020-01-23 RX ORDER — MIDAZOLAM HYDROCHLORIDE 1 MG/ML
0.5 INJECTION, SOLUTION INTRAMUSCULAR; INTRAVENOUS
Status: DISCONTINUED | OUTPATIENT
Start: 2020-01-23 | End: 2020-01-23 | Stop reason: HOSPADM

## 2020-01-23 RX ORDER — CELECOXIB 200 MG/1
400 CAPSULE ORAL ONCE
Status: COMPLETED | OUTPATIENT
Start: 2020-01-23 | End: 2020-01-23

## 2020-01-23 RX ORDER — ACETAMINOPHEN 325 MG/1
650 TABLET ORAL ONCE
Status: DISCONTINUED | OUTPATIENT
Start: 2020-01-23 | End: 2020-01-23 | Stop reason: HOSPADM

## 2020-01-23 RX ORDER — DEXAMETHASONE SODIUM PHOSPHATE 4 MG/ML
INJECTION, SOLUTION INTRA-ARTICULAR; INTRALESIONAL; INTRAMUSCULAR; INTRAVENOUS; SOFT TISSUE AS NEEDED
Status: DISCONTINUED | OUTPATIENT
Start: 2020-01-23 | End: 2020-01-23 | Stop reason: HOSPADM

## 2020-01-23 RX ORDER — ROPIVACAINE HYDROCHLORIDE 5 MG/ML
INJECTION, SOLUTION EPIDURAL; INFILTRATION; PERINEURAL
Status: COMPLETED | OUTPATIENT
Start: 2020-01-23 | End: 2020-01-23

## 2020-01-23 RX ORDER — SODIUM CHLORIDE, SODIUM LACTATE, POTASSIUM CHLORIDE, CALCIUM CHLORIDE 600; 310; 30; 20 MG/100ML; MG/100ML; MG/100ML; MG/100ML
100 INJECTION, SOLUTION INTRAVENOUS CONTINUOUS
Status: DISCONTINUED | OUTPATIENT
Start: 2020-01-23 | End: 2020-01-23 | Stop reason: HOSPADM

## 2020-01-23 RX ORDER — ONDANSETRON 2 MG/ML
4 INJECTION INTRAMUSCULAR; INTRAVENOUS AS NEEDED
Status: DISCONTINUED | OUTPATIENT
Start: 2020-01-23 | End: 2020-01-23 | Stop reason: HOSPADM

## 2020-01-23 RX ORDER — FACIAL-BODY WIPES
10 EACH TOPICAL DAILY PRN
Status: DISCONTINUED | OUTPATIENT
Start: 2020-01-25 | End: 2020-01-25 | Stop reason: HOSPADM

## 2020-01-23 RX ORDER — TRANEXAMIC ACID 100 MG/ML
INJECTION, SOLUTION INTRAVENOUS
Status: DISPENSED
Start: 2020-01-23 | End: 2020-01-23

## 2020-01-23 RX ORDER — KETOROLAC TROMETHAMINE 30 MG/ML
15 INJECTION, SOLUTION INTRAMUSCULAR; INTRAVENOUS EVERY 6 HOURS
Status: COMPLETED | OUTPATIENT
Start: 2020-01-23 | End: 2020-01-24

## 2020-01-23 RX ORDER — ROCURONIUM BROMIDE 10 MG/ML
INJECTION, SOLUTION INTRAVENOUS AS NEEDED
Status: DISCONTINUED | OUTPATIENT
Start: 2020-01-23 | End: 2020-01-23 | Stop reason: HOSPADM

## 2020-01-23 RX ORDER — MIDAZOLAM HYDROCHLORIDE 1 MG/ML
1 INJECTION, SOLUTION INTRAMUSCULAR; INTRAVENOUS AS NEEDED
Status: DISCONTINUED | OUTPATIENT
Start: 2020-01-23 | End: 2020-01-23 | Stop reason: HOSPADM

## 2020-01-23 RX ORDER — ONDANSETRON 2 MG/ML
4 INJECTION INTRAMUSCULAR; INTRAVENOUS
Status: DISPENSED | OUTPATIENT
Start: 2020-01-23 | End: 2020-01-24

## 2020-01-23 RX ORDER — ASPIRIN 81 MG/1
81 TABLET ORAL 2 TIMES DAILY
Status: DISCONTINUED | OUTPATIENT
Start: 2020-01-23 | End: 2020-01-25 | Stop reason: HOSPADM

## 2020-01-23 RX ORDER — POLYETHYLENE GLYCOL 3350 17 G/17G
17 POWDER, FOR SOLUTION ORAL DAILY
Qty: 14 PACKET | Refills: 0 | Status: SHIPPED | OUTPATIENT
Start: 2020-01-24 | End: 2020-02-07

## 2020-01-23 RX ORDER — MORPHINE SULFATE 2 MG/ML
2 INJECTION, SOLUTION INTRAMUSCULAR; INTRAVENOUS
Status: ACTIVE | OUTPATIENT
Start: 2020-01-23 | End: 2020-01-24

## 2020-01-23 RX ORDER — ACETAMINOPHEN 325 MG/1
650 TABLET ORAL EVERY 6 HOURS
Status: DISCONTINUED | OUTPATIENT
Start: 2020-01-23 | End: 2020-01-25 | Stop reason: HOSPADM

## 2020-01-23 RX ORDER — ROPIVACAINE HYDROCHLORIDE 5 MG/ML
30 INJECTION, SOLUTION EPIDURAL; INFILTRATION; PERINEURAL AS NEEDED
Status: DISCONTINUED | OUTPATIENT
Start: 2020-01-23 | End: 2020-01-23 | Stop reason: HOSPADM

## 2020-01-23 RX ORDER — SODIUM CHLORIDE 9 MG/ML
25 INJECTION, SOLUTION INTRAVENOUS CONTINUOUS
Status: DISCONTINUED | OUTPATIENT
Start: 2020-01-23 | End: 2020-01-23 | Stop reason: HOSPADM

## 2020-01-23 RX ORDER — SODIUM CHLORIDE 9 MG/ML
INJECTION, SOLUTION INTRAVENOUS
Status: COMPLETED
Start: 2020-01-23 | End: 2020-01-23

## 2020-01-23 RX ORDER — ALUMINA, MAGNESIA, AND SIMETHICONE 2400; 2400; 240 MG/30ML; MG/30ML; MG/30ML
30 SUSPENSION ORAL
Status: DISCONTINUED | OUTPATIENT
Start: 2020-01-23 | End: 2020-01-25 | Stop reason: HOSPADM

## 2020-01-23 RX ORDER — SODIUM CHLORIDE 9 MG/ML
125 INJECTION, SOLUTION INTRAVENOUS CONTINUOUS
Status: DISPENSED | OUTPATIENT
Start: 2020-01-23 | End: 2020-01-24

## 2020-01-23 RX ORDER — HYDROMORPHONE HYDROCHLORIDE 1 MG/ML
0.5 INJECTION, SOLUTION INTRAMUSCULAR; INTRAVENOUS; SUBCUTANEOUS
Status: DISCONTINUED | OUTPATIENT
Start: 2020-01-23 | End: 2020-01-23 | Stop reason: HOSPADM

## 2020-01-23 RX ORDER — SODIUM CHLORIDE 0.9 % (FLUSH) 0.9 %
5-40 SYRINGE (ML) INJECTION AS NEEDED
Status: DISCONTINUED | OUTPATIENT
Start: 2020-01-23 | End: 2020-01-23 | Stop reason: HOSPADM

## 2020-01-23 RX ORDER — OXYCODONE HYDROCHLORIDE 5 MG/1
5 TABLET ORAL
Status: DISCONTINUED | OUTPATIENT
Start: 2020-01-23 | End: 2020-01-25 | Stop reason: HOSPADM

## 2020-01-23 RX ORDER — PROPOFOL 10 MG/ML
INJECTION, EMULSION INTRAVENOUS AS NEEDED
Status: DISCONTINUED | OUTPATIENT
Start: 2020-01-23 | End: 2020-01-23 | Stop reason: HOSPADM

## 2020-01-23 RX ORDER — AMOXICILLIN 250 MG
1 CAPSULE ORAL 2 TIMES DAILY
Status: DISCONTINUED | OUTPATIENT
Start: 2020-01-23 | End: 2020-01-25 | Stop reason: HOSPADM

## 2020-01-23 RX ORDER — AMOXICILLIN 250 MG
1 CAPSULE ORAL 2 TIMES DAILY
Qty: 28 TAB | Refills: 0 | Status: SHIPPED | OUTPATIENT
Start: 2020-01-23 | End: 2020-02-06

## 2020-01-23 RX ORDER — POLYETHYLENE GLYCOL 3350 17 G/17G
17 POWDER, FOR SOLUTION ORAL DAILY
Status: DISCONTINUED | OUTPATIENT
Start: 2020-01-23 | End: 2020-01-25 | Stop reason: HOSPADM

## 2020-01-23 RX ORDER — SODIUM CHLORIDE 0.9 % (FLUSH) 0.9 %
5-40 SYRINGE (ML) INJECTION EVERY 8 HOURS
Status: DISCONTINUED | OUTPATIENT
Start: 2020-01-23 | End: 2020-01-25 | Stop reason: HOSPADM

## 2020-01-23 RX ORDER — ONDANSETRON 2 MG/ML
INJECTION INTRAMUSCULAR; INTRAVENOUS AS NEEDED
Status: DISCONTINUED | OUTPATIENT
Start: 2020-01-23 | End: 2020-01-23 | Stop reason: HOSPADM

## 2020-01-23 RX ORDER — SUCCINYLCHOLINE CHLORIDE 20 MG/ML
INJECTION INTRAMUSCULAR; INTRAVENOUS AS NEEDED
Status: DISCONTINUED | OUTPATIENT
Start: 2020-01-23 | End: 2020-01-23 | Stop reason: HOSPADM

## 2020-01-23 RX ORDER — LIDOCAINE HYDROCHLORIDE 10 MG/ML
0.1 INJECTION, SOLUTION EPIDURAL; INFILTRATION; INTRACAUDAL; PERINEURAL AS NEEDED
Status: DISCONTINUED | OUTPATIENT
Start: 2020-01-23 | End: 2020-01-23 | Stop reason: HOSPADM

## 2020-01-23 RX ORDER — OXYCODONE HYDROCHLORIDE 5 MG/1
10 TABLET ORAL
Status: DISCONTINUED | OUTPATIENT
Start: 2020-01-23 | End: 2020-01-25 | Stop reason: HOSPADM

## 2020-01-23 RX ORDER — ACETAMINOPHEN 500 MG
1000 TABLET ORAL ONCE
Status: COMPLETED | OUTPATIENT
Start: 2020-01-23 | End: 2020-01-23

## 2020-01-23 RX ORDER — PREGABALIN 75 MG/1
150 CAPSULE ORAL ONCE
Status: COMPLETED | OUTPATIENT
Start: 2020-01-23 | End: 2020-01-23

## 2020-01-23 RX ORDER — FENTANYL CITRATE 50 UG/ML
INJECTION, SOLUTION INTRAMUSCULAR; INTRAVENOUS AS NEEDED
Status: DISCONTINUED | OUTPATIENT
Start: 2020-01-23 | End: 2020-01-23 | Stop reason: HOSPADM

## 2020-01-23 RX ORDER — NEOSTIGMINE METHYLSULFATE 1 MG/ML
INJECTION INTRAVENOUS AS NEEDED
Status: DISCONTINUED | OUTPATIENT
Start: 2020-01-23 | End: 2020-01-23 | Stop reason: HOSPADM

## 2020-01-23 RX ORDER — DIPHENHYDRAMINE HYDROCHLORIDE 50 MG/ML
12.5 INJECTION, SOLUTION INTRAMUSCULAR; INTRAVENOUS AS NEEDED
Status: DISCONTINUED | OUTPATIENT
Start: 2020-01-23 | End: 2020-01-23 | Stop reason: HOSPADM

## 2020-01-23 RX ORDER — HYDROMORPHONE HYDROCHLORIDE 2 MG/ML
INJECTION, SOLUTION INTRAMUSCULAR; INTRAVENOUS; SUBCUTANEOUS AS NEEDED
Status: DISCONTINUED | OUTPATIENT
Start: 2020-01-23 | End: 2020-01-23 | Stop reason: HOSPADM

## 2020-01-23 RX ORDER — GLYCOPYRROLATE 0.2 MG/ML
INJECTION INTRAMUSCULAR; INTRAVENOUS AS NEEDED
Status: DISCONTINUED | OUTPATIENT
Start: 2020-01-23 | End: 2020-01-23 | Stop reason: HOSPADM

## 2020-01-23 RX ORDER — FENTANYL CITRATE 50 UG/ML
50 INJECTION, SOLUTION INTRAMUSCULAR; INTRAVENOUS AS NEEDED
Status: DISCONTINUED | OUTPATIENT
Start: 2020-01-23 | End: 2020-01-23 | Stop reason: HOSPADM

## 2020-01-23 RX ORDER — FENTANYL CITRATE 50 UG/ML
25 INJECTION, SOLUTION INTRAMUSCULAR; INTRAVENOUS
Status: DISCONTINUED | OUTPATIENT
Start: 2020-01-23 | End: 2020-01-23 | Stop reason: HOSPADM

## 2020-01-23 RX ORDER — ACETAMINOPHEN 325 MG/1
650 TABLET ORAL EVERY 6 HOURS
Qty: 112 TAB | Refills: 0 | Status: SHIPPED | OUTPATIENT
Start: 2020-01-23 | End: 2020-02-06

## 2020-01-23 RX ORDER — ROPIVACAINE HYDROCHLORIDE 5 MG/ML
INJECTION, SOLUTION EPIDURAL; INFILTRATION; PERINEURAL AS NEEDED
Status: DISCONTINUED | OUTPATIENT
Start: 2020-01-23 | End: 2020-01-23 | Stop reason: HOSPADM

## 2020-01-23 RX ORDER — ALBUTEROL SULFATE 0.83 MG/ML
2.5 SOLUTION RESPIRATORY (INHALATION)
Status: DISCONTINUED | OUTPATIENT
Start: 2020-01-23 | End: 2020-01-25 | Stop reason: HOSPADM

## 2020-01-23 RX ORDER — PANTOPRAZOLE SODIUM 40 MG/1
40 TABLET, DELAYED RELEASE ORAL
Status: DISCONTINUED | OUTPATIENT
Start: 2020-01-24 | End: 2020-01-25 | Stop reason: HOSPADM

## 2020-01-23 RX ORDER — EZETIMIBE 10 MG/1
10 TABLET ORAL DAILY
Status: DISCONTINUED | OUTPATIENT
Start: 2020-01-23 | End: 2020-01-25 | Stop reason: HOSPADM

## 2020-01-23 RX ORDER — TAMSULOSIN HYDROCHLORIDE 0.4 MG/1
0.4 CAPSULE ORAL DAILY
Status: DISCONTINUED | OUTPATIENT
Start: 2020-01-23 | End: 2020-01-25 | Stop reason: HOSPADM

## 2020-01-23 RX ADMIN — ASPIRIN 81 MG: 81 TABLET ORAL at 12:26

## 2020-01-23 RX ADMIN — TRANEXAMIC ACID 1000 MG: 1 INJECTION, SOLUTION INTRAVENOUS at 09:58

## 2020-01-23 RX ADMIN — ONDANSETRON HYDROCHLORIDE 4 MG: 2 INJECTION, SOLUTION INTRAMUSCULAR; INTRAVENOUS at 08:42

## 2020-01-23 RX ADMIN — ALUMINUM HYDROXIDE, MAGNESIUM HYDROXIDE, AND DIMETHICONE 30 ML: 400; 400; 40 SUSPENSION ORAL at 23:22

## 2020-01-23 RX ADMIN — PHENYLEPHRINE HYDROCHLORIDE 80 MCG: 10 INJECTION INTRAVENOUS at 07:58

## 2020-01-23 RX ADMIN — CELECOXIB 400 MG: 200 CAPSULE ORAL at 06:34

## 2020-01-23 RX ADMIN — Medication 3 AMPULE: at 06:38

## 2020-01-23 RX ADMIN — ROCURONIUM BROMIDE 45 MG: 10 INJECTION INTRAVENOUS at 07:46

## 2020-01-23 RX ADMIN — Medication 1 AMPULE: at 22:09

## 2020-01-23 RX ADMIN — SODIUM CHLORIDE 125 ML/HR: 900 INJECTION, SOLUTION INTRAVENOUS at 09:15

## 2020-01-23 RX ADMIN — OXYCODONE 5 MG: 5 TABLET ORAL at 19:02

## 2020-01-23 RX ADMIN — FENTANYL CITRATE 25 MCG: 50 INJECTION, SOLUTION INTRAMUSCULAR; INTRAVENOUS at 09:34

## 2020-01-23 RX ADMIN — WATER 2 G: 1 INJECTION INTRAMUSCULAR; INTRAVENOUS; SUBCUTANEOUS at 23:25

## 2020-01-23 RX ADMIN — KETOROLAC TROMETHAMINE 15 MG: 30 INJECTION, SOLUTION INTRAMUSCULAR at 15:00

## 2020-01-23 RX ADMIN — Medication 10 ML: at 13:11

## 2020-01-23 RX ADMIN — GLYCOPYRROLATE 0.1 MG: 0.2 INJECTION, SOLUTION INTRAMUSCULAR; INTRAVENOUS at 07:38

## 2020-01-23 RX ADMIN — FENTANYL CITRATE 50 MCG: 50 INJECTION, SOLUTION INTRAMUSCULAR; INTRAVENOUS at 07:36

## 2020-01-23 RX ADMIN — PREGABALIN 150 MG: 75 CAPSULE ORAL at 06:34

## 2020-01-23 RX ADMIN — PROPOFOL 200 MG: 10 INJECTION, EMULSION INTRAVENOUS at 07:40

## 2020-01-23 RX ADMIN — KETOROLAC TROMETHAMINE 15 MG: 30 INJECTION, SOLUTION INTRAMUSCULAR at 17:45

## 2020-01-23 RX ADMIN — SODIUM CHLORIDE, SODIUM LACTATE, POTASSIUM CHLORIDE, AND CALCIUM CHLORIDE 25 ML/HR: 600; 310; 30; 20 INJECTION, SOLUTION INTRAVENOUS at 06:33

## 2020-01-23 RX ADMIN — OXYCODONE 5 MG: 5 TABLET ORAL at 12:27

## 2020-01-23 RX ADMIN — ACETAMINOPHEN 1000 MG: 500 TABLET ORAL at 06:34

## 2020-01-23 RX ADMIN — SODIUM CHLORIDE, SODIUM LACTATE, POTASSIUM CHLORIDE, AND CALCIUM CHLORIDE: 600; 310; 30; 20 INJECTION, SOLUTION INTRAVENOUS at 08:16

## 2020-01-23 RX ADMIN — Medication 10 MG: at 07:56

## 2020-01-23 RX ADMIN — Medication 10 MG: at 07:58

## 2020-01-23 RX ADMIN — ROCURONIUM BROMIDE 5 MG: 10 INJECTION INTRAVENOUS at 07:38

## 2020-01-23 RX ADMIN — SODIUM CHLORIDE 125 ML/HR: 900 INJECTION, SOLUTION INTRAVENOUS at 17:44

## 2020-01-23 RX ADMIN — SENNOSIDES AND DOCUSATE SODIUM 1 TABLET: 8.6; 5 TABLET ORAL at 17:44

## 2020-01-23 RX ADMIN — HYDROMORPHONE HYDROCHLORIDE 0.5 MG: 2 INJECTION, SOLUTION INTRAMUSCULAR; INTRAVENOUS; SUBCUTANEOUS at 09:09

## 2020-01-23 RX ADMIN — GLYCOPYRROLATE 0.6 MG: 0.2 INJECTION, SOLUTION INTRAMUSCULAR; INTRAVENOUS at 08:44

## 2020-01-23 RX ADMIN — ACETAMINOPHEN 650 MG: 325 TABLET ORAL at 12:26

## 2020-01-23 RX ADMIN — Medication 10 ML: at 22:10

## 2020-01-23 RX ADMIN — SUCCINYLCHOLINE CHLORIDE 180 MG: 20 INJECTION, SOLUTION INTRAMUSCULAR; INTRAVENOUS at 07:40

## 2020-01-23 RX ADMIN — OXYCODONE 5 MG: 5 TABLET ORAL at 22:12

## 2020-01-23 RX ADMIN — WATER 2 G: 1 INJECTION INTRAMUSCULAR; INTRAVENOUS; SUBCUTANEOUS at 15:49

## 2020-01-23 RX ADMIN — HYDROMORPHONE HYDROCHLORIDE 0.5 MG: 2 INJECTION, SOLUTION INTRAMUSCULAR; INTRAVENOUS; SUBCUTANEOUS at 07:54

## 2020-01-23 RX ADMIN — ASPIRIN 81 MG: 81 TABLET ORAL at 17:44

## 2020-01-23 RX ADMIN — DEXAMETHASONE SODIUM PHOSPHATE 4 MG: 4 INJECTION, SOLUTION INTRAMUSCULAR; INTRAVENOUS at 08:42

## 2020-01-23 RX ADMIN — TAMSULOSIN HYDROCHLORIDE 0.4 MG: 0.4 CAPSULE ORAL at 19:02

## 2020-01-23 RX ADMIN — ACETAMINOPHEN 650 MG: 325 TABLET ORAL at 23:23

## 2020-01-23 RX ADMIN — SENNOSIDES AND DOCUSATE SODIUM 1 TABLET: 8.6; 5 TABLET ORAL at 12:27

## 2020-01-23 RX ADMIN — NEOSTIGMINE METHYLSULFATE 4 MG: 1 INJECTION INTRAVENOUS at 08:44

## 2020-01-23 RX ADMIN — TRANEXAMIC ACID 1 G: 100 INJECTION, SOLUTION INTRAVENOUS at 07:43

## 2020-01-23 RX ADMIN — LIDOCAINE HYDROCHLORIDE 80 MG: 20 INJECTION, SOLUTION EPIDURAL; INFILTRATION; INTRACAUDAL; PERINEURAL at 07:38

## 2020-01-23 RX ADMIN — OXYCODONE 5 MG: 5 TABLET ORAL at 15:48

## 2020-01-23 RX ADMIN — ROPIVACAINE HYDROCHLORIDE 20 ML: 5 INJECTION, SOLUTION EPIDURAL; INFILTRATION; PERINEURAL at 07:27

## 2020-01-23 RX ADMIN — WATER 2 G: 1 INJECTION INTRAMUSCULAR; INTRAVENOUS; SUBCUTANEOUS at 07:32

## 2020-01-23 RX ADMIN — FENTANYL CITRATE 50 MCG: 50 INJECTION, SOLUTION INTRAMUSCULAR; INTRAVENOUS at 07:39

## 2020-01-23 RX ADMIN — ACETAMINOPHEN 650 MG: 325 TABLET ORAL at 17:44

## 2020-01-23 NOTE — PERIOP NOTES
Handoff Report from Operating Room to PACU    Report received from IDA Goldman RN and Sharyn Stokes CRNA regarding Malik Coelho III. Surgeon(s):  Maria Teresa Meyers MD  And Procedure(s) (LRB):  LEFT TOTAL KNEE ARTHROPLASTY  WITH NAVIGATION (Left)  confirmed   with allergies, dressings and local anesthetic discussed. Anesthesia type, drugs, patient history, complications, estimated blood loss, vital signs, intake and output, and last pain medication, lines, reversal medications and temperature were reviewed.

## 2020-01-23 NOTE — ANESTHESIA PROCEDURE NOTES
Peripheral Block    Start time: 1/23/2020 7:17 AM  End time: 1/23/2020 7:27 AM  Performed by: Kailey Casillas MD  Authorized by: Kailey Casillas MD       Pre-procedure: Indications: at surgeon's request and post-op pain management    Preanesthetic Checklist: patient identified, risks and benefits discussed, site marked, timeout performed, anesthesia consent given and patient being monitored      Block Type:   Block Type:   Adductor canal  Laterality:  Left  Monitoring:  Standard ASA monitoring, responsive to questions, oxygen, continuous pulse ox, frequent vital sign checks and heart rate  Injection Technique:  Single shot  Procedures: ultrasound guided    Patient Position: supine  Prep: chlorhexidine    Location:  Mid thigh  Needle Type:  Stimuplex  Needle Gauge:  22 G  Needle Localization:  Ultrasound guidance    Assessment:  Number of attempts:  1  Injection Assessment:  Incremental injection every 5 mL, local visualized surrounding nerve on ultrasound, negative aspiration for blood, no intravascular symptoms, no paresthesia and ultrasound image on chart  Patient tolerance:  Patient tolerated the procedure well with no immediate complications

## 2020-01-23 NOTE — H&P
Nayeli Snell MD - Adult Reconstruction and Total Joint Replacement     Orthopaedic History and Physical        NAME: Bárbara Clemens III       :  1950       MRN:  [id number]        Subjective:   Patient ID: Bárbara Clemens is a 81 y.o. male. Chief Complaint: Follow-up of the Left Knee    Patient presents for a follow-up of the left knee. Previous steroid injections did not provide significant relief. Symptoms are located on the medal aspect of the left knee that radiates distally. Previous UKA on the right knee. No complaints of the contralateral side. Of note: Patient had bronchitis in November.        Patient Active Problem List    Diagnosis Date Noted    Impaired glucose tolerance 2020    SANDERS (dyspnea on exertion) 10/28/2019    Acute bronchitis 10/28/2019    Essential hypertension 2019    Severe obesity (BMI 35.0-39.9) 2018    Actinic keratosis 09/15/2017    Annual physical exam 09/15/2017    ASHD (arteriosclerotic heart disease) 09/15/2017    Chest pain 09/15/2017    Chronic back pain 09/15/2017    Cough 09/15/2017    Diabetes mellitus screening 09/15/2017    Diaphoresis 09/15/2017    Dyslipidemia 09/15/2017    Edema 09/15/2017    Elevated LFTs 09/15/2017    Fatigue 09/15/2017    Hyperplasia of prostate 09/15/2017    Obesity 09/15/2017    Other long term (current) drug therapy 09/15/2017    Sinusitis 09/15/2017    SK (seborrheic keratosis) 09/15/2017     Past Medical History:   Diagnosis Date    Actinic keratosis 9/15/2017    Annual physical exam 9/15/2017    Arthritis     ASHD (arteriosclerotic heart disease) 9/15/2017    Asthma     At risk for sleep apnea 2019    STOP BANG 5    Guerrier's esophagus     CAD (coronary artery disease)     Chest pain 9/15/2017    Chronic back pain 9/15/2017    Chronic obstructive pulmonary disease (HCC)     Chronic pain     Cough 9/15/2017    Diabetes ([address]     hypoglycemia hx    Diabetes mellitus screening 9/15/2017    Diaphoresis 9/15/2017    SANDERS (dyspnea on exertion)     Dyslipidemia 9/15/2017    Edema 9/15/2017    Elevated LFTs 9/15/2017    Essential hypertension 6/7/2019    Fatigue 9/15/2017    GERD (gastroesophageal reflux disease)     Guerrier's esophagus    Hypercholesterolemia     Hyperplasia of prostate 9/15/2017    Impaired glucose tolerance 1/17/2020    Obesity 9/15/2017    Other long term (current) drug therapy 9/15/2017    Sinusitis 9/15/2017    SK (seborrheic keratosis) 9/15/2017      Past Surgical History:   Procedure Laterality Date    COLONOSCOPY N/A 11/27/2019    FLEXIBLE SIGMOIDOSCOPY performed by Estelle Trevino MD at [facility] AMBULATORY OR    COLONOSCOPY N/A 1/15/2020    COLONOSCOPY performed by Estelle Trevino MD at [facility] ENDOSCOPY    HX APPENDECTOMY      HX COLONOSCOPY      HX ENDOSCOPY      HX HEART CATHETERIZATION  ~2008    stent    HX KNEE REPLACEMENT Right 06/2019    partial knee replacement Dr. eMir Amado      Prior to Admission medications    Medication Sig Start Date End Date Taking? Authorizing Provider   COQ10, UBIQUINOL, PO Take 100 mg by mouth daily. Yes Provider, Historical   nabumetone (RELAFEN) 500 mg tablet TAKE 1 TABLET BY MOUTH TWICE DAILY 12/23/19  Yes Earl Ambrosio MD   Cholestyramine-Aspartame (CHOLESTYRAMINE LIGHT) 4 gram powder Take 4 g by mouth every morning. Yes Provider, Historical   albuterol (VENTOLIN HFA) 90 mcg/actuation inhaler Take 2 Puffs by inhalation every four (4) hours as needed for Wheezing. Yes Provider, Historical   dilTIAZem (TIAZAC) 360 mg SR capsule Take 360 mg by mouth every morning. 6/17/19  Yes Provider, Historical   levocetirizine (XYZAL) 5 mg tablet TAKE 1 TABLET BY MOUTH ONCE DAILY 7/19/19  Yes Earl Ambrosio MD   omeprazole (PRILOSEC) 40 mg capsule Take 40 mg by mouth daily. Indications: gastroesophageal reflux disease 7/22/18  Yes Provider, Historical   ZETIA 10 mg tablet Take 1 Tab by mouth daily.  2/16/18  Yes Terri Evans Neha Allison MD   finasteride (PROSCAR) 5 mg tablet Take 5 mg by mouth every Monday, Wednesday, Friday. 3/25/16  Yes Provider, Historical   aspirin 81 mg chewable tablet Take 81 mg by mouth nightly. Yes Provider, Historical   multivitamin (ONE A DAY) tablet Take 1 Tab by mouth daily. Yes Provider, Historical   amoxicillin-clavulanate (AUGMENTIN) 875-125 mg per tablet Take 1 Tab by mouth two (2) times a day for 5 days. 1/17/20 1/22/20  Brendan Her MD   cephALEXin [facility]) 500 mg capsule Take 1,000 mg by mouth once. Indications: 1 hour prior to dental procedure. s/p partial knee replacement    Provider, Historical     Allergies   Allergen Reactions    Claritin-D 12 Hour [Loratadine-Pseudoephedrine] Shortness of Breath    Iodine Shortness of Breath    Levaquin [Levofloxacin] Hives    Seafood [Shellfish Containing Products] Shortness of Breath    Statins-Hmg-Coa Reductase Inhibitors Other (comments)     \"liver problems\"    Sulfa (Sulfonamide Antibiotics) Hives      Social History     Tobacco Use    Smoking status: Never Smoker    Smokeless tobacco: Former User   Substance Use Topics    Alcohol use: No     Alcohol/week: 0.0 standard drinks      Family History   Problem Relation Age of Onset    Lung Disease Mother         COPD    Heart Disease Mother         pacemaker    Cancer Father         prostate/liver        REVIEW OF SYSTEMS: A comprehensive review of systems was negative except for that written in the HPI. Objective:   Constitutional: He appears stated age. Pt is cooperative and is in no acute distress. Well nourished. Well developed. Body habitus is obese. Body mass index is 36.53 kg/m². Assistive devices: none  Eyes: Sclera are monothermic. Respiratory: No labored breathing. Cardiovascular: No marked edema. Well perfused extremities bilaterally. Skin: No marked skin ulcers. No lymphedema or skin abnormalities. Neurological: No marked sensory loss noted.  Grossly neurovascularly intact. Both lower extremities are intact to distal sensory and motor function. Psychiatric: Alert and oriented x3  MUSCULOSKELETAL: Lumbar spine is nonfocal. No obvious LLD. 5/5 BLE strength. Evaluation of the left knee demonstrate no significant swelling or effusion. Medial joint line tenderness. slight flexion contracture of 5 degrees or less with flexion greater than 115 degrees. No ligamentous instability. Benign right knee incision with full range of motion. Radiographs:     Order: XR KNEE 3 VW LEFT - Indication: Localized osteoarthritis of left   knee    X-ray Knee Left 3 Views ([id number])    Result Date: 1/7/2020  Supine. Views: Aries Hacking. Impression: Three views of the left knee shows severe medial joint space narrowing. Lateral aspect has mild arthritic change and osteophyte formation. Bone on bone articulation medially. No significant progression when compared to prior films. Varus alignment. Assessment:     ICD-10-CM   1. Localized osteoarthritis of left knee M17.12       Plan: At this time, I recommended TKA. The pt understands that weight is a significant factor. I recommended weight loss and quadriceps strengthening and I instructed him to avoid deep knee bends under a load. Conservative treatments including activity modification, medication, and steroid injections have not successfully relieved pain. Surgery was discussed at length with the pt today. We went over all the pertinent risks, benefits, and alternatives to the procedure. They understand no guarantees can be made about the outcome and they would like to proceed. I discussed general recovery timeline with the pt. We will plan for the [facility] 01/23/2020. All questions were answered to his satisfaction. Follow up as after surgery. Scribed for Dr. Neo Gomez by Venecia Solorio.       JERZY Peck

## 2020-01-23 NOTE — PROGRESS NOTES
TRANSFER - IN REPORT:    Verbal report received from Se(name) on [facility] [facility] [address]  being received from PACU(unit) for routine post - op      Report consisted of patients Situation, Background, Assessment and   Recommendations(SBAR). Information from the following report(s) SBAR, Kardex and MAR was reviewed with the receiving nurse. Opportunity for questions and clarification was provided. THis nurse asked Jodie Peñaloza to check patient's blood sugar before sending patient up. Assessment completed upon patients arrival to unit and care assumed.

## 2020-01-23 NOTE — ANESTHESIA POSTPROCEDURE EVALUATION
Procedure(s):  LEFT TOTAL KNEE ARTHROPLASTY  WITH NAVIGATION. general    Anesthesia Post Evaluation        Patient location during evaluation: PACU  Note status: Adequate. Level of consciousness: responsive to verbal stimuli and sleepy but conscious  Pain management: satisfactory to patient  Airway patency: patent  Anesthetic complications: no  Cardiovascular status: acceptable  Respiratory status: acceptable  Hydration status: acceptable  Comments: +Post-Anesthesia Evaluation and Assessment    Patient: Stephanie Nicholson MRN: [id number]  SSN: [id number]   YOB: 1950  Age: 81 y.o. Sex: male          Cardiovascular Function/Vital Signs    /81 (BP 1 Location: Left arm, BP Patient Position: At rest;Head of bed elevated (Comment degrees))   Pulse 62   Temp 36.6 °C (97.8 °F)   Resp 14   Ht 5' 10\" (1.778 m)   Wt 113.4 kg (250 lb)   SpO2 94%   BMI 35.87 kg/m²     Patient is status post Procedure(s):  LEFT TOTAL KNEE ARTHROPLASTY  WITH NAVIGATION. Nausea/Vomiting: Controlled. Postoperative hydration reviewed and adequate. Pain:  Pain Scale 1: Numeric (0 - 10) (01/23/20 0945)  Pain Intensity 1: 2 (01/23/20 0945)   Managed. Neurological Status:   Neuro (WDL): Exceptions to WDL (01/23/20 0905)   At baseline. Mental Status and Level of Consciousness: Arousable. Pulmonary Status:   O2 Device: Nasal cannula (01/23/20 0945)   Adequate oxygenation and airway patent. Complications related to anesthesia: None    Post-anesthesia assessment completed. No concerns. I have evaluated the patient and the patient is stable and ready to be discharged from PACU . Signed By: Shane Cruz MD    1/23/2020        Vitals Value Taken Time   /75 1/23/2020 10:00 AM   Temp 36.6 °C (97.8 °F) 1/23/2020  9:45 AM   Pulse 54 1/23/2020 10:02 AM   Resp 14 1/23/2020 10:02 AM   SpO2 94 % 1/23/2020 10:02 AM   Vitals shown include unvalidated device data.

## 2020-01-23 NOTE — OP NOTES
Vamsi Escamilla MD - Adult Reconstruction and Total Joint Replacement    Mehdi Wills - MRN [id number] - : 1950 (81 y.o.)    Date: 2020    PREOPERATIVE DIAGNOSIS:  Left knee degenerative joint disease    POSTOPERATIVE DIAGNOSIS:  Same    PROCEDURE: Total knee replacement with imageless computer navigation    Implants Used:   Implant Name Type Inv. Item Serial No.  Lot No. LRB No. Used Action   INSERT TIB PS MOD SZ3.5 11MM -- [employer] - [id number]  INSERT TIB PS MOD SZ3.5 11MM -- [employer] [id number] [employer] NA Left 1 Implanted   FEM PS TAWANA LT SZ 3.5 -- [employer]UL[employer]ANT - [id number]  FEM PS TAWANA LT SZ 3.5 -- TRULIANT [id number] [employer] NA Left 1 Implanted   Truliant Fit Tibial Tray   [id number] [employer] NA Left 1 Implanted   Biomet Bone Cement R   NA [employer] [id number] Left 1 Implanted       Anesthesia: General + block / local    Pre-operative antibiotic: Ancef    Surgeon: Vamsi Escamilla     Assist: JERZY Padilla (Performing all or most of the following assistant-at-surgery services including but not limited to: proper patient positioning, sterile/prep and draping, placement of instruments/trackers, operative exposure, minor portions of bone / soft tissue excision, final irrigation and debridement, deep and superficial closure, application of final dressings)    EBL: minimal    Drains: none     Specimens: none     Complications: none     Condition: stable to PACU     INDICATIONS: Longstanding knee pain unresponsive to conservative measures. The risks, benefits, and alternatives to the procedure were explained to the patient and they wished to proceed. They understood no guarantees could be given about the outcome of the procedure. DESCRIPTION OF PROCEDURE:  The patient was brought in to the operating room and placed supine on a standard OR table. Anesthesia was provided by the anesthesia team without difficulty.   A thigh tourniquet was applied to the operative limb which was then prepped and draped in the usual fashion. Pre-operative antibiotics were administered. An appropriate time-out was performed. The limb was exsanguinated with an Esmarch and tourniquet inflated. A standard medial parapatellar arthrotomy was used. The fat pad was excised. The patella was then subluxed laterally and attention turned to the femur. Navigation was used after the registration sequence to make the appropriate distal femoral cut, and drill for the lugs of the 4-in-1 guide. The femoral cut was confirmed with navigation. The cruciate ligaments were excised along with the anterior portions of the menisci. The  4-in-1 guide position was confirmed with navigation and pinned in parallel to the epicondylar axis and perpendicular to Lewis's line. The anterior, posterior and chamfer cuts were performed, protecting the collateral ligaments at all times. The posterior capsule was cleared and any osteophytes were removed. The box cut was then made. Attention was then turned to the tibia. The navigation system was used to perform the tibial cut perpendicular to the mechanical axis. The remainder of the menisci were excised and the tibia sized. The patella was noted to be in acceptable condition and was not resurfaced. Selective denervation was performed. Trial components were then placed and the knee taken through a range of motion and found to have excellent range of motion, stability, and ligamentous balance. The rotation of the tibial tray was marked and the trials removed. The trial tibial tray was reinserted and the tibial prepared for the keel of the tray. All cut surfaces were thoroughly lavaged and dried. The final implants were then cemented into place and excess cement removed with a curette. The tibial tray liner was inserted into the locking mechanism and the knee reduced.  It was again taken through a range of motion and found to be stable in all planes with excellent tracking of the patella. The wound was thoroughly lavaged again. The extensor mechanism was repaired with heavy interrupted suture and a running stitch. Periarticular injection was performed. Skin closure was then performed in layers. Sterile compressive dressings were applied. The patient was awakened, moved to the stretcher and taken to the recovery room in stable condition. At the conclusion of the procedure, all counts were correct. There no immediate complications.

## 2020-01-23 NOTE — PROGRESS NOTES
ASHWIN: home with home health ([facility]) and family to provide transportation at d/c  1) patient cleared for d/c from CM standpoint    Reason for Admission:  Left total knee arthroplasty                    RRAT Score:  6%                   Plan for utilizing home health: At home care                        Current Advanced Directive/Advance Care Plan: not at this time                          Transition of Care Plan:                      Patient is a 82 y/o male who was admitted to [facility] for a planned left total knee arthroplasty. CM made room visit with patient who was alert and oriented with daughter at bedside. Patient confirmed demographics, insurance, and emergency contacts (requested daughter, Le Harding be primary contact) on file. Patient's PCP is Dr. Charissa Burk and was last seen 1 wk ago. Patient uses [facility] on [address]. Patient and wife reside in a single level home with 4 FOZIA. Patient reported that multiple family members, including his daughter, all [place], but separate houses. Patient has a RW at home. Prior to admission patient was independent with ADLs, IADLs (painful and shuffled feet when ambulating), and driving. Patient denied any history of HH, SNF, or IPR. Patient's plan is to return home with home health and family assistance. Patient requested to use [facility]. FOC signed and on bedside chart. Referral sent to [facility] and they have accepted patient for [facility] services. AVS updated. Care Management Interventions  PCP Verified by CM: Yes  Mode of Transport at Discharge:  Other (see comment)  Transition of Care Consult (CM Consult): Discharge Planning, [address]: No  Reason Outside [place]: Physician referred to specific agency  Discharge Durable Medical Equipment: No  Physical Therapy Consult: Yes  Occupational Therapy Consult: No  Speech Therapy Consult: No  Current Support Network: Lives with Spouse, Own Home, Family Lives Nearby  Confirm Follow Up Transport: Family  The Plan for Transition of Care is Related to the Following Treatment Goals : hh  The Patient and/or Patient Representative was Provided with a Choice of Provider and Agrees with the Discharge Plan?: Yes  Freedom of Choice List was Provided with Basic Dialogue that Supports the Patient's Individualized Plan of Care/Goals, Treatment Preferences and Shares the Quality Data Associated with the Providers?: Yes  Discharge Location  Discharge Placement: Home with home health    Liban Pak, [address] [facility], [address] [facility]

## 2020-01-23 NOTE — ANESTHESIA PREPROCEDURE EVALUATION
Relevant Problems   No relevant active problems       Anesthetic History   No history of anesthetic complications            Review of Systems / Medical History  Patient summary reviewed, nursing notes reviewed and pertinent labs reviewed    Pulmonary  Within defined limits  COPD        Asthma        Neuro/Psych   Within defined limits           Cardiovascular    Hypertension          CAD and hyperlipidemia    Exercise tolerance: >4 METS  Comments: Normal stress test   GI/Hepatic/Renal     GERD      Hiatal hernia     Endo/Other  Within defined limits  Diabetes    Morbid obesity and arthritis     Other Findings              Physical Exam    Airway  Mallampati: III  TM Distance: > 6 cm  Neck ROM: normal range of motion   Mouth opening: Normal     Cardiovascular  Regular rate and rhythm,  S1 and S2 normal,  no murmur, click, rub, or gallop             Dental  No notable dental hx       Pulmonary  Breath sounds clear to auscultation               Abdominal  GI exam deferred       Other Findings            Anesthetic Plan    ASA: 3  Anesthesia type: general      Post-op pain plan if not by surgeon: peripheral nerve block single    Induction: Intravenous  Anesthetic plan and risks discussed with: Patient

## 2020-01-23 NOTE — PROGRESS NOTES
Problem: Mobility Impaired (Adult and Pediatric)  Goal: *Acute Goals and Plan of Care (Insert Text)  Description  FUNCTIONAL STATUS PRIOR TO ADMISSION: Patient was independent and active without use of DME.    HOME SUPPORT PRIOR TO ADMISSION: The patient lived with wife but did not require assist.    Physical Therapy Goals  Initiated 1/23/2020  1. Patient will move from supine to sit and sit to supine  in bed with independence within 4 day(s). 2.  Patient will transfer from bed to chair and chair to bed with modified independence using the least restrictive device within 4 day(s). 3.  Patient will perform sit to stand with modified independence within 4 day(s). 4.  Patient will ambulate with modified independence for 300 feet with the least restrictive device within  4 day(s). 5.  Patient will ascend/descend 4 stairs with 1 handrail(s) with modified independence within 4day(s). 6. Patient will perform home exercise program per protocol with independence within 4 days. 7. Patient will demonstrate AROM 0-90 degrees in operative joint within 4 days. Outcome: Progressing Towards Goal   PHYSICAL THERAPY EVALUATION  Patient: Vladislav Felix III (20 y.o. male)  Date: 1/23/2020  Primary Diagnosis: Status post total knee replacement [Z96.659]  Procedure(s) (LRB):  LEFT TOTAL KNEE ARTHROPLASTY  WITH NAVIGATION (Left) Day of Surgery   Precautions:   Fall, WBAT      ASSESSMENT  Based on the objective data described below, the patient presents with increased pain, increased drowsiness, decreased activity tolerance, impaired balance and altered gait. Pt was received in supine and cleared by nursing to mobilize. VSS during session. He was able to get up to the EOB and then stand with RW. Ambulated around the bed. Attempted to void 2 times while standing, but unable. He was returned to supine. Pain 8/10. Ice and compression applied. Expect to do well once pain contorlled.     Current Level of Function Impacting Discharge (mobility/balance): CGA    Functional Outcome Measure: The patient scored 65/100 on the barthel outcome measure which is indicative of 35% impaired. Other factors to consider for discharge:      Patient will benefit from skilled therapy intervention to address the above noted impairments. PLAN :  Recommendations and Planned Interventions: bed mobility training, transfer training, gait training, therapeutic exercises, patient and family training/education, and therapeutic activities      Frequency/Duration: Patient will be followed by physical therapy:  twice daily to address goals. Recommendation for discharge: (in order for the patient to meet his/her long term goals)  Physical therapy at least 2 days/week in the home     This discharge recommendation:  Has not yet been discussed the attending provider and/or case management    IF patient discharges home will need the following DME: none         SUBJECTIVE:   Patient stated this is harder than the uni.     OBJECTIVE DATA SUMMARY:   HISTORY:    Past Medical History:   Diagnosis Date    Actinic keratosis 9/15/2017    Annual physical exam 9/15/2017    Arthritis     ASHD (arteriosclerotic heart disease) 9/15/2017    Asthma     At risk for sleep apnea 11/20/2019    STOP BANG 5    Guerrier's esophagus     CAD (coronary artery disease)     Chest pain 9/15/2017    Chronic back pain 9/15/2017    Chronic obstructive pulmonary disease ([address]     Chronic pain     Cough 9/15/2017    Diabetes ([address]     hypoglycemia hx    Diabetes mellitus screening 9/15/2017    Diaphoresis 9/15/2017    SANDERS (dyspnea on exertion)     Dyslipidemia 9/15/2017    Edema 9/15/2017    Elevated LFTs 9/15/2017    Essential hypertension 6/7/2019    Fatigue 9/15/2017    GERD (gastroesophageal reflux disease)     Guerrier's esophagus    Hypercholesterolemia     Hyperplasia of prostate 9/15/2017    Impaired glucose tolerance 1/17/2020    Obesity 9/15/2017    Other long term (current) drug therapy 9/15/2017    Sinusitis 9/15/2017    SK (seborrheic keratosis) 9/15/2017     Past Surgical History:   Procedure Laterality Date    COLONOSCOPY N/A 11/27/2019    FLEXIBLE SIGMOIDOSCOPY performed by Marti Wright MD at [facility] AMBULATORY OR    COLONOSCOPY N/A 1/15/2020    COLONOSCOPY performed by Marti Wright MD at [facility] ENDOSCOPY    HX APPENDECTOMY      HX COLONOSCOPY      HX ENDOSCOPY      HX HEART CATHETERIZATION  ~2008    stent    HX KNEE REPLACEMENT Right 06/2019    partial knee replacement Dr. Lenin Trujillo       Personal factors and/or comorbidities impacting plan of care: pain     Home Situation  Home Environment: Private residence  # Steps to Enter: 4  Rails to Enter: Yes  Hand Rails : Right  One/Two Story Residence: One story  Living Alone: No  Support Systems: Spouse/Significant Other/Partner, Child(alda)  Patient Expects to be Discharged to[de-identified] Private residence  Current DME Used/Available at Home: Walker, rolling  Tub or Shower Type: Tub/Shower combination    EXAMINATION/PRESENTATION/DECISION MAKING:   Critical Behavior:  Neurologic State: Sleeping, Eyes open spontaneously, Eyes open to voice  Orientation Level: Oriented X4  Cognition: Appropriate for age attention/concentration, Follows commands     Hearing:     Skin:  ace wrap in place  Edema: WDL  Range Of Motion:  AROM: Generally decreased, functional           PROM: Generally decreased, functional           Strength:    Strength: Generally decreased, functional                    Tone & Sensation:   Tone: Normal              Sensation: Intact               Coordination:  Coordination: Within functional limits  Vision:      Functional Mobility:  Bed Mobility:  Rolling: Contact guard assistance  Supine to Sit: Contact guard assistance  Sit to Supine: Contact guard assistance  Scooting: Contact guard assistance  Transfers:  Sit to Stand: Contact guard assistance  Stand to Sit: Contact guard assistance                       Balance:   Sitting: Intact  Standing: Intact; With support  Ambulation/Gait Training:  Distance (ft): 15 Feet (ft)  Assistive Device: Gait belt;Walker, rolling  Ambulation - Level of Assistance: Contact guard assistance        Gait Abnormalities: Decreased step clearance        Base of Support: Widened     Speed/Mei: Pace decreased (<100 feet/min); Shuffled  Step Length: Left shortened;Right shortened              Functional Measure:  Barthel Index:    Bathin  Bladder: 10  Bowels: 10  Groomin  Dressin  Feeding: 10  Mobility: 10  Stairs: 0  Toilet Use: 5  Transfer (Bed to Chair and Back): 10  Total: 65/100       The Barthel ADL Index: Guidelines  1. The index should be used as a record of what a patient does, not as a record of what a patient could do. 2. The main aim is to establish degree of independence from any help, physical or verbal, however minor and for whatever reason. 3. The need for supervision renders the patient not independent. 4. A patient's performance should be established using the best available evidence. Asking the patient, friends/relatives and nurses are the usual sources, but direct observation and common sense are also important. However direct testing is not needed. 5. Usually the patient's performance over the preceding 24-48 hours is important, but occasionally longer periods will be relevant. 6. Middle categories imply that the patient supplies over 50 per cent of the effort. 7. Use of aids to be independent is allowed. Deb Kauffman., Barthel, DLesaWLesa ([id number]). Functional evaluation: the Barthel Index. [address] [facility] (14)2. JODIE Plaza, Russ Casiano., Toni Alvarado., Mill[place]ent, [address] [place] [address] (). Measuring the change indisability after inpatient rehabilitation; comparison of the responsiveness of the Barthel Index and Functional [place] Measure. Journal of Neurology, Neurosurgery, and Psychiatry, 66(4), 578-814. Amina Nelson, N.J.A, JANENE Tirado, Amara Shi M.A. (2004.) Assessment of post-stroke quality of life in cost-effectiveness studies: The usefulness of the Barthel Index and the EuroQoL-5D. Quality of Life Research, 15, 204-40            Physical Therapy Evaluation Charge Determination   History Examination Presentation Decision-Making   HIGH Complexity :3+ comorbidities / personal factors will impact the outcome/ POC  MEDIUM Complexity : 3 Standardized tests and measures addressing body structure, function, activity limitation and / or participation in recreation  MEDIUM Complexity : Evolving with changing characteristics  Other outcome measures barthel  MEDIUM      Based on the above components, the patient evaluation is determined to be of the following complexity level: MEDIUM    Pain Ratin/10, nursing aware    Activity Tolerance:   Fair  Please refer to the flowsheet for vital signs taken during this treatment. After treatment patient left in no apparent distress:   Supine in bed and Call bell within reach    COMMUNICATION/EDUCATION:   The patients plan of care was discussed with: Registered Nurse. Fall prevention education was provided and the patient/caregiver indicated understanding. and Patient/family agree to work toward stated goals and plan of care.     Thank you for this referral.  Nathalie Noriega, PT, DPT   Time Calculation: 22 mins

## 2020-01-23 NOTE — DISCHARGE INSTRUCTIONS
Discharge Instructions: How to [address]  Surgery: Total Knee Replacement  Surgeon:   Candi Montanez MD  Surgery Date:  1/23/2020     To relieve pain:   Use ice/gel packs.  Put the ice pack directly over the wound, or anywhere you are hurting or swollen.  To control pain and swelling, keep ice on regularly, especially after physical activity.  The packs should stay cold for 3-4 hours. When it is not cold anymore, rotate with the packs in the freezer.  Elevate your leg. This will also keep swelling down.  Rest for at least 20 minutes between activity or exercises.  To keep track of your pain medications, write down what you take and when you take it.  The last dose of pain medication you got in the hospital was:       Medication    Dose    Date & Time           Choose your medications based on the pain scale below:     To keep your pain under control, take Tylenol every 6 hours for 14 days - even if you feel like you dont need it.  For mild to moderate pain (4-6 on pain scale), take one pain pill every 3-4 hours as needed.  For severe pain (7-10 on pain scale), take two pain pills every 3-4 hours as needed.  To prevent nausea, take your pain medications with food. Pain Scale                 As your pain lessens:     Slowly start taking less pain medication. You may do this by waiting longer between doses or by taking smaller doses.  Stop using the pain medications as soon as you no longer need it, usually in 2-3 weeks.  Aspirin   To prevent blood clots, you will need to take Aspirin 81 mg twice a day for 30 days.                To prevent stomach upset or bleeding:   Do not take non-steroidal anti-inflammatory medications (Ibuprofen, Advil, Motrin, Naproxen, etc.)    Take Pepcid 20 mg twice a day, or a similar home medication, while you are taking a blood thinner. OPSITE (Honeycomb dressing)     Keep your clear, waterproof dressing in place for 5-7 days after your leave the hospital.     If you are still having drainage, you will need to change your dressing in 5-7 days. You will be given one extra dressing to use at home.  If there is no more drainage from the wound, you may leave it open to the air.  You will have some swelling, warmth, and bruising around the knee and up and down the leg after surgery. This will may get worse in the first few days you are home and will slowly get better over the next few weeks. OPSITE DRESSING INSTRUCTIONS                  PRINEO DRESSING INSTRUCTIONS     Prineo is a type of skin glue and mesh that is keeping your wound together.  Leave this covering alone. Do not peel it off.  Do not apply oils or lotions over it.  You may shower with this dressing but do not soak it. Gently pat your wound dry when you get out of the shower.  DO NOTs:   Do not rub your surgical wound   Do not put lotion or oils on your wound.  Do not take a tub bath or go swimming until your doctor says it is ok.  You may shower with this dressing over your wound.  After showering pat the dressing dry.  If you have staples a home health nurse will remove them in about 10 days.  To increase and promote healing:   Stop Smoking (or at least cut back on       Smoking).  Eat a well-balanced diet (high in protein       and vitamin C).  If you have a poor appetite, drink Ensure, Glucerna, or         [place] Instant Breakfast for the next 30 days.  If you are diabetic, you should control your blood         sugars to prevent infection and help your wound         to heal.                     f you have a poor appetite, drink Ensure, Glucerna, or [place] Instant Breakfast for the next 30 days.      If you are diabetic, you should control your blood                                                sugars to prevent infection and help your wound                                                to heal.     Prevent Infection    1. Wash your hands.  This is the most important thing you or your caregiver can do.  Wash your hands with soap and water (or use the hand  we gave you) before you touch any wounds. 2. Shower.  Use the antibacterial soap we gave you when you take a shower.  Shower with this soap until your wounds are healed. 3.  Use clean sheets.  Put freshly cleaned sheets on your bed after surgery.  To keep the surgery site clean, do not allow pets to sleep with you while your wound is still healing.  To prevent constipation, stay active and drink plenty of fluid.  While using pain medications, you should also take stool softeners and laxatives, such as Pericolace       and Miralax.  If you are having too many bowel       Movements, then you may need       to stop taking the laxatives.  You should have a bowel movement 3-4 days        after surgery and then at least every other day while       on pain medication.  To improve your recovery, you must be active!  Use your walker and take short walks (in your home)         about every 2 hours during the day.  Try to increase how far you walk each day.  You can put as much weight on your leg as you can tolerate while walking.  To avoid getting a stiff knee, work on getting your knee bent and straight as soon as possible.  Home health physical therapy will come to your       home the day after you leave the hospital.  The       therapist will visit about 4 times over the next        couple of weeks to teach you how to get out of        bed, to safely walk in your home, and to do your        exercises.  NO DRIVING until your surgeon tells you it is ok.      You can return to work when cleared by a physician.  Please call your physician immediately if you have:     Constant bleeding from your wound.  Increasing redness or swelling around your wound (Some warmth, bruising and swelling is normal).  Change in wound drainage (increase in amount, color, or bad smell).  Change in mental status (unusual behavior   Temperature over 101.5 degrees Fahrenheit      Pain or redness in the calf (back of your lower leg - see picture)     Increased swelling of the thigh, ankle, calf, or foot.  Emergency: CALL 911 if you have:     Shortness of breath     Chest pain when you cough or taking a deep breath     Please call your surgeons office at [phone] for a follow up appointment. _   You should call as soon as you get home or the next day after discharge. Ask to make an appointment in 2 weeks.  If you have questions or concerns during normal business hours, you may reach Dr. Cj Howard' Team at [phone].

## 2020-01-23 NOTE — PROGRESS NOTES
Ortho / Neurosurgery NP Note    POD# 0  s/p LEFT TOTAL KNEE ARTHROPLASTY  WITH NAVIGATION     Pt resting in bed, family at bedside   Drowsy but awakens easily to voice, appropriate, follows commands  Pain well controlled. Tolerating food      VSS Afebrile. 3L O2    /67   Pulse (!) 53   Temp 97.4 °F (36.3 °C)   Resp 16   Ht 5' 10\" (1.778 m)   Wt 113.4 kg (250 lb)   SpO2 94%   BMI 35.87 kg/m²      Voiding status: due to void  Output (mL)  Urine Voided: 0 ml (01/23/20 1010)  Emesis: 0 mL (01/23/20 1010)  Stool: 0 ml (01/23/20 1010)  Last Bowel Movement Date: 01/22/20 (01/23/20 9535)  Blood: 0 ml (01/23/20 1010)  Other Output: 0 mL (01/23/20 1010)  Unmeasurable Output  Urine Occurrence(s): 0 (01/23/20 1010)  Stool Occurrence(s): 0 (01/23/20 1010)  Emesis Occurrence(s): 0 (01/23/20 1010)      Labs  Lab Results   Component Value Date/Time    HGB 14.3 01/14/2020 08:34 AM      Lab Results   Component Value Date/Time    INR 1.0 01/14/2020 08:34 AM        Recent Glucose Results:   Lab Results   Component Value Date/Time    GLUCPOC 144 (H) 01/23/2020 10:38 AM    GLUCPOC 124 (H) 01/23/2020 10:13 AM    GLUCPOC 75 01/23/2020 06:38 AM         Body mass index is 35.87 kg/m². : A BMI > 30 is classified as obesity and > 40 is classified as morbid obesity. Elastic bandage dressing c.d.i  Cryotherapy in place over incision  Calves soft and supple;  No pain with passive stretch  Sensation and motor intact  PF/DF/EHL intact 5/5  SCDs for mechanical DVT proph while in bed     PLAN:  1) PT BID, OT  2) Aspirin 81 mg PO BID for DVT Prophylaxis   3) GI Prophylaxis - Protonix  4) Readniess for discharge:     [x] Vital Signs stable - wean O2   [] Hgb stable - will check in am    [] + Voiding - due to void   [x] Wound intact, drainage minimal    [x] Tolerating PO intake     [] Cleared by PT (OT if applicable)     [] Stair training completed (if applicable)    [] Independent / Contact Guard Assist (household distance)     [] Bed mobility     [] Car transfers     [] ADLs    [x] Adequate pain control on oral medication alone       Nella Hyatt NP

## 2020-01-23 NOTE — ROUTINE PROCESS
Patient: Morgan Alcaraz MRN: [id number]  SSN: [id number]   YOB: 1950  Age: 81 y.o. Sex: male     Patient is status post Procedure(s):  LEFT TOTAL KNEE ARTHROPLASTY  WITH NAVIGATION. Surgeon(s) and Role:     Bill Curiel MD - Primary    Local/Dose/Irrigation:  60mL 0.5% Ropivicaine                  Peripheral IV 01/23/20 Right Arm (Active)   Site Assessment Clean, dry, & intact 1/23/2020  6:30 AM   Phlebitis Assessment 0 1/23/2020  6:30 AM   Infiltration Assessment 0 1/23/2020  6:30 AM   Dressing Status Clean, dry, & intact 1/23/2020  6:30 AM   Dressing Type Tape;Transparent 1/23/2020  6:30 AM   Hub Color/Line Status Pink; Infusing 1/23/2020  6:30 AM                           Dressing/Packing:  Wound Knee Left Incision-Dressing Type: 4 x 4;ABD pad;Cast padding;Elastic bandage; Topical skin adhesive/glue; Other (Comment)(Prineo closure dressing ) (01/23/20 0841)    Splint/Cast:  ]

## 2020-01-23 NOTE — H&P
Date of Surgery Update:  Deonte Loomis III was seen and examined on the day of surgery prior to the procedure. There were no significant clinical changes since the completion of the History and Physical.    Exam today prior to surgery showed no acute cardiac findings, no respiratory difficulty, and no abdominal complaints or pain. This patient is a candidate for TXA. Documentation of Medical Necessity:    Symptoms: pain with activity and at rest, antalgia, interferes with ADLs    Conservative Treatment: activity modification, multiple medications, injection    Physical Findings:  varus, pain at joint line, antalgia on ambulation, crepitation    Imaging: significant OA, sclerosis and osteophytes,  varus    Indications:   Failure of conservative treatments with daily pain and functional limitations. Appropriate imaging demonstrating significant disease. Appropriate physical findings consistent with significant degenerative joint disease. All pertinent risks, benefits, and alternatives to operative management including continued conservative care were explained at length. The patient has elected to proceed with appropriately indicated and medically necessary total joint arthroplasty. They understand no guarantees can be given about the outcome. *They will be admitted as an inpatient because of the need for intensive therapy and post-anesthesia monitoring.    5) Additional Concerns:  CAD w/ stent 2008, COPD, chronic pain, T2DM, GERD, at risk for MI     Signed By: JERZY Penny     January 23, 2020 7:23 AM

## 2020-01-23 NOTE — PERIOP NOTES
TRANSFER - OUT REPORT:    Verbal report given to IRVIN Olsen RN(name) on [facility] [address] III  being transferred to [facility](unit) for routine post - op       Report consisted of patients Situation, Background, Assessment and   Recommendations(SBAR). Information from the following report(s) SBAR, OR Summary, Intake/Output, MAR and Recent Results was reviewed with the receiving nurse. Opportunity for questions and clarification was provided. Patient transported with:   O2 @ 2 liters  Tech     Family notified of transfer by volunteer.

## 2020-01-24 LAB
ANION GAP SERPL CALC-SCNC: 5 MMOL/L (ref 5–15)
BUN SERPL-MCNC: 11 MG/DL (ref 6–20)
BUN/CREAT SERPL: 14 (ref 12–20)
CALCIUM SERPL-MCNC: 7.8 MG/DL (ref 8.5–10.1)
CHLORIDE SERPL-SCNC: 101 MMOL/L (ref 97–108)
CO2 SERPL-SCNC: 28 MMOL/L (ref 21–32)
CREAT SERPL-MCNC: 0.81 MG/DL (ref 0.7–1.3)
GLUCOSE BLD STRIP.AUTO-MCNC: 138 MG/DL (ref 65–100)
GLUCOSE BLD STRIP.AUTO-MCNC: 142 MG/DL (ref 65–100)
GLUCOSE BLD STRIP.AUTO-MCNC: 143 MG/DL (ref 65–100)
GLUCOSE BLD STRIP.AUTO-MCNC: 157 MG/DL (ref 65–100)
GLUCOSE SERPL-MCNC: 127 MG/DL (ref 65–100)
HGB BLD-MCNC: 11.9 G/DL (ref 12.1–17)
POTASSIUM SERPL-SCNC: 4 MMOL/L (ref 3.5–5.1)
SERVICE CMNT-IMP: ABNORMAL
SODIUM SERPL-SCNC: 134 MMOL/L (ref 136–145)

## 2020-01-24 PROCEDURE — 94640 AIRWAY INHALATION TREATMENT: CPT

## 2020-01-24 PROCEDURE — 51798 US URINE CAPACITY MEASURE: CPT

## 2020-01-24 PROCEDURE — 85018 HEMOGLOBIN: CPT

## 2020-01-24 PROCEDURE — 80048 BASIC METABOLIC PNL TOTAL CA: CPT

## 2020-01-24 PROCEDURE — 74011250637 HC RX REV CODE- 250/637: Performed by: ORTHOPAEDIC SURGERY

## 2020-01-24 PROCEDURE — 97110 THERAPEUTIC EXERCISES: CPT

## 2020-01-24 PROCEDURE — 74011000250 HC RX REV CODE- 250: Performed by: ORTHOPAEDIC SURGERY

## 2020-01-24 PROCEDURE — 97116 GAIT TRAINING THERAPY: CPT

## 2020-01-24 PROCEDURE — 82962 GLUCOSE BLOOD TEST: CPT

## 2020-01-24 PROCEDURE — 94760 N-INVAS EAR/PLS OXIMETRY 1: CPT

## 2020-01-24 PROCEDURE — 36415 COLL VENOUS BLD VENIPUNCTURE: CPT

## 2020-01-24 PROCEDURE — 77030029684 HC NEB SM VOL KT MONA -A

## 2020-01-24 PROCEDURE — 74011250637 HC RX REV CODE- 250/637: Performed by: PHYSICIAN ASSISTANT

## 2020-01-24 PROCEDURE — 74011250636 HC RX REV CODE- 250/636: Performed by: PHYSICIAN ASSISTANT

## 2020-01-24 PROCEDURE — 65270000029 HC RM PRIVATE

## 2020-01-24 PROCEDURE — 97530 THERAPEUTIC ACTIVITIES: CPT

## 2020-01-24 PROCEDURE — 77010033678 HC OXYGEN DAILY

## 2020-01-24 PROCEDURE — 74011250637 HC RX REV CODE- 250/637: Performed by: NURSE PRACTITIONER

## 2020-01-24 RX ORDER — CALCIUM CARBONATE 200(500)MG
200 TABLET,CHEWABLE ORAL
Status: DISCONTINUED | OUTPATIENT
Start: 2020-01-24 | End: 2020-01-25 | Stop reason: HOSPADM

## 2020-01-24 RX ADMIN — ALUMINUM HYDROXIDE, MAGNESIUM HYDROXIDE, AND DIMETHICONE 30 ML: 400; 400; 40 SUSPENSION ORAL at 14:52

## 2020-01-24 RX ADMIN — OXYCODONE 5 MG: 5 TABLET ORAL at 18:14

## 2020-01-24 RX ADMIN — ASPIRIN 81 MG: 81 TABLET ORAL at 18:16

## 2020-01-24 RX ADMIN — DEXAMETHASONE SODIUM PHOSPHATE 10 MG: 4 INJECTION, SOLUTION INTRAMUSCULAR; INTRAVENOUS at 08:25

## 2020-01-24 RX ADMIN — ACETAMINOPHEN 650 MG: 325 TABLET ORAL at 18:16

## 2020-01-24 RX ADMIN — ASPIRIN 81 MG: 81 TABLET ORAL at 08:26

## 2020-01-24 RX ADMIN — ONDANSETRON 4 MG: 2 INJECTION INTRAMUSCULAR; INTRAVENOUS at 08:33

## 2020-01-24 RX ADMIN — OXYCODONE 5 MG: 5 TABLET ORAL at 04:40

## 2020-01-24 RX ADMIN — Medication 1 AMPULE: at 21:23

## 2020-01-24 RX ADMIN — CALCIUM CARBONATE (ANTACID) CHEW TAB 500 MG 200 MG: 500 CHEW TAB at 16:32

## 2020-01-24 RX ADMIN — SENNOSIDES AND DOCUSATE SODIUM 1 TABLET: 8.6; 5 TABLET ORAL at 18:16

## 2020-01-24 RX ADMIN — KETOROLAC TROMETHAMINE 15 MG: 30 INJECTION, SOLUTION INTRAMUSCULAR at 06:08

## 2020-01-24 RX ADMIN — ACETAMINOPHEN 650 MG: 325 TABLET ORAL at 06:07

## 2020-01-24 RX ADMIN — Medication 10 ML: at 14:53

## 2020-01-24 RX ADMIN — Medication 1 AMPULE: at 08:26

## 2020-01-24 RX ADMIN — SENNOSIDES AND DOCUSATE SODIUM 1 TABLET: 8.6; 5 TABLET ORAL at 08:26

## 2020-01-24 RX ADMIN — ALBUTEROL SULFATE 2.5 MG: 2.5 SOLUTION RESPIRATORY (INHALATION) at 06:28

## 2020-01-24 RX ADMIN — ALUMINUM HYDROXIDE, MAGNESIUM HYDROXIDE, AND DIMETHICONE 30 ML: 400; 400; 40 SUSPENSION ORAL at 19:56

## 2020-01-24 RX ADMIN — POLYETHYLENE GLYCOL 3350 17 G: 17 POWDER, FOR SOLUTION ORAL at 08:25

## 2020-01-24 RX ADMIN — OXYCODONE 5 MG: 5 TABLET ORAL at 08:26

## 2020-01-24 RX ADMIN — TAMSULOSIN HYDROCHLORIDE 0.4 MG: 0.4 CAPSULE ORAL at 08:26

## 2020-01-24 RX ADMIN — ALUMINUM HYDROXIDE, MAGNESIUM HYDROXIDE, AND DIMETHICONE 30 ML: 400; 400; 40 SUSPENSION ORAL at 10:54

## 2020-01-24 RX ADMIN — Medication 10 ML: at 06:08

## 2020-01-24 RX ADMIN — KETOROLAC TROMETHAMINE 15 MG: 30 INJECTION, SOLUTION INTRAMUSCULAR at 11:00

## 2020-01-24 RX ADMIN — OXYCODONE 5 MG: 5 TABLET ORAL at 01:27

## 2020-01-24 RX ADMIN — FINASTERIDE 5 MG: 5 TABLET, FILM COATED ORAL at 21:23

## 2020-01-24 RX ADMIN — Medication 10 ML: at 21:23

## 2020-01-24 RX ADMIN — ACETAMINOPHEN 650 MG: 325 TABLET ORAL at 11:00

## 2020-01-24 RX ADMIN — EZETIMIBE 10 MG: 10 TABLET ORAL at 08:26

## 2020-01-24 RX ADMIN — ACETAMINOPHEN 650 MG: 325 TABLET ORAL at 23:15

## 2020-01-24 RX ADMIN — ALUMINUM HYDROXIDE, MAGNESIUM HYDROXIDE, AND DIMETHICONE 30 ML: 400; 400; 40 SUSPENSION ORAL at 03:19

## 2020-01-24 RX ADMIN — PANTOPRAZOLE SODIUM 40 MG: 40 TABLET, DELAYED RELEASE ORAL at 06:07

## 2020-01-24 NOTE — PROGRESS NOTES
Pt. Taken off of O2 1 hour after Jet Neb given and O2 sat 88-89 % fluctuating up to 91    Placed back on O2 at 2l.     Pt. Denies feeling short of breath or chest pain

## 2020-01-24 NOTE — PROGRESS NOTES
Problem: Mobility Impaired (Adult and Pediatric)  Goal: *Acute Goals and Plan of Care (Insert Text)  Description  FUNCTIONAL STATUS PRIOR TO ADMISSION: Patient was independent and active without use of DME.    HOME SUPPORT PRIOR TO ADMISSION: The patient lived with wife but did not require assist.    Physical Therapy Goals  Initiated 1/23/2020  1. Patient will move from supine to sit and sit to supine  in bed with independence within 7 day(s). Met 1/24/20  2. Patient will transfer from bed to chair and chair to bed with modified independence using the least restrictive device within 7 day(s). 3.  Patient will perform sit to stand with modified independence within 7 day(s). Met 1/24/20  4. Patient will ambulate with modified independence for 300 feet with the least restrictive device within 7 day(s). 5.  Patient will ascend/descend 4 stairs with 1 handrail(s) with modified independence within 7 day(s). 1/24/2020 1458 by Savannah Lyons, PT  Outcome: Progressing Towards Goal  1/24/2020 1126 by Savannah Lyons, PT  Outcome: Progressing Towards Goal   PHYSICAL THERAPY TREATMENT  Patient: Yamila Caputo III (40 y.o. male)  Date: 1/24/2020  Diagnosis: Status post total knee replacement [Z96.659]   <principal problem not specified>  Procedure(s) (LRB):  LEFT TOTAL KNEE ARTHROPLASTY  WITH NAVIGATION (Left) 1 Day Post-Op  Precautions: Fall, WBAT  Chart, physical therapy assessment, plan of care and goals were reviewed. ASSESSMENT  Patient continues with skilled PT services and is progressing towards goals. He was much more alert and engaged in all exercises and mobility training. O2 sats ranged from 94-95% on RA during gait/stair training. 97% at rest on RA. Gait progressed from room to gym with RW at [address] with much improved L knee extension in stance phase of gait. Needed cg assistance up/down steps and min a for LLE for car transfers. Cleared all PT milestones.      Current Level of Function Impacting Discharge (mobility/balance): sba grossly    Other factors to consider for discharge: good family support, independent PLOF         PLAN :  Patient continues to benefit from skilled intervention to address the above impairments. Continue treatment per established plan of care. to address goals. Recommendation for discharge: (in order for the patient to meet his/her long term goals)  Physical therapy at least 2 days/week in the home AND ensure assist and/or supervision for safety with ADLs and mobility     This discharge recommendation:  Has been made in collaboration with the attending provider and/or case management    IF patient discharges home will need the following DME: patient owns DME required for discharge       SUBJECTIVE:   Patient stated I feel much better about going home now.     OBJECTIVE DATA SUMMARY:   Critical Behavior:  Neurologic State: Alert  Orientation Level: Oriented X4  Cognition: Follows commands, Appropriate decision making, Appropriate for age attention/concentration, Appropriate safety awareness  Safety/Judgement: Awareness of environment, Good awareness of safety precautions    Range of Motion:  AROM: Generally decreased, functional  PROM: Generally decreased, functional(-5 to 95 L knee)                      Functional Mobility Training:  Bed Mobility:  Rolling: Independent  Supine to Sit: Independent  Sit to Supine: Independent  Scooting: Independent        Transfers:  Sit to Stand: Modified independent  Stand to Sit: Modified independent        Bed to Chair: Supervision; Adaptive equipment(RW)                    Balance:  Sitting: Intact  Standing: Impaired  Standing - Static: Good;Occasional  Standing - Dynamic : Fair;Constant support  Ambulation/Gait Training:  Distance (ft): 175 Feet (ft)  Assistive Device: Gait belt;Walker, rolling  Ambulation - Level of Assistance: Stand-by assistance     Gait Description (WDL): Exceptions to WDL  Gait Abnormalities: Step to gait;Antalgic  Right Side Weight Bearing: Full  Left Side Weight Bearing: As tolerated  Base of Support: Widened  Stance: Left decreased  Speed/Mei: Pace decreased (<100 feet/min)  Step Length: Left shortened;Right shortened              Stairs:  Number of Stairs Trained: 4  Stairs - Level of Assistance: Contact guard assistance   Rail Use: Right     Therapeutic Exercises:     EXERCISE   Sets   Reps   Active Active Assist   Passive Self ROM   Comments   Ankle Pumps  20 [x]                                        []                                        []                                        []                                           Quad Sets  20 [x]                                        []                                        []                                        []                                           Hamstring Sets   []                                        []                                        []                                        []                                           Short Arc Quads   []                                        []                                        []                                        []                                           Knee Extension Stretch     []                                          [x]                                          [x]                                          []                                        In chair foot on cushion   Heel Slides  10 [x]                                        []                                        []                                        []                                           Long Arc Quads  10 []                                        [x]                                        []                                        []                                           Knee Flexion Stretch   []                                        []                                        [] []                                           Straight Leg Raises   []                                        []                                        []                                        []                                               Pain Rating:  3/10 L knee    Activity Tolerance:   Good, SpO2 stable on RA, and requires rest breaks  Please refer to the flowsheet for vital signs taken during this treatment.     After treatment patient left in no apparent distress:   Sitting in chair, Call bell within reach, and Caregiver / family present    COMMUNICATION/COLLABORATION:   The patients plan of care was discussed with: Physical Therapy Assistant, Registered Nurse, Rehabilitation Attendant, and NP     Mika Chicas, PT   Time Calculation: 41 mins

## 2020-01-24 NOTE — ROUTINE PROCESS
Bedside shift change report given to Emily Selby (oncoming nurse) by Gonzalez SORIA RN (offgoing nurse). Report included the following information SBAR, Kardex and MAR.

## 2020-01-24 NOTE — PHYSICIAN ADVISORY
Letter of Status Determination: Current Status   INPATIENT is Appropriate         Pt Name:  Liliana Casillas   MR#  [id number]   [facility]#   [id number]   Room and Hospital  3209/01  @ [facility]   Hospitalization date  1/23/2020  5:26 AM   Current Attending Physician  Lois Skiff, MD   Principal diagnosis  <principal problem not specified>    Clinicals  81 y.o. y.o  male hospitalized with   Total knee replacement with imageless computer navigation         STATUS DETERMINATION  On the basis of clinical data, available documentation, we believe that the current status of this patient as INPATIENT is Appropriate         The Documentation in the medical record  supports the admitting physicians determination that the patient required inpatient care despite the lack of a 2-midnight expectation based upon complex medical factors including but not limited to: Patients history, co-morbidities and current medical needs Risk of adverse events . Severity of signs and symptoms   Additional comments     Insurance  Payor: Florinda Penny / Plan: [address] / Product Type: Medicare /    Insurance Information                [facility] [employer] A [employer] B Phone:     Subscriber: Sameer Conner Subscriber#: [id number]    Group#:  Precert#:         [facility] [place] [facility] Phone:     SubscriberJr Blackburn Subscriber#: [id number]    Group#: [id number] Precert#:                  Francoise Hendrix MD, [facility]   Physician Everette Martini, [address] [place] [address]

## 2020-01-24 NOTE — PROGRESS NOTES
Bedside shift change report given to [address] [place] [address] (oncoming nurse) by César Lanier RN (offgoing nurse). Report included the following information SBAR, Kardex, OR Summary, Intake/Output, MAR and Recent Results.

## 2020-01-24 NOTE — PROGRESS NOTES
Bedside and Verbal shift change report given to Zoraida (oncoming nurse) by Alvaro Daley (offgoing nurse). Report included the following information SBAR, Kardex, Intake/Output, MAR and Recent Results.

## 2020-01-24 NOTE — PROGRESS NOTES
Problem: Mobility Impaired (Adult and Pediatric)  Goal: *Acute Goals and Plan of Care (Insert Text)  Description  FUNCTIONAL STATUS PRIOR TO ADMISSION: Patient was independent and active without use of DME.    HOME SUPPORT PRIOR TO ADMISSION: The patient lived with wife but did not require assist.    Physical Therapy Goals  Initiated 1/23/2020  1. Patient will move from supine to sit and sit to supine  in bed with independence within 7 day(s). 2.  Patient will transfer from bed to chair and chair to bed with modified independence using the least restrictive device within 7 day(s). 3.  Patient will perform sit to stand with modified independence within 7 day(s). 4.  Patient will ambulate with modified independence for 300 feet with the least restrictive device within 7 day(s). 5.  Patient will ascend/descend 4 stairs with 1 handrail(s) with modified independence within 7 day(s). Outcome: Progressing Towards Goal   PHYSICAL THERAPY TREATMENT  Patient: Jagruti Cifuentes III (89 y.o. male)  Date: 1/24/2020  Diagnosis: Status post total knee replacement [Z96.659]   <principal problem not specified>  Procedure(s) (LRB):  LEFT TOTAL KNEE ARTHROPLASTY  WITH NAVIGATION (Left) 1 Day Post-Op  Precautions: Fall, WBAT  Chart, physical therapy assessment, plan of care and goals were reviewed. ASSESSMENT  Patient continues with skilled PT services and is progressing towards goals. He was drowsy and needed regular cues to maintain attention to tasks. VSS, but O2 sats borderline on RA during activity from 91% to 93%. At rest increased to 95% to 97%. He was able to manage stairs with min a x 1 as well as car transfers with cues to improve sequencing and motor planning. He would benefit from 2nd session prior to returning home. Emphasized importance of exercise and positioning compliance to maximize full return of knee function - family and patient verbalized understanding.     Current Level of Function Impacting Discharge (mobility/balance): sba to min a    Other factors to consider for discharge: possible respiratory issues, good family support and modified independent PLOF. PLAN :  Patient continues to benefit from skilled intervention to address the above impairments. Continue treatment per established plan of care. to address goals. Recommendation for discharge: (in order for the patient to meet his/her long term goals)  Physical therapy at least 2 days/week in the home AND ensure assist and/or supervision for safety with ADLs and mobility skills. This discharge recommendation:  Has been made in collaboration with the attending provider and/or case management    IF patient discharges home will need the following DME: patient owns DME required for discharge       SUBJECTIVE:   Patient stated  I need about 4 hours of sleep.     OBJECTIVE DATA SUMMARY:   Critical Behavior:  Neurologic State: Drowsy  Orientation Level: Unable to verbalize  Cognition: Decreased attention/concentration, Decreased command following  Safety/Judgement: Awareness of environment, Decreased insight into deficits, Decreased awareness of need for safety, Decreased awareness of need for assistance, Fall prevention    Range of Motion:  AROM: Generally decreased, functional  PROM: Generally decreased, functional(-12 ro 95 L knee)                      Functional Mobility Training:  Bed Mobility:  Rolling: Independent  Supine to Sit: Stand-by assistance  Sit to Supine: Stand-by assistance  Scooting: Independent        Transfers:  Sit to Stand: Stand-by assistance  Stand to Sit: Stand-by assistance        Bed to Chair: Stand-by assistance; Adaptive equipment(RW)                    Balance:  Sitting: Intact  Standing: Impaired  Standing - Static: Good;Constant support  Standing - Dynamic : Fair;Constant support  Ambulation/Gait Training:     Assistive Device: Gait belt;Walker, rolling        Gait Description (WDL): Exceptions to WDL  Gait Abnormalities: Step to gait;Trunk sway increased; Other(inadequate L knee extension in stance)  Right Side Weight Bearing: Full  Left Side Weight Bearing: As tolerated  Base of Support: Widened  Stance: Left decreased  Speed/Mei: Slow  Step Length: Left shortened;Right lengthened                    Stairs:  Number of Stairs Trained: 4  Stairs - Level of Assistance: Minimum assistance   Rail Use: Right     Therapeutic Exercises:     EXERCISE   Sets   Reps   Active Active Assist   Passive Self ROM   Comments   Ankle Pumps  10 [x]                                        []                                        []                                        []                                           Quad Sets  10 [x]                                        []                                        []                                        []                                           Hamstring Sets   []                                        []                                        []                                        []                                           Short Arc Quads  10 []                                        [x]                                        []                                        []                                           Knee Extension Stretch     []                                          []                                          [x]                                          []                                        L foot on bolster     Heel Slides  10 []                                        [x]                                        []                                        []                                           Long Arc Quads   []                                        []                                        []                                        []                                           Knee Flexion Stretch   []                                        [] []                                        []                                           Straight Leg Raises   []                                        []                                        []                                        []                                               Pain Ratin/10 L knee    Activity Tolerance:   Fair, SpO2 stable on RA, requires frequent rest breaks, and observed SOB with activity  Please refer to the flowsheet for vital signs taken during this treatment.     After treatment patient left in no apparent distress:   Sitting in chair, Call bell within reach, and Caregiver / family present    COMMUNICATION/COLLABORATION:   The patients plan of care was discussed with: Registered Nurse and NP     Ferdinand Patiño PT   Time Calculation: 58 mins

## 2020-01-24 NOTE — PROGRESS NOTES
ADULT PROTOCOL: JET AEROSOL ASSESSMENT    Patient  Chris Newton III     81 y.o.   male     1/24/2020  6:42 AM    Breath Sounds Pre Procedure: Right Breath Sounds: Scattered wheezing                               Left Breath Sounds: Scattered wheezing    Breath Sounds Post Procedure: Right Breath Sounds: Scattered wheezing                                 Left Breath Sounds: Scattered wheezing    Breathing pattern: Pre procedure Breathing Pattern: Regular          Post procedure Breathing Pattern: Regular    Heart Rate: Pre procedure Pulse: 74           Post procedure Pulse: 75    Resp Rate: Pre procedure Respirations: 20           Post procedure Respirations: 20    Peak Flow: Pre bronchodilator   n/a          Post bronchodilator   n/a      Incentive Spirometry:  Actual Volume (ml): 1750 ml          Cough: Pre procedure Cough: Non-productive               Post procedure Cough: Non-productive    Suctioned: NO    Sputum: Pre procedure  n/a                 Post procedure  n/a    Oxygen: O2 Device: Nasal cannula   Flow rate (L/min) 2     Changed: NO    SpO2: Pre procedure SpO2: 92 %   with oxygen              Post procedure SpO2: 95 %  with oxygen    Nebulizer Therapy: Current medications Aerosolized Medications: Albuterol      Changed: NO    Problem List:   Patient Active Problem List   Diagnosis Code    Actinic keratosis L57.0    Annual physical exam Z00.00    ASHD (arteriosclerotic heart disease) I25.10    Chest pain R07.9    Chronic back pain M54.9, G89.29    Cough R05    Diabetes mellitus screening Z13.1    Diaphoresis R61    Dyslipidemia E78.5    Edema R60.9    Elevated LFTs R94.5    Fatigue R53.83    Hyperplasia of prostate N40.0    Obesity E66.9    Other long term (current) drug therapy Z79.899    Sinusitis J32.9    SK (seborrheic keratosis) L82.1    Severe obesity (BMI 35.0-39. 9) E66.01    Essential hypertension I10    SANDERS (dyspnea on exertion) R06.09    Acute bronchitis J20.9    Impaired glucose tolerance R73.02    Status post total knee replacement Z96.659       Respiratory Therapist: Scarlett Giron RT

## 2020-01-24 NOTE — PROGRESS NOTES
Patient has a constant cough. No wheezing and does not report CP or shortness of breath. Continue to monitor and keep on continuous pulse oximetry. Has been on room air since 7pm  sats maintaining above 90%  Mostly around 94%. Diminished to bases. Pt. States he has been seeing an pulmonologist for his cough. Daughter is staying overnight   Pt. Only able to void small amounts standing at bedside.   At.1230 Bladder scan was 253 after voided 75ml   Will monitor

## 2020-01-24 NOTE — PROGRESS NOTES
Ortho / Neurosurgery NP Note    POD# 1  s/p LEFT TOTAL KNEE ARTHROPLASTY  WITH NAVIGATION   Pt seen with PT while ambulating in hallway  Had to stop due to SOB  Pt reports just completing abx course for bronchiolitis yesterday before surgery and that surgery had been prev postponed for same. Pt was cleared for OR by Dr. Allison Elaine (pulm)      Pt resting in bed. No complaints. VSS Afebrile. Voiding status: voiding in small amounts 50, 100 at a time needs to completely empty bladder    Output (mL)  Urine Voided: 200 ml (01/24/20 1055)  Emesis: 0 mL (01/23/20 1010)  Stool: 0 ml (01/23/20 1010)  Last Bowel Movement Date: 01/22/20 (01/24/20 0827)  Blood: 0 ml (01/23/20 1010)  Other Output: 302 mL (01/23/20 1539)  Unmeasurable Output  Urine Occurrence(s): 0 (01/23/20 1010)  Stool Occurrence(s): 0 (01/23/20 1010)  Emesis Occurrence(s): 0 (01/23/20 1010)  Straight Cath  Straight Cath: Nurse performed cath;Sterile technique used (01/23/20 1753)  Number of Attempts: 1 (01/23/20 1753)  Catheter Size: 16 FR (01/23/20 1753)  Urine: 450 mL (01/23/20 1753)      Labs  Lab Results   Component Value Date/Time    HGB 11.9 (L) 01/24/2020 03:27 AM      Lab Results   Component Value Date/Time    INR 1.0 01/14/2020 08:34 AM        Recent Glucose Results:   Lab Results   Component Value Date/Time     (H) 01/24/2020 03:27 AM    GLUCPOC 138 (H) 01/24/2020 11:28 AM    GLUCPOC 142 (H) 01/24/2020 07:25 AM    GLUCPOC 129 (H) 01/23/2020 09:13 PM         Body mass index is 35.87 kg/m². : A BMI > 30 is classified as obesity and > 40 is classified as morbid obesity. resp status - some WOB, Sats down to 91% with exertion. Improves with rest.  RRR. BBS clear - no audible wheezes at present. Able to complete PT wth multiple pauses to rest.    Dressing c.d.i, ace over top - can be removed. Cryotherapy in place over incision  Calves soft and supple;  No pain with passive stretch  Sensation and motor intact  SCDs for mechanical DVT proph while in bed     PLAN:  1) PT BID  2) Aspirin 81 mg PO BID for DVT Prophylaxis   3) GI Prophylaxis - Protonix  4) Shortness of breath - received neb tx last night. PRN nebs available. Will order continuous pulse ox. 5) Readniess for discharge:     [x] Vital Signs stable except pulse ox 91% room air with activity. [x] Hgb stable    [x] + Voiding    [x] Wound intact, drainage minimal    [x] Tolerating PO intake     [] Cleared by PT (OT if applicable)     [] Stair training completed (if applicable)    [] Independent / Contact Guard Assist (household distance)     [] Bed mobility     [] Car transfers     [] ADLs    [] Adequate pain control on oral medication alone     Plan home today if void and resp stable vs monitor overnight to and allow more time to void.     Rajni Moran, NP  DNP, ACNP-BC, ONP-C

## 2020-01-24 NOTE — PROGRESS NOTES
Denies chest pain or feeling tightness in chest.  States he feels like his head is congested and he has post nasal drip   Coughing moderate white sputum. Says this is same sputum he has been coughing up at home O2 sat 97% with O2 on at 2l/min at present time.

## 2020-01-25 VITALS
WEIGHT: 250 LBS | OXYGEN SATURATION: 93 % | DIASTOLIC BLOOD PRESSURE: 72 MMHG | RESPIRATION RATE: 18 BRPM | HEART RATE: 91 BPM | TEMPERATURE: 98.2 F | SYSTOLIC BLOOD PRESSURE: 149 MMHG | HEIGHT: 70 IN | BODY MASS INDEX: 35.79 KG/M2

## 2020-01-25 LAB
ANION GAP SERPL CALC-SCNC: 4 MMOL/L (ref 5–15)
BUN SERPL-MCNC: 14 MG/DL (ref 6–20)
BUN/CREAT SERPL: 18 (ref 12–20)
CALCIUM SERPL-MCNC: 8.7 MG/DL (ref 8.5–10.1)
CHLORIDE SERPL-SCNC: 103 MMOL/L (ref 97–108)
CO2 SERPL-SCNC: 31 MMOL/L (ref 21–32)
CREAT SERPL-MCNC: 0.77 MG/DL (ref 0.7–1.3)
GLUCOSE BLD STRIP.AUTO-MCNC: 126 MG/DL (ref 65–100)
GLUCOSE BLD STRIP.AUTO-MCNC: 127 MG/DL (ref 65–100)
GLUCOSE SERPL-MCNC: 138 MG/DL (ref 65–100)
HGB BLD-MCNC: 11.7 G/DL (ref 12.1–17)
POTASSIUM SERPL-SCNC: 4.4 MMOL/L (ref 3.5–5.1)
SERVICE CMNT-IMP: ABNORMAL
SERVICE CMNT-IMP: ABNORMAL
SODIUM SERPL-SCNC: 138 MMOL/L (ref 136–145)

## 2020-01-25 PROCEDURE — 80048 BASIC METABOLIC PNL TOTAL CA: CPT

## 2020-01-25 PROCEDURE — 74011250637 HC RX REV CODE- 250/637: Performed by: PHYSICIAN ASSISTANT

## 2020-01-25 PROCEDURE — 85018 HEMOGLOBIN: CPT

## 2020-01-25 PROCEDURE — 74011250637 HC RX REV CODE- 250/637: Performed by: NURSE PRACTITIONER

## 2020-01-25 PROCEDURE — 36415 COLL VENOUS BLD VENIPUNCTURE: CPT

## 2020-01-25 PROCEDURE — 94760 N-INVAS EAR/PLS OXIMETRY 1: CPT

## 2020-01-25 PROCEDURE — 74011250637 HC RX REV CODE- 250/637: Performed by: ORTHOPAEDIC SURGERY

## 2020-01-25 PROCEDURE — 82962 GLUCOSE BLOOD TEST: CPT

## 2020-01-25 RX ADMIN — ASPIRIN 81 MG: 81 TABLET ORAL at 08:00

## 2020-01-25 RX ADMIN — ACETAMINOPHEN 650 MG: 325 TABLET ORAL at 11:55

## 2020-01-25 RX ADMIN — Medication 10 ML: at 06:24

## 2020-01-25 RX ADMIN — OXYCODONE 5 MG: 5 TABLET ORAL at 11:55

## 2020-01-25 RX ADMIN — EZETIMIBE 10 MG: 10 TABLET ORAL at 08:00

## 2020-01-25 RX ADMIN — PANTOPRAZOLE SODIUM 40 MG: 40 TABLET, DELAYED RELEASE ORAL at 07:29

## 2020-01-25 RX ADMIN — TAMSULOSIN HYDROCHLORIDE 0.4 MG: 0.4 CAPSULE ORAL at 08:00

## 2020-01-25 RX ADMIN — Medication 1 AMPULE: at 08:03

## 2020-01-25 RX ADMIN — OXYCODONE 5 MG: 5 TABLET ORAL at 07:29

## 2020-01-25 RX ADMIN — CALCIUM CARBONATE (ANTACID) CHEW TAB 500 MG 200 MG: 500 CHEW TAB at 07:29

## 2020-01-25 RX ADMIN — ALUMINUM HYDROXIDE, MAGNESIUM HYDROXIDE, AND DIMETHICONE 30 ML: 400; 400; 40 SUSPENSION ORAL at 04:34

## 2020-01-25 RX ADMIN — CALCIUM CARBONATE (ANTACID) CHEW TAB 500 MG 200 MG: 500 CHEW TAB at 11:55

## 2020-01-25 RX ADMIN — ACETAMINOPHEN 650 MG: 325 TABLET ORAL at 06:24

## 2020-01-25 NOTE — PROGRESS NOTES
Discharge instructions given per written order with family at bedside. Opportunity for questions given, all questions answered. Iv removed per policy, catheter tip intact. Patient transported home via family, taken down by tech.

## 2020-01-25 NOTE — PROGRESS NOTES
Orthopedic End of Shift Note    Bedside and Verbal shift change report given to Shant Yañez RN (oncoming nurse) by Israel Palomares (offgoing nurse). Report included the following information SBAR, Kardex, Procedure Summary, Intake/Output, MAR, Accordion and Recent Results. POD# 2  Significant issues during shift: N/A- Patient sustained O2 sats above 90% for this shift. Issues for Physician to address:      Activity This Shift  (check all that apply) [] chair  [] dangle   [x] bathroom  [] bedside commode [] hallway  [] bedrest   Nausea/Vomiting [] yes [x] no     Voiding Status [x] void [] Mc [] I&O Cath   Bowel Movements [] yes [x] no     Foot Pumps or SCD [x] yes [] no    Ice Pack [x] yes    [] no    Incentive Spirometer [x] yes [] no Volume:   1700   Telemetry Monitoring   [] yes [x] no Rhythm:   Supplemental O2 [] yes [x] no Sat off O2:   95%

## 2020-01-25 NOTE — PROGRESS NOTES
Orthopedic End of Shift Note    Bedside shift change report given to Sharda Montes RN (oncoming nurse) by Bill Gaines (offgoing nurse). Report included the following information SBAR, Kardex, OR Summary, Procedure Summary, Intake/Output, MAR, Accordion, Recent Results and Med Rec Status.      POD#     Significant issues during shift: indigestion    Issues for Physician to address: none    Activity This Shift  (check all that apply) [] chair  [] dangle   [x] bathroom  [] bedside commode [x] hallway  [] bedrest   Nausea/Vomiting [] yes [x] no     Voiding Status [x] void [] Mc [] I&O Cath   Bowel Movements [] yes [x] no     Foot Pumps or SCD [x] yes [] no    Ice Pack [x] yes    [] no    Incentive Spirometer [x] yes [] no Volume:   2200   Telemetry Monitoring   [] yes [x] no Rhythm:   Supplemental O2 [] yes [x] no Sat off O2:   93%

## 2020-01-25 NOTE — PROGRESS NOTES
Problem: Patient Education: Go to Patient Education Activity  Goal: Patient/Family Education  Outcome: Progressing Towards Goal     Problem: Falls - Risk of  Goal: *Absence of Falls  Description  Document Jean Mortensen Fall Risk and appropriate interventions in the flowsheet.   Outcome: Progressing Towards Goal  Note: Fall Risk Interventions:  Mobility Interventions: Patient to call before getting OOB         Medication Interventions: Patient to call before getting OOB    Elimination Interventions: Call light in reach              Problem: Patient Education: Go to Patient Education Activity  Goal: Patient/Family Education  Outcome: Progressing Towards Goal

## 2020-01-25 NOTE — PROGRESS NOTES
ORTHO - Progress Note  Post Op day: 2 Days Post-Op    Wanda Jean     [id number]  male    81 y.o.    1950    Admit date: 2020  Date of Surgery: 2020   Procedures: Procedure(s):LEFT TOTAL KNEE ARTHROPLASTY  WITH NAVIGATION  Admitting Physician: Noam Blake MD   Surgeon:  Abilio Mitchell) and Role:   Angelina Degroot MD - Primary    Consulting Physician(s): Treatment Team: Attending Provider: Sharath Watters MD; Care Manager: Gretchen Ruiz; Utilization Review: Isabel York RN    SUBJECTIVE:     Yissel Loyd III is a 81 y.o. male s/p a LEFT TOTAL KNEE ARTHROPLASTY  WITH NAVIGATION resting in the chair. Patient has complaints of appropriate post-op pain, tolerating PO pain medications oxy 5mg PRN    Denies F/C, nausea, vomiting, dizziness, lightheadedness, chest pain, or shortness of breath. Continued cough, phlegm production, and irration upper throat. OBJECTIVE:       Physical Exam:  General: alert, cooperative, no distress. Gastrointestinal:  distended . Cardiovascular: equal pulses in the lower extremities,  Brisk cap refill in all distal extremities   Genitourinary: Voiding independently   Respiratory: No respiratory distress   Neurological:Neurovascular exam within normal limits. Senstion intact: LE bilat. Motor: + DF/PF/EHL. Musculoskeletal: Min effusion. Min L calf swelling. Malinda's sign negative in bilateral lower extremities. Calves soft, supple, non-tender upon palpation or with passive stretch. Dressing/Wound:  Clean, dry and intact. No significant erythema or swelling.     Vital Signs:         Patient Vitals for the past 8 hrs:   BP Temp Pulse Resp SpO2   20 1057 149/72 98.2 °F (36.8 °C) 91 18 93 %   20 0725 156/86 97.6 °F (36.4 °C) 95 17 98 %                                          Temp (24hrs), Av.1 °F (36.7 °C), Min:97.6 °F (36.4 °C), Max:98.4 °F (36.9 °C)      Labs:        Recent Labs     20  0319   HGB 11.7*     PT/OT: Gait  Base of Support: Widened  Speed/Mei: Pace decreased (<100 feet/min)  Step Length: Left shortened, Right shortened  Stance: Left decreased  Gait Abnormalities: Step to gait, Antalgic  Ambulation - Level of Assistance: Stand-by assistance  Distance (ft): 175 Feet (ft)  Assistive Device: Gait belt, Walker, rolling  Rail Use: Right   Stairs - Level of Assistance: Contact guard assistance  Number of Stairs Trained: 4     ASSESSMENT / PLAN:   Active Problems:    Status post total knee replacement (1/23/2020)       - 02 sat and resp improved.   HR improved since last night.  -  Continue PT/OT WBAT  -  DVT prophylaxis- SCD w/ ASA 81BID  -  DC planning - HH/PT ----DC today    Signed By: Billie Foreman PA-C

## 2020-01-30 NOTE — DISCHARGE SUMMARY
[facility] - Adult Reconstruction and Total Joint Replacement    Mamadou Shaver - MRN [id number] - : 1950 (81 y.o.)    Discharge Summary    Admit date: 2020    Discharge date and time: 2020  1:24 PM     Admitting Physician: [facility] MD     Date of Surgery: 2020     Preoperative Diagnosis:  LEFT KNEE OA    Postoperative Diagnosis: LEFT KNEE OA    Procedure(s): Procedure(s):  LEFT TOTAL KNEE ARTHROPLASTY  WITH NAVIGATION    Surgeon: Surgeon(s) and Role:     Pastor Carrel, MD - Primary      Anesthesia:  General    HPI:  Pt is a 81 y.o. male who has a history of Status post total knee replacement [Z96.659]  with pain and limitations of activities of daily living who presents at this time for total joint replacement following the failure of conservative management.     PMH:   Past Medical History:   Diagnosis Date    Actinic keratosis 9/15/2017    Annual physical exam 9/15/2017    Arthritis     ASHD (arteriosclerotic heart disease) 9/15/2017    Asthma     At risk for sleep apnea 2019    STOP BANG 5    Guerrier's esophagus     CAD (coronary artery disease)     Chest pain 9/15/2017    Chronic back pain 9/15/2017    Chronic obstructive pulmonary disease (HCC)     Chronic pain     Cough 9/15/2017    Diabetes ([address]     hypoglycemia hx    Diabetes mellitus screening 9/15/2017    Diaphoresis 9/15/2017    SANDERS (dyspnea on exertion)     Dyslipidemia 9/15/2017    Edema 9/15/2017    Elevated LFTs 9/15/2017    Essential hypertension 2019    Fatigue 9/15/2017    GERD (gastroesophageal reflux disease)     Guerrier's esophagus    Hypercholesterolemia     Hyperplasia of prostate 9/15/2017    Impaired glucose tolerance 2020    Obesity 9/15/2017    Other long term (current) drug therapy 9/15/2017    Sinusitis 9/15/2017    SK (seborrheic keratosis) 9/15/2017       Medications upon admission :   Prior to Admission Medications   Prescriptions Last Dose Informant Patient Reported? Taking? COQ10, UBIQUINOL, PO 1/16/2020 at Unknown time  Yes Yes   Sig: Take 100 mg by mouth daily. Cholestyramine-Aspartame (CHOLESTYRAMINE LIGHT) 4 gram powder 1/22/2020 at Unknown time  Yes Yes   Sig: Take 4 g by mouth every morning. ZETIA 10 mg tablet 1/23/2020 at 0500  No Yes   Sig: Take 1 Tab by mouth daily. albuterol (VENTOLIN HFA) 90 mcg/actuation inhaler 1/22/2020 at Unknown time  Yes Yes   Sig: Take 2 Puffs by inhalation every four (4) hours as needed for Wheezing. amoxicillin-clavulanate (AUGMENTIN) 875-125 mg per tablet   No No   Sig: Take 1 Tab by mouth two (2) times a day for 5 days. aspirin 81 mg chewable tablet 1/16/2020 at Unknown time  Yes No   Sig: Take 81 mg by mouth nightly. cephALEXin (KEFLEX) 500 mg capsule Not Taking at Unknown time  Yes No   Sig: Take 1,000 mg by mouth once. Indications: 1 hour prior to dental procedure. s/p partial knee replacement   dilTIAZem (TIAZAC) 360 mg SR capsule 1/23/2020 at 0500  Yes Yes   Sig: Take 360 mg by mouth every morning. finasteride (PROSCAR) 5 mg tablet 1/22/2020 at Unknown time  Yes Yes   Sig: Take 5 mg by mouth every Monday, Wednesday, Friday. levocetirizine (XYZAL) 5 mg tablet 1/23/2020 at 0500  No Yes   Sig: TAKE 1 TABLET BY MOUTH ONCE DAILY   multivitamin (ONE A DAY) tablet 1/16/2020 at Unknown time  Yes Yes   Sig: Take 1 Tab by mouth daily. nabumetone (RELAFEN) 500 mg tablet 1/16/2020 at Unknown time  No No   Sig: TAKE 1 TABLET BY MOUTH TWICE DAILY   omeprazole (PRILOSEC) 40 mg capsule 1/23/2020 at 0400  Yes Yes   Sig: Take 40 mg by mouth daily. Indications: gastroesophageal reflux disease      Facility-Administered Medications: None        Allergies:     Allergies   Allergen Reactions    Claritin-D 12 Hour [Loratadine-Pseudoephedrine] Shortness of Breath    Iodine Shortness of Breath    Levaquin [Levofloxacin] Hives    Seafood [Shellfish Containing Products] Shortness of Breath    Statins-Hmg-Coa Reductase Inhibitors Other (comments)     \"liver problems\"    Sulfa (Sulfonamide Antibiotics) Hives        Hospital Course: The patient underwent surgery. There were no immediate post-operative complications. The patient was taken to the PACU in stable condition and then transferred to the  floor. Hemoglobin at discharge:    Lab Results   Component Value Date/Time    HGB 11.7 (L) 01/25/2020 03:19 AM    INR 1.0 01/14/2020 08:34 AM       Dressing was changed postoperatively and the incision was noted to be clean, dry and intact. Normal significant erythema and swelling was noted. Neurovascular exam within normal limits. Wound appears to be healing without any evidence of infection. Physical Therapy started postoperatively per protocol. The patient was allowed to bear weight as tolerated. Discharge instructions:  -Anticoagulate per discharge instructions  -Resume pre hospital diet              Discharge Medication List as of 1/25/2020 11:38 AM      START taking these medications    Details   acetaminophen (TYLENOL) 325 mg tablet Take 2 Tabs by mouth every six (6) hours for 14 days. , Print, Disp-112 Tab, R-0      oxyCODONE IR (ROXICODONE) 5 mg immediate release tablet Take 1-2 Tabs by mouth every four (4) hours as needed for Pain for up to 14 days. Max Daily Amount: 60 mg. One tab for mild to moderate pain level 1-6, or 2 tabs for severe pain level 7-10, Print, Disp-60 Tab, R-0      polyethylene glycol (MIRALAX) 17 gram packet Take 1 Packet by mouth daily for 14 days. , Print, Disp-14 Packet, R-0      senna-docusate (PERICOLACE) 8.6-50 mg per tablet Take 1 Tab by mouth two (2) times a day for 14 days. , Print, Disp-28 Tab, R-0         CONTINUE these medications which have CHANGED    Details   aspirin 81 mg chewable tablet Take 1 Tab by mouth two (2) times a day for 30 days. , Print, Disp-60 Tab, R-0         CONTINUE these medications which have NOT CHANGED    Details   COQ10, UBIQUINOL, PO Take 100 mg by mouth daily. , Historical Med      Cholestyramine-Aspartame (CHOLESTYRAMINE LIGHT) 4 gram powder Take 4 g by mouth every morning., Historical Med      albuterol (VENTOLIN HFA) 90 mcg/actuation inhaler Take 2 Puffs by inhalation every four (4) hours as needed for Wheezing., Historical Med      dilTIAZem (TIAZAC) 360 mg SR capsule Take 360 mg by mouth every morning., Historical Med, R-2      levocetirizine (XYZAL) 5 mg tablet TAKE 1 TABLET BY MOUTH ONCE DAILY, Normal**Patient requests 90 days supply**Disp-90 Tab, R-5      omeprazole (PRILOSEC) 40 mg capsule Take 40 mg by mouth daily. Indications: gastroesophageal reflux disease, Historical Med, R-1      ZETIA 10 mg tablet Take 1 Tab by mouth daily. , Normal, Disp-90 Tab, R-3, DEMETRIUS      finasteride (PROSCAR) 5 mg tablet Take 5 mg by mouth every Monday, Wednesday, Friday., Historical Med, R-0      multivitamin (ONE A DAY) tablet Take 1 Tab by mouth daily. , Historical Med      cephALEXin (KEFLEX) 500 mg capsule Take 1,000 mg by mouth once. Indications: 1 hour prior to dental procedure. s/p partial knee replacement, Historical Med         STOP taking these medications       amoxicillin-clavulanate (AUGMENTIN) 875-125 mg per tablet Comments:   Reason for Stopping:         nabumetone (RELAFEN) 500 mg tablet Comments:   Reason for Stopping:                -Discharge activity: as tolerated +/- assistive devices as prescribed.   -Routine Wound Care  -Follow up in office in 2-3 weeks      Signed:  Jose Gonzales MD  1/30/2020  11:49 AM

## 2020-03-16 ENCOUNTER — OFFICE VISIT (OUTPATIENT)
Dept: INTERNAL MEDICINE CLINIC | Age: 70
End: 2020-03-16

## 2020-03-16 VITALS
DIASTOLIC BLOOD PRESSURE: 84 MMHG | HEIGHT: 70 IN | TEMPERATURE: 98.4 F | HEART RATE: 77 BPM | RESPIRATION RATE: 16 BRPM | WEIGHT: 244.6 LBS | BODY MASS INDEX: 35.02 KG/M2 | OXYGEN SATURATION: 96 % | SYSTOLIC BLOOD PRESSURE: 128 MMHG

## 2020-03-16 DIAGNOSIS — Z13.31 SCREENING FOR DEPRESSION: ICD-10-CM

## 2020-03-16 DIAGNOSIS — N40.0 HYPERPLASIA OF PROSTATE: ICD-10-CM

## 2020-03-16 DIAGNOSIS — I25.10 ASHD (ARTERIOSCLEROTIC HEART DISEASE): ICD-10-CM

## 2020-03-16 DIAGNOSIS — Z12.5 SPECIAL SCREENING FOR MALIGNANT NEOPLASM OF PROSTATE: ICD-10-CM

## 2020-03-16 DIAGNOSIS — Z00.00 MEDICARE ANNUAL WELLNESS VISIT, SUBSEQUENT: Primary | ICD-10-CM

## 2020-03-16 DIAGNOSIS — Z13.39 SCREENING FOR ALCOHOLISM: ICD-10-CM

## 2020-03-16 DIAGNOSIS — E78.5 DYSLIPIDEMIA: ICD-10-CM

## 2020-03-16 DIAGNOSIS — R53.83 FATIGUE, UNSPECIFIED TYPE: ICD-10-CM

## 2020-03-16 DIAGNOSIS — I10 ESSENTIAL HYPERTENSION: ICD-10-CM

## 2020-03-16 DIAGNOSIS — Z13.6 SCREENING FOR ISCHEMIC HEART DISEASE: ICD-10-CM

## 2020-03-16 DIAGNOSIS — R73.02 IMPAIRED GLUCOSE TOLERANCE: ICD-10-CM

## 2020-03-16 DIAGNOSIS — Z13.1 SCREENING FOR DIABETES MELLITUS: ICD-10-CM

## 2020-03-16 LAB
A-G RATIO,AGRAT: 1.6 RATIO
ALBUMIN SERPL-MCNC: 4.5 G/DL (ref 3.9–5.4)
ALP SERPL-CCNC: 48 U/L (ref 38–126)
ALT SERPL-CCNC: 23 U/L (ref 0–50)
ANION GAP SERPL CALC-SCNC: 13 MMOL/L
AST SERPL W P-5'-P-CCNC: 20 U/L (ref 14–36)
BILIRUB SERPL-MCNC: 0.5 MG/DL (ref 0.2–1.3)
BILIRUB UR QL: NEGATIVE
BUN SERPL-MCNC: 20 MG/DL (ref 9–20)
BUN/CREATININE RATIO,BUCR: 33 RATIO
CALCIUM SERPL-MCNC: 9.7 MG/DL (ref 8.4–10.2)
CHLORIDE SERPL-SCNC: 101 MMOL/L (ref 98–107)
CHOL/HDL RATIO,CHHD: 2 RATIO (ref 0–4)
CHOLEST SERPL-MCNC: 190 MG/DL (ref 0–200)
CLARITY: CLEAR
CO2 SERPL-SCNC: 27 MMOL/L (ref 22–32)
COLOR UR: NORMAL
CREAT SERPL-MCNC: 0.6 MG/DL (ref 0.8–1.5)
GLOBULIN,GLOB: 2.8
GLUCOSE 24H UR-MRATE: NEGATIVE G/(24.H)
GLUCOSE SERPL-MCNC: 89 MG/DL (ref 75–110)
HDLC SERPL-MCNC: 88 MG/DL (ref 35–130)
HGB UR QL STRIP: NEGATIVE
KETONES UR QL STRIP.AUTO: NEGATIVE
LDL/HDL RATIO,LDHD: 1 RATIO
LDLC SERPL CALC-MCNC: 75 MG/DL (ref 0–130)
LEUKOCYTE ESTERASE: NEGATIVE
NITRITE UR QL STRIP.AUTO: NEGATIVE
PH UR STRIP: 6.5 [PH] (ref 5–7)
POTASSIUM SERPL-SCNC: 4.7 MMOL/L (ref 3.6–5)
PROT SERPL-MCNC: 7.3 G/DL (ref 6.3–8.2)
PROT UR STRIP-MCNC: NEGATIVE MG/DL
PSA, TEST22: 0.3 NG/ML (ref 0–4)
SODIUM SERPL-SCNC: 141 MMOL/L (ref 137–145)
SP GR UR REFRACTOMETRY: 1.01 (ref 1–1.03)
TRIGL SERPL-MCNC: 137 MG/DL (ref 0–200)
UROBILINOGEN UR QL STRIP.AUTO: NEGATIVE
VLDLC SERPL CALC-MCNC: 27 MG/DL

## 2020-03-16 RX ORDER — DICLOFENAC SODIUM 10 MG/G
GEL TOPICAL
Status: ON HOLD | COMMUNITY
Start: 2020-02-25 | End: 2021-10-14

## 2020-03-16 RX ORDER — GABAPENTIN 100 MG/1
100 CAPSULE ORAL 3 TIMES DAILY
COMMUNITY
Start: 2020-03-04 | End: 2020-07-20 | Stop reason: ALTCHOICE

## 2020-03-16 NOTE — PROGRESS NOTES
This note will not be viewable in [address] Aleja Greenfield is a 81 y.o. male and presents with Annual Wellness Visit  . Subjective:  Mr. Rachel Ayala presents today for follow-up Medicare annual wellness review as well as follow-up of coronary disease, hyperlipidemia/dyslipidemia, impaired glucose tolerance, hypertension, chronic back pain. He is status post partial right total knee replacement and total left knee replacement. He is continuing physical therapy with good results. He has had some back issues but has returned to his chiropractor with good results. He denies any shortness of breath, chest pain, palpitations, PND, orthopnea, or pedal edema. He does note increased abdominal girth with increased intra-abdominal pressure when doing his physical therapy. He has no pain associated with this. Review of Systems  Constitutional:   Eyes:   negative for visual disturbance and irritation  ENT:   negative for tinnitus,sore throat,nasal congestion,ear pains. hoarseness  Respiratory:  negative for cough, hemoptysis, dyspnea,wheezing  CV:   negative for chest pain, palpitations, lower extremity edema  GI:   negative for nausea, vomiting, diarrhea, abdominal pain,melena  Endo:               negative for polyuria,polydipsia,polyphagia,heat intolerance  Genitourinary: negative for frequency, dysuria and hematuria  Integumentary: negative for rash and pruritus  Hematologic:  negative for easy bruising and gum/nose bleeding  Musculoskel: negative for myalgias, arthralgias, back pain, muscle weakness, joint pain  Neurological:  negative for headaches, dizziness, vertigo, memory problems and gait   Behavl/Psych: negative for feelings of anxiety, depression, mood changes    Past Medical History:   Diagnosis Date    Actinic keratosis 9/15/2017    Annual physical exam 9/15/2017    Arthritis     ASHD (arteriosclerotic heart disease) 9/15/2017    Asthma     At risk for sleep apnea 11/20/2019    STOP BANG 5    Guerrier's esophagus     CAD (coronary artery disease)     Chest pain 9/15/2017    Chronic back pain 9/15/2017    Chronic obstructive pulmonary disease (HCC)     Chronic pain     Cough 9/15/2017    Diabetes ([address]     hypoglycemia hx    Diabetes mellitus screening 9/15/2017    Diaphoresis 9/15/2017    SANDERS (dyspnea on exertion)     Dyslipidemia 9/15/2017    Edema 9/15/2017    Elevated LFTs 9/15/2017    Essential hypertension 6/7/2019    Fatigue 9/15/2017    GERD (gastroesophageal reflux disease)     Guerrier's esophagus    Hypercholesterolemia     Hyperplasia of prostate 9/15/2017    Impaired glucose tolerance 1/17/2020    Obesity 9/15/2017    Other long term (current) drug therapy 9/15/2017    Sinusitis 9/15/2017    SK (seborrheic keratosis) 9/15/2017     Past Surgical History:   Procedure Laterality Date    COLONOSCOPY N/A 11/27/2019    FLEXIBLE SIGMOIDOSCOPY performed by Demarcus Fong MD at [facility] AMBULATORY OR    COLONOSCOPY N/A 1/15/2020    COLONOSCOPY performed by [facility]arcus Fong MD at [facility] ENDOSCOPY    HX APPENDECTOMY      HX COLONOSCOPY      HX ENDOSCOPY      HX HEART CATHETERIZATION  ~2008    stent    HX KNEE REPLACEMENT Right 06/2019    partial knee replacement Dr. Jimenes Knife History     Socioeconomic History    Marital status:      Spouse name: Not on file    Number of children: Not on file    Years of education: Not on file    Highest education level: Not on file   Tobacco Use    Smoking status: Never Smoker    Smokeless tobacco: Former User   Substance and Sexual Activity    Alcohol use: No     Alcohol/week: 0.0 standard drinks    Drug use: No     Family History   Problem Relation Age of Onset    Lung Disease Mother         COPD    Heart Disease Mother         pacemaker    Cancer Father         prostate/liver     Current Outpatient Medications   Medication Sig Dispense Refill    gabapentin (NEURONTIN) 100 mg capsule Take 100 mg by mouth three (3) times daily.      diclofenac (VOLTAREN) 1 % gel Apply two grams by topical route four times daily to the affected area (s).  COQ10, UBIQUINOL, PO Take 100 mg by mouth daily.  Cholestyramine-Aspartame (CHOLESTYRAMINE LIGHT) 4 gram powder Take 4 g by mouth every morning.  cephALEXin (KEFLEX) 500 mg capsule Take 1,000 mg by mouth once. Indications: 1 hour prior to dental procedure. s/p partial knee replacement      dilTIAZem (TIAZAC) 360 mg SR capsule Take 360 mg by mouth every morning. 2    levocetirizine (XYZAL) 5 mg tablet TAKE 1 TABLET BY MOUTH ONCE DAILY 90 Tab 5    omeprazole (PRILOSEC) 40 mg capsule Take 40 mg by mouth daily. Indications: gastroesophageal reflux disease  1    ZETIA 10 mg tablet Take 1 Tab by mouth daily. 90 Tab 3    finasteride (PROSCAR) 5 mg tablet Take 5 mg by mouth every Monday, Wednesday, Friday. 0    multivitamin (ONE A DAY) tablet Take 1 Tab by mouth daily.  albuterol (VENTOLIN HFA) 90 mcg/actuation inhaler Take 2 Puffs by inhalation every four (4) hours as needed for Wheezing.        Allergies   Allergen Reactions    Claritin-D 12 Hour [Loratadine-Pseudoephedrine] Shortness of Breath    Iodine Shortness of Breath    Levaquin [Levofloxacin] Hives    Seafood [Shellfish Containing Products] Shortness of Breath    Statins-Hmg-Coa Reductase Inhibitors Other (comments)     \"liver problems\"    Sulfa (Sulfonamide Antibiotics) Hives       Objective:  Visit Vitals  /84 (BP 1 Location: Left arm, BP Patient Position: Sitting)   Pulse 77   Temp 98.4 °F (36.9 °C) (Oral)   Resp 16   Ht 5' 10\" (1.778 m)   Wt 244 lb 9.6 oz (110.9 kg)   SpO2 96%   BMI 35.10 kg/m²     Physical Exam:   General appearance - alert, well appearing, and in no distress  Mental status - alert, oriented to person, place, and time  EYE-KAI, EOMI, fundi normal, corneas normal, no foreign bodies  ENT-ENT exam normal, no neck nodes or sinus tenderness  Nose - normal and patent, no erythema, discharge or polyps  Mouth - mucous membranes moist, pharynx normal without lesions  Neck - supple, no significant adenopathy   Chest - clear to auscultation, no wheezes, rales or rhonchi, symmetric air entry   Heart - normal rate, regular rhythm, normal S1, S2, no murmurs, rubs, clicks or gallops   Abdomen - soft, nontender, nondistended, no masses or organomegaly, rectus diastases with Valsalva  Lymph- no adenopathy palpable  Ext-peripheral pulses normal, no pedal edema, no clubbing or cyanosis  Skin-Warm and dry. no hyperpigmentation, vitiligo, or suspicious lesions  Neuro -alert, oriented, normal speech, no focal findings or movement disorder noted  Musculoskeletal- FROM, no bony abnormalities, no point tenderness    No results found for this visit on 03/16/20. All results for lab orders may not have been returned by the time this encountered was closed. Assessment/Plan:       ICD-10-CM ICD-9-CM    1. Medicare annual wellness visit, subsequent Z00.00 V70.0    2. ASHD (arteriosclerotic heart disease) I25.10 414.00    3. Essential hypertension H82 623.4 METABOLIC PANEL, COMPREHENSIVE      URINALYSIS W/O MICRO   4. Impaired glucose tolerance R73.02 790.22 HEMOGLOBIN A1C W/O EAG   5. Hyperplasia of prostate N40.0 600.90    6. Dyslipidemia E78.5 272.4 LIPID PANEL   7. Fatigue, unspecified type R53.83 780.79 CBC WITH AUTOMATED DIFF   8. Special screening for malignant neoplasm of prostate Z12.5 V76.44 PSA SCREENING (SCREENING)      DE PROSTATE CA SCREENING; GYPSY   9. Screening for alcoholism Z13.39 V79.1 DE ANNUAL ALCOHOL SCREEN 15 MIN   10. Screening for depression Z13.31 V79.0 [facility]   11. Screening for diabetes mellitus Z13.1 V77.1    12.  Screening for ischemic heart disease Z13.6 V81.0        Orders Placed This Encounter    Depression Screen Annual    CBC WITH AUTOMATED DIFF    LIPID PANEL ([facility])    METABOLIC PANEL, COMPREHENSIVE ([facility])    URINALYSIS W/O MICRO ([facility])    PSA SCREENING (SCREENING) ([facility])    HEMOGLOBIN A1C W/O EAG ([facility])    Annual  Alcohol Screen 15 min ()    Digital Rectal Exam ()    gabapentin (NEURONTIN) 100 mg capsule     Sig: Take 100 mg by mouth three (3) times daily.  diclofenac (VOLTAREN) 1 % gel     Sig: Apply two grams by topical route four times daily to the affected area (s). Plan:    Continue current medical regimen as outlined above. Further recommendations based on labs as ordered. I have reviewed with the patient details of the assessment and plan and all questions were answered. Relevent patient education was performed. Verbal and/or written instructions (see AVS) provided. The most recent lab findings were reviewed with the patient. Plan was discussed with patient who verbal expressed understanding. An After Visit Summary was printed and given to the patient. Jose Gauthier MD    This is the Subsequent Medicare Annual Wellness Exam, performed 12 months or more after the Initial AWV or the last Subsequent AWV    I have reviewed the patient's medical history in detail and updated the computerized patient record. History     Patient Active Problem List   Diagnosis Code    Actinic keratosis L57.0    Annual physical exam Z00.00    ASHD (arteriosclerotic heart disease) I25.10    Chest pain R07.9    Chronic back pain M54.9, G89.29    Cough R05    Diabetes mellitus screening Z13.1    Diaphoresis R61    Dyslipidemia E78.5    Edema R60.9    Elevated LFTs R94.5    Fatigue R53.83    Hyperplasia of prostate N40.0    Obesity E66.9    Other long term (current) drug therapy Z79.899    Sinusitis J32.9    SK (seborrheic keratosis) L82.1    Severe obesity (BMI 35.0-39. 9) E66.01    Essential hypertension I10    SANDERS (dyspnea on exertion) R06.09    Acute bronchitis J20.9    Impaired glucose tolerance R73.02    Status post total knee replacement Z96.659 Past Medical History:   Diagnosis Date    Actinic keratosis 9/15/2017    Annual physical exam 9/15/2017    Arthritis     ASHD (arteriosclerotic heart disease) 9/15/2017    Asthma     At risk for sleep apnea 11/20/2019    STOP BANG 5    Guerrier's esophagus     CAD (coronary artery disease)     Chest pain 9/15/2017    Chronic back pain 9/15/2017    Chronic obstructive pulmonary disease (HCC)     Chronic pain     Cough 9/15/2017    Diabetes ([address]     hypoglycemia hx    Diabetes mellitus screening 9/15/2017    Diaphoresis 9/15/2017    SANDERS (dyspnea on exertion)     Dyslipidemia 9/15/2017    Edema 9/15/2017    Elevated LFTs 9/15/2017    Essential hypertension 6/7/2019    Fatigue 9/15/2017    GERD (gastroesophageal reflux disease)     Guerrier's esophagus    Hypercholesterolemia     Hyperplasia of prostate 9/15/2017    Impaired glucose tolerance 1/17/2020    Obesity 9/15/2017    Other long term (current) drug therapy 9/15/2017    Sinusitis 9/15/2017    SK (seborrheic keratosis) 9/15/2017      Past Surgical History:   Procedure Laterality Date    COLONOSCOPY N/A 11/27/2019    FLEXIBLE SIGMOIDOSCOPY performed by Shayy Mora MD at [place] [address] COLONOSCOPY N/A 1/15/2020    COLONOSCOPY [facility] at [facility] ENDOSCOPY    HX APPENDECTOMY      HX COLONOSCOPY      HX ENDOSCOPY      HX HEART CATHETERIZATION  ~2008    stent    HX KNEE REPLACEMENT Right 06/2019    partial knee replacement Dr. India Spann     Current Outpatient Medications   Medication Sig Dispense Refill    gabapentin (NEURONTIN) 100 mg capsule Take 100 mg by mouth three (3) times daily.  diclofenac (VOLTAREN) 1 % gel Apply two grams by topical route four times daily to the affected area (s).  COQ10, UBIQUINOL, PO Take 100 mg by mouth daily.  Cholestyramine-Aspartame (CHOLESTYRAMINE LIGHT) 4 gram powder Take 4 g by mouth every morning.       cephALEXin (KEFLEX) 500 mg capsule Take 1,000 mg by mouth once. Indications: 1 hour prior to dental procedure. s/p partial knee replacement      dilTIAZem (TIAZAC) 360 mg SR capsule Take 360 mg by mouth every morning. 2    levocetirizine (XYZAL) 5 mg tablet TAKE 1 TABLET BY MOUTH ONCE DAILY 90 Tab 5    omeprazole (PRILOSEC) 40 mg capsule Take 40 mg by mouth daily. Indications: gastroesophageal reflux disease  1    ZETIA 10 mg tablet Take 1 Tab by mouth daily. 90 Tab 3    finasteride (PROSCAR) 5 mg tablet Take 5 mg by mouth every Monday, Wednesday, Friday. 0    multivitamin (ONE A DAY) tablet Take 1 Tab by mouth daily.  albuterol (VENTOLIN HFA) 90 mcg/actuation inhaler Take 2 Puffs by inhalation every four (4) hours as needed for Wheezing. Allergies   Allergen Reactions    Claritin-D 12 Hour [Loratadine-Pseudoephedrine] Shortness of Breath    Iodine Shortness of Breath    Levaquin [Levofloxacin] Hives    Seafood [Shellfish Containing Products] Shortness of Breath    Statins-Hmg-Coa Reductase Inhibitors Other (comments)     \"liver problems\"    Sulfa (Sulfonamide Antibiotics) Hives       Family History   Problem Relation Age of Onset    Lung Disease Mother         COPD    Heart Disease Mother         pacemaker   [facility] Cancer Father         prostate/liver     Social History     Tobacco Use    Smoking status: Never Smoker    Smokeless tobacco: Former User   Substance Use Topics    Alcohol use: No     Alcohol/week: 0.0 standard drinks       Depression Risk Factor Screening:     3 most recent PHQ Screens 1/17/2020   Little interest or pleasure in doing things Not at all   Feeling down, depressed, irritable, or hopeless Not at all   Total Score PHQ 2 0       Alcohol Risk Factor Screening (MALE > 65):    Do you average more 1 drink per night or more than 7 drinks a week: No    In the past three months have you have had more than 4 drinks containing alcohol on one occasion: No      Functional Ability and Level of Safety:   Hearing: Hearing is good.    Activities of Daily Living: The home contains: no safety equipment. Patient does total self care    Ambulation: with no difficulty    Fall Risk:  Fall Risk Assessment, last 12 mths 3/16/2020   Able to walk? Yes   Fall in past 12 months? No       Abuse Screen:  Patient is not abused    Cognitive Screening   Has your family/caregiver stated any concerns about your memory: no  Cognitive Screening: Normal - Verbal Fluency Test    Patient Care Team   Patient Care Team:  Hamp Homans, MD as PCP - General (Internal Medicine)  Hamp Homans, MD as PCP - [facility]  Marianela Gil MD (Urology)  Fidencio Snowden MD (Cardiology)    Assessment/Plan   Education and counseling provided:  Are appropriate based on today's review and evaluation    Diagnoses and all orders for this visit:    1. Medicare annual wellness visit, subsequent    2. ASHD (arteriosclerotic heart disease)    3. Essential hypertension  -     METABOLIC PANEL, COMPREHENSIVE  -     URINALYSIS W/O MICRO    4. Impaired glucose tolerance  -     HEMOGLOBIN A1C W/O EAG    5. Hyperplasia of prostate    6. Dyslipidemia  -     LIPID PANEL    7. Fatigue, unspecified type  -     CBC WITH AUTOMATED DIFF    8. Special screening for malignant neoplasm of prostate  -     PSA SCREENING (SCREENING)  -     [place] PROSTATE CA SCREENING; GYPSY    9. Screening for alcoholism  -     [place] ANNUAL ALCOHOL SCREEN 15 MIN    10. Screening for depression  -     DEPRESSION SCREEN ANNUAL    11. Screening for diabetes mellitus    12.  Screening for ischemic heart disease        Health Maintenance Due   Topic Date Due    DTaP/Tdap/Td series (1 - Tdap) 06/29/1973    Shingrix Vaccine Age 50> (1 of 2) 06/29/2002    FOBT Q1Y Age 50-75  06/29/2002    GLAUCOMA SCREENING Q2Y  06/29/2017    Medicare Yearly Exam  10/12/2018    Influenza Age 5 to Adult  08/01/2019

## 2020-03-16 NOTE — PATIENT INSTRUCTIONS
Medicare Wellness Visit, Male    The best way to live healthy is to have a lifestyle where you eat a well-balanced diet, exercise regularly, limit alcohol use, and quit all forms of tobacco/nicotine, if applicable. Regular preventive services are another way to keep healthy. Preventive services (vaccines, screening tests, monitoring & exams) can help personalize your care plan, which helps you manage your own care. Screening tests can find health problems at the earliest stages, when they are easiest to treat. [facility] follows the current, evidence-based guidelines published by the [facility] ([facility]) when recommending preventive services for our patients. Because we follow these guidelines, sometimes recommendations change over time as research supports it. (For example, a prostate screening blood test is no longer routinely recommended for men with no symptoms). Of course, you and your doctor may decide to screen more often for some diseases, based on your risk and co-morbidities (chronic disease you are already diagnosed with). Preventive services for you include:  - Medicare offers their members a free annual wellness visit, which is time for you and your primary care provider to discuss and plan for your preventive service needs. Take advantage of this benefit every year!  -All adults over age 72 should receive the recommended pneumonia vaccines. Current USPSTF guidelines recommend a series of two vaccines for the best pneumonia protection.   -All adults should have a flu vaccine yearly and tetanus vaccine every 10 years.  -All adults age 48 and older should receive the shingles vaccines (series of two vaccines).        -All adults age 38-68 who are overweight should have a diabetes screening test once every three years.   -Other screening tests & preventive services for persons with diabetes include: an eye exam to screen for diabetic retinopathy, a kidney function test, a foot exam, and stricter control over your cholesterol.   -Cardiovascular screening for adults with routine risk involves an electrocardiogram (ECG) at intervals determined by the provider.   -Colorectal cancer screening should be done for adults age 54-65 with no increased risk factors for colorectal cancer. There are a number of acceptable methods of screening for this type of cancer. Each test has its own benefits and drawbacks. Discuss with your provider what is most appropriate for you during your annual wellness visit. The different tests include: colonoscopy (considered the best screening method), a fecal occult blood test, a fecal DNA test, and sigmoidoscopy.  -All adults born between [place] should be screened once for Hepatitis C.  -An Abdominal Aortic Aneurysm (AAA) Screening is recommended for men age 73-68 who has ever smoked in their lifetime. Here is a list of your current Health Maintenance items (your personalized list of preventive services) with a due date:  Health Maintenance Due   Topic Date Due    DTaP/Tdap/Td  (1 - Tdap) 06/29/1973    Shingles Vaccine (1 of 2) 06/29/2002    Colon Cancer Stool Test  06/29/2002    Glaucoma Screening   06/29/2017    Annual Well Visit  10/12/2018    Flu Vaccine  08/01/2019     All Medicare beneficiaries aged 48 and older are covered; however, when a beneficiary is at high risk, there is no minimum age required to receive a screening colonoscopy or a barium enema rendered as an alternative to a screening colonoscopy. The following are the coverage criteria for each colorectal cancer screening test/procedure. Screening FOBT  Medicare provides coverage of a screening FOBT annually (i.e., at least 11 months have passed following the month in which the last covered screening FOBT was performed) for beneficiaries aged 48 and older.  This screening requires a written order from the beneficiarys attending physician. NOTE: Medicare will only provide coverage for one FOBT per year: either HCPCS code  or CPT code 70005, but not both. Screening Colonoscopy  For Beneficiaries at [facility] for Developing Colorectal Cancer  Medicare provides coverage of a screening colonoscopy (HCPCS code ) once every 2 years for beneficiaries at high risk for developing colorectal cancer (i.e., at least 23 months have passed following the month in which the last covered screening colonoscopy [HCPCS code ] was performed). For Beneficiaries Not at [facility] for Develop[facility] Colorectal Cancer  Medicare provides coverage of a screening colonoscopy (HCPCS code ) for beneficiaries who do not meet the criteria for being at high risk for developing colorectal cancer once every 10 years (i.e., at least 119 months have passed following the month in which the last covered screening colonoscopy [HCPCS code ] was performed). If the beneficiary otherwise qualifies to have a covered screening colonoscopy (HCPCS code ) based on the above but has had a covered screening flexible sigmoidoscopy (HCPCS code ), then Medicare may cover a screening colonoscopy (HCPCS code ) only after at least 47 months have passed following the month in which the last covered screening flexible sigmoidoscopy (HCPCS code ) was performed.

## 2020-03-16 NOTE — PROGRESS NOTES
Reviewed record in preparation for visit and have obtained necessary documentation. Identified pt with two pt identifiers(name and ). Chief Complaint   Patient presents with    Annual Wellness Visit        Coordination of Care Questionnaire:  :     1) Have you been to an emergency room, urgent care clinic since your last visit? No   Hospitalized since your last visit? Yes [facility]            2) Have you seen or consulted any other health care providers outside of [address] [place] [address] since your last visit?  Yes Dr Gayatri Garcia

## 2020-03-17 LAB
BASOPHILS # BLD AUTO: 0 X10E3/UL (ref 0–0.2)
BASOPHILS NFR BLD AUTO: 0 %
EOSINOPHIL # BLD AUTO: 0 X10E3/UL (ref 0–0.4)
EOSINOPHIL NFR BLD AUTO: 0 %
ERYTHROCYTE [DISTWIDTH] IN BLOOD BY AUTOMATED COUNT: 12.9 % (ref 11.6–15.4)
EST. AVERAGE GLUCOSE BLD GHB EST-MCNC: 123 MG/DL
HBA1C MFR BLD: 5.9 % (ref 4.8–5.6)
HCT VFR BLD AUTO: 44.3 % (ref 37.5–51)
HGB BLD-MCNC: 14.9 G/DL (ref 13–17.7)
IMM GRANULOCYTES # BLD AUTO: 0 X10E3/UL (ref 0–0.1)
IMM GRANULOCYTES NFR BLD AUTO: 0 %
LYMPHOCYTES # BLD AUTO: 1.4 X10E3/UL (ref 0.7–3.1)
LYMPHOCYTES NFR BLD AUTO: 13 %
MCH RBC QN AUTO: 30.3 PG (ref 26.6–33)
MCHC RBC AUTO-ENTMCNC: 33.6 G/DL (ref 31.5–35.7)
MCV RBC AUTO: 90 FL (ref 79–97)
MONOCYTES # BLD AUTO: 0.6 X10E3/UL (ref 0.1–0.9)
MONOCYTES NFR BLD AUTO: 5 %
NEUTROPHILS # BLD AUTO: 9.1 X10E3/UL (ref 1.4–7)
NEUTROPHILS NFR BLD AUTO: 82 %
PLATELET # BLD AUTO: 290 X10E3/UL (ref 150–450)
RBC # BLD AUTO: 4.92 X10E6/UL (ref 4.14–5.8)
WBC # BLD AUTO: 11.2 X10E3/UL (ref 3.4–10.8)

## 2020-03-26 RX ORDER — NABUMETONE 500 MG/1
TABLET, FILM COATED ORAL
Qty: 180 TAB | Refills: 0 | Status: SHIPPED | OUTPATIENT
Start: 2020-03-26 | End: 2020-07-16

## 2020-03-26 NOTE — TELEPHONE ENCOUNTER
Requested Prescriptions     Pending Prescriptions Disp Refills    nabumetone (RELAFEN) 500 mg tablet 180 Tab 0       Last Refill: 12/23/19  Next Appointment:9/22/20

## 2020-04-03 ENCOUNTER — VIRTUAL VISIT (OUTPATIENT)
Dept: INTERNAL MEDICINE CLINIC | Age: 70
End: 2020-04-03

## 2020-04-03 DIAGNOSIS — J45.21 MILD INTERMITTENT ASTHMATIC BRONCHITIS WITH ACUTE EXACERBATION: ICD-10-CM

## 2020-04-03 DIAGNOSIS — J30.1 ALLERGIC RHINITIS DUE TO POLLEN, UNSPECIFIED SEASONALITY: Primary | ICD-10-CM

## 2020-04-03 RX ORDER — PREDNISONE 10 MG/1
TABLET ORAL
Qty: 31 TAB | Refills: 0 | Status: SHIPPED | OUTPATIENT
Start: 2020-04-03 | End: 2020-07-20 | Stop reason: ALTCHOICE

## 2020-04-03 RX ORDER — AMOXICILLIN 875 MG/1
875 TABLET, FILM COATED ORAL 2 TIMES DAILY
Qty: 20 TAB | Refills: 0 | Status: SHIPPED | OUTPATIENT
Start: 2020-04-03 | End: 2020-04-13

## 2020-04-03 NOTE — PROGRESS NOTES
Reviewed record in preparation for visit and have obtained necessary documentation. Identified pt with two pt identifiers(name and ). Chief Complaint   Patient presents with    Cough    Ear Fullness        Coordination of Care Questionnaire:  :     1) Have you been to an emergency room, urgent care clinic since your last visit? no     Hospitalized since your last visit? No               2) Have you seen or consulted any other health care providers outside of [address] [place] [address] since your last visit?  No

## 2020-04-03 NOTE — PROGRESS NOTES
This note will not be viewable in [address] Stephanie Maciel III is a 81 y.o. male and presents with Cough and Ear Fullness  . Subjective:    Mr. Linda Brantley presents via telemedicine visit for symptoms of sinus congestion drainage, mild dry cough, and ear fullness. He denies fever. He has no shortness of breath. He denies any wheezing. He does have a history of seasonal allergic rhinitis. He is not taking any over-the-counter medication for this. He has a prescription for albuterol inhaler which she has not used. He has a history of asthmatic bronchitis.        Review of Systems  Constitutional:   Eyes:   negative for visual disturbance and irritation  ENT:   negative for tinnitus,sore throat,.hoarseness  Respiratory:  negative forhemoptysis, dyspnea,wheezing  CV:   negative for chest pain, palpitations, lower extremity edema  GI:   negative for nausea, vomiting, diarrhea, abdominal pain,melena  Endo:               negative for polyuria,polydipsia,polyphagia,heat intolerance  Genitourinary: negative for frequency, dysuria and hematuria  Integumentary: negative for rash and pruritus  Hematologic:  negative for easy bruising and gum/nose bleeding  Musculoskel: negative for myalgias, arthralgias, back pain, muscle weakness, joint pain  Neurological:  negative for headaches, dizziness, vertigo, memory problems and gait   Behavl/Psych: negative for feelings of anxiety, depression, mood changes    Past Medical History:   Diagnosis Date    Actinic keratosis 9/15/2017    Annual physical exam 9/15/2017    Arthritis     ASHD (arteriosclerotic heart disease) 9/15/2017    Asthma     At risk for sleep apnea 11/20/2019    STOP BANG 5    Guerrier's esophagus     CAD (coronary artery disease)     Chest pain 9/15/2017    Chronic back pain 9/15/2017    Chronic obstructive pulmonary disease ([address]     Chronic pain     Cough 9/15/2017    Diabetes ([facility]     hypoglycemia hx    Diabetes mellitus screening 9/15/2017    Diaphoresis 9/15/2017    SANDERS (dyspnea on exertion)     Dyslipidemia 9/15/2017    Edema 9/15/2017    Elevated LFTs 9/15/2017    Essential hypertension 6/7/2019    Fatigue 9/15/2017    GERD (gastroesophageal reflux disease)     Guerrier's esophagus    Hypercholesterolemia     Hyperplasia of prostate 9/15/2017    Impaired glucose tolerance 1/17/2020    Obesity 9/15/2017    Other long term (current) drug therapy 9/15/2017    Sinusitis 9/15/2017    SK (seborrheic keratosis) 9/15/2017     Past Surgical History:   Procedure Laterality Date    COLONOSCOPY N/A 11/27/2019    FLEXIBLE SIGMOIDOSCOPY performed by Adri [facility] at [facility] AMBULATORY OR    COLONOSCOPY N/A 1/15/2020    COLONOSCOPY performed by Adri Martinez MD at [facility] ENDOSCOPY    HX APPENDECTOMY      HX COLONOSCOPY      HX ENDOSCOPY      HX HEART CATHETERIZATION  ~2008    stent    HX KNEE REPLACEMENT Right 06/2019    partial knee replacement Dr. Evelin Bain     Social History     Socioeconomic History    Marital status:      Spouse name: Not on file    Number of children: Not on file    Years of education: Not on file    Highest education level: Not on file   Tobacco Use    Smoking status: Never Smoker    Smokeless tobacco: Former User   Substance and Sexual Activity    Alcohol use: No     Alcohol/week: 0.0 standard drinks    Drug use: No     Family History   Problem Relation Age of Onset    Lung Disease Mother         COPD    Heart Disease Mother         pacemaker    Cancer Father         prostate/liver     Current Outpatient Medications   Medication Sig Dispense Refill    predniSONE (DELTASONE) 10 mg tablet 6 tablets day 1, then 4 tablets for 3 days, then 2 tablets for 3 days, then 1 tablet daily 31 Tab 0    amoxicillin (AMOXIL) 875 mg tablet Take 1 Tab by mouth two (2) times a day for 10 days.  20 Tab 0    nabumetone (RELAFEN) 500 mg tablet TAKE 1 TABLET BY MOUTH TWICE DAILY 180 Tab 0    gabapentin (NEURONTIN) 100 mg capsule Take 100 mg by mouth three (3) times daily.  diclofenac (VOLTAREN) 1 % gel Apply two grams by topical route four times daily to the affected area (s).  COQ10, UBIQUINOL, PO Take 100 mg by mouth daily.  Cholestyramine-Aspartame (CHOLESTYRAMINE LIGHT) 4 gram powder Take 4 g by mouth every morning.  cephALEXin (KEFLEX) 500 mg capsule Take 1,000 mg by mouth once. Indications: 1 hour prior to dental procedure. s/p partial knee replacement      dilTIAZem (TIAZAC) 360 mg SR capsule Take 360 mg by mouth every morning. 2    levocetirizine (XYZAL) 5 mg tablet TAKE 1 TABLET BY MOUTH ONCE DAILY 90 Tab 5    omeprazole (PRILOSEC) 40 mg capsule Take 40 mg by mouth daily. Indications: gastroesophageal reflux disease  1    ZETIA 10 mg tablet Take 1 Tab by mouth daily. 90 Tab 3    finasteride (PROSCAR) 5 mg tablet Take 5 mg by mouth every Monday, Wednesday, Friday. 0    multivitamin (ONE A DAY) tablet Take 1 Tab by mouth daily.  albuterol (VENTOLIN HFA) 90 mcg/actuation inhaler Take 2 Puffs by inhalation every four (4) hours as needed for Wheezing. Allergies   Allergen Reactions    Claritin-D 12 Hour [Loratadine-Pseudoephedrine] Shortness of Breath    Iodine Shortness of Breath    Levaquin [Levofloxacin] Hives    Seafood [Shellfish Containing Products] Shortness of Breath    Statins-Hmg-Coa Reductase Inhibitors Other (comments)     \"liver problems\"    Sulfa (Sulfonamide Antibiotics) Hives       Objective: There were no vitals taken for this visit. No results found for this visit on 04/03/20. All results for lab orders may not have been returned by the time this encountered was closed. There is no audible wheezing while talking to the patient on the phone and he is able to speak in complete sentences. He does have nasal congestion which I can hear in his voice. He does have intermittent spontaneous coughing. Assessment/Plan:       ICD-10-CM ICD-9-CM    1.  Allergic rhinitis due to pollen, unspecified seasonality J30.1 477.0 predniSONE (DELTASONE) 10 mg tablet      amoxicillin (AMOXIL) 875 mg tablet   2. Mild intermittent asthmatic bronchitis with acute exacerbation J45.21 493.92 predniSONE (DELTASONE) 10 mg tablet      amoxicillin (AMOXIL) 875 mg tablet       Orders Placed This Encounter    predniSONE (DELTASONE) 10 mg tablet     Si tablets day 1, then 4 tablets for 3 days, then 2 tablets for 3 days, then 1 tablet daily     Dispense:  31 Tab     Refill:  0    amoxicillin (AMOXIL) 875 mg tablet     Sig: Take 1 Tab by mouth two (2) times a day for 10 days. Dispense:  20 Tab     Refill:  0     Plan:    The patient will start over-the-counter Claritin (without decongestant), prednisone taper, and amoxicillin as prescribed. He will use his albuterol inhaler as needed. He will call or follow-up if his symptoms progress. I have reviewed with the patient details of the assessment and plan and all questions were answered. Relevent patient education was performed. Verbal and/or written instructions (see AVS) provided. The most recent lab findings were reviewed with the patient. Plan was discussed with patient who verbal expressed understanding. An After Visit Summary was printed and given to the patient.       Tiffanie Marquez MD

## 2020-04-27 RX ORDER — LEVOCETIRIZINE DIHYDROCHLORIDE 5 MG/1
TABLET, FILM COATED ORAL
Qty: 30 TAB | Refills: 0 | Status: SHIPPED | OUTPATIENT
Start: 2020-04-27 | End: 2020-06-12

## 2020-07-16 RX ORDER — NABUMETONE 500 MG/1
TABLET, FILM COATED ORAL
Qty: 180 TAB | Refills: 0 | Status: SHIPPED | OUTPATIENT
Start: 2020-07-16 | End: 2020-10-16

## 2020-07-20 ENCOUNTER — OFFICE VISIT (OUTPATIENT)
Dept: INTERNAL MEDICINE CLINIC | Age: 70
End: 2020-07-20

## 2020-07-20 VITALS
BODY MASS INDEX: 37.08 KG/M2 | TEMPERATURE: 98.5 F | WEIGHT: 259 LBS | DIASTOLIC BLOOD PRESSURE: 78 MMHG | HEIGHT: 70 IN | HEART RATE: 70 BPM | SYSTOLIC BLOOD PRESSURE: 128 MMHG | RESPIRATION RATE: 20 BRPM | OXYGEN SATURATION: 92 %

## 2020-07-20 DIAGNOSIS — R19.8 INCREASED ABDOMINAL GIRTH: ICD-10-CM

## 2020-07-20 DIAGNOSIS — R10.84 GENERALIZED ABDOMINAL PAIN: ICD-10-CM

## 2020-07-20 DIAGNOSIS — R53.83 FATIGUE, UNSPECIFIED TYPE: ICD-10-CM

## 2020-07-20 DIAGNOSIS — E66.01 CLASS 2 SEVERE OBESITY DUE TO EXCESS CALORIES WITH SERIOUS COMORBIDITY AND BODY MASS INDEX (BMI) OF 37.0 TO 37.9 IN ADULT (HCC): Primary | ICD-10-CM

## 2020-07-20 LAB
ERYTHROCYTE [DISTWIDTH] IN BLOOD BY AUTOMATED COUNT: 13.7 %
HCT VFR BLD AUTO: 42.7 % (ref 37–51)
HGB BLD-MCNC: 13.9 G/DL (ref 12–18)
LYMPHOCYTES ABSOLUTE: 1.6 K/UL (ref 0.6–4.1)
LYMPHOCYTES NFR BLD: 22 % (ref 10–58.5)
MCH RBC QN AUTO: 30.5 PG (ref 26–32)
MCHC RBC AUTO-ENTMCNC: 32.6 G/DL (ref 30–36)
MCV RBC AUTO: 93.9 FL (ref 80–97)
MONOCYTES ABS-DIF,2141: 0.7 K/UL (ref 0–1.8)
MONOCYTES NFR BLD: 9 % (ref 0.1–24)
NEUTROPHILS # BLD: 69 % (ref 37–92)
NEUTROPHILS ABS,2156: 5 K/UL (ref 2–7.8)
PLATELET # BLD AUTO: 231 K/UL (ref 140–440)
RBC # BLD AUTO: 4.55 M/UL (ref 4.2–6.3)
WBC # BLD AUTO: 7.3 K/UL (ref 4.1–10.9)

## 2020-07-20 RX ORDER — ASPIRIN 81 MG/1
81 TABLET ORAL DAILY
COMMUNITY

## 2020-07-20 RX ORDER — PREDNISONE 10 MG/1
TABLET ORAL
Qty: 31 TAB | Refills: 0 | Status: SHIPPED | OUTPATIENT
Start: 2020-07-20 | End: 2020-08-13 | Stop reason: ALTCHOICE

## 2020-07-20 RX ORDER — UMECLIDINIUM BROMIDE AND VILANTEROL TRIFENATATE 62.5; 25 UG/1; UG/1
POWDER RESPIRATORY (INHALATION)
Status: ON HOLD | COMMUNITY
Start: 2020-06-29 | End: 2021-10-14

## 2020-07-20 RX ORDER — AZELASTINE 1 MG/ML
SPRAY, METERED NASAL
COMMUNITY
Start: 2020-06-26

## 2020-07-20 NOTE — PROGRESS NOTES
Adria  presents today at the clinic for    Chief Complaint   Patient presents with    Cough     dry cough from drainage    Eye Drainage     both eyes are \"gunky\" every morning    Sleep Problem     falling asleep during the day    Abdominal Pain     left upper quadrant        Wt Readings from Last 3 Encounters:   07/20/20 259 lb (117.5 kg)   03/16/20 244 lb 9.6 oz (110.9 kg)   01/23/20 250 lb (113.4 kg)     Temp Readings from Last 3 Encounters:   07/20/20 98.5 °F (36.9 °C) (Oral)   03/16/20 98.4 °F (36.9 °C) (Oral)   01/25/20 98.2 °F (36.8 °C)     BP Readings from Last 3 Encounters:   07/20/20 128/78   03/16/20 128/84   01/25/20 149/72     Pulse Readings from Last 3 Encounters:   07/20/20 70   03/16/20 77   01/25/20 91       Health Maintenance Due   Topic    DTaP/Tdap/Td series (1 - Tdap)    Shingrix Vaccine Age 50> (1 of 2)    FOBT Q1Y Age 54-65     GLAUCOMA SCREENING Q2Y          Learning Assessment:  :     Learning Assessment 8/16/2017   PRIMARY LEARNER Patient   HIGHEST LEVEL OF EDUCATION - PRIMARY LEARNER  SOME COLLEGE   BARRIERS PRIMARY LEARNER NONE   CO-LEARNER CAREGIVER No   PRIMARY LANGUAGE ENGLISH   LEARNER PREFERENCE PRIMARY DEMONSTRATION   ANSWERED BY patient   RELATIONSHIP SELF       Depression Screening:  :     3 most recent PHQ Screens 1/17/2020   Little interest or pleasure in doing things Not at all   Feeling down, depressed, irritable, or hopeless Not at all   Total Score PHQ 2 0       Fall Risk Assessment:  :     Fall Risk Assessment, last 12 mths 3/16/2020   Able to walk? Yes   Fall in past 12 months? No       Abuse Screening:  :     Abuse Screening Questionnaire 3/16/2020 9/12/2019 11/15/2017 8/16/2017   Do you ever feel afraid of your partner? N N N N   Are you in a relationship with someone who physically or mentally threatens you? N N N N   Is it safe for you to go home?  Elizabeth Carmen

## 2020-07-20 NOTE — PROGRESS NOTES
This note will not be viewable in [address] Jacklyn Smith III is a 78 y.o. male and presents with Cough (dry cough from drainage); Eye Drainage (both eyes are \"gunky\" every morning); Sleep Problem (falling asleep during the day); and Abdominal Pain (left upper quadrant)  . Subjective:    Mr. Primitivo Caldera presents today with complaint of increasing abdominal pain and abdominal girth. The past couple of months. The pain is not constant but comes and goes. It is not associate with change in position or movement. He is also noted increased daytime somnolence. He was referred for a sleep study but unable to complete this because of the current COVID-19 pandemic and inability to get into the sleep lab for a study. Continues to have a cough and has had follow-up with pulmonary for this. He has seasonal allergies and mild seasonal asthma. Review of Systems  Constitutional:   Eyes:   negative for visual disturbance and irritation  ENT:   negative for tinnitus,sore throat,nasal congestion,ear pains. hoarseness  Respiratory:  negative for cough, hemoptysis, dyspnea,wheezing  CV:   negative for chest pain, palpitations, lower extremity edema  GI:   negative for nausea, vomiting, diarrhea, abdominal pain,melena  Endo:               negative for polyuria,polydipsia,polyphagia,heat intolerance  Genitourinary: negative for frequency, dysuria and hematuria  Integumentary: negative for rash and pruritus  Hematologic:  negative for easy bruising and gum/nose bleeding  Musculoskel: negative for myalgias, arthralgias, back pain, muscle weakness, joint pain  Neurological:  negative for headaches, dizziness, vertigo, memory problems and gait   Behavl/Psych: negative for feelings of anxiety, depression, mood changes    Past Medical History:   Diagnosis Date    Actinic keratosis 9/15/2017    Annual physical exam 9/15/2017    Arthritis     ASHD (arteriosclerotic heart disease) 9/15/2017    Asthma     At risk for sleep apnea 11/20/2019    STOP BANG 5    Guerrier's esophagus     CAD (coronary artery disease)     Chest pain 9/15/2017    Chronic back pain 9/15/2017    Chronic obstructive pulmonary disease (HCC)     Chronic pain     Cough 9/15/2017    Diabetes ([address]     hypoglycemia hx    Diabetes mellitus screening 9/15/2017    Diaphoresis 9/15/2017    SANDERS (dyspnea on exertion)     Dyslipidemia 9/15/2017    Edema 9/15/2017    Elevated LFTs 9/15/2017    Essential hypertension 6/7/2019    Fatigue 9/15/2017    GERD (gastroesophageal reflux disease)     Guerrier's esophagus    Hypercholesterolemia     Hyperplasia of prostate 9/15/2017    Impaired glucose tolerance 1/17/2020    Obesity 9/15/2017    Other long term (current) drug therapy 9/15/2017    Sinusitis 9/15/2017    SK (seborrheic keratosis) 9/15/2017     Past Surgical History:   Procedure Laterality Date    COLONOSCOPY N/A 11/27/2019    FLEXIBLE SIGMOIDOSCOPY performed by Shanice Hendrickson MD at [facility] AMBULATORY OR    COLONOSCOPY N/A 1/15/2020    COLONOSCOPY performed by [facility]ice Hendrickson MD at [facility] ENDOSCOPY    HX APPENDECTOMY      HX COLONOSCOPY      HX ENDOSCOPY      HX HEART CATHETERIZATION  ~2008    stent    HX KNEE REPLACEMENT Right 06/2019    partial knee replacement Dr. Melendez Field History     Socioeconomic History    Marital status:      Spouse name: Not on file    Number of children: Not on file    Years of education: Not on file    Highest education level: Not on file   Tobacco Use    Smoking status: Never Smoker    Smokeless tobacco: Former User   Substance and Sexual Activity    Alcohol use: No     Alcohol/week: 0.0 standard drinks    Drug use: No     Family History   Problem Relation Age of Onset    Lung Disease Mother         COPD    Heart Disease Mother         pacemaker    Cancer Father         prostate/liver     Current Outpatient Medications   Medication Sig Dispense Refill    azelastine (ASTELIN) 137 mcg (0.1 %) nasal spray       Anoro Ellipta 62.5-25 mcg/actuation inhaler       aspirin delayed-release 81 mg tablet Take 81 mg by mouth daily.  nabumetone (RELAFEN) 500 mg tablet TAKE ONE TABLET BY MOUTH TWICE A  Tab 0    levocetirizine (XYZAL) 5 mg tablet TAKE ONE TABLET BY MOUTH DAILY 30 Tab 5    COQ10, UBIQUINOL, PO Take 100 mg by mouth daily.  Cholestyramine-Aspartame (CHOLESTYRAMINE LIGHT) 4 gram powder Take 4 g by mouth every morning.  albuterol (VENTOLIN HFA) 90 mcg/actuation inhaler Take 2 Puffs by inhalation every four (4) hours as needed for Wheezing.  cephALEXin (KEFLEX) 500 mg capsule Take 1,000 mg by mouth once. Indications: 1 hour prior to dental procedure. s/p partial knee replacement      dilTIAZem (TIAZAC) 360 mg SR capsule Take 360 mg by mouth every morning. 2    omeprazole (PRILOSEC) 40 mg capsule Take 40 mg by mouth daily. Indications: gastroesophageal reflux disease  1    ZETIA 10 mg tablet Take 1 Tab by mouth daily. 90 Tab 3    finasteride (PROSCAR) 5 mg tablet Take 5 mg by mouth every Monday, Wednesday, Friday. 0    multivitamin (ONE A DAY) tablet Take 1 Tab by mouth daily.  diclofenac (VOLTAREN) 1 % gel Apply two grams by topical route four times daily to the affected area (s).        Allergies   Allergen Reactions    Claritin-D 12 Hour [Loratadine-Pseudoephedrine] Shortness of Breath    Iodine Shortness of Breath    Levaquin [Levofloxacin] Hives    Seafood [Shellfish Containing Products] Shortness of Breath    Statins-Hmg-Coa Reductase Inhibitors Other (comments)     \"liver problems\"    Sulfa (Sulfonamide Antibiotics) Hives       Objective:  Visit Vitals  /78 (BP 1 Location: Left arm, BP Patient Position: Sitting)   Pulse 70   Temp 98.5 °F (36.9 °C) (Oral)   Resp 20   Ht 5' 10\" (1.778 m)   Wt 259 lb (117.5 kg)   SpO2 92%   BMI 37.16 kg/m²     Physical Exam:   General appearance - alert, well appearing, and in no distress  Mental status - alert, oriented to person, place, and time  EYE-KAI, EOMI, fundi normal, corneas normal, no foreign bodies  ENT-ENT exam normal, no neck nodes or sinus tenderness  Nose - normal and patent, no erythema, discharge or polyps  Mouth - mucous membranes moist, pharynx normal without lesions  Neck - supple, no significant adenopathy   Chest - clear to auscultation, no wheezes, rales or rhonchi, symmetric air entry   Heart - normal rate, regular rhythm, normal S1, S2, no murmurs, rubs, clicks or gallops   Abdomen - soft,  distended mildly tender left upper quadrant, no masses or organomegaly  Lymph- no adenopathy palpable  Ext-peripheral pulses normal, no pedal edema, no clubbing or cyanosis  Skin-Warm and dry. no hyperpigmentation, vitiligo, or suspicious lesions  Neuro -alert, oriented, normal speech, no focal findings or movement disorder noted  Musculoskeletal- FROM, no bony abnormalities, no point tenderness    No results found for this visit on 07/20/20. All results for lab orders may not have been returned by the time this encountered was closed. Assessment/Plan:       ICD-10-CM ICD-9-CM    1. Class 2 severe obesity due to excess calories with serious comorbidity and body mass index (BMI) of 37.0 to 37.9 in adult ([facility])  E66.01 278.01 CT ABD W CONT    Z68.37 V85.37    2. Fatigue, unspecified type  R53.83 780.79 CBC WITH AUTOMATED DIFF      CT ABD W CONT   3. Increased abdominal girth  R19.8 789.30 CT ABD W CONT   4. Generalized abdominal pain  R10.84 789.07 CT ABD W CONT       Orders Placed This Encounter    CT ABD W CONT     Standing Status:   Future     Standing Expiration Date:   8/20/2021     Order Specific Question:   STAT Creatinine as indicated     Answer:   Yes     Order Specific Question: This order utilizes IV contrast.  What additional contrast is needed?      Answer:   Oral     Order Specific Question:   Reason for Exam     Answer:   abdominal pain    CBC WITH AUTOMATED DIFF ([facility])    azelastine (ASTELIN) 137 mcg (0.1 %) nasal spray    Anoro Ellipta 62.5-25 mcg/actuation inhaler    aspirin delayed-release 81 mg tablet     Sig: Take 81 mg by mouth daily. Plan:    CT scan abdomen with contrast to rule out hernia or other etiology for his abdomen. Also would be concerned about development of ascites given increased abdominal girth. I suspect this is also contributing to some sleep apnea which needs to be evaluated further. His increased daytime somnolence is probably related to this. We will check a CBC to make sure this is not related to anemia. I have reviewed with the patient details of the assessment and plan and all questions were answered. Relevent patient education was performed. Verbal and/or written instructions (see AVS) provided. The most recent lab findings were reviewed with the patient. Plan was discussed with patient who verbal expressed understanding. An After Visit Summary was printed and given to the patient.       Loki Thomas MD

## 2020-07-22 DIAGNOSIS — R10.84 GENERALIZED ABDOMINAL PAIN: Primary | ICD-10-CM

## 2020-07-27 ENCOUNTER — HOSPITAL ENCOUNTER (OUTPATIENT)
Dept: CT IMAGING | Age: 70
Discharge: HOME | End: 2020-07-27
Attending: INTERNAL MEDICINE
Payer: MEDICARE

## 2020-07-27 DIAGNOSIS — R10.84 GENERALIZED ABDOMINAL PAIN: ICD-10-CM

## 2020-07-27 PROCEDURE — 74011000255 HC RX REV CODE- 255: Performed by: INTERNAL MEDICINE

## 2020-07-27 PROCEDURE — 74176 CT ABD & PELVIS W/O CONTRAST: CPT

## 2020-07-27 RX ORDER — BARIUM SULFATE 20 MG/ML
900 SUSPENSION ORAL
Status: COMPLETED | OUTPATIENT
Start: 2020-07-27 | End: 2020-07-27

## 2020-07-27 RX ADMIN — BARIUM SULFATE 900 ML: 21 SUSPENSION ORAL at 08:00

## 2020-08-11 ENCOUNTER — TELEPHONE (OUTPATIENT)
Dept: INTERNAL MEDICINE CLINIC | Age: 70
End: 2020-08-11

## 2020-08-13 ENCOUNTER — OFFICE VISIT (OUTPATIENT)
Dept: INTERNAL MEDICINE CLINIC | Age: 70
End: 2020-08-13
Payer: MEDICARE

## 2020-08-13 VITALS
RESPIRATION RATE: 20 BRPM | BODY MASS INDEX: 37.45 KG/M2 | HEIGHT: 70 IN | HEART RATE: 70 BPM | DIASTOLIC BLOOD PRESSURE: 82 MMHG | TEMPERATURE: 97.7 F | OXYGEN SATURATION: 95 % | SYSTOLIC BLOOD PRESSURE: 142 MMHG | WEIGHT: 261.6 LBS

## 2020-08-13 DIAGNOSIS — I10 ESSENTIAL HYPERTENSION: Primary | ICD-10-CM

## 2020-08-13 DIAGNOSIS — R10.84 GENERALIZED ABDOMINAL PAIN: ICD-10-CM

## 2020-08-13 DIAGNOSIS — R73.02 IMPAIRED GLUCOSE TOLERANCE: ICD-10-CM

## 2020-08-13 DIAGNOSIS — H10.13 ALLERGIC CONJUNCTIVITIS OF BOTH EYES: ICD-10-CM

## 2020-08-13 PROCEDURE — G8536 NO DOC ELDER MAL SCRN: HCPCS | Performed by: INTERNAL MEDICINE

## 2020-08-13 PROCEDURE — 1101F PT FALLS ASSESS-DOCD LE1/YR: CPT | Performed by: INTERNAL MEDICINE

## 2020-08-13 PROCEDURE — G8754 DIAS BP LESS 90: HCPCS | Performed by: INTERNAL MEDICINE

## 2020-08-13 PROCEDURE — 3017F COLORECTAL CA SCREEN DOC REV: CPT | Performed by: INTERNAL MEDICINE

## 2020-08-13 PROCEDURE — G8432 DEP SCR NOT DOC, RNG: HCPCS | Performed by: INTERNAL MEDICINE

## 2020-08-13 PROCEDURE — G8427 DOCREV CUR MEDS BY ELIG CLIN: HCPCS | Performed by: INTERNAL MEDICINE

## 2020-08-13 PROCEDURE — G8417 CALC BMI ABV UP PARAM F/U: HCPCS | Performed by: INTERNAL MEDICINE

## 2020-08-13 PROCEDURE — 99214 OFFICE O/P EST MOD 30 MIN: CPT | Performed by: INTERNAL MEDICINE

## 2020-08-13 PROCEDURE — G8753 SYS BP > OR = 140: HCPCS | Performed by: INTERNAL MEDICINE

## 2020-08-13 RX ORDER — BUDESONIDE AND FORMOTEROL FUMARATE DIHYDRATE 160; 4.5 UG/1; UG/1
AEROSOL RESPIRATORY (INHALATION)
COMMUNITY
Start: 2020-07-31 | End: 2022-05-24

## 2020-08-13 RX ORDER — AZELASTINE HYDROCHLORIDE 0.5 MG/ML
1 SOLUTION/ DROPS OPHTHALMIC 2 TIMES DAILY
Qty: 6 ML | Refills: 0 | Status: SHIPPED | OUTPATIENT
Start: 2020-08-13

## 2020-08-13 RX ORDER — MONTELUKAST SODIUM 10 MG/1
TABLET ORAL
COMMUNITY
Start: 2020-07-31

## 2020-08-13 NOTE — PROGRESS NOTES
This note will not be viewable in [address] Radha Martin III is a 78 y.o. male and presents with Results (ct scan and labs)  . Subjective:  Mr. Loren Enriquez presents today for follow-up of abdominal discomfort which he presented with approximately 1 month ago. He had a CT scan and labs and presents today for review. History significant for asthma, hypertension, coronary disease, and impaired glucose tolerance. He has had follow-up with pulmonary and is scheduled for a sleep evaluation for sleep apnea. He does complain of increased daytime somnolence and his body habitus would also likely contribute to sleep apnea. He is to have some mild abdominal discomfort that is exacerbated by leaning forward to tie his shoes. He denies any trauma or injury. He denies any change in bowel habits. He did have a colonoscopy within the past year. His CT scan did not reveal any significant abnormalities. There was minimal calcification of the prostate and some diverticuli noted that were unchanged. His labs were all normal.         Review of Systems  Constitutional:   Eyes:   negative for visual disturbance and irritation  ENT:   negative for tinnitus,sore throat,nasal congestion,ear pains. hoarseness  Respiratory:  negative for cough, hemoptysis, dyspnea,wheezing  CV:   negative for chest pain, palpitations, lower extremity edema  GI:   negative for nausea, vomiting, diarrhea, abdominal pain,melena  Endo:               negative for polyuria,polydipsia,polyphagia,heat intolerance  Genitourinary: negative for frequency, dysuria and hematuria  Integumentary: negative for rash and pruritus  Hematologic:  negative for easy bruising and gum/nose bleeding  Musculoskel: negative for myalgias, arthralgias, back pain, muscle weakness, joint pain  Neurological:  negative for headaches, dizziness, vertigo, memory problems and gait   Behavl/Psych: negative for feelings of anxiety, depression, mood changes    Past Medical History: Diagnosis Date    Actinic keratosis 9/15/2017    Annual physical exam 9/15/2017    Arthritis     ASHD (arteriosclerotic heart disease) 9/15/2017    Asthma     At risk for sleep apnea 11/20/2019    STOP BANG 5    Guerrier's esophagus     CAD (coronary artery disease)     Chest pain 9/15/2017    Chronic back pain 9/15/2017    Chronic obstructive pulmonary disease (HCC)     Chronic pain     Cough 9/15/2017    Diabetes ([address]     hypoglycemia hx    Diabetes mellitus screening 9/15/2017    Diaphoresis 9/15/2017    SANDERS (dyspnea on exertion)     Dyslipidemia 9/15/2017    Edema 9/15/2017    Elevated LFTs 9/15/2017    Essential hypertension 6/7/2019    Fatigue 9/15/2017    GERD (gastroesophageal reflux disease)     Guerrier's esophagus    Hypercholesterolemia     Hyperplasia of prostate 9/15/2017    Impaired glucose tolerance 1/17/2020    Obesity 9/15/2017    Other long term (current) drug therapy 9/15/2017    Sinusitis 9/15/2017    SK (seborrheic keratosis) 9/15/2017     Past Surgical History:   Procedure Laterality Date    COLONOSCOPY N/A 11/27/2019    FLEXIBLE SIGMOIDOSCOPY performed [facility] Roshan West [facility] at [facility] AMBULATORY OR    COLONOSCOPY N/A 1/15/2020    COLONOSCOPY performed by Roshan West MD at [facility] ENDOSCOPY    HX APPENDECTOMY      HX COLONOSCOPY      HX ENDOSCOPY      HX HEART CATHETERIZATION  ~2008    stent    HX KNEE REPLACEMENT Right 06/2019    partial knee replacement Dr. Hinds Session History     Socioeconomic History    Marital status:      Spouse name: Not on file    Number of children: Not on file    Years of education: Not on file    Highest education level: Not on file   Tobacco Use    Smoking status: Never Smoker    Smokeless tobacco: Former User   Substance and Sexual Activity    Alcohol use: No     Alcohol/week: 0.0 standard drinks    Drug use: No     Family History   Problem Relation Age of Onset    Lung Disease Mother         COPD    Heart Disease Mother         pacemaker    Cancer Father         prostate/liver     Current Outpatient Medications   Medication Sig Dispense Refill    montelukast (SINGULAIR) 10 mg tablet       Symbicort 160-4.5 mcg/actuation HFAA       azelastine (OPTIVAR) 0.05 % ophthalmic solution Administer 1 Drop to both eyes two (2) times a day. Use in affected eye(s) 6 mL 0    aspirin delayed-release 81 mg tablet Take 81 mg by mouth daily.  nabumetone (RELAFEN) 500 mg tablet TAKE ONE TABLET BY MOUTH TWICE A  Tab 0    levocetirizine (XYZAL) 5 mg tablet TAKE ONE TABLET BY MOUTH DAILY 30 Tab 5    COQ10, UBIQUINOL, PO Take 100 mg by mouth daily.  Cholestyramine-Aspartame (CHOLESTYRAMINE LIGHT) 4 gram powder Take 4 g by mouth every morning.  cephALEXin (KEFLEX) 500 mg capsule Take 1,000 mg by mouth once. Indications: 1 hour prior to dental procedure. s/p partial knee replacement      dilTIAZem (TIAZAC) 360 mg SR capsule Take 360 mg by mouth every morning. 2    omeprazole (PRILOSEC) 40 mg capsule Take 40 mg by mouth daily. Indications: gastroesophageal reflux disease  1    ZETIA 10 mg tablet Take 1 Tab by mouth daily. 90 Tab 3    finasteride (PROSCAR) 5 mg tablet Take 5 mg by mouth every Monday, Wednesday, Friday. 0    multivitamin (ONE A DAY) tablet Take 1 Tab by mouth daily.  azelastine (ASTELIN) 137 mcg (0.1 %) nasal spray       Anoro Ellipta 62.5-25 mcg/actuation inhaler       diclofenac (VOLTAREN) 1 % gel Apply two grams by topical route four times daily to the affected area (s).  albuterol (VENTOLIN HFA) 90 mcg/actuation inhaler Take 2 Puffs by inhalation every four (4) hours as needed for Wheezing.        Allergies   Allergen Reactions    Claritin-D 12 Hour [Loratadine-Pseudoephedrine] Shortness of Breath    Iodine Shortness of Breath    Levaquin [Levofloxacin] Hives    Seafood [Shellfish Containing Products] Shortness of Breath    Statins-Hmg-Coa Reductase Inhibitors Other (comments)     \"liver problems\"    Sulfa (Sulfonamide Antibiotics) Hives       Objective:  Visit Vitals  /82 (BP 1 Location: Left arm, BP Patient Position: Sitting)   Pulse 70   Temp 97.7 °F (36.5 °C) (Oral)   Resp 20   Ht 5' 10\" (1.778 m)   Wt 261 lb 9.6 oz (118.7 kg)   SpO2 95%   BMI 37.54 kg/m²     Physical Exam:   General appearance - alert, well appearing, and in no distress  Mental status - alert, oriented to person, place, and time  EYE-KAI, EOMI, fundi normal, corneas normal, no foreign bodies  ENT-ENT exam normal, no neck nodes or sinus tenderness  Nose - normal and patent, no erythema, discharge or polyps  Mouth - mucous membranes moist, pharynx normal without lesions  Neck - supple, no significant adenopathy   Chest - clear to auscultation, no wheezes, rales or rhonchi, symmetric air entry   Heart - normal rate, regular rhythm, normal S1, S2, no murmurs, rubs, clicks or gallops   Abdomen - soft, minimally tender in the midepigastric lesion on the left side, no palpable abnormality with Valsalva maneuver,, nondistended, no masses or organomegaly  Lymph- no adenopathy palpable  Ext-peripheral pulses normal, no pedal edema, no clubbing or cyanosis  Skin-Warm and dry. no hyperpigmentation, vitiligo, or suspicious lesions  Neuro -alert, oriented, normal speech, no focal findings or movement disorder noted  Musculoskeletal- FROM, no bony abnormalities, no point tenderness    No results found for this visit on 08/13/20. All results for lab orders may not have been returned by the time this encountered was closed. Assessment/Plan:       ICD-10-CM ICD-9-CM    1. Essential hypertension  I10 401.9    2. Impaired glucose tolerance  R73.02 790.22    3. Generalized abdominal pain  R10.84 789.07    4.  Allergic conjunctivitis of both eyes  H10.13 372.14        Orders Placed This Encounter    montelukast (SINGULAIR) 10 mg tablet    Symbicort 160-4.5 mcg/actuation HFAA    azelastine (OPTIVAR) 0.05 % ophthalmic solution     Sig: Administer 1 Drop to both eyes two (2) times a day. Use in affected eye(s)     Dispense:  6 mL     Refill:  0       Follow-up and Dispositions    · Return in about 6 months (around 2/13/2021). Plan:    After evaluation I suspect his abdominal pain is more muscular than anything else. I advised him to avoid heavy lifting and watch his weight. He will cut back on food intake. Of note his weight is increased from March to now from 2 44-2 61. We will continue to observe and follow-up in 6 months unless otherwise indicated. He will be given Optivar eyedrops for what I suspect is allergic conjunctivitis. I have reviewed with the patient details of the assessment and plan and all questions were answered. Relevent patient education was performed. Verbal and/or written instructions (see AVS) provided. The most recent lab findings were reviewed with the patient. Plan was discussed with patient who verbal expressed understanding. An After Visit Summary was printed and given to the patient.       Kip Rodriguez MD

## 2020-08-13 NOTE — PROGRESS NOTES
Reviewed record in preparation for visit and have obtained necessary documentation. Identified pt with two pt identifiers(name and ). Chief Complaint   Patient presents with    Results     ct scan and labs        Coordination of Care Questionnaire:  :     1) Have you been to an emergency room, urgent care clinic since your last visit? No     Hospitalized since your last visit? No               2) Have you seen or consulted any other health care providers outside of [address] [place] [address] since your last visit?  Yes Dr Monty valenzuela

## 2020-09-03 ENCOUNTER — HOSPITAL ENCOUNTER (OUTPATIENT)
Dept: MRI IMAGING | Age: 70
Discharge: HOME | End: 2020-09-03
Attending: ORTHOPAEDIC SURGERY
Payer: MEDICARE

## 2020-09-03 DIAGNOSIS — M54.2 NECK PAIN: ICD-10-CM

## 2020-09-03 DIAGNOSIS — M54.12 CERVICAL RADICULOPATHY: ICD-10-CM

## 2020-09-03 DIAGNOSIS — M47.812 CERVICAL SPONDYLOSIS WITHOUT MYELOPATHY: ICD-10-CM

## 2020-09-03 PROCEDURE — 72141 MRI NECK SPINE W/O DYE: CPT

## 2020-10-15 ENCOUNTER — TRANSCRIBE ORDER (OUTPATIENT)
Dept: SCHEDULING | Age: 70
End: 2020-10-15

## 2020-10-15 DIAGNOSIS — M54.32 BILATERAL SCIATICA: ICD-10-CM

## 2020-10-15 DIAGNOSIS — M48.062 SPINAL STENOSIS, LUMBAR REGION, WITH NEUROGENIC CLAUDICATION: ICD-10-CM

## 2020-10-15 DIAGNOSIS — M54.31 BILATERAL SCIATICA: ICD-10-CM

## 2020-10-15 DIAGNOSIS — M54.50 LOW BACK PAIN, UNSPECIFIED BACK PAIN LATERALITY, UNSPECIFIED CHRONICITY, UNSPECIFIED WHETHER SCIATICA PRESENT: ICD-10-CM

## 2020-10-15 DIAGNOSIS — M48.061 FORAMINAL STENOSIS OF LUMBAR REGION: Primary | ICD-10-CM

## 2020-10-16 RX ORDER — NABUMETONE 500 MG/1
TABLET, FILM COATED ORAL
Qty: 180 TAB | Refills: 0 | Status: SHIPPED | OUTPATIENT
Start: 2020-10-16 | End: 2021-01-18 | Stop reason: SDUPTHER

## 2020-10-27 ENCOUNTER — HOSPITAL ENCOUNTER (OUTPATIENT)
Dept: MRI IMAGING | Age: 70
Discharge: HOME | End: 2020-10-27
Attending: ORTHOPAEDIC SURGERY
Payer: MEDICARE

## 2020-10-27 DIAGNOSIS — M54.32 BILATERAL SCIATICA: ICD-10-CM

## 2020-10-27 DIAGNOSIS — M48.062 SPINAL STENOSIS, LUMBAR REGION, WITH NEUROGENIC CLAUDICATION: ICD-10-CM

## 2020-10-27 DIAGNOSIS — M54.50 LOW BACK PAIN, UNSPECIFIED BACK PAIN LATERALITY, UNSPECIFIED CHRONICITY, UNSPECIFIED WHETHER SCIATICA PRESENT: ICD-10-CM

## 2020-10-27 DIAGNOSIS — M48.061 FORAMINAL STENOSIS OF LUMBAR REGION: ICD-10-CM

## 2020-10-27 DIAGNOSIS — M54.31 BILATERAL SCIATICA: ICD-10-CM

## 2020-10-27 PROCEDURE — 72148 MRI LUMBAR SPINE W/O DYE: CPT

## 2020-12-22 RX ORDER — LEVOCETIRIZINE DIHYDROCHLORIDE 5 MG/1
TABLET, FILM COATED ORAL
Qty: 30 TAB | Refills: 4 | Status: SHIPPED | OUTPATIENT
Start: 2020-12-22 | End: 2021-05-30

## 2021-01-18 RX ORDER — NABUMETONE 500 MG/1
TABLET, FILM COATED ORAL
Qty: 180 TAB | Refills: 2 | Status: SHIPPED | OUTPATIENT
Start: 2021-01-18 | End: 2021-12-20

## 2021-01-18 NOTE — TELEPHONE ENCOUNTER
Last Refill: 10/16/20  Last Visit: 1/14/2021 Virtual visit  Next Visit: 2/25/2021    Requested Prescriptions     Pending Prescriptions Disp Refills    nabumetone (RELAFEN) 500 mg tablet 180 Tab 0

## 2021-02-25 ENCOUNTER — OFFICE VISIT (OUTPATIENT)
Dept: INTERNAL MEDICINE CLINIC | Age: 71
End: 2021-02-25
Payer: MEDICARE

## 2021-02-25 VITALS
HEART RATE: 66 BPM | WEIGHT: 261 LBS | BODY MASS INDEX: 37.37 KG/M2 | TEMPERATURE: 98 F | SYSTOLIC BLOOD PRESSURE: 130 MMHG | DIASTOLIC BLOOD PRESSURE: 82 MMHG | HEIGHT: 70 IN | OXYGEN SATURATION: 95 % | RESPIRATION RATE: 18 BRPM

## 2021-02-25 DIAGNOSIS — R53.83 FATIGUE, UNSPECIFIED TYPE: ICD-10-CM

## 2021-02-25 DIAGNOSIS — N39.0 URINARY TRACT INFECTION WITHOUT HEMATURIA, SITE UNSPECIFIED: ICD-10-CM

## 2021-02-25 DIAGNOSIS — Z12.5 SPECIAL SCREENING FOR MALIGNANT NEOPLASM OF PROSTATE: ICD-10-CM

## 2021-02-25 DIAGNOSIS — I25.10 ASHD (ARTERIOSCLEROTIC HEART DISEASE): Primary | ICD-10-CM

## 2021-02-25 DIAGNOSIS — I10 ESSENTIAL HYPERTENSION: ICD-10-CM

## 2021-02-25 DIAGNOSIS — R73.02 IMPAIRED GLUCOSE TOLERANCE: ICD-10-CM

## 2021-02-25 DIAGNOSIS — N40.0 HYPERPLASIA OF PROSTATE: ICD-10-CM

## 2021-02-25 LAB
A-G RATIO,AGRAT: 1.6 RATIO
ALBUMIN SERPL-MCNC: 4.2 G/DL (ref 3.9–5.4)
ALP SERPL-CCNC: 46 U/L (ref 38–126)
ALT SERPL-CCNC: 59 U/L (ref 0–50)
ANION GAP SERPL CALC-SCNC: 11 MMOL/L
AST SERPL W P-5'-P-CCNC: 34 U/L (ref 14–36)
BACTERIA,BACTU: ABNORMAL
BILIRUB SERPL-MCNC: 0.7 MG/DL (ref 0.2–1.3)
BILIRUB UR QL: NEGATIVE
BUN SERPL-MCNC: 21 MG/DL (ref 9–20)
BUN/CREATININE RATIO,BUCR: 26 RATIO
CALCIUM SERPL-MCNC: 9.4 MG/DL (ref 8.4–10.2)
CHLORIDE SERPL-SCNC: 101 MMOL/L (ref 98–107)
CLARITY: CLEAR
CO2 SERPL-SCNC: 29 MMOL/L (ref 22–32)
COLOR UR: ABNORMAL
CREAT SERPL-MCNC: 0.8 MG/DL (ref 0.8–1.5)
GLOBULIN,GLOB: 2.6
GLUCOSE 24H UR-MRATE: ABNORMAL G/(24.H)
GLUCOSE SERPL-MCNC: 97 MG/DL (ref 75–110)
HGB UR QL STRIP: NEGATIVE
KETONES UR QL STRIP.AUTO: NEGATIVE
LEUKOCYTE ESTERASE: ABNORMAL
NITRITE UR QL STRIP.AUTO: NEGATIVE
PH UR STRIP: 7 [PH] (ref 5–7)
POTASSIUM SERPL-SCNC: 4.6 MMOL/L (ref 3.6–5)
PROT SERPL-MCNC: 6.8 G/DL (ref 6.3–8.2)
PROT UR STRIP-MCNC: NEGATIVE MG/DL
PSA, TEST22: 0.3 NG/ML (ref 0–4)
RBC #/AREA URNS HPF: 0 #/HPF
SODIUM SERPL-SCNC: 141 MMOL/L (ref 137–145)
SP GR UR REFRACTOMETRY: 1.01 (ref 1–1.03)
UROBILINOGEN UR QL STRIP.AUTO: NEGATIVE
WBC URNS QL MICRO: ABNORMAL #/HPF

## 2021-02-25 PROCEDURE — G8754 DIAS BP LESS 90: HCPCS | Performed by: INTERNAL MEDICINE

## 2021-02-25 PROCEDURE — G8427 DOCREV CUR MEDS BY ELIG CLIN: HCPCS | Performed by: INTERNAL MEDICINE

## 2021-02-25 PROCEDURE — 81003 URINALYSIS AUTO W/O SCOPE: CPT | Performed by: INTERNAL MEDICINE

## 2021-02-25 PROCEDURE — G8417 CALC BMI ABV UP PARAM F/U: HCPCS | Performed by: INTERNAL MEDICINE

## 2021-02-25 PROCEDURE — G8752 SYS BP LESS 140: HCPCS | Performed by: INTERNAL MEDICINE

## 2021-02-25 PROCEDURE — G8536 NO DOC ELDER MAL SCRN: HCPCS | Performed by: INTERNAL MEDICINE

## 2021-02-25 PROCEDURE — 80053 COMPREHEN METABOLIC PANEL: CPT | Performed by: INTERNAL MEDICINE

## 2021-02-25 PROCEDURE — 3017F COLORECTAL CA SCREEN DOC REV: CPT | Performed by: INTERNAL MEDICINE

## 2021-02-25 PROCEDURE — 99214 OFFICE O/P EST MOD 30 MIN: CPT | Performed by: INTERNAL MEDICINE

## 2021-02-25 PROCEDURE — 1101F PT FALLS ASSESS-DOCD LE1/YR: CPT | Performed by: INTERNAL MEDICINE

## 2021-02-25 PROCEDURE — G8510 SCR DEP NEG, NO PLAN REQD: HCPCS | Performed by: INTERNAL MEDICINE

## 2021-02-25 RX ORDER — FUROSEMIDE 40 MG/1
TABLET ORAL
COMMUNITY
Start: 2021-01-28

## 2021-02-25 RX ORDER — PREGABALIN 100 MG/1
CAPSULE ORAL
Status: ON HOLD | COMMUNITY
Start: 2021-01-27 | End: 2021-10-14

## 2021-02-25 RX ORDER — DAPAGLIFLOZIN 10 MG/1
TABLET, FILM COATED ORAL
COMMUNITY
Start: 2021-02-11

## 2021-02-25 RX ORDER — METFORMIN HYDROCHLORIDE 500 MG/1
TABLET, EXTENDED RELEASE ORAL
COMMUNITY
Start: 2021-02-11

## 2021-02-25 NOTE — PROGRESS NOTES
Elizabeth Bello is a 78 y.o. male and presents with Hypertension (6 month) and Diabetes  . Subjective:  Mr. Shanon Gracia presents today for follow-up of several problems including hypertension, diabetes, coronary disease, hyperlipidemia, asthma, obstructive sleep apnea. He has seen cardiology and endocrinology since his last visit. He was placed on Trulicity and another medication which she cannot afford. Subsequently he has been started on Farxiga 10 mg daily and remains on Metformin. He has had labs done which we will need to obtain and review. He complains of urinary frequency urgency and nocturia. This is been present for some time now. He notes it was present before he was started on furosemide. The swelling in his lower extremities has subsided since being on this medicine. He is also had a cardiac stress test and what sounds like an echocardiogram since his last visit. He continues his inhalers as prescribed. He does have some hoarseness of voice and tries to rinse his throat out after he does his inhalers. He is supposed to see urology at some point but he does not know when this visit is scheduled. He has not had a PSA test done in a year.     Past Medical History:   Diagnosis Date    Actinic keratosis 9/15/2017    Annual physical exam 9/15/2017    Arthritis     ASHD (arteriosclerotic heart disease) 9/15/2017    Asthma     At risk for sleep apnea 11/20/2019    STOP BANG 5    Guerrier's esophagus     CAD (coronary artery disease)     Chest pain 9/15/2017    Chronic back pain 9/15/2017    Chronic obstructive pulmonary disease (HCC)     Chronic pain     Cough 9/15/2017    Diabetes ([address]     hypoglycemia hx    Diabetes mellitus screening 9/15/2017    Diaphoresis 9/15/2017    SANDERS (dyspnea on exertion)     Dyslipidemia 9/15/2017    Edema 9/15/2017    Elevated LFTs 9/15/2017    Essential hypertension 6/7/2019    Fatigue 9/15/2017    GERD (gastroesophageal reflux disease) Guerrier's esophagus    Hypercholesterolemia     Hyperplasia of prostate 9/15/2017    Impaired glucose tolerance 1/17/2020    Obesity 9/15/2017    Other long term (current) drug therapy 9/15/2017    Sinusitis 9/15/2017    SK (seborrheic keratosis) 9/15/2017     Past Surgical History:   Procedure Laterality Date    COLONOSCOPY N/A 11/27/2019    FLEXIBLE SIGMOIDOSCOPY performed by Lamont [facility] at [facility] AMBULATORY OR    COLONOSCOPY N/A 1/15/2020    COLONOSCOPY performed by Lamont Sanchez MD at [facility] ENDOSCOPY    HX APPENDECTOMY      HX COLONOSCOPY      HX ENDOSCOPY      HX HEART CATHETERIZATION  ~2008    stent    HX KNEE REPLACEMENT Right 06/2019    partial knee replacement Dr. Vero Boucher   Allergen Reactions    Claritin-D 12 Hour [Loratadine-Pseudoephedrine] Shortness of Breath    Iodine Shortness of Breath    Levaquin [Levofloxacin] Hives    Seafood [Shellfish Containing Products] Shortness of Breath    Statins-Hmg-Coa Reductase Inhibitors Other (comments)     \"liver problems\"    Sulfa (Sulfonamide Antibiotics) Hives     Current Outpatient Medications   Medication Sig Dispense Refill    Farxiga 10 mg tab tablet TAKE 1 TABLET BY MOUTH EVERY DAY      furosemide (LASIX) 40 mg tablet TAKE 1 TABLET BY MOUTH DAILY      metFORMIN ER (GLUCOPHAGE XR) 500 mg tablet TAKE 1 TABLET BY MOUTH TWICE A DAY      pregabalin (LYRICA) 100 mg capsule TAKE 1 CAPSULE BY MOUTH TWICE A DAY      nabumetone (RELAFEN) 500 mg tablet TAKE ONE TABLET BY MOUTH TWICE A  Tab 2    levocetirizine (XYZAL) 5 mg tablet TAKE ONE TABLET BY MOUTH DAILY 30 Tab 4    montelukast (SINGULAIR) 10 mg tablet       Symbicort 160-4.5 mcg/actuation HFAA       azelastine (ASTELIN) 137 mcg (0.1 %) nasal spray       aspirin delayed-release 81 mg tablet Take 81 mg by mouth daily.  COQ10, UBIQUINOL, PO Take 100 mg by mouth daily.       Cholestyramine-Aspartame (CHOLESTYRAMINE LIGHT) 4 gram powder Take 4 g by mouth every morning.  albuterol (VENTOLIN HFA) 90 mcg/actuation inhaler Take 2 Puffs by inhalation every four (4) hours as needed for Wheezing.  dilTIAZem (TIAZAC) 360 mg SR capsule Take 360 mg by mouth every morning. 2    omeprazole (PRILOSEC) 40 mg capsule Take 40 mg by mouth daily. Indications: gastroesophageal reflux disease  1    ZETIA 10 mg tablet Take 1 Tab by mouth daily. 90 Tab 3    finasteride (PROSCAR) 5 mg tablet Take 5 mg by mouth every Monday, Wednesday, Friday. 0    multivitamin (ONE A DAY) tablet Take 1 Tab by mouth daily.  azelastine (OPTIVAR) 0.05 % ophthalmic solution Administer 1 Drop to both eyes two (2) times a day. Use in affected eye(s) 6 mL 0    Anoro Ellipta 62.5-25 mcg/actuation inhaler       diclofenac (VOLTAREN) 1 % gel Apply two grams by topical route four times daily to the affected area (s).  cephALEXin (KEFLEX) 500 mg capsule Take 1,000 mg by mouth once. Indications: 1 hour prior to dental procedure. s/p partial knee replacement       Social History     Socioeconomic History    Marital status:      Spouse name: Not on file    Number of children: Not on file    Years of education: Not on file    Highest education level: Not on file   Tobacco Use    Smoking status: Never Smoker    Smokeless tobacco: Former User   Substance and Sexual Activity    Alcohol use: No     Alcohol/week: 0.0 standard drinks    Drug use: No     Family History   Problem Relation Age of Onset    Lung Disease Mother         COPD    Heart Disease Mother         pacemaker    Cancer Father         prostate/liver       Review of Systems  Constitutional:  negative for fevers, chills, anorexia and weight loss  Eyes:    negative for visual disturbance and irritation  ENT:    negative for tinnitus,sore throat,nasal congestion,ear pains. hoarseness  Respiratory:     negative for cough, hemoptysis, dyspnea,wheezing  CV:    negative for chest pain, palpitations, lower extremity edema  GI: negative for nausea, vomiting, diarrhea, abdominal pain,melena  Endo:               negative for polyuria,polydipsia,polyphagia,heat intolerance  Genitourinary : negative for  dysuria and hematuria  Integumentary: negative for rash and pruritus  Hematologic:   negative for easy bruising and gum/nose bleeding  Musculoskel:  negative for myalgias, arthralgias, back pain, muscle weakness, joint pain  Neurological:   negative for headaches, dizziness, vertigo, memory problems and gait   Behavl/Psych:  negative for feelings of anxiety, depression, mood changes  ROS otherwise negative      Objective:  Visit Vitals  /82 (BP 1 Location: Left upper arm, BP Patient Position: Sitting, BP Cuff Size: Large adult)   Pulse 66   Temp 98 °F (36.7 °C) (Oral)   Resp 18   Ht 5' 10\" (1.778 m)   Wt 261 lb (118.4 kg)   SpO2 95%   BMI 37.45 kg/m²     Physical Exam:   General appearance - alert, well appearing, and in no distress  Mental status - alert, oriented to person, place, and time  EYE-KAI, EOMI, fundi normal, corneas normal, no foreign bodies  ENT-ENT exam normal, no neck nodes or sinus tenderness  Nose - normal and patent, no erythema, discharge or polyps  Mouth - mucous membranes moist, pharynx normal without lesions  Neck - supple, no significant adenopathy   Chest - clear to auscultation, no wheezes, rales or rhonchi, symmetric air entry   Heart - normal rate, regular rhythm, normal S1, S2, no murmurs, rubs, clicks or gallops   Abdomen - soft, nontender, nondistended, no masses or organomegaly  Lymph- no adenopathy palpable  Ext-peripheral pulses normal, no pedal edema, no clubbing or cyanosis  Skin-Warm and dry. no hyperpigmentation, vitiligo, or suspicious lesions  Neuro -alert, oriented, normal speech, no focal findings or movement disorder noted      Assessment/Plan:  Diagnoses and all orders for this visit:    1. ASHD (arteriosclerotic heart disease)  -     METABOLIC PANEL, COMPREHENSIVE    2.  Essential hypertension  -     METABOLIC PANEL, COMPREHENSIVE  -     URINALYSIS W/O MICRO    3. Impaired glucose tolerance    4. Hyperplasia of prostate  -     PSA SCREENING (SCREENING)    5. Special screening for malignant neoplasm of prostate  -     PSA SCREENING (SCREENING)    6. Fatigue, unspecified type          ICD-10-CM ICD-9-CM    1. ASHD (arteriosclerotic heart disease)  A12.73 577.07 METABOLIC PANEL, COMPREHENSIVE   2. Essential hypertension  G06 914.1 METABOLIC PANEL, COMPREHENSIVE      URINALYSIS W/O MICRO   3. Impaired glucose tolerance  R73.02 790.22    4. Hyperplasia of prostate  N40.0 600.90 PSA SCREENING (SCREENING)   5. Special screening for malignant neoplasm of prostate  Z12.5 V76.44 PSA SCREENING (SCREENING)   6. Fatigue, unspecified type  R53.83 780.79      Plan:    Continue current medical regimen as outlined above. Follow-up labs as ordered today including urinalysis and PSA. Obtain the remainder of his labs which have been done recently by endocrine and cardiology. Further recommendations based on these results. He will be due for a Medicare annual wellness review next month which will be updated on his next follow-up visit. Follow-up and Dispositions    · Return in about 6 months (around 8/25/2021) for follow up. I have reviewed with the patient details of the assessment and plan and all questions were answered. Relevent patient education was performed. Verbal and/or written instructions (see AVS) provided. The most recent lab findings were reviewed with the patient. Plan was discussed with patient who verbally expressed understanding. An After Visit Summary was printed and given to the patient.     Taylor Villalobos MD

## 2021-02-25 NOTE — PROGRESS NOTES
HIPAA verified by two patient identifiers. Luis Salvador III is a 78 y.o. male    Chief Complaint   Patient presents with    Hypertension     6 month    Diabetes       Visit Vitals  /82 (BP 1 Location: Left upper arm, BP Patient Position: Sitting, BP Cuff Size: Large adult)   Pulse 66   Temp 98 °F (36.7 °C) (Oral)   Resp 18   Ht 5' 10\" (1.778 m)   Wt 261 lb (118.4 kg)   SpO2 95%   BMI 37.45 kg/m²       Pain Scale: 0 - No pain/10  Pain Location:       Health Maintenance Due   Topic Date Due    COVID-19 Vaccine (1 of 2) [birth year]/1968    DTaP/Tdap/Td series (1 - Tdap) 06/29/1973    Shingrix Vaccine Age 50> (1 of 2) 06/29/2002    GLAUCOMA SCREENING Q2Y  06/29/2017    Flu Vaccine (1) 09/01/2020    Pneumococcal 65+ years (2 of 2 - PPSV23) 09/12/2020    Medicare Yearly Exam  03/17/2021         Coordination of Care Questionnaire:  :   1) Have you been to an emergency room, urgent care, or hospitalized since your last visit? If yes, where when, and reason for visit? no       2. Have seen or consulted any other health care provider since your last visit? If yes, where when, and reason for visit? NO      Patient is accompanied by self I have received verbal consent from Luis Salvador III to discuss any/all medical information while they are present in the room.

## 2021-02-27 LAB — BACTERIA UR CULT: NO GROWTH

## 2021-03-18 ENCOUNTER — OFFICE VISIT (OUTPATIENT)
Dept: INTERNAL MEDICINE CLINIC | Age: 71
End: 2021-03-18
Payer: MEDICARE

## 2021-03-18 VITALS
TEMPERATURE: 97.7 F | DIASTOLIC BLOOD PRESSURE: 64 MMHG | BODY MASS INDEX: 37.8 KG/M2 | OXYGEN SATURATION: 96 % | HEIGHT: 70 IN | RESPIRATION RATE: 16 BRPM | HEART RATE: 84 BPM | WEIGHT: 264 LBS | SYSTOLIC BLOOD PRESSURE: 124 MMHG

## 2021-03-18 DIAGNOSIS — J30.1 SEASONAL ALLERGIC RHINITIS DUE TO POLLEN: ICD-10-CM

## 2021-03-18 DIAGNOSIS — J01.00 ACUTE MAXILLARY SINUSITIS, RECURRENCE NOT SPECIFIED: Primary | ICD-10-CM

## 2021-03-18 PROCEDURE — 3017F COLORECTAL CA SCREEN DOC REV: CPT | Performed by: INTERNAL MEDICINE

## 2021-03-18 PROCEDURE — G8417 CALC BMI ABV UP PARAM F/U: HCPCS | Performed by: INTERNAL MEDICINE

## 2021-03-18 PROCEDURE — G8427 DOCREV CUR MEDS BY ELIG CLIN: HCPCS | Performed by: INTERNAL MEDICINE

## 2021-03-18 PROCEDURE — 1101F PT FALLS ASSESS-DOCD LE1/YR: CPT | Performed by: INTERNAL MEDICINE

## 2021-03-18 PROCEDURE — G8752 SYS BP LESS 140: HCPCS | Performed by: INTERNAL MEDICINE

## 2021-03-18 PROCEDURE — 99213 OFFICE O/P EST LOW 20 MIN: CPT | Performed by: INTERNAL MEDICINE

## 2021-03-18 PROCEDURE — G8536 NO DOC ELDER MAL SCRN: HCPCS | Performed by: INTERNAL MEDICINE

## 2021-03-18 PROCEDURE — G8754 DIAS BP LESS 90: HCPCS | Performed by: INTERNAL MEDICINE

## 2021-03-18 PROCEDURE — G8432 DEP SCR NOT DOC, RNG: HCPCS | Performed by: INTERNAL MEDICINE

## 2021-03-18 RX ORDER — DOXYCYCLINE 100 MG/1
100 CAPSULE ORAL 2 TIMES DAILY
Qty: 20 CAP | Refills: 0 | Status: SHIPPED | OUTPATIENT
Start: 2021-03-18 | End: 2021-03-28

## 2021-03-18 RX ORDER — LANOLIN ALCOHOL/MO/W.PET/CERES
1000 CREAM (GRAM) TOPICAL DAILY
COMMUNITY

## 2021-03-18 RX ORDER — ACETAMINOPHEN 500 MG
2000 TABLET ORAL 3 TIMES DAILY
COMMUNITY

## 2021-03-18 NOTE — PROGRESS NOTES
Zaid Sample III presents today at the clinic for    Chief Complaint   Patient presents with    Cough    Pressure Behind the Eyes    Nasal Congestion        Wt Readings from Last 3 Encounters:   03/18/21 264 lb (119.7 kg)   02/25/21 261 lb (118.4 kg)   08/13/20 261 lb 9.6 oz (118.7 kg)     Temp Readings from Last 3 Encounters:   03/18/21 97.7 °F (36.5 °C) (Temporal)   02/25/21 98 °F (36.7 °C) (Oral)   08/13/20 97.7 °F (36.5 °C) (Oral)     BP Readings from Last 3 Encounters:   03/18/21 124/64   02/25/21 130/82   08/13/20 142/82     Pulse Readings from Last 3 Encounters:   03/18/21 84   02/25/21 66   08/13/20 70       Health Maintenance Due   Topic    COVID-19 Vaccine (1)    DTaP/Tdap/Td series (1 - Tdap)    Shingrix Vaccine Age 50> (1 of 2)    Flu Vaccine (1)    Pneumococcal 65+ years (2 of 2 - PPSV23)    Medicare Yearly Exam          Learning Assessment:  :     Learning Assessment 2/25/2021 8/16/2017   PRIMARY LEARNER Patient Patient   HIGHEST LEVEL OF EDUCATION - PRIMARY LEARNER  GRADUATED HIGH SCHOOL OR GED SOME COLLEGE   BARRIERS PRIMARY LEARNER - NONE   CO-LEARNER CAREGIVER No No   PRIMARY LANGUAGE ENGLISH ENGLISH   LEARNER PREFERENCE PRIMARY DEMONSTRATION DEMONSTRATION   ANSWERED BY patient patient   RELATIONSHIP SELF SELF       Depression Screening:  :     3 most recent PHQ Screens 2/25/2021   Little interest or pleasure in doing things Not at all   Feeling down, depressed, irritable, or hopeless Not at all   Total Score PHQ 2 0       Fall Risk Assessment:  :     Fall Risk Assessment, last 12 mths 2/25/2021   Able to walk? Yes   Fall in past 12 months? 0   Do you feel unsteady? 0   Are you worried about falling 0       Abuse Screening:  :     Abuse Screening Questionnaire 3/16/2020 9/12/2019 11/15/2017 8/16/2017   Do you ever feel afraid of your partner? N N N N   Are you in a relationship with someone who physically or mentally threatens you? N N N N   Is it safe for you to go home?  Gallo Wilhelm Coordination of Care Questionnaire:  :     1. Have you been to the ER, urgent care clinic since your last visit? Hospitalized since your last visit?no    2. Have you seen or consulted any other health care providers outside of the [address] [place] [address] since your last visit? Include any pap smears or colon screening.  no

## 2021-03-18 NOTE — PROGRESS NOTES
This note will not be viewable in [address] [facility] is a 78 y.o. male and presents with Cough, Pressure Behind the Eyes, and Nasal Congestion  . Subjective:  Mr. Claire Oppenheim presents today with complaint of sinus congestion pressure and drainage of yellowish-green color with cough productive of same. He has a history of asthma. He does not note any current wheezing or shortness of breath. He has no fever. He denies any pleuritic chest pain. He continues to take Symbicort and a nasal steroid for seasonal allergies and asthma. His symptoms have been present for about 7 to 10 days.     Past Medical History:   Diagnosis Date    Actinic keratosis 9/15/2017    Annual physical exam 9/15/2017    Arthritis     ASHD (arteriosclerotic heart disease) 9/15/2017    Asthma     At risk for sleep apnea 11/20/2019    STOP BANG 5    Guerrier's esophagus     CAD (coronary artery disease)     Chest pain 9/15/2017    Chronic back pain 9/15/2017    Chronic obstructive pulmonary disease (HCC)     Chronic pain     Cough 9/15/2017    Diabetes ([address]     hypoglycemia hx    Diabetes mellitus screening 9/15/2017    Diaphoresis 9/15/2017    SANDERS (dyspnea on exertion)     Dyslipidemia 9/15/2017    Edema 9/15/2017    Elevated LFTs 9/15/2017    Essential hypertension 6/7/2019    Fatigue 9/15/2017    GERD (gastroesophageal reflux disease)     Guerrier's esophagus    Hypercholesterolemia     Hyperplasia of prostate 9/15/2017    Impaired glucose tolerance 1/17/2020    Obesity 9/15/2017    Other long term (current) drug therapy 9/15/2017    Seasonal allergic rhinitis due to pollen 3/18/2021    Sinusitis 9/15/2017    SK (seborrheic keratosis) 9/15/2017     Past Surgical History:   Procedure Laterality Date    COLONOSCOPY N/A 11/27/2019    FLEXIBLE SIGMOIDOSCOPY performed by Ingrid Rodriguez MD at [place] [address] COLONOSCOPY N/A 1/15/2020    COLONOSCOPY performed [facility] END[facility]COPY    HX APPENDECTOMY      HX COLONOSCOPY      HX ENDOSCOPY      HX HEART CATHETERIZATION  ~2008    stent    HX KNEE REPLACEMENT Right 06/2019    partial knee replacement Dr. Mont Gilford     Allergies   Allergen Reactions    Claritin-D 12 Hour [Loratadine-Pseudoephedrine] Shortness of Breath    Iodine Shortness of Breath    Levaquin [Levofloxacin] Hives    Seafood [Shellfish Containing Products] Shortness of Breath    Statins-Hmg-Coa Reductase Inhibitors Other (comments)     \"liver problems\"    Sulfa (Sulfonamide Antibiotics) Hives     Current Outpatient Medications   Medication Sig Dispense Refill    cholecalciferol (VITAMIN D3) (2,000 UNITS /50 MCG) cap capsule Take 2,000 Units by mouth three (3) times daily.  cyanocobalamin (Vitamin B-12) 1,000 mcg tablet Take 1,000 mcg by mouth daily.  doxycycline (VIBRAMYCIN) 100 mg capsule Take 1 Cap by mouth two (2) times a day for 10 days. 20 Cap 0    Farxiga 10 mg tab tablet TAKE 1 TABLET BY MOUTH EVERY DAY      furosemide (LASIX) 40 mg tablet TAKE 1 TABLET BY MOUTH DAILY      metFORMIN ER (GLUCOPHAGE XR) 500 mg tablet TAKE 1 TABLET BY MOUTH TWICE A DAY      pregabalin (LYRICA) 100 mg capsule TAKE 1 CAPSULE BY MOUTH TWICE A DAY      nabumetone (RELAFEN) 500 mg tablet TAKE ONE TABLET BY MOUTH TWICE A  Tab 2    levocetirizine (XYZAL) 5 mg tablet TAKE ONE TABLET BY MOUTH DAILY 30 Tab 4    montelukast (SINGULAIR) 10 mg tablet       Symbicort 160-4.5 mcg/actuation HFAA       azelastine (ASTELIN) 137 mcg (0.1 %) nasal spray       aspirin delayed-release 81 mg tablet Take 81 mg by mouth daily.  diclofenac (VOLTAREN) 1 % gel Apply two grams by topical route four times daily to the affected area (s).  COQ10, UBIQUINOL, PO Take 100 mg by mouth daily.  Cholestyramine-Aspartame (CHOLESTYRAMINE LIGHT) 4 gram powder Take 4 g by mouth every morning.       albuterol (VENTOLIN HFA) 90 mcg/actuation inhaler Take 2 Puffs by inhalation every four (4) hours as needed for Wheezing.  cephALEXin (KEFLEX) 500 mg capsule Take 1,000 mg by mouth once. Indications: 1 hour prior to dental procedure. s/p partial knee replacement      dilTIAZem (TIAZAC) 360 mg SR capsule Take 360 mg by mouth every morning. 2    omeprazole (PRILOSEC) 40 mg capsule Take 40 mg by mouth daily. Indications: gastroesophageal reflux disease  1    ZETIA 10 mg tablet Take 1 Tab by mouth daily. 90 Tab 3    finasteride (PROSCAR) 5 mg tablet Take 5 mg by mouth every Monday, Wednesday, Friday. 0    multivitamin (ONE A DAY) tablet Take 1 Tab by mouth daily.  azelastine (OPTIVAR) 0.05 % ophthalmic solution Administer 1 Drop to both eyes two (2) times a day.  Use in affected eye(s) 6 mL 0    Anoro Ellipta 62.5-25 mcg/actuation inhaler        Social History     Socioeconomic History    Marital status:      Spouse name: Not on file    Number of children: Not on file    Years of education: Not on file    Highest education level: Not on file   Tobacco Use    Smoking status: Never Smoker    Smokeless tobacco: Former User   Substance and Sexual Activity    Alcohol use: No     Alcohol/week: 0.0 standard drinks    Drug use: No     Family History   Problem Relation Age of Onset    Lung Disease Mother         COPD    Heart Disease Mother         pacemaker    Cancer Father         prostate/liver       Review of Systems  Constitutional:  negative for fevers, chills, anorexia and weight loss  Eyes:    negative for visual disturbance and irritation  ENT:    negative for tinnitus,.hoarseness  Respiratory:     negative for  hemoptysis, dyspnea,wheezing  CV:    negative for chest pain, palpitations, lower extremity edema  GI:    negative for nausea, vomiting, diarrhea, abdominal pain,melena  Endo:               negative for polyuria,polydipsia,polyphagia,heat intolerance  Genitourinary : negative for frequency, dysuria and hematuria  Integumentary: negative for rash and pruritus  Hematologic:   negative for easy bruising and gum/nose bleeding  Musculoskel:  negative for myalgias, arthralgias, back pain, muscle weakness, joint pain  Neurological:   negative for headaches, dizziness, vertigo, memory problems and gait   Behavl/Psych:  negative for feelings of anxiety, depression, mood changes  ROS otherwise negative      Objective:  Visit Vitals  /64 (BP 1 Location: Left upper arm, BP Patient Position: Sitting, BP Cuff Size: Large adult)   Pulse 84   Temp 97.7 °F (36.5 °C) (Temporal)   Resp 16   Ht 5' 10\" (1.778 m)   Wt 264 lb (119.7 kg)   SpO2 96%   BMI 37.88 kg/m²     Physical Exam:   General appearance - alert, well appearing, and in no distress  Mental status - alert, oriented to person, place, and time  EYE-KAI, EOMI, fundi normal, corneas normal, no foreign bodies  ENT-tympanic membranes opacified bilaterally without injection or erythema, no neck nodes or sinus tenderness  Nose -erythematous and boggy with mucopurulent drainage, tenderness to the maxillary sinuses bilaterally with decreased transillumination light bilaterally  Mouth - mucous membranes moist, pharynx normal without lesions  Neck - supple, no significant adenopathy   Chest - clear to auscultation, no wheezes, rales or rhonchi, symmetric air entry   Heart - normal rate, regular rhythm, normal S1, S2, no murmurs, rubs, clicks or gallops   Abdomen - soft, nontender, nondistended, no masses or organomegaly  Lymph- no adenopathy palpable  Ext-peripheral pulses normal, no pedal edema, no clubbing or cyanosis  Skin-Warm and dry. no hyperpigmentation, vitiligo, or suspicious lesions  Neuro -alert, oriented, normal speech, no focal findings or movement disorder noted      Assessment/Plan:  Diagnoses and all orders for this visit:    1. Acute maxillary sinusitis, recurrence not specified    2. Seasonal allergic rhinitis due to pollen    Other orders  -     doxycycline (VIBRAMYCIN) 100 mg capsule;  Take 1 Cap by mouth two (2) times a day for 10 days. ICD-10-CM ICD-9-CM    1. Acute maxillary sinusitis, recurrence not specified  J01.00 461.0    2. Seasonal allergic rhinitis due to pollen  J30.1 477.0        Plan:    Acute sinusitis complicating seasonal allergic rhinitis. Start doxycycline 100 mg twice daily for 10 days. Continue medications otherwise as prescribed. Follow-up if not improved. I have reviewed with the patient details of the assessment and plan and all questions were answered. Relevent patient education was performed. Verbal and/or written instructions (see AVS) provided. The most recent lab findings were reviewed with the patient. Plan was discussed with patient who verbally expressed understanding. An After Visit Summary was printed and given to the patient.     Davidson Tsang MD

## 2021-05-06 ENCOUNTER — OFFICE VISIT (OUTPATIENT)
Dept: URGENT CARE | Age: 71
End: 2021-05-06
Payer: MEDICARE

## 2021-05-06 VITALS — RESPIRATION RATE: 20 BRPM | TEMPERATURE: 97.7 F | HEART RATE: 77 BPM | OXYGEN SATURATION: 98 %

## 2021-05-06 DIAGNOSIS — J01.90 ACUTE BACTERIAL SINUSITIS: Primary | ICD-10-CM

## 2021-05-06 DIAGNOSIS — R09.82 POST-NASAL DRIP: ICD-10-CM

## 2021-05-06 DIAGNOSIS — B96.89 ACUTE BACTERIAL SINUSITIS: Primary | ICD-10-CM

## 2021-05-06 DIAGNOSIS — R05.9 COUGH: ICD-10-CM

## 2021-05-06 PROCEDURE — G8756 NO BP MEASURE DOC: HCPCS | Performed by: NURSE PRACTITIONER

## 2021-05-06 PROCEDURE — G8427 DOCREV CUR MEDS BY ELIG CLIN: HCPCS | Performed by: NURSE PRACTITIONER

## 2021-05-06 PROCEDURE — G8536 NO DOC ELDER MAL SCRN: HCPCS | Performed by: NURSE PRACTITIONER

## 2021-05-06 PROCEDURE — 3017F COLORECTAL CA SCREEN DOC REV: CPT | Performed by: NURSE PRACTITIONER

## 2021-05-06 PROCEDURE — 99213 OFFICE O/P EST LOW 20 MIN: CPT | Performed by: NURSE PRACTITIONER

## 2021-05-06 PROCEDURE — 1101F PT FALLS ASSESS-DOCD LE1/YR: CPT | Performed by: NURSE PRACTITIONER

## 2021-05-06 PROCEDURE — G8432 DEP SCR NOT DOC, RNG: HCPCS | Performed by: NURSE PRACTITIONER

## 2021-05-06 PROCEDURE — G8417 CALC BMI ABV UP PARAM F/U: HCPCS | Performed by: NURSE PRACTITIONER

## 2021-05-06 RX ORDER — DOXYCYCLINE 100 MG/1
100 TABLET ORAL 2 TIMES DAILY
Qty: 20 TAB | Refills: 0 | Status: ON HOLD | OUTPATIENT
Start: 2021-05-06 | End: 2021-10-14

## 2021-05-06 NOTE — PROGRESS NOTES
Subjective: (As above and below)       This patient was seen in [facility] at [address] [place] [address] Urgent Care while outdoors at their vehicle due to COVID-19 pandemic with PPE and focused examination in order to decrease community viral transmission. The patient/guardian gave verbal consent to treat. Chief Complaint   Patient presents with    Other     sinus issues and ear pressure     Carolyne Nascimento III is a 78 y.o. male who presents for evaluation of: sinus pain and pressure, drip down throat and intermittent cough without any associated fever, chills, SOB. Promotes similar to sinus infection in past. Symptom onset 2 weeks ago . Preceding illness: none. No other identified aggravating or alleviating factors. Symptoms are constant and overall slightly worse. Promotes no decrease in PO intake of fluids. Denies: severe lethargy, syncope/near syncope, vomiting/diarrhea, chest pain, chest pain with breathing, labored breathing, severe headache, non-intractable fevers . Hx significant for asthma. Denies wheezing. Recent travel: no  Known Exposure to COVID-19: no  Known flu or strep contact: no     ROS positive for R and L ear pressure  Review of Systems - negative except as listed above    Reviewed PmHx, RxHx, FmHx, SocHx, AllgHx and updated in chart.   Family History   Problem Relation Age of Onset    Lung Disease Mother         COPD    Heart Disease Mother         pacemaker    Cancer Father         prostate/liver       Past Medical History:   Diagnosis Date    Actinic keratosis 9/15/2017    Annual physical exam 9/15/2017    Arthritis     ASHD (arteriosclerotic heart disease) 9/15/2017    Asthma     At risk for sleep apnea 11/20/2019    STOP BANG 5    Guerrier's esophagus     CAD (coronary artery disease)     Chest pain 9/15/2017    Chronic back pain 9/15/2017    Chronic obstructive pulmonary disease (HCC)     Chronic pain     Cough 9/15/2017    Diabetes ([facility] [address]     hypoglycemia hx    Diabetes mellitus screening 9/15/2017    Diaphoresis 9/15/2017    SANDERS (dyspnea on exertion)     Dyslipidemia 9/15/2017    Edema 9/15/2017    Elevated LFTs 9/15/2017    Essential hypertension 6/7/2019    Fatigue 9/15/2017    GERD (gastroesophageal reflux disease)     Guerrier's esophagus    Hypercholesterolemia     Hyperplasia of prostate 9/15/2017    Impaired glucose tolerance 1/17/2020    Obesity 9/15/2017    Other long term (current) drug therapy 9/15/2017    Seasonal allergic rhinitis due to pollen 3/18/2021    Sinusitis 9/15/2017    SK (seborrheic keratosis) 9/15/2017      Social History     Socioeconomic History    Marital status:      Spouse name: Not on file    Number of children: Not on file    Years of education: Not on file    Highest education level: Not on file   Tobacco Use    Smoking status: Never Smoker    Smokeless tobacco: Former User   Substance and Sexual Activity    Alcohol use: No     Alcohol/week: 0.0 standard drinks    Drug use: No          Current Outpatient Medications   Medication Sig    doxycycline (ADOXA) 100 mg tablet Take 1 Tab by mouth two (2) times a day.  cholecalciferol (VITAMIN D3) (2,000 UNITS /50 MCG) cap capsule Take 2,000 Units by mouth three (3) times daily.  cyanocobalamin (Vitamin B-12) 1,000 mcg tablet Take 1,000 mcg by mouth daily.     Farxiga 10 mg tab tablet TAKE 1 TABLET BY MOUTH EVERY DAY    furosemide (LASIX) 40 mg tablet TAKE 1 TABLET BY MOUTH DAILY    metFORMIN ER (GLUCOPHAGE XR) 500 mg tablet TAKE 1 TABLET BY MOUTH TWICE A DAY    pregabalin (LYRICA) 100 mg capsule TAKE 1 CAPSULE BY MOUTH TWICE A DAY    nabumetone (RELAFEN) 500 mg tablet TAKE ONE TABLET BY MOUTH TWICE A DAY    levocetirizine (XYZAL) 5 mg tablet TAKE ONE TABLET BY MOUTH DAILY    montelukast (SINGULAIR) 10 mg tablet     Symbicort 160-4.5 mcg/actuation HFAA     azelastine (OPTIVAR) 0.05 % ophthalmic solution Administer 1 Drop to both eyes two (2) times a day. Use in affected eye(s)    azelastine (ASTELIN) 137 mcg (0.1 %) nasal spray     Anoro Ellipta 62.5-25 mcg/actuation inhaler     aspirin delayed-release 81 mg tablet Take 81 mg by mouth daily.  diclofenac (VOLTAREN) 1 % gel Apply two grams by topical route four times daily to the affected area (s).  COQ10, UBIQUINOL, PO Take 100 mg by mouth daily.  Cholestyramine-Aspartame (CHOLESTYRAMINE LIGHT) 4 gram powder Take 4 g by mouth every morning.  albuterol (VENTOLIN HFA) 90 mcg/actuation inhaler Take 2 Puffs by inhalation every four (4) hours as needed for Wheezing.  cephALEXin (KEFLEX) 500 mg capsule Take 1,000 mg by mouth once. Indications: 1 hour prior to dental procedure. s/p partial knee replacement    dilTIAZem (TIAZAC) 360 mg SR capsule Take 360 mg by mouth every morning.  omeprazole (PRILOSEC) 40 mg capsule Take 40 mg by mouth daily. Indications: gastroesophageal reflux disease    ZETIA 10 mg tablet Take 1 Tab by mouth daily.  finasteride (PROSCAR) 5 mg tablet Take 5 mg by mouth every Monday, Wednesday, Friday.  multivitamin (ONE A DAY) tablet Take 1 Tab by mouth daily. No current facility-administered medications for this visit. Objective:     Vitals:    05/06/21 1005   Pulse: 77   Resp: 20   Temp: 97.7 °F (36.5 °C)   SpO2: 98%       Physical Exam  General appearance - appears well hydrated and does not appear toxic, no acute distress  Eyes - EOMs intact. Non injected. No scleral icterus   Ears - no external swelling. TMs normal bilat  Nose - nasal congestion. No purulent drainage  Mouth - OP clear without swelling, exudate or lesion. Mucus membranes moist. Uvula midline. Neck/Lymphatics - trachea midline, full AROM  Chest - Normal breathing effort no wheeze rales, rhonchi or diminishments bilaterally.   Heart - RRR, no murmurs  Skin - no observable rashes or pallor  Neurologic- alert and oriented x 3  Psychiatric- normal mood, behavior and though content. Assessment/ Plan:     1. Acute bacterial sinusitis  - doxycycline (ADOXA) 100 mg tablet; Take 1 Tab by mouth two (2) times a day. Dispense: 20 Tab; Refill: 0    2. Post-nasal drip  - doxycycline (ADOXA) 100 mg tablet; Take 1 Tab by mouth two (2) times a day. Dispense: 20 Tab; Refill: 0    3. Cough  - doxycycline (ADOXA) 100 mg tablet; Take 1 Tab by mouth two (2) times a day. Dispense: 20 Tab; Refill: 0      Will treat with doxycycline for bacterial sinusitis  Cough likely from post nasal drip; no evidence suggesting asthma exacerbation today. Follow up with PCP if not improving within next 1 week        Follow up: We have reviewed worsening/concerning signs and symptoms that may warrant seeking immediate care in the ED setting. For other non-severe changes, non-improvement or questions, patient aware to contact PCP office or consider a virtual online medical consultation.         Sailaja Haq NP

## 2021-05-30 RX ORDER — LEVOCETIRIZINE DIHYDROCHLORIDE 5 MG/1
TABLET, FILM COATED ORAL
Qty: 30 TABLET | Refills: 3 | Status: SHIPPED | OUTPATIENT
Start: 2021-05-30 | End: 2021-10-05

## 2021-06-04 LAB — HBA1C MFR BLD HPLC: 5.9 %

## 2021-09-10 ENCOUNTER — OFFICE VISIT (OUTPATIENT)
Dept: INTERNAL MEDICINE CLINIC | Age: 71
End: 2021-09-10
Payer: MEDICARE

## 2021-09-10 VITALS
HEART RATE: 69 BPM | RESPIRATION RATE: 18 BRPM | HEIGHT: 70 IN | BODY MASS INDEX: 37.65 KG/M2 | OXYGEN SATURATION: 95 % | DIASTOLIC BLOOD PRESSURE: 84 MMHG | WEIGHT: 263 LBS | SYSTOLIC BLOOD PRESSURE: 132 MMHG

## 2021-09-10 DIAGNOSIS — I10 ESSENTIAL HYPERTENSION: ICD-10-CM

## 2021-09-10 DIAGNOSIS — E66.01 SEVERE OBESITY (BMI 35.0-35.9 WITH COMORBIDITY) (HCC): ICD-10-CM

## 2021-09-10 DIAGNOSIS — Z13.39 SCREENING FOR ALCOHOLISM: ICD-10-CM

## 2021-09-10 DIAGNOSIS — E11.9 TYPE 2 DIABETES MELLITUS WITHOUT COMPLICATION, WITHOUT LONG-TERM CURRENT USE OF INSULIN (HCC): ICD-10-CM

## 2021-09-10 DIAGNOSIS — I25.10 ASHD (ARTERIOSCLEROTIC HEART DISEASE): ICD-10-CM

## 2021-09-10 DIAGNOSIS — K22.70 BARRETT'S ESOPHAGUS WITHOUT DYSPLASIA: ICD-10-CM

## 2021-09-10 DIAGNOSIS — J45.20 MILD INTERMITTENT ASTHMA WITHOUT COMPLICATION: ICD-10-CM

## 2021-09-10 DIAGNOSIS — Z13.6 SCREENING FOR ISCHEMIC HEART DISEASE: ICD-10-CM

## 2021-09-10 DIAGNOSIS — Z00.00 MEDICARE ANNUAL WELLNESS VISIT, SUBSEQUENT: Primary | ICD-10-CM

## 2021-09-10 DIAGNOSIS — N40.0 HYPERPLASIA OF PROSTATE: ICD-10-CM

## 2021-09-10 DIAGNOSIS — Z12.5 SPECIAL SCREENING FOR MALIGNANT NEOPLASM OF PROSTATE: ICD-10-CM

## 2021-09-10 DIAGNOSIS — E78.5 DYSLIPIDEMIA: ICD-10-CM

## 2021-09-10 PROBLEM — R73.02 IMPAIRED GLUCOSE TOLERANCE: Status: RESOLVED | Noted: 2020-01-17 | Resolved: 2021-09-10

## 2021-09-10 LAB
ALBUMIN SERPL-MCNC: 3.8 G/DL (ref 3.5–5)
ALBUMIN/GLOB SERPL: 1.2 {RATIO} (ref 1.1–2.2)
ALP SERPL-CCNC: 44 U/L (ref 45–117)
ALT SERPL-CCNC: 38 U/L (ref 12–78)
ANION GAP SERPL CALC-SCNC: 6 MMOL/L (ref 5–15)
AST SERPL-CCNC: 20 U/L (ref 15–37)
BILIRUB SERPL-MCNC: 0.5 MG/DL (ref 0.2–1)
BUN SERPL-MCNC: 24 MG/DL (ref 6–20)
BUN/CREAT SERPL: 30 (ref 12–20)
CALCIUM SERPL-MCNC: 8.8 MG/DL (ref 8.5–10.1)
CHLORIDE SERPL-SCNC: 105 MMOL/L (ref 97–108)
CHOLEST SERPL-MCNC: 196 MG/DL
CK SERPL-CCNC: 178 U/L (ref 39–308)
CO2 SERPL-SCNC: 28 MMOL/L (ref 21–32)
CREAT SERPL-MCNC: 0.81 MG/DL (ref 0.7–1.3)
CREAT UR-MCNC: 103 MG/DL
EST. AVERAGE GLUCOSE BLD GHB EST-MCNC: 131 MG/DL
GLOBULIN SER CALC-MCNC: 3.1 G/DL (ref 2–4)
GLUCOSE SERPL-MCNC: 107 MG/DL (ref 65–100)
HBA1C MFR BLD: 6.2 % (ref 4–5.6)
HDLC SERPL-MCNC: 50 MG/DL
HDLC SERPL: 3.9 {RATIO} (ref 0–5)
LDLC SERPL CALC-MCNC: 102.8 MG/DL (ref 0–100)
MICROALBUMIN UR-MCNC: 0.71 MG/DL
MICROALBUMIN/CREAT UR-RTO: 7 MG/G (ref 0–30)
POTASSIUM SERPL-SCNC: 4.7 MMOL/L (ref 3.5–5.1)
PROT SERPL-MCNC: 6.9 G/DL (ref 6.4–8.2)
SODIUM SERPL-SCNC: 139 MMOL/L (ref 136–145)
TRIGL SERPL-MCNC: 216 MG/DL (ref ?–150)
UR CULT HOLD, URHOLD: NORMAL
VLDLC SERPL CALC-MCNC: 43.2 MG/DL

## 2021-09-10 PROCEDURE — 3046F HEMOGLOBIN A1C LEVEL >9.0%: CPT | Performed by: INTERNAL MEDICINE

## 2021-09-10 PROCEDURE — G0439 PPPS, SUBSEQ VISIT: HCPCS | Performed by: INTERNAL MEDICINE

## 2021-09-10 PROCEDURE — 3017F COLORECTAL CA SCREEN DOC REV: CPT | Performed by: INTERNAL MEDICINE

## 2021-09-10 PROCEDURE — G8427 DOCREV CUR MEDS BY ELIG CLIN: HCPCS | Performed by: INTERNAL MEDICINE

## 2021-09-10 PROCEDURE — G8536 NO DOC ELDER MAL SCRN: HCPCS | Performed by: INTERNAL MEDICINE

## 2021-09-10 PROCEDURE — G8432 DEP SCR NOT DOC, RNG: HCPCS | Performed by: INTERNAL MEDICINE

## 2021-09-10 PROCEDURE — 2022F DILAT RTA XM EVC RTNOPTHY: CPT | Performed by: INTERNAL MEDICINE

## 2021-09-10 PROCEDURE — G8754 DIAS BP LESS 90: HCPCS | Performed by: INTERNAL MEDICINE

## 2021-09-10 PROCEDURE — 1101F PT FALLS ASSESS-DOCD LE1/YR: CPT | Performed by: INTERNAL MEDICINE

## 2021-09-10 PROCEDURE — G8417 CALC BMI ABV UP PARAM F/U: HCPCS | Performed by: INTERNAL MEDICINE

## 2021-09-10 PROCEDURE — G8752 SYS BP LESS 140: HCPCS | Performed by: INTERNAL MEDICINE

## 2021-09-10 NOTE — PATIENT INSTRUCTIONS
Medicare Wellness Visit, Male    The best way to live healthy is to have a lifestyle where you eat a well-balanced diet, exercise regularly, limit alcohol use, and quit all forms of tobacco/nicotine, if applicable. Regular preventive services are another way to keep healthy. Preventive services (vaccines, screening tests, monitoring & exams) can help personalize your care plan, which helps you manage your own care. Screening tests can find health problems at the earliest stages, when they are easiest to treat. [facility] follows the current, evidence-based guidelines published by the [facility] ([facility]) when recommending preventive services for our patients. Because we follow these guidelines, sometimes recommendations change over time as research supports it. (For example, a prostate screening blood test is no longer routinely recommended for men with no symptoms). Of course, you and your doctor may decide to screen more often for some diseases, based on your risk and co-morbidities (chronic disease you are already diagnosed with). Preventive services for you include:  - Medicare offers their members a free annual wellness visit, which is time for you and your primary care provider to discuss and plan for your preventive service needs. Take advantage of this benefit every year!  -All adults over age 72 should receive the recommended pneumonia vaccines. Current USPSTF guidelines recommend a series of two vaccines for the best pneumonia protection.   -All adults should have a flu vaccine yearly and tetanus vaccine every 10 years.  -All adults age 48 and older should receive the shingles vaccines (series of two vaccines).        -All adults age 38-68 who are overweight should have a diabetes screening test once every three years.   -Other screening tests & preventive services for persons with diabetes include: an eye exam to screen for diabetic retinopathy, a kidney function test, a foot exam, and stricter control over your cholesterol.   -Cardiovascular screening for adults with routine risk involves an electrocardiogram (ECG) at intervals determined by the provider.   -Colorectal cancer screening should be done for adults age 54-65 with no increased risk factors for colorectal cancer. There are a number of acceptable methods of screening for this type of cancer. Each test has its own benefits and drawbacks. Discuss with your provider what is most appropriate for you during your annual wellness visit. The different tests include: colonoscopy (considered the best screening method), a fecal occult blood test, a fecal DNA test, and sigmoidoscopy.  -All adults born between [facility] should be screened once for Hepatitis C.  -An Abdominal Aortic Aneurysm (AAA) Screening is recommended for men age 73-68 who has ever smoked in their lifetime.      Here is a list of your current Health Maintenance items (your personalized list of preventive services) with a due date:  Health Maintenance Due   Topic Date Due    Diabetic Foot Care  Never done    Albumin Urine Test  Never done    Eye Exam  Never done    DTaP/Tdap/Td  (1 - Tdap) Never done    Shingles Vaccine (1 of 2) Never done    Pneumococcal Vaccine (2 of 2 - PPSV23) 09/12/2020    Cholesterol Test   03/16/2021    Yearly Flu Vaccine (1) Never done

## 2021-09-10 NOTE — PROGRESS NOTES
1. Have you been to the ER, urgent care clinic since your last visit? Hospitalized since your last visit? No    2. Have you seen or consulted any other health care providers outside of the [address] [place] [address] since your last visit? Include any pap smears or colon screening.  No

## 2021-09-10 NOTE — PROGRESS NOTES
This note will not be viewable in [address] Michelle Mendenhall is a 73 y.o. male and presents with Follow-up (routine 6 month f/u)  . Subjective:  Mr. Linda Alcocer presents today for 6-month follow-up and a Medicare annual wellness review. His problems include coronary disease, hyperlipidemia, diabetes, asthma/COPD, prostatic hypertrophy. He has had follow-up with cardiology, endocrinology, and has a follow-up with urology in the near future. His labs were last done in March of this year per endocrinology. His A1c at that time was 6.5%. He denies any polyuria or polydipsia. He has no shortness of breath, chest pain, palpitations, PND, orthopnea, or pedal edema. He remains on [employer] for diabetes as well as for its benefit in reducing hospitalization for congestive heart failure.     Past Medical History:   Diagnosis Date    Actinic keratosis 9/15/2017    Annual physical exam 9/15/2017    Arthritis     ASHD (arteriosclerotic heart disease) 9/15/2017    Asthma     At risk for sleep apnea 11/20/2019    STOP BANG 5    Guerrier's esophagus     CAD (coronary artery disease)     Chest pain 9/15/2017    Chronic back pain 9/15/2017    Chronic obstructive pulmonary disease (HCC)     Chronic pain     Cough 9/15/2017    Diabetes ([address]     hypoglycemia hx    Diabetes mellitus screening 9/15/2017    Diaphoresis 9/15/2017    SANDERS (dyspnea on exertion)     Dyslipidemia 9/15/2017    Edema 9/15/2017    Elevated LFTs 9/15/2017    Essential hypertension 6/7/2019    Fatigue 9/15/2017    GERD (gastroesophageal reflux disease)     Guerrier's esophagus    Hypercholesterolemia     Hyperplasia of prostate 9/15/2017    Impaired glucose tolerance 1/17/2020    Obesity 9/15/2017    Other long term (current) drug therapy 9/15/2017    Seasonal allergic rhinitis due to pollen 3/18/2021    Sinusitis 9/15/2017    SK (seborrheic keratosis) 9/15/2017     Past Surgical History:   Procedure Laterality Date    COLONOSCOPY N/A 11/27/2019    FLEXIBLE SIGMOIDOSCOPY performed by Lawrence Mederos MD at [facility] AMBULATORY OR    COLONOSCOPY N/A 1/15/2020    COLONOSCOPY performed by Lawrence Mederos MD at [facility] ENDOSCOPY    HX APPENDECTOMY      HX COLONOSCOPY      HX ENDOSCOPY      HX HEART CATHETERIZATION  ~2008    stent    HX KNEE REPLACEMENT Right 06/2019    partial knee replacement [facility]     Allergies   Allergen Reactions    Claritin-D 12 Hour [Loratadine-Pseudoephedrine] Shortness of Breath    Iodine Shortness of Breath    Levaquin [Levofloxacin] Hives    Seafood [Shellfish Containing Products] Shortness of Breath    Statins-Hmg-Coa Reductase Inhibitors Other (comments)     \"liver problems\"    Sulfa (Sulfonamide Antibiotics) Hives     Current Outpatient Medications   Medication Sig Dispense Refill    levocetirizine (XYZAL) 5 mg tablet TAKE 1 TABLET BY MOUTH DAILY 30 Tablet 3    doxycycline (ADOXA) 100 mg tablet Take 1 Tab by mouth two (2) times a day. 20 Tab 0    cholecalciferol (VITAMIN D3) (2,000 UNITS /50 MCG) cap capsule Take 2,000 Units by mouth three (3) times daily.  cyanocobalamin (Vitamin B-12) 1,000 mcg tablet Take 1,000 mcg by mouth daily.  Farxiga 10 mg tab tablet TAKE 1 TABLET BY MOUTH EVERY DAY      furosemide (LASIX) 40 mg tablet TAKE 1 TABLET BY MOUTH DAILY      metFORMIN ER (GLUCOPHAGE XR) 500 mg tablet TAKE 1 TABLET BY MOUTH TWICE A DAY      pregabalin (LYRICA) 100 mg capsule TAKE 1 CAPSULE BY MOUTH TWICE A DAY      nabumetone (RELAFEN) 500 mg tablet TAKE ONE TABLET BY MOUTH TWICE A  Tab 2    montelukast (SINGULAIR) 10 mg tablet       Symbicort 160-4.5 mcg/actuation HFAA       azelastine (OPTIVAR) 0.05 % ophthalmic solution Administer 1 Drop to both eyes two (2) times a day. Use in affected eye(s) 6 mL 0    azelastine (ASTELIN) 137 mcg (0.1 %) nasal spray       Anoro Ellipta 62.5-25 mcg/actuation inhaler       aspirin delayed-release 81 mg tablet Take 81 mg by mouth daily.       diclofenac (VOLTAREN) 1 % gel Apply two grams by topical route four times daily to the affected area (s).  COQ10, UBIQUINOL, PO Take 100 mg by mouth daily.  Cholestyramine-Aspartame (CHOLESTYRAMINE LIGHT) 4 gram powder Take 4 g by mouth every morning.  albuterol (VENTOLIN HFA) 90 mcg/actuation inhaler Take 2 Puffs by inhalation every four (4) hours as needed for Wheezing.  cephALEXin (KEFLEX) 500 mg capsule Take 1,000 mg by mouth once. Indications: 1 hour prior to dental procedure. s/p partial knee replacement      dilTIAZem (TIAZAC) 360 mg SR capsule Take 360 mg by mouth every morning. 2    omeprazole (PRILOSEC) 40 mg capsule Take 40 mg by mouth daily. Indications: gastroesophageal reflux disease  1    ZETIA 10 mg tablet Take 1 Tab by mouth daily. 90 Tab 3    finasteride (PROSCAR) 5 mg tablet Take 5 mg by mouth every Monday, Wednesday, Friday. 0    multivitamin (ONE A DAY) tablet Take 1 Tab by mouth daily. Social History     Socioeconomic History    Marital status:      Spouse name: Not on file    Number of children: Not on file    Years of education: Not on file    Highest education level: Not on file   Tobacco Use    Smoking status: Never Smoker    Smokeless tobacco: Former User   Substance and Sexual Activity    Alcohol use: No     Alcohol/week: 0.0 standard drinks    Drug use: No     Social Determinants of Health     Financial Resource Strain:     Difficulty of Paying Living Expenses:    Food Insecurity:     Worried About Running Out of Food in the Last Year:     [address] in the Last Year:    Transportation Needs:     Lack of Transportation (Medical):      Lack of Transportation (Non-Medical):    Physical Activity:     Days of Exercise per Week:     Minutes of Exercise per Session:    Stress:     Feeling of Stress :    Social Connections:     Frequency of Communication with Friends and Family:     Frequency of Social Gatherings with Friends and Family:     Attends Sabian[employer] Services:     Active Member of Clubs or Organizations:     Attends Club or Organization Meetings:     Marital Status:      Family History   Problem Relation Age of Onset    Lung Disease Mother         COPD    Heart Disease Mother         pacemaker    Cancer Father         prostate/liver       Review of Systems  Constitutional:  negative for fevers, chills, anorexia and weight loss  Eyes:    negative for visual disturbance and irritation  ENT:    negative for tinnitus,sore throat,nasal congestion,ear pains. hoarseness  Respiratory:     negative for cough, hemoptysis, dyspnea,wheezing  CV:    negative for chest pain, palpitations, lower extremity edema  GI:    negative for nausea, vomiting, diarrhea, abdominal pain,melena  Endo:               negative for polyuria,polydipsia,polyphagia,heat intolerance  Genitourinary : negative for frequency, dysuria and hematuria  Integumentary: negative for rash and pruritus  Hematologic:   negative for easy bruising and gum/nose bleeding  Musculoskel:  negative for myalgias, arthralgias, back pain, muscle weakness, joint pain  Neurological:   negative for headaches, dizziness, vertigo, memory problems and gait   Behavl/Psych:  negative for feelings of anxiety, depression, mood changes  ROS otherwise negative      Objective:  Visit Vitals  /84 (BP 1 Location: Left arm, BP Patient Position: Sitting, BP Cuff Size: Large adult)   Pulse 69   Resp 18   Ht 5' 10\" (1.778 m)   Wt 263 lb (119.3 kg)   SpO2 95%   BMI 37.74 kg/m²     Physical Exam:   General appearance - alert, well appearing, and in no distress  Mental status - alert, oriented to person, place, and time  EYE-KAI, EOMI, fundi normal, corneas normal, no foreign bodies  ENT-ENT exam normal, no neck nodes or sinus tenderness  Nose - normal and patent, no erythema, discharge or polyps  Mouth - mucous membranes moist, pharynx normal without lesions  Neck - supple, no significant adenopathy Chest - clear to auscultation, no wheezes, rales or rhonchi, symmetric air entry   Heart - normal rate, regular rhythm, normal S1, S2, no murmurs, rubs, clicks or gallops   Abdomen - soft, nontender, nondistended, no masses or organomegaly  Lymph- no adenopathy palpable  Ext-peripheral pulses normal, no pedal edema, no clubbing or cyanosis  Skin-Warm and dry. no hyperpigmentation, vitiligo, or suspicious lesions  Neuro -alert, oriented, normal speech, no focal findings or movement disorder noted      Assessment/Plan:  Diagnoses and all orders for this visit:    1. Medicare annual wellness visit, subsequent    2. ASHD (arteriosclerotic heart disease)  -     LIPID PANEL; Future    3. Severe obesity (BMI 35.0-35.9 with comorbidity) ([facility] [address]    4. Essential hypertension  -     METABOLIC PANEL, COMPREHENSIVE; Future    5. Guerrier's esophagus without dysplasia    6. Dyslipidemia  -     CK; Future  -     LIPID PANEL; Future    7. Mild intermittent asthma without complication    8. Type 2 diabetes mellitus without complication, without long-term current use of insulin (formerly [facility])  -     HEMOGLOBIN A1C WITH EAG; Future  -     CK; Future  -     LIPID PANEL; Future  -     METABOLIC PANEL, COMPREHENSIVE; Future  -     MICROALBUMIN, UR, RAND W/ MICROALB/CREAT RATIO; Future    9. Hyperplasia of prostate    10. Body mass index 37.0-37.9, adult    11. Screening for alcoholism  -     [place] ANNUAL ALCOHOL SCREEN 15 MIN    12. Special screening for malignant neoplasm of prostate    13. Screening for ischemic heart disease          ICD-10-CM ICD-9-CM    1. Medicare annual wellness visit, subsequent  Z00.00 V70.0    2. ASHD (arteriosclerotic heart disease)  I25.10 414.00 LIPID PANEL   3. Severe obesity (BMI 35.0-35.9 with comorbidity) (formerly [facility])  E66.01 278.01     Z68.35 V85.35    4. Essential hypertension  B91 702.4 METABOLIC PANEL, COMPREHENSIVE   5. Guerrier's esophagus without dysplasia  K22.70 530.85    6.  Dyslipidemia  E78.5 272.4 CK      LIPID PANEL   7. Mild intermittent asthma without complication  W73.19 840.06    8. Type 2 diabetes mellitus without complication, without long-term current use of insulin (HCC)  E11.9 250.00 HEMOGLOBIN A1C WITH EAG      CK      LIPID PANEL      METABOLIC PANEL, COMPREHENSIVE      MICROALBUMIN, UR, RAND W/ MICROALB/CREAT RATIO   9. Hyperplasia of prostate  N40.0 600.90    10. Body mass index 37.0-37.9, adult  Z68.37 V85.37    11. Screening for alcoholism  Z13.39 V79.1 [place] ANNUAL ALCOHOL SCREEN 15 MIN   12. Special screening for malignant neoplasm of prostate  Z12.5 V76.44    13. Screening for ischemic heart disease  Z13.6 V81.0        Plan:    Multiple problems as outlined above currently stable. Continue current medical regimen. Further recommendations based on labs as ordered. Continue follow-up with cardiology, endocrine, and urology. I have reviewed with the patient details of the assessment and plan and all questions were answered. Relevent patient education was performed. Verbal and/or written instructions (see AVS) provided. The most recent lab findings were reviewed with the patient. Plan was discussed with patient who verbally expressed understanding. An After Visit Summary was printed and given to the patient. Uyen Tolbert MD        This is the Subsequent Medicare Annual Wellness Exam, performed 12 months or more after the Initial AWV or the last Subsequent AWV    I have reviewed the patient's medical history in detail and updated the computerized patient record. Assessment/Plan   Education and counseling provided:  Are appropriate based on today's review and evaluation    1. Medicare annual wellness visit, subsequent  2. ASHD (arteriosclerotic heart disease)  -     LIPID PANEL; Future  3. Severe obesity (BMI 35.0-35.9 with comorbidity) ([facility] [address]  4. Essential hypertension  -     METABOLIC PANEL, COMPREHENSIVE; Future  5. Guerrier's esophagus without dysplasia  6.  Dyslipidemia  -     CK; Future  -     LIPID PANEL; Future  7. Mild intermittent asthma without complication  8. Type 2 diabetes mellitus without complication, without long-term current use of insulin (HCC)  -     HEMOGLOBIN A1C WITH EAG; Future  -     CK; Future  -     LIPID PANEL; Future  -     METABOLIC PANEL, COMPREHENSIVE; Future  -     MICROALBUMIN, UR, RAND W/ MICROALB/CREAT RATIO; Future  9. Hyperplasia of prostate  10. Body mass index 37.0-37.9, adult  11. Screening for alcoholism  -     [place] ANNUAL ALCOHOL SCREEN 15 MIN  12. Special screening for malignant neoplasm of prostate  13. Screening for ischemic heart disease       Depression Risk Factor Screening     3 most recent PHQ Screens 2/25/2021   Little interest or pleasure in doing things Not at all   Feeling down, depressed, irritable, or hopeless Not at all   Total Score PHQ 2 0       Alcohol Risk Screen    Do you average more than 1 drink per night or more than 7 drinks a week: No    In the past three months have you have had more than 4 drinks containing alcohol on one occasion: No        Functional Ability and Level of Safety    Hearing: Hearing is good. Activities of Daily Living: The home contains: no safety equipment. Patient does total self care      Ambulation: with no difficulty     Fall Risk:  Fall Risk Assessment, last 12 mths 2/25/2021   Able to walk? Yes   Fall in past 12 months? 0   Do you feel unsteady?  0   Are you worried about falling 0      Abuse Screen:  Patient is not abused       Cognitive Screening    Has your family/caregiver stated any concerns about your memory: no     Cognitive Screening: Normal - Verbal Fluency Test    Health Maintenance Due     Health Maintenance Due   Topic Date Due    Foot Exam Q1  Never done    MICROALBUMIN Q1  Never done    Eye Exam Retinal or Dilated  Never done    DTaP/Tdap/Td series (1 - Tdap) Never done    Shingrix Vaccine Age 50> (1 of 2) Never done    Pneumococcal 65+ years (2 of 2 - PPSV23) 09/12/2020    Lipid Screen  03/16/2021    Flu Vaccine (1) Never done       Patient Care Team   Patient Care Team:  Axel John MD as PCP - General (Internal Medicine)  Axel John MD as PCP - [address] [place] [address] Provider  Marla Minaya MD (Urology)  Kala Zambrano MD (Cardiology)  Slade Mueller MD (Endocrinology)    History     Patient Active Problem List   Diagnosis Code    Actinic keratosis L57.0    Annual physical exam Z00.00    ASHD (arteriosclerotic heart disease) I25.10    Chest pain R07.9    Chronic back pain M54.9, G89.29    Cough R05    Diabetes mellitus screening Z13.1    Diaphoresis R61    Dyslipidemia E78.5    Edema R60.9    Elevated LFTs R79.89    Fatigue R53.83    Hyperplasia of prostate N40.0    Obesity E66.9    Other long term (current) drug therapy Z79.899    Sinusitis J32.9    SK (seborrheic keratosis) L82.1    Severe obesity (BMI 35.0-39. 9) E66.01    Essential hypertension I10    SANDERS (dyspnea on exertion) R06.00    Acute bronchitis J20.9    Status post total knee replacement Z96.659    Generalized abdominal pain R10.84    Allergic conjunctivitis of both eyes H10.13    Seasonal allergic rhinitis due to pollen J30.1    Guerrier's esophagus K22.70    Asthma J45.909    Diabetes ([address] E11.9     Past Medical History:   Diagnosis Date    Actinic keratosis 9/15/2017    Annual physical exam 9/15/2017    Arthritis     ASHD (arteriosclerotic heart disease) 9/15/2017    Asthma     At risk for sleep apnea 11/20/2019    STOP BANG 5    Guerrier's esophagus     CAD (coronary artery disease)     Chest pain 9/15/2017    Chronic back pain 9/15/2017    Chronic obstructive pulmonary disease (HCC)     Chronic pain     Cough 9/15/2017    Diabetes ([address]     hypoglycemia hx    Diabetes mellitus screening 9/15/2017    Diaphoresis 9/15/2017    SANDERS (dyspnea on exertion)     Dyslipidemia 9/15/2017    Edema 9/15/2017    Elevated LFTs 9/15/2017    Essential hypertension 6/7/2019    Fatigue 9/15/2017    GERD (gastroesophageal reflux disease)     Guerrier's esophagus    Hypercholesterolemia     Hyperplasia of prostate 9/15/2017    Impaired glucose tolerance 1/17/2020    Obesity 9/15/2017    Other long term (current) drug therapy 9/15/2017    Seasonal allergic rhinitis due to pollen 3/18/2021    Sinusitis 9/15/2017    SK (seborrheic keratosis) 9/15/2017      Past Surgical History:   Procedure Laterality Date    COLONOSCOPY N/A 11/27/2019    FLEXIBLE SIGMOIDOSCOPY performed by Marcus Johnson MD at [place] [address] COLONOSCOPY N/A 1/15/2020    COLONOSCOPY performed by Marcus Johnson MD at [facility] ENDOSCOPY    HX APPENDECTOMY      HX COLONOSCOPY      HX ENDOSCOPY      HX HEART CATHETERIZATION  ~2008    stent    HX KNEE REPLACEMENT Right 06/2019    partial knee replacement Dr. Jonas Hanna     Current Outpatient Medications   Medication Sig Dispense Refill    levocetirizine (XYZAL) 5 mg tablet TAKE 1 TABLET BY MOUTH DAILY 30 Tablet 3    doxycycline (ADOXA) 100 mg tablet Take 1 Tab by mouth two (2) times a day. 20 Tab 0    cholecalciferol (VITAMIN D3) (2,000 UNITS /50 MCG) cap capsule Take 2,000 Units by mouth three (3) times daily.  cyanocobalamin (Vitamin B-12) 1,000 mcg tablet Take 1,000 mcg by mouth daily.  Farxiga 10 mg tab tablet TAKE 1 TABLET BY MOUTH EVERY DAY      furosemide (LASIX) 40 mg tablet TAKE 1 TABLET BY MOUTH DAILY      metFORMIN ER (GLUCOPHAGE XR) 500 mg tablet TAKE 1 TABLET BY MOUTH TWICE A DAY      pregabalin (LYRICA) 100 mg capsule TAKE 1 CAPSULE BY MOUTH TWICE A DAY      nabumetone (RELAFEN) 500 mg tablet TAKE ONE TABLET BY MOUTH TWICE A  Tab 2    montelukast (SINGULAIR) 10 mg tablet       Symbicort 160-4.5 mcg/actuation HFAA       azelastine (OPTIVAR) 0.05 % ophthalmic solution Administer 1 Drop to both eyes two (2) times a day.  Use in affected eye(s) 6 mL 0    azelastine (ASTELIN) 137 mcg (0.1 %) nasal spray       Anoro Ellipta 62.5-25 mcg/actuation inhaler       aspirin delayed-release 81 mg tablet Take 81 mg by mouth daily.  diclofenac (VOLTAREN) 1 % gel Apply two grams by topical route four times daily to the affected area (s).  COQ10, UBIQUINOL, PO Take 100 mg by mouth daily.  Cholestyramine-Aspartame (CHOLESTYRAMINE LIGHT) 4 gram powder Take 4 g by mouth every morning.  albuterol (VENTOLIN HFA) 90 mcg/actuation inhaler Take 2 Puffs by inhalation every four (4) hours as needed for Wheezing.  cephALEXin (KEFLEX) 500 mg capsule Take 1,000 mg by mouth once. Indications: 1 hour prior to dental procedure. s/p partial knee replacement      dilTIAZem (TIAZAC) 360 mg SR capsule Take 360 mg by mouth every morning. 2    omeprazole (PRILOSEC) 40 mg capsule Take 40 mg by mouth daily. Indications: gastroesophageal reflux disease  1    ZETIA 10 mg tablet Take 1 Tab by mouth daily. 90 Tab 3    finasteride (PROSCAR) 5 mg tablet Take 5 mg by mouth every Monday, Wednesday, Friday. 0    multivitamin (ONE A DAY) tablet Take 1 Tab by mouth daily.        Allergies   Allergen Reactions    Claritin-D 12 Hour [Loratadine-Pseudoephedrine] Shortness of Breath    Iodine Shortness of Breath    Levaquin [Levofloxacin] Hives    Seafood [Shellfish Containing Products] Shortness of Breath    Statins-Hmg-Coa Reductase Inhibitors Other (comments)     \"liver problems\"    Sulfa (Sulfonamide Antibiotics) Hives       Family History   Problem Relation Age of Onset    Lung Disease Mother         COPD    Heart Disease Mother         pacemaker   [facility] Cancer Father         prostate/liver     Social History     Tobacco Use    Smoking status: Never Smoker    Smokeless tobacco: Former User   Substance Use Topics    Alcohol use: No     Alcohol/week: 0.0 standard drinks         C Krystian Shrestha MD

## 2021-10-05 RX ORDER — LEVOCETIRIZINE DIHYDROCHLORIDE 5 MG/1
TABLET, FILM COATED ORAL
Qty: 30 TABLET | Refills: 3 | Status: SHIPPED | OUTPATIENT
Start: 2021-10-05 | End: 2022-02-08

## 2021-10-11 ENCOUNTER — HOSPITAL ENCOUNTER (OUTPATIENT)
Dept: PREADMISSION TESTING | Age: 71
Discharge: HOME | End: 2021-10-11
Payer: MEDICARE

## 2021-10-11 PROCEDURE — U0005 INFEC AGEN DETEC AMPLI PROBE: HCPCS

## 2021-10-12 LAB
SARS-COV-2, XPLCVT: NOT DETECTED
SOURCE, COVRS: NORMAL

## 2021-10-13 ENCOUNTER — ANESTHESIA EVENT (OUTPATIENT)
Dept: ENDOSCOPY | Age: 71
End: 2021-10-13
Payer: MEDICARE

## 2021-10-13 RX ORDER — AMOXICILLIN 500 MG/1
500 CAPSULE ORAL 2 TIMES DAILY
COMMUNITY
End: 2021-11-03

## 2021-10-14 ENCOUNTER — ANESTHESIA (OUTPATIENT)
Dept: ENDOSCOPY | Age: 71
End: 2021-10-14
Payer: MEDICARE

## 2021-10-14 ENCOUNTER — HOSPITAL ENCOUNTER (OUTPATIENT)
Age: 71
Setting detail: OUTPATIENT SURGERY
Discharge: HOME | End: 2021-10-14
Attending: INTERNAL MEDICINE | Admitting: INTERNAL MEDICINE
Payer: MEDICARE

## 2021-10-14 VITALS
SYSTOLIC BLOOD PRESSURE: 154 MMHG | TEMPERATURE: 97.8 F | RESPIRATION RATE: 20 BRPM | HEART RATE: 58 BPM | OXYGEN SATURATION: 96 % | HEIGHT: 69 IN | BODY MASS INDEX: 37.77 KG/M2 | WEIGHT: 255 LBS | DIASTOLIC BLOOD PRESSURE: 75 MMHG

## 2021-10-14 PROCEDURE — 76060000031 HC ANESTHESIA FIRST 0.5 HR: Performed by: INTERNAL MEDICINE

## 2021-10-14 PROCEDURE — 74011000250 HC RX REV CODE- 250: Performed by: ANESTHESIOLOGY

## 2021-10-14 PROCEDURE — 88305 TISSUE EXAM BY PATHOLOGIST: CPT

## 2021-10-14 PROCEDURE — 77030019988 HC FCPS ENDOSC DISP BSC -B: Performed by: INTERNAL MEDICINE

## 2021-10-14 PROCEDURE — 2709999900 HC NON-CHARGEABLE SUPPLY: Performed by: INTERNAL MEDICINE

## 2021-10-14 PROCEDURE — 74011250636 HC RX REV CODE- 250/636: Performed by: ANESTHESIOLOGY

## 2021-10-14 PROCEDURE — 74011250636 HC RX REV CODE- 250/636: Performed by: INTERNAL MEDICINE

## 2021-10-14 PROCEDURE — 76040000019: Performed by: INTERNAL MEDICINE

## 2021-10-14 PROCEDURE — 77030039825 HC MSK NSL PAP SUPERNO2VA VYRM -B: Performed by: ANESTHESIOLOGY

## 2021-10-14 RX ORDER — LIDOCAINE HYDROCHLORIDE 20 MG/ML
INJECTION, SOLUTION EPIDURAL; INFILTRATION; INTRACAUDAL; PERINEURAL AS NEEDED
Status: DISCONTINUED | OUTPATIENT
Start: 2021-10-14 | End: 2021-10-14 | Stop reason: HOSPADM

## 2021-10-14 RX ORDER — PROPOFOL 10 MG/ML
INJECTION, EMULSION INTRAVENOUS AS NEEDED
Status: DISCONTINUED | OUTPATIENT
Start: 2021-10-14 | End: 2021-10-14 | Stop reason: HOSPADM

## 2021-10-14 RX ORDER — ATROPINE SULFATE 0.1 MG/ML
0.5 INJECTION INTRAVENOUS
Status: DISCONTINUED | OUTPATIENT
Start: 2021-10-14 | End: 2021-10-14 | Stop reason: HOSPADM

## 2021-10-14 RX ORDER — SODIUM CHLORIDE 9 MG/ML
75 INJECTION, SOLUTION INTRAVENOUS CONTINUOUS
Status: DISCONTINUED | OUTPATIENT
Start: 2021-10-14 | End: 2021-10-14 | Stop reason: HOSPADM

## 2021-10-14 RX ORDER — MIDAZOLAM HYDROCHLORIDE 1 MG/ML
.25-5 INJECTION, SOLUTION INTRAMUSCULAR; INTRAVENOUS
Status: DISCONTINUED | OUTPATIENT
Start: 2021-10-14 | End: 2021-10-14 | Stop reason: HOSPADM

## 2021-10-14 RX ORDER — SODIUM CHLORIDE 0.9 % (FLUSH) 0.9 %
5-40 SYRINGE (ML) INJECTION EVERY 8 HOURS
Status: DISCONTINUED | OUTPATIENT
Start: 2021-10-14 | End: 2021-10-14 | Stop reason: HOSPADM

## 2021-10-14 RX ORDER — SODIUM CHLORIDE 0.9 % (FLUSH) 0.9 %
5-40 SYRINGE (ML) INJECTION AS NEEDED
Status: DISCONTINUED | OUTPATIENT
Start: 2021-10-14 | End: 2021-10-14 | Stop reason: HOSPADM

## 2021-10-14 RX ORDER — NALOXONE HYDROCHLORIDE 0.4 MG/ML
0.4 INJECTION, SOLUTION INTRAMUSCULAR; INTRAVENOUS; SUBCUTANEOUS
Status: DISCONTINUED | OUTPATIENT
Start: 2021-10-14 | End: 2021-10-14 | Stop reason: HOSPADM

## 2021-10-14 RX ORDER — EPINEPHRINE 0.1 MG/ML
1 INJECTION INTRACARDIAC; INTRAVENOUS
Status: DISCONTINUED | OUTPATIENT
Start: 2021-10-14 | End: 2021-10-14 | Stop reason: HOSPADM

## 2021-10-14 RX ORDER — FENTANYL CITRATE 50 UG/ML
25 INJECTION, SOLUTION INTRAMUSCULAR; INTRAVENOUS
Status: DISCONTINUED | OUTPATIENT
Start: 2021-10-14 | End: 2021-10-14 | Stop reason: HOSPADM

## 2021-10-14 RX ORDER — FLUMAZENIL 0.1 MG/ML
0.2 INJECTION INTRAVENOUS
Status: DISCONTINUED | OUTPATIENT
Start: 2021-10-14 | End: 2021-10-14 | Stop reason: HOSPADM

## 2021-10-14 RX ORDER — DEXTROMETHORPHAN/PSEUDOEPHED 2.5-7.5/.8
1.2 DROPS ORAL
Status: DISCONTINUED | OUTPATIENT
Start: 2021-10-14 | End: 2021-10-14 | Stop reason: HOSPADM

## 2021-10-14 RX ADMIN — LIDOCAINE HYDROCHLORIDE 100 MG: 20 INJECTION, SOLUTION EPIDURAL; INFILTRATION; INTRACAUDAL; PERINEURAL at 07:42

## 2021-10-14 RX ADMIN — PROPOFOL 140 MG: 10 INJECTION, EMULSION INTRAVENOUS at 07:54

## 2021-10-14 RX ADMIN — SODIUM CHLORIDE 75 ML/HR: 9 INJECTION, SOLUTION INTRAVENOUS at 07:31

## 2021-10-14 NOTE — DISCHARGE INSTRUCTIONS
Dae Pimentel III  [id number]  1950    EGD DISCHARGE INSTRUCTIONS  Discomfort:  Sore throat- throat lozenges or warm salt water gargle  redness at IV site- apply warm compress to area; if redness or soreness persist- contact your physician  Gaseous discomfort- walking, belching will help relieve any discomfort  You may not operate a vehicle for 12 hours  You may not engage in an occupation involving machinery or appliances for rest of today  You may not drink alcoholic beverages for at least 12 hours  Avoid making any critical decisions for at least 24 hour  DIET  You may have minimal sips at this time-- do not eat or drink for two hours. You may eat and drink after 0815am today  You may resume your regular diet - however -  remember your colon is empty and a heavy meal will produce gas. Avoid these foods:  vegetables, fried / greasy foods, carbonated drinks    MEDICATIONS:        ACTIVITY  You may resume your normal daily activities until tomorrow AM;  Spend the remainder of the day resting -  avoid any strenuous activity. CALL M.D.   ANY SIGN OF   Increasing pain, nausea, vomiting  Abdominal distension (swelling)  New increased bleeding (oral or rectal)  Fever (chills)  Pain in chest area  Bloody discharge from nose or mouth  Shortness of breath    IMPRESSION:  -Mild distal esophagitis felt consisitent with LA Grade esophagitis  -Minimal 1cm smooth continuous irregularity at gastroesophageal junction; biopsied  -Small 2cm hiatal hernia from 41-43cm  -Normal stomach mucosa  -Normal duodenal mucosa    Follow-up Instructions:   Call Dr. Sheila Her for the results of procedure / biopsy in 7-10 days   Telephone # [phone]  Repeat upper endoscopy in 3 years    Krishan Vazquez MD

## 2021-10-14 NOTE — PROCEDURES
NAME:  Marcell Newman   :   1950   MRN:   [id number]     Date/Time:  10/14/2021 7:56 AM    Esophagogastroduodenoscopy (EGD) Procedure Note    Procedure: Esophagogastroduodenoscopy with biopsy    Indication:  Guerrier's esophagus  Pre-operative Diagnosis: see indication above  Post-operative Diagnosis: see findings below  :  Kevin Serrano MD  Referring Provider:   Cherylene Lorenzo, MD    Exam:  Airway: clear, no airway problems anticipated  Heart: RRR, without gallops or rubs  Lungs: clear bilaterally without wheezes, crackles, or rhonchi  Abdomen: soft, nontender, nondistended, bowel sounds present  Mental Status: awake, alert and oriented to person, place and time     Anethesia/Sedation:  MAC anesthesia Propofol 140mg IV  Procedure Details   After informed consent was obtained for the procedure, with all risks and benefits of procedure explained the patient was taken to the endoscopy suite and placed in the left lateral decubitus position. Following sequential administration of sedation as per above, the [id number] gastroscope was inserted into the mouth and advanced under direct vision to second portion of the duodenum. A careful inspection was made as the gastroscope was withdrawn, including a retroflexed view of the proximal stomach; findings and interventions are described below. Findings:    -Mild distal esophagitis felt consisitent with LA Grade esophagitis  -Minimal 1cm smooth continuous irregularity at gastroesophageal junction; biopsied  -Small 2cm hiatal hernia from 41-43cm  -Normal stomach mucosa  -Normal duodenal mucosa    Therapies:  biopsy of esophagus  Specimens: #1 distal esophagus  EBL:  None. Complications:   None; patient tolerated the procedure well.            Impression:    -Mild distal esophagitis felt consisitent with LA Grade esophagitis  -Minimal 1cm smooth continuous irregularity at gastroesophageal junction; biopsied  -Small 2cm hiatal hernia from 41-43cm  -Normal stomach mucosa  -Normal duodenal mucosa    Recommendations:  -Continue acid suppression. , -Await pathology. , -REpeat EGD in 3 yrs    Discharge disposition:  Home in the company of  when able to ambulate    Craig Roberts MD

## 2021-10-14 NOTE — ANESTHESIA POSTPROCEDURE EVALUATION
Procedure(s):  ESOPHAGOGASTRODUODENOSCOPY (EGD)  ESOPHAGOGASTRODUODENAL (EGD) BIOPSY. total IV anesthesia    Anesthesia Post Evaluation        Patient location during evaluation: PACU  Note status: Adequate. Level of consciousness: responsive to verbal stimuli and sleepy but conscious  Pain management: satisfactory to patient  Airway patency: patent  Anesthetic complications: no  Cardiovascular status: acceptable  Respiratory status: acceptable  Hydration status: acceptable  Comments: +Post-Anesthesia Evaluation and Assessment    Patient: Marti Torrez MRN: [id number]  SSN: [id number]   YOB: 1950  Age: 73 y.o. Sex: male      Cardiovascular Function/Vital Signs    /80   Pulse (!) 58   Resp 18   Ht 5' 9\" (1.753 m)   Wt 115.7 kg (255 lb)   SpO2 100%   BMI 37.66 kg/m²     Patient is status post Procedure(s):  ESOPHAGOGASTRODUODENOSCOPY (EGD)  ESOPHAGOGASTRODUODENAL (EGD) BIOPSY. Nausea/Vomiting: Controlled. Postoperative hydration reviewed and adequate. Pain:  Pain Scale 1: Numeric (0 - 10) (10/14/21 2801)  Pain Intensity 1: 0 (10/14/21 0730)   Managed. Neurological Status: At baseline. Mental Status and Level of Consciousness: Arousable. Pulmonary Status:   O2 Device: CO2 nasal cannula (10/14/21 5085)   Adequate oxygenation and airway patent. Complications related to anesthesia: None    Post-anesthesia assessment completed. No concerns. Signed By: Donalda Curling, MD    10/14/2021  Post anesthesia nausea and vomiting:  controlled      INITIAL Post-op Vital signs: No vitals data found for the desired time range.

## 2021-10-14 NOTE — PROGRESS NOTES
Shay Coelho III  1950  [id number]    Situation:  Verbal report received from: Danielle Downs RN  Procedure: Procedure(s):  ESOPHAGOGASTRODUODENOSCOPY (EGD)  ESOPHAGOGASTRODUODENAL (EGD) BIOPSY    Background:    Preoperative diagnosis: BARRETTS  Postoperative diagnosis: Barretts Esophagus, esophagitis, hiatal hernnia 43-40cm    :  Dr. Greg Romo  Assistant(s): Endoscopy Technician-1: David Abad  Endoscopy RN-1: Mae Rodriguez    Specimens:   ID Type Source Tests Collected by Time Destination   1 : Distal Esophagus BX Preservative Esophagus, Distal  Rosaline Gandhi MD 10/14/2021 0696 Pathology     H. Pylori  no    Assessment:  Intra-procedure medications   Anesthesia gave intra-procedure sedation and medications, see anesthesia flow sheet yes    Intravenous fluids: NS@ KVO     Vital signs stable yes    Abdominal assessment: round and soft yes    Recommendation:  Discharge patient per MD order yes.   Return to floor n/a  Family or Friend michael Curiel  Permission to share finding with family or friend yes

## 2021-10-14 NOTE — ANESTHESIA PREPROCEDURE EVALUATION
Relevant Problems   RESPIRATORY SYSTEM   (+) Asthma   (+) SANDERS (dyspnea on exertion)      CARDIOVASCULAR   (+) ASHD (arteriosclerotic heart disease)   (+) Essential hypertension      ENDOCRINE   (+) Diabetes (HCC)   (+) Obesity   (+) Severe obesity (BMI 35.0-39. 9)       Anesthetic History   No history of anesthetic complications            Review of Systems / Medical History  Patient summary reviewed, nursing notes reviewed and pertinent labs reviewed    Pulmonary    COPD    Sleep apnea: CPAP  Shortness of breath  Asthma        Neuro/Psych   Within defined limits           Cardiovascular    Hypertension          CAD, cardiac stents and hyperlipidemia    Exercise tolerance: <4 METS  Comments: Pt denies problems with hrt or chest pain since stent placed in 2008   GI/Hepatic/Renal     GERD          Comments: Hx colon polyps  Elevated LFTs  Guerrier's esophagus Endo/Other    Diabetes    Morbid obesity and arthritis     Other Findings   Comments: Actinic keratosis  Chronic Back Pain  BPH  SK (seborrheic keratosis)           Physical Exam    Airway  Mallampati: II  TM Distance: > 6 cm  Neck ROM: normal range of motion   Mouth opening: Normal     Cardiovascular  Regular rate and rhythm,  S1 and S2 normal,  no murmur, click, rub, or gallop             Dental    Dentition: Caps/crowns  Comments: Several missing teeth, none loose.    Pulmonary      Decreased breath sounds: bibasilar           Abdominal  GI exam deferred       Other Findings            Anesthetic Plan    ASA: 3  Anesthesia type: total IV anesthesia          Induction: Intravenous  Anesthetic plan and risks discussed with: Patient

## 2021-10-14 NOTE — PROGRESS NOTES
Endoscope was pre-cleaned at the bedside immediately following procedure by Catherene Grade. For medications administered by anesthesia, see anesthesia chart.

## 2021-10-14 NOTE — H&P
Gastroenterology Outpatient History and Physical    Patient: Steva Gaucher III    Physician: Subha Dwyer MD    Chief Complaint: Guerrier's esophagus  History of Present Illness: 71yo M with hx Guerrier's esophagus. Last EGD 4/2018.     History:  Past Medical History:   Diagnosis Date    Actinic keratosis 9/15/2017    Annual physical exam 9/15/2017    Arthritis     ASHD (arteriosclerotic heart disease) 9/15/2017    Asthma     At risk for sleep apnea 11/20/2019    STOP BANG 5    Guerrier's esophagus     CAD (coronary artery disease)     Chest pain 9/15/2017    Chronic back pain 9/15/2017    Chronic obstructive pulmonary disease (HCC)     Chronic pain     Cough 9/15/2017    Diabetes ([address]     hypoglycemia hx    Diabetes mellitus screening 9/15/2017    Diaphoresis 9/15/2017    SANDERS (dyspnea on exertion)     Dyslipidemia 9/15/2017    Edema 9/15/2017    Elevated LFTs 9/15/2017    Essential hypertension 6/7/2019    Fatigue 9/15/2017    GERD (gastroesophageal reflux disease)     Guerrier's esophagus    Hypercholesterolemia     Hyperplasia of prostate 9/15/2017    Impaired glucose tolerance 1/17/2020    Obesity 9/15/2017    Other long term (current) drug therapy 9/15/2017    Seasonal allergic rhinitis due to pollen 3/18/2021    Sinusitis 9/15/2017    SK (seborrheic keratosis) 9/15/2017    Sleep apnea     CPAP      Past Surgical History:   Procedure Laterality Date    COLONOSCOPY N/A 11/27/2019    FLEXIBLE SIGMOIDOSCOPY performed by Erin Ramey MD at [place] [id number] COLONOSCOPY N/A 1/15/2020    COL[facility] at [facility] ENDOSCOPY    HX APPENDECTOMY      HX CATARACT REMOVAL Bilateral 11/2020    HX COLONOSCOPY      HX ENDOSCOPY      HX HEART CATHETERIZATION  ~2008    stent    HX HEENT Left 2021    lazer surgery    HX KNEE ARTHROSCOPY Right 06/2019    partial knee replacement Dr. Jarrell Ba Left 2021 Social History     Socioeconomic History    Marital status:      Spouse name: Not on file    Number of children: Not on file    Years of education: Not on file    Highest education level: Not on file   Tobacco Use    Smoking status: Never Smoker    Smokeless tobacco: Former User   Vaping Use    Vaping Use: Never used   Substance and Sexual Activity    Alcohol use: No     Alcohol/week: 0.0 standard drinks    Drug use: No     Social Determinants of Health     Financial Resource Strain:     Difficulty of Paying Living Expenses:    Food Insecurity:     Worried About Running Out of Food in the Last Year:     [address] in the Last Year:    Transportation Needs:     Lack of Transportation (Medical):  Lack of Transportation (Non-Medical):    Physical Activity:     Days of Exercise per Week:     Minutes of Exercise per Session:    Stress:     Feeling of Stress :    Social Connections:     Frequency of Communication with Friends and Family:     Frequency of Social Gatherings with Friends and Family:     Attends Hindu Services:     Active Member of Clubs or Organizations:     Attends Club or Organization Meetings:     Marital Status:       Family History   Problem Relation Age of Onset    Lung Disease Mother         COPD    Heart Disease Mother         pacemaker    Cancer Father         prostate/liver      Patient Active Problem List   Diagnosis Code    Actinic keratosis L57.0    Annual physical exam Z00.00    ASHD (arteriosclerotic heart disease) I25.10    Chest pain R07.9    Chronic back pain M54.9, G89.29    Cough R05.9    Diabetes mellitus screening Z13.1    Diaphoresis R61    Dyslipidemia E78.5    Edema R60.9    Elevated LFTs R79.89    Fatigue R53.83    Hyperplasia of prostate N40.0    Obesity E66.9    Other long term (current) drug therapy Z79.899    Sinusitis J32.9    SK (seborrheic keratosis) L82.1    Severe obesity (BMI 35.0-39. 9) E66.01    Essential hypertension I10    SANDERS (dyspnea on exertion) R06.00    Acute bronchitis J20.9    Status post total knee replacement Z96.659    Generalized abdominal pain R10.84    Allergic conjunctivitis of both eyes H10.13    Seasonal allergic rhinitis due to pollen J30.1    Guerrier's esophagus K22.70    Asthma J45.909    Diabetes (HCC) E11.9       Allergies: Allergies   Allergen Reactions    Claritin-D 12 Hour [Loratadine-Pseudoephedrine] Shortness of Breath    Iodine Shortness of Breath    Levaquin [Levofloxacin] Hives    Seafood [Shellfish Containing Products] Shortness of Breath    Statins-Hmg-Coa Reductase Inhibitors Other (comments)     \"liver problems\"    Sulfa (Sulfonamide Antibiotics) Hives     Medications:   Prior to Admission medications    Medication Sig Start Date End Date Taking? Authorizing Provider   amoxicillin (AMOXIL) 500 mg capsule Take 500 mg by mouth two (2) times a day. Yes Provider, Historical   levocetirizine (XYZAL) 5 mg tablet TAKE 1 TABLET BY MOUTH EVERY DAY 10/5/21  Yes Terrall Opitz, MD   cholecalciferol (VITAMIN D3) (2,000 UNITS /50 MCG) cap capsule Take 2,000 Units by mouth three (3) times daily. Yes Provider, Historical   cyanocobalamin (Vitamin B-12) 1,000 mcg tablet Take 1,000 mcg by mouth daily. Yes Provider, Historical   Farxiga 10 mg tab tablet TAKE 1 TABLET BY MOUTH EVERY DAY 2/11/21  Yes Provider, Historical   furosemide (LASIX) 40 mg tablet TAKE 1 TABLET BY MOUTH DAILY 1/28/21  Yes Provider, Historical   metFORMIN ER (GLUCOPHAGE XR) 500 mg tablet TAKE 1 TABLET BY MOUTH TWICE A DAY 2/11/21  Yes Provider, Historical   montelukast (SINGULAIR) 10 mg tablet  7/31/20  Yes Provider, Historical   Symbicort 160-4.5 mcg/actuation HFAA  7/31/20  Yes Provider, Historical   azelastine (OPTIVAR) 0.05 % ophthalmic solution Administer 1 Drop to both eyes two (2) times a day.  Use in affected eye(s) 8/13/20  Yes Terrall Opitz, MD   azelastine (ASTELIN) 137 mcg (0.1 %) nasal spray  6/26/20  Yes Provider, Historical   COQ10, UBIQUINOL, PO Take 100 mg by mouth daily. Yes Provider, Historical   Cholestyramine-Aspartame (CHOLESTYRAMINE LIGHT) 4 gram powder Take 4 g by mouth every morning. Yes Provider, Historical   albuterol (VENTOLIN HFA) 90 mcg/actuation inhaler Take 2 Puffs by inhalation every four (4) hours as needed for Wheezing. Yes Provider, Historical   dilTIAZem (TIAZAC) 360 mg SR capsule Take 360 mg by mouth every morning. 6/17/19  Yes Provider, Historical   omeprazole (PRILOSEC) 40 mg capsule Take 40 mg by mouth daily. Indications: gastroesophageal reflux disease 7/22/18  Yes Provider, Historical   ZETIA 10 mg tablet Take 1 Tab by mouth daily. 2/16/18  Yes Kike Damian MD   finasteride (PROSCAR) 5 mg tablet Take 5 mg by mouth every Monday, Wednesday, Friday. 3/25/16  Yes Provider, Historical   multivitamin (ONE A DAY) tablet Take 1 Tab by mouth daily. Yes Provider, Historical   nabumetone (RELAFEN) 500 mg tablet TAKE ONE TABLET BY MOUTH TWICE A DAY 1/18/21   Kike Damian MD   aspirin delayed-release 81 mg tablet Take 81 mg by mouth daily. Provider, Historical     Physical Exam:   Vital Signs: Blood pressure (!) 149/73, pulse (!) 56, resp. rate 17, height 5' 9\" (1.753 m), weight 115.7 kg (255 lb), SpO2 95 %.   General: well developed, well nourished   HEENT: unremarkable   Heart: regular rhythm no mumur    Lungs: clear   Abdominal:  benign   Neurological: unremarkable   Extremities: no edema     Findings/Diagnosis: Guerrier's esophagus  Plan of Care/Planned Procedure: EGD with conscious/deep sedation    Signed:  Kera Trent MD 10/14/2021

## 2021-11-03 ENCOUNTER — OFFICE VISIT (OUTPATIENT)
Dept: INTERNAL MEDICINE CLINIC | Age: 71
End: 2021-11-03
Payer: MEDICARE

## 2021-11-03 VITALS
WEIGHT: 260.8 LBS | OXYGEN SATURATION: 97 % | HEART RATE: 67 BPM | BODY MASS INDEX: 38.63 KG/M2 | DIASTOLIC BLOOD PRESSURE: 82 MMHG | SYSTOLIC BLOOD PRESSURE: 130 MMHG | RESPIRATION RATE: 16 BRPM | TEMPERATURE: 98 F | HEIGHT: 69 IN

## 2021-11-03 DIAGNOSIS — J01.00 ACUTE MAXILLARY SINUSITIS, RECURRENCE NOT SPECIFIED: ICD-10-CM

## 2021-11-03 DIAGNOSIS — M54.50 RIGHT-SIDED LOW BACK PAIN WITHOUT SCIATICA, UNSPECIFIED CHRONICITY: Primary | ICD-10-CM

## 2021-11-03 LAB
APPEARANCE UR: CLEAR
BILIRUB UR QL: NEGATIVE
COLOR UR: ABNORMAL
GLUCOSE UR STRIP.AUTO-MCNC: 500 MG/DL
HGB UR QL STRIP: NEGATIVE
KETONES UR QL STRIP.AUTO: NEGATIVE MG/DL
LEUKOCYTE ESTERASE UR QL STRIP.AUTO: NEGATIVE
NITRITE UR QL STRIP.AUTO: NEGATIVE
PH UR STRIP: 7 [PH] (ref 5–8)
PROT UR STRIP-MCNC: NEGATIVE MG/DL
SP GR UR REFRACTOMETRY: 1.01 (ref 1–1.03)
UR CULT HOLD, URHOLD: NORMAL
UROBILINOGEN UR QL STRIP.AUTO: 0.2 EU/DL (ref 0.2–1)

## 2021-11-03 PROCEDURE — G8754 DIAS BP LESS 90: HCPCS | Performed by: INTERNAL MEDICINE

## 2021-11-03 PROCEDURE — G8427 DOCREV CUR MEDS BY ELIG CLIN: HCPCS | Performed by: INTERNAL MEDICINE

## 2021-11-03 PROCEDURE — G8752 SYS BP LESS 140: HCPCS | Performed by: INTERNAL MEDICINE

## 2021-11-03 PROCEDURE — G8536 NO DOC ELDER MAL SCRN: HCPCS | Performed by: INTERNAL MEDICINE

## 2021-11-03 PROCEDURE — G8417 CALC BMI ABV UP PARAM F/U: HCPCS | Performed by: INTERNAL MEDICINE

## 2021-11-03 PROCEDURE — 3017F COLORECTAL CA SCREEN DOC REV: CPT | Performed by: INTERNAL MEDICINE

## 2021-11-03 PROCEDURE — 99213 OFFICE O/P EST LOW 20 MIN: CPT | Performed by: INTERNAL MEDICINE

## 2021-11-03 PROCEDURE — 1101F PT FALLS ASSESS-DOCD LE1/YR: CPT | Performed by: INTERNAL MEDICINE

## 2021-11-03 PROCEDURE — G8510 SCR DEP NEG, NO PLAN REQD: HCPCS | Performed by: INTERNAL MEDICINE

## 2021-11-03 RX ORDER — CYCLOBENZAPRINE HCL 10 MG
10 TABLET ORAL
Qty: 30 TABLET | Refills: 0 | Status: SHIPPED | OUTPATIENT
Start: 2021-11-03 | End: 2022-05-24

## 2021-11-03 RX ORDER — CEFUROXIME AXETIL 500 MG/1
500 TABLET ORAL 2 TIMES DAILY
Qty: 20 TABLET | Refills: 0 | Status: SHIPPED | OUTPATIENT
Start: 2021-11-03 | End: 2022-01-02 | Stop reason: SDUPTHER

## 2021-11-03 NOTE — PROGRESS NOTES
Edenilson Posey III is a 73 y.o. male    Chief Complaint   Patient presents with    Back Pain     acute care    Sinus Infection       Visit Vitals  /82 (BP 1 Location: Left upper arm, BP Patient Position: Sitting, BP Cuff Size: Large adult) Comment (BP Cuff Size): Manual BP   Pulse 67   Temp 98 °F (36.7 °C)   Resp 16   Ht 5' 9\" (1.753 m)   Wt 260 lb 12.8 oz (118.3 kg)   SpO2 97%   BMI 38.51 kg/m²           1. Have you been to the ER, urgent care clinic since your last visit? Hospitalized since your last visit? NO    2. Have you seen or consulted any other health care providers outside of the [address] [place] [address] since your last visit? Include any pap smears or colon screening.  NO

## 2021-11-03 NOTE — PROGRESS NOTES
Elinor Mason is a 73 y.o. male and presents with Back Pain (acute care) and Sinus Infection  . Subjective:  Mr. Henry Eaton presents today with 2 distinct complaints. He has sinus congestion and drainage that has been persistent for the past month or so. He states that his seasonal allergies have been worsening despite the fact that he is on medication for this including Xyzal, Singulair, and azelastine. He was placed on a course of amoxicillin approximately a month ago but states that his symptoms do not completely resolve. He has no shortness of breath, chest pain, palpitations, PND, orthopnea, or pedal edema. He also notes that he has some back pain that is located in the right costovertebral area. This is constant and mild. He states it is nagging but gave a score of 8 on a scale of 1-10. He is not taking medication for this currently. He does note that his urine has been darker in color.     Past Medical History:   Diagnosis Date    Actinic keratosis 9/15/2017    Annual physical exam 9/15/2017    Arthritis     ASHD (arteriosclerotic heart disease) 9/15/2017    Asthma     At risk for sleep apnea 11/20/2019    STOP BANG 5    Guerrier's esophagus     CAD (coronary artery disease)     Chest pain 9/15/2017    Chronic back pain 9/15/2017    Chronic obstructive pulmonary disease (HCC)     Chronic pain     Cough 9/15/2017    Diabetes ([address]     hypoglycemia hx    Diabetes mellitus screening 9/15/2017    Diaphoresis 9/15/2017    SANDERS (dyspnea on exertion)     Dyslipidemia 9/15/2017    Edema 9/15/2017    Elevated LFTs 9/15/2017    Essential hypertension 6/7/2019    Fatigue 9/15/2017    GERD (gastroesophageal reflux disease)     Guerrier's esophagus    Hypercholesterolemia     Hyperplasia of prostate 9/15/2017    Impaired glucose tolerance 1/17/2020    Obesity 9/15/2017    Other long term (current) drug therapy 9/15/2017    Seasonal allergic rhinitis due to pollen 3/18/2021    Sinusitis 9/15/2017    SK (seborrheic keratosis) 9/15/2017    Sleep apnea     CPAP     Past Surgical History:   Procedure Laterality Date    COLONOSCOPY N/A 11/27/2019    FLEXIBLE SIGMOIDOSCOPY performed [facility] at [facility] AMBULATORY OR    COLONOSCOPY N/A 1/15/2020    COLONOSCOPY performed by Alecia Marshall MD at [facility] ENDOSCOPY    HX APPENDECTOMY      HX CATARACT REMOVAL Bilateral 11/2020    HX COLONOSCOPY      HX ENDOSCOPY      HX HEART CATHETERIZATION  ~2008    stent    HX HEENT Left 2021    lazer surgery    HX KNEE ARTHROSCOPY Right 06/2019    partial knee replacement Dr. Kenzie Abreu    HX KNEE REPLACEMENT Left     HX RETINAL DETACHMENT REPAIR Left 2021    UPPER GI ENDOSCOPY,BIOPSY  10/14/2021          Allergies   Allergen Reactions    Claritin-D 12 Hour [Loratadine-Pseudoephedrine] Shortness of Breath    Iodine Shortness of Breath    Levaquin [Levofloxacin] Hives    Seafood [Shellfish Containing Products] Shortness of Breath    Statins-Hmg-Coa Reductase Inhibitors Other (comments)     \"liver problems\"    Sulfa (Sulfonamide Antibiotics) Hives     Current Outpatient Medications   Medication Sig Dispense Refill    cefUROXime (CEFTIN) 500 mg tablet Take 1 Tablet by mouth two (2) times a day for 10 days. 20 Tablet 0    cyclobenzaprine (FLEXERIL) 10 mg tablet Take 1 Tablet by mouth three (3) times daily as needed for Muscle Spasm(s). 30 Tablet 0    levocetirizine (XYZAL) 5 mg tablet TAKE 1 TABLET BY MOUTH EVERY DAY 30 Tablet 3    cholecalciferol (VITAMIN D3) (2,000 UNITS /50 MCG) cap capsule Take 2,000 Units by mouth three (3) times daily.  cyanocobalamin (Vitamin B-12) 1,000 mcg tablet Take 1,000 mcg by mouth daily.       Farxiga 10 mg tab tablet TAKE 1 TABLET BY MOUTH EVERY DAY      furosemide (LASIX) 40 mg tablet TAKE 1 TABLET BY MOUTH DAILY      metFORMIN ER (GLUCOPHAGE XR) 500 mg tablet TAKE 1 TABLET BY MOUTH TWICE A DAY      nabumetone (RELAFEN) 500 mg tablet TAKE ONE TABLET BY MOUTH TWICE A  Tab 2    montelukast (SINGULAIR) 10 mg tablet       Symbicort 160-4.5 mcg/actuation HFAA       azelastine (OPTIVAR) 0.05 % ophthalmic solution Administer 1 Drop to both eyes two (2) times a day. Use in affected eye(s) 6 mL 0    azelastine (ASTELIN) 137 mcg (0.1 %) nasal spray       aspirin delayed-release 81 mg tablet Take 81 mg by mouth daily.  COQ10, UBIQUINOL, PO Take 100 mg by mouth daily.  Cholestyramine-Aspartame (CHOLESTYRAMINE LIGHT) 4 gram powder Take 4 g by mouth every morning.  albuterol (VENTOLIN HFA) 90 mcg/actuation inhaler Take 2 Puffs by inhalation every four (4) hours as needed for Wheezing.  dilTIAZem (TIAZAC) 360 mg SR capsule Take 360 mg by mouth every morning. 2    omeprazole (PRILOSEC) 40 mg capsule Take 40 mg by mouth daily. Indications: gastroesophageal reflux disease  1    ZETIA 10 mg tablet Take 1 Tab by mouth daily. 90 Tab 3    finasteride (PROSCAR) 5 mg tablet Take 5 mg by mouth every Monday, Wednesday, Friday. 0    multivitamin (ONE A DAY) tablet Take 1 Tab by mouth daily.  amoxicillin (AMOXIL) 500 mg capsule Take 500 mg by mouth two (2) times a day. (Patient not taking: Reported on 11/3/2021)       Social History     Socioeconomic History    Marital status:    Tobacco Use    Smoking status: Never Smoker    Smokeless tobacco: Former User   Vaping Use    Vaping Use: Never used   Substance and Sexual Activity    Alcohol use: No     Alcohol/week: 0.0 standard drinks    Drug use: No     Family History   Problem Relation Age of Onset    Lung Disease Mother         COPD    Heart Disease Mother         pacemaker    Cancer Father         prostate/liver       Review of Systems  Constitutional:  negative for fevers, chills, anorexia and weight loss  Eyes:    negative for visual disturbance and irritation  ENT:    negative for tinnitus,sore throat,nasal congestion,ear pains. hoarseness  Respiratory:     negative for cough, hemoptysis, dyspnea,wheezing  CV:    negative for chest pain, palpitations, lower extremity edema  GI:    negative for nausea, vomiting, diarrhea, abdominal pain,melena  Endo:               negative for polyuria,polydipsia,polyphagia,heat intolerance  Genitourinary : negative for frequency, dysuria and hematuria  Integumentary: negative for rash and pruritus  Hematologic:   negative for easy bruising and gum/nose bleeding  Musculoskel:  negative for myalgias, arthralgias, muscle weakness, joint pain  Neurological:   negative for headaches, dizziness, vertigo, memory problems and gait   Behavl/Psych:  negative for feelings of anxiety, depression, mood changes  ROS otherwise negative      Objective:  Visit Vitals  /82 (BP 1 Location: Left upper arm, BP Patient Position: Sitting, BP Cuff Size: Large adult) Comment (BP Cuff Size): Manual BP   Pulse 67   Temp 98 °F (36.7 °C)   Resp 16   Ht 5' 9\" (1.753 m)   Wt 260 lb 12.8 oz (118.3 kg)   SpO2 97%   BMI 38.51 kg/m²     Physical Exam:   General appearance - alert, well appearing, and in no distress  Mental status - alert, oriented to person, place, and time  EYE-KAI, EOMI, fundi normal, corneas normal, no foreign bodies  ENT-tympanic membrane is opacified bilaterally, no neck nodes or sinus tenderness  Nose -nares mildly erythematous and boggy discharge or polyps  Mouth - mucous membranes moist, pharynx normal without lesions  Neck - supple, no significant adenopathy   Chest - clear to auscultation, no wheezes, rales or rhonchi, symmetric air entry   Heart - normal rate, regular rhythm, normal S1, S2, no murmurs, rubs, clicks or gallops   Abdomen - soft, nontender, nondistended, no masses or organomegaly, right CVA tenderness  Lymph- no adenopathy palpable  Ext-peripheral pulses normal, no pedal edema, no clubbing or cyanosis  Skin-Warm and dry.  no hyperpigmentation, vitiligo, or suspicious lesions  Neuro -alert, oriented, normal speech, no focal findings or movement disorder noted      Assessment/Plan:  Diagnoses and all orders for this visit:    1. Right-sided low back pain without sciatica, unspecified chronicity  -     URINALYSIS W/ RFLX MICROSCOPIC; Future  -     cyclobenzaprine (FLEXERIL) 10 mg tablet; Take 1 Tablet by mouth three (3) times daily as needed for Muscle Spasm(s). 2. Acute maxillary sinusitis, recurrence not specified    Other orders  -     cefUROXime (CEFTIN) 500 mg tablet; Take 1 Tablet by mouth two (2) times a day for 10 days. ICD-10-CM ICD-9-CM    1. Right-sided low back pain without sciatica, unspecified chronicity  M54.50 724.2 URINALYSIS W/ RFLX MICROSCOPIC      cyclobenzaprine (FLEXERIL) 10 mg tablet   2. Acute maxillary sinusitis, recurrence not specified  J01.00 461.0        Plan:  Treat symptoms consistent with acute sinusitis superimposed on seasonal allergic rhinitis with Ceftin 5 mg twice daily for 10 days. Continue other medications as prescribed. Follow-up urine analysis to rule out nephrolithiasis or other etiology causing right flank/back pain. Flexeril as needed for musculoskeletal pain. Follow-up if not improved. I have reviewed with the patient details of the assessment and plan and all questions were answered. Relevent patient education was performed. Verbal and/or written instructions (see AVS) provided. The most recent lab findings were reviewed with the patient. Plan was discussed with patient who verbally expressed understanding. An After Visit Summary was printed and given to the patient.     Jarad Molina MD

## 2021-11-08 NOTE — PROGRESS NOTES
Urine analysis was clear except for trace glucose. Star Wedge Flap Text: The defect edges were debeveled with a #15 scalpel blade.  Given the location of the defect, shape of the defect and the proximity to free margins a star wedge flap was deemed most appropriate.  Using a sterile surgical marker, an appropriate rotation flap was drawn incorporating the defect and placing the expected incisions within the relaxed skin tension lines where possible. The area thus outlined was incised deep to adipose tissue with a #15 scalpel blade.  The skin margins were undermined to an appropriate distance in all directions utilizing iris scissors.

## 2021-12-20 RX ORDER — NABUMETONE 500 MG/1
TABLET, FILM COATED ORAL
Qty: 180 TABLET | Refills: 2 | Status: SHIPPED | OUTPATIENT
Start: 2021-12-20

## 2022-01-02 RX ORDER — CEFUROXIME AXETIL 500 MG/1
500 TABLET ORAL 2 TIMES DAILY
Qty: 20 TABLET | Refills: 0 | Status: SHIPPED | OUTPATIENT
Start: 2022-01-02 | End: 2022-01-12

## 2022-01-02 RX ORDER — PREDNISONE 10 MG/1
10 TABLET ORAL SEE ADMIN INSTRUCTIONS
Qty: 21 TABLET | Refills: 0 | Status: SHIPPED | OUTPATIENT
Start: 2022-01-02 | End: 2022-03-14 | Stop reason: ALTCHOICE

## 2022-01-19 ENCOUNTER — OFFICE VISIT (OUTPATIENT)
Dept: URGENT CARE | Age: 72
End: 2022-01-19
Payer: MEDICARE

## 2022-01-19 VITALS — OXYGEN SATURATION: 95 % | HEART RATE: 65 BPM | RESPIRATION RATE: 16 BRPM | TEMPERATURE: 97.7 F

## 2022-01-19 DIAGNOSIS — Z20.822 EXPOSURE TO COVID-19 VIRUS: Primary | ICD-10-CM

## 2022-01-19 PROCEDURE — 99212 OFFICE O/P EST SF 10 MIN: CPT | Performed by: FAMILY MEDICINE

## 2022-01-19 NOTE — PROGRESS NOTES
This patient was seen at [address] [place] [address] [facility]gent [facility] while in their vehicle due to COVID-19 pandemic with PPE and focused examination in order to decrease community viral transmission. The patient/guardian gave verbal consent to treat. The history is provided by the patient. Sore Throat   The history is provided by the patient. This is a new problem. The current episode started 2 days ago. The problem has been rapidly worsening. There has been no fever. Associated symptoms include congestion, ear pain, headaches, plugged ear sensation (rt side), swollen glands and cough. Pertinent negatives include no trouble swallowing. He has tried nothing for the symptoms.         Past Medical History:   Diagnosis Date    Actinic keratosis 9/15/2017    Annual physical exam 9/15/2017    Arthritis     ASHD (arteriosclerotic heart disease) 9/15/2017    Asthma     At risk for sleep apnea 11/20/2019    STOP BANG 5    Guerrier's esophagus     CAD (coronary artery disease)     Chest pain 9/15/2017    Chronic back pain 9/15/2017    Chronic obstructive pulmonary disease (HCC)     Chronic pain     Cough 9/15/2017    Diabetes ([address]     hypoglycemia hx    Diabetes mellitus screening 9/15/2017    Diaphoresis 9/15/2017    SANDERS (dyspnea on exertion)     Dyslipidemia 9/15/2017    Edema 9/15/2017    Elevated LFTs 9/15/2017    Essential hypertension 6/7/2019    Fatigue 9/15/2017    GERD (gastroesophageal reflux disease)     Guerrier's esophagus    Hypercholesterolemia     Hyperplasia of prostate 9/15/2017    Impaired glucose tolerance 1/17/2020    Obesity 9/15/2017    Other long term (current) drug therapy 9/15/2017    Seasonal allergic rhinitis due to pollen 3/18/2021    Sinusitis 9/15/2017    SK (seborrheic keratosis) 9/15/2017    Sleep apnea     CPAP        Past Surgical History:   Procedure Laterality Date    COLONOSCOPY N/A 11/27/2019    FLEXIBLE SIGMOIDOSCOPY performed [facility]once [facility] AMBUL[facility]ORY OR    COLONOSCOPY N/A 1/15/2020    COL[facility]COPY performed [facility] ENDOSCOPY    HX APPENDECTOMY      HX CATARACT REMOVAL Bilateral 11/2020    HX COLONOSCOPY      HX ENDOSCOPY      HX HEART CATHETERIZATION  ~2008    stent    HX HEENT Left 2021    lazer surgery    HX KNEE ARTHROSCOPY Right 06/2019    partial knee replacement Dr. Courtney Emmanuel    HX [address] Left     HX RETINAL DETACHMENT REPAIR Left 2021    UPPER GI ENDOSCOPY,BIOPSY  10/14/2021              Family History   Problem Relation Age of Onset    Lung Disease Mother         COPD    Heart Disease Mother         pacemaker    Cancer Father         prostate/liver        Social History     Socioeconomic History    Marital status:      Spouse name: Not on file    Number of children: Not on file    Years of education: Not on file    Highest education level: Not on file   Occupational History    Not on file   Tobacco Use    Smoking status: Never Smoker    Smokeless tobacco: Former User   Vaping Use    Vaping Use: Never used   Substance and Sexual Activity    Alcohol use: No     Alcohol/week: 0.0 standard drinks    Drug use: No    Sexual activity: Not on file   Other Topics Concern    Not on file   Social History Narrative    Not on file     Social Determinants of Health     Financial Resource Strain:     Difficulty of Paying Living Expenses: Not on file   Food Insecurity:     Worried About [address] in the Last Year: Not on file    [address] in the Last Year: Not on file   Transportation Needs:     Lack of Transportation (Medical): Not on file    Lack of Transportation (Non-Medical):  Not on file   Physical Activity:     Days of Exercise per Week: Not on file    Minutes of Exercise per Session: Not on file   Stress:     Feeling of Stress : Not on file   Social Connections:     Frequency of Communication with Friends and Family: Not on file    Frequency of Social Gatherings with Friends and Family: Not on file    Attends [employer] Services: Not on file    Active Member of Clubs or Organizations: Not on file    Attends Club or Organization Meetings: Not on file    Marital Status: Not on file   Intimate Partner Violence:     Fear of Current or Ex-Partner: Not on file    Emotionally Abused: Not on file    Physically Abused: Not on file    Sexually Abused: Not on file   Housing Stability:     Unable to Pay for Housing in the Last Year: Not on file    Number of [place] in the Last Year: Not on file    Unstable Housing in the Last Year: Not on file                ALLERGIES: Claritin-d 12 hour [loratadine-pseudoephedrine], Iodine, Levaquin [levofloxacin], Seafood [shellfish containing products], Statins-hmg-coa reductase inhibitors, and Sulfa (sulfonamide antibiotics)    Review of Systems   HENT: Positive for congestion, ear pain and sore throat. Negative for trouble swallowing. Respiratory: Positive for cough. Neurological: Positive for headaches. Psychiatric/Behavioral: Behavioral problem: \   All other systems reviewed and are negative. Vitals:    01/19/22 0902   Pulse: 65   Resp: 16   Temp: 97.7 °F (36.5 °C)   SpO2: 95%       Physical Exam  Vitals and nursing note reviewed. Constitutional:       General: He is not in acute distress. Appearance: He is not ill-appearing. Pulmonary:      Effort: Pulmonary effort is normal. No respiratory distress. Breath sounds: Normal breath sounds. MDM    Procedures      ICD-10-CM ICD-9-CM    1. Exposure to COVID-19 virus  Z20.822 V01.79 NOVEL CORONAVIRUS (COVID-19)       Fluids/ gargles  Claritin/ allegra / zyrtec- with flonase  Tylenol cold-sinus - max strength 1-2 tab 4 times/ day    with Advil as needed    If not better in 4-5 days - may use     No orders of the defined types were placed in this encounter. No results found for any visits on 01/19/22.   The patients condition was discussed with the patient and they understand. The patient is to follow up with primary care doctor. If signs and symptoms become worse the pt is to go to the ER. The patient is to take medications as prescribed.

## 2022-01-21 LAB
SARS-COV-2, NAA 2 DAY TAT: NORMAL
SARS-COV-2, NAA: NOT DETECTED

## 2022-02-04 LAB — HBA1C MFR BLD HPLC: 6.2 %

## 2022-02-08 RX ORDER — LEVOCETIRIZINE DIHYDROCHLORIDE 5 MG/1
TABLET, FILM COATED ORAL
Qty: 30 TABLET | Refills: 3 | Status: SHIPPED | OUTPATIENT
Start: 2022-02-08

## 2022-03-14 ENCOUNTER — OFFICE VISIT (OUTPATIENT)
Dept: INTERNAL MEDICINE CLINIC | Age: 72
End: 2022-03-14
Payer: MEDICARE

## 2022-03-14 VITALS
BODY MASS INDEX: 37.62 KG/M2 | SYSTOLIC BLOOD PRESSURE: 134 MMHG | HEIGHT: 69 IN | RESPIRATION RATE: 18 BRPM | DIASTOLIC BLOOD PRESSURE: 82 MMHG | OXYGEN SATURATION: 95 % | WEIGHT: 254 LBS | HEART RATE: 54 BPM

## 2022-03-14 DIAGNOSIS — Z12.5 SPECIAL SCREENING FOR MALIGNANT NEOPLASM OF PROSTATE: ICD-10-CM

## 2022-03-14 DIAGNOSIS — I25.10 ASHD (ARTERIOSCLEROTIC HEART DISEASE): ICD-10-CM

## 2022-03-14 DIAGNOSIS — Z13.31 SCREENING FOR DEPRESSION: ICD-10-CM

## 2022-03-14 DIAGNOSIS — J45.20 MILD INTERMITTENT ASTHMA WITHOUT COMPLICATION: ICD-10-CM

## 2022-03-14 DIAGNOSIS — K22.70 BARRETT'S ESOPHAGUS WITHOUT DYSPLASIA: ICD-10-CM

## 2022-03-14 DIAGNOSIS — E11.9 TYPE 2 DIABETES MELLITUS WITHOUT COMPLICATION, WITHOUT LONG-TERM CURRENT USE OF INSULIN (HCC): ICD-10-CM

## 2022-03-14 DIAGNOSIS — Z00.00 MEDICARE ANNUAL WELLNESS VISIT, SUBSEQUENT: Primary | ICD-10-CM

## 2022-03-14 DIAGNOSIS — L73.9 FOLLICULITIS: ICD-10-CM

## 2022-03-14 DIAGNOSIS — E66.01 SEVERE OBESITY (BMI 35.0-39.9) WITH COMORBIDITY (HCC): ICD-10-CM

## 2022-03-14 DIAGNOSIS — E78.5 DYSLIPIDEMIA: ICD-10-CM

## 2022-03-14 DIAGNOSIS — I10 ESSENTIAL HYPERTENSION: ICD-10-CM

## 2022-03-14 LAB
APPEARANCE UR: CLEAR
BILIRUB UR QL: NEGATIVE
COLOR UR: ABNORMAL
CREAT UR-MCNC: 130 MG/DL
GLUCOSE UR STRIP.AUTO-MCNC: 500 MG/DL
HGB UR QL STRIP: NEGATIVE
KETONES UR QL STRIP.AUTO: NEGATIVE MG/DL
LEUKOCYTE ESTERASE UR QL STRIP.AUTO: NEGATIVE
MICROALBUMIN UR-MCNC: 0.77 MG/DL
MICROALBUMIN/CREAT UR-RTO: 6 MG/G (ref 0–30)
NITRITE UR QL STRIP.AUTO: NEGATIVE
PH UR STRIP: 6 [PH] (ref 5–8)
PROT UR STRIP-MCNC: NEGATIVE MG/DL
PSA SERPL-MCNC: 0.2 NG/ML (ref 0.01–4)
SP GR UR REFRACTOMETRY: 1.02 (ref 1–1.03)
UROBILINOGEN UR QL STRIP.AUTO: 0.2 EU/DL (ref 0.2–1)

## 2022-03-14 PROCEDURE — 99214 OFFICE O/P EST MOD 30 MIN: CPT | Performed by: INTERNAL MEDICINE

## 2022-03-14 PROCEDURE — G0439 PPPS, SUBSEQ VISIT: HCPCS | Performed by: INTERNAL MEDICINE

## 2022-03-14 RX ORDER — MUPIROCIN 20 MG/G
OINTMENT TOPICAL DAILY
Qty: 22 G | Refills: 0 | Status: SHIPPED | OUTPATIENT
Start: 2022-03-14 | End: 2022-05-24

## 2022-03-14 NOTE — PROGRESS NOTES
Janine Carnes presents today at the clinic for follow up. Chief Complaint   Patient presents with    Follow-up     6 month f/u        Wt Readings from Last 3 Encounters:   03/14/22 254 lb (115.2 kg)   11/03/21 260 lb 12.8 oz (118.3 kg)   10/14/21 255 lb (115.7 kg)     Temp Readings from Last 3 Encounters:   01/19/22 97.7 °F (36.5 °C)   11/03/21 98 °F (36.7 °C)   10/14/21 97.8 °F (36.6 °C)     BP Readings from Last 3 Encounters:   11/03/21 130/82   10/14/21 (!) 154/75   09/10/21 132/84     Pulse Readings from Last 3 Encounters:   01/19/22 65   11/03/21 67   10/14/21 (!) 58       Health Maintenance Due   Topic    Foot Exam Q1     Eye Exam Retinal or Dilated     DTaP/Tdap/Td series (1 - Tdap)    Shingrix Vaccine Age 50> (1 of 2)    Pneumococcal 65+ years (2 of 2 - PPSV23)    Flu Vaccine (1)    COVID-19 Vaccine (3 - Booster for [facility] - BASTROP series)         Learning Assessment:  :     Learning Assessment 2/25/2021 8/16/2017   PRIMARY LEARNER Patient Patient   HIGHEST LEVEL OF EDUCATION - PRIMARY LEARNER  GRADUATED HIGH SCHOOL OR GED SOME COLLEGE   BARRIERS PRIMARY LEARNER - NONE   CO-LEARNER CAREGIVER No No   PRIMARY LANGUAGE ENGLISH ENGLISH   LEARNER PREFERENCE PRIMARY DEMONSTRATION DEMONSTRATION   ANSWERED BY patient patient   RELATIONSHIP SELF SELF       Depression Screening:  :     3 most recent PHQ Screens 3/14/2022   Little interest or pleasure in doing things Not at all   Feeling down, depressed, irritable, or hopeless Not at all   Total Score PHQ 2 0       Fall Risk Assessment:  :     Fall Risk Assessment, last 12 mths 11/3/2021   Able to walk? Yes   Fall in past 12 months? 0   Do you feel unsteady? 0   Are you worried about falling 0       Abuse Screening:  :     Abuse Screening Questionnaire 3/16/2020 9/12/2019 11/15/2017 8/16/2017   Do you ever feel afraid of your partner? N N N N   Are you in a relationship with someone who physically or mentally threatens you? N N N N   Is it safe for you to go home? Margaret Herrera       Coordination of Care Questionnaire:  :     1. Have you been to the ER, urgent care clinic since your last visit? Hospitalized since your last visit? No    2. Have you seen or consulted any other health care providers outside of the [address] [place] [address] since your last visit? Include any pap smears or colon screening.  No

## 2022-03-14 NOTE — PATIENT INSTRUCTIONS
Medicare Wellness Visit, Male    The best way to live healthy is to have a lifestyle where you eat a well-balanced diet, exercise regularly, limit alcohol use, and quit all forms of tobacco/nicotine, if applicable. Regular preventive services are another way to keep healthy. Preventive services (vaccines, screening tests, monitoring & exams) can help personalize your care plan, which helps you manage your own care. Screening tests can find health problems at the earliest stages, when they are easiest to treat. [facility] follows the current, evidence-based guidelines published by the [facility] Ike Lazarus ([facility]) when recommending preventive services for our patients. Because we follow these guidelines, sometimes recommendations change over time as research supports it. (For example, a prostate screening blood test is no longer routinely recommended for men with no symptoms). Of course, you and your doctor may decide to screen more often for some diseases, based on your risk and co-morbidities (chronic disease you are already diagnosed with). Preventive services for you include:  - Medicare offers their members a free annual wellness visit, which is time for you and your primary care provider to discuss and plan for your preventive service needs. Take advantage of this benefit every year!  -All adults over age 72 should receive the recommended pneumonia vaccines. Current USPSTF guidelines recommend a series of two vaccines for the best pneumonia protection.   -All adults should have a flu vaccine yearly and tetanus vaccine every 10 years.  -All adults age 48 and older should receive the shingles vaccines (series of two vaccines).        -All adults age 38-68 who are overweight should have a diabetes screening test once every three years.   -Other screening tests & preventive services for persons with diabetes include: an eye exam to screen for diabetic retinopathy, a kidney function test, a foot exam, and stricter control over your cholesterol.   -Cardiovascular screening for adults with routine risk involves an electrocardiogram (ECG) at intervals determined by the provider.   -Colorectal cancer screening should be done for adults age 54-65 with no increased risk factors for colorectal cancer. There are a number of acceptable methods of screening for this type of cancer. Each test has its own benefits and drawbacks. Discuss with your provider what is most appropriate for you during your annual wellness visit. The different tests include: colonoscopy (considered the best screening method), a fecal occult blood test, a fecal DNA test, and sigmoidoscopy.  -All adults born between [facility] should be screened once for Hepatitis C.  -An Abdominal Aortic Aneurysm (AAA) Screening is recommended for men age 73-68 who has ever smoked in their lifetime.      Here is a list of your current Health Maintenance items (your personalized list of preventive services) with a due date:  Health Maintenance Due   Topic Date Due    Diabetic Foot Care  Never done    Eye Exam  Never done    DTaP/Tdap/Td  (1 - Tdap) Never done    Shingles Vaccine (1 of 2) Never done    Pneumococcal Vaccine (2 of 2 - PPSV23) 09/12/2020    Yearly Flu Vaccine (1) Never done    COVID-19 Vaccine (3 - Booster for Moderna series) 09/19/2021

## 2022-03-14 NOTE — PROGRESS NOTES
This is the Subsequent Medicare Annual Wellness Exam, performed 12 months or more after the Initial AWV or the last Subsequent AWV    I have reviewed the patient's medical history in detail and updated the computerized patient record. Assessment/Plan   Education and counseling provided:  Are appropriate based on today's review and evaluation    1. Medicare annual wellness visit, subsequent  2. ASHD (arteriosclerotic heart disease)  3. Severe obesity (BMI 35.0-39. 9) with comorbidity ([address]  4. Type 2 diabetes mellitus without complication, without long-term current use of insulin (HCC)  -     URINALYSIS W/ RFLX MICROSCOPIC; Future  -     MICROALBUMIN, UR, RAND W/ MICROALB/CREAT RATIO; Future  5. Essential hypertension  -     URINALYSIS W/ RFLX MICROSCOPIC; Future  6. Guerrier's esophagus without dysplasia  7. Dyslipidemia  8. Mild intermittent asthma without complication  9. Special screening for malignant neoplasm of prostate  -     PSA SCREENING (SCREENING); Future  10. Body mass index 37.0-37.9, adult  11. Screening for depression  -     DEPRESSION SCREEN ANNUAL  12. Folliculitis       Depression Risk Factor Screening     3 most recent PHQ Screens 3/14/2022   Little interest or pleasure in doing things Not at all   Feeling down, depressed, irritable, or hopeless Not at all   Total Score PHQ 2 0       Alcohol & Drug Abuse Risk Screen    Do you average more than 1 drink per night or more than 7 drinks a week: No    In the past three months have you have had more than 4 drinks containing alcohol on one occasion: No          Functional Ability and Level of Safety    Hearing: Hearing is good. Activities of Daily Living: The home contains: no safety equipment. Patient does total self care      Ambulation: with no difficulty     Fall Risk:  Fall Risk Assessment, last 12 mths 11/3/2021   Able to walk? Yes   Fall in past 12 months? 0   Do you feel unsteady?  0   Are you worried about falling 0      Abuse Screen:  Patient is not abused       Cognitive Screening    Has your family/caregiver stated any concerns about your memory: no     Cognitive Screening: Normal - Verbal Fluency Test    Health Maintenance Due     Health Maintenance Due   Topic Date Due    Foot Exam Q1  Never done    Eye Exam Retinal or Dilated  Never done    DTaP/Tdap/Td series (1 - Tdap) Never done    Shingrix Vaccine Age 50> (1 of 2) Never done    Pneumococcal 65+ years (2 of 2 - PPSV23) 09/12/2020    Flu Vaccine (1) Never done    COVID-19 Vaccine (3 - Booster for Waneta Dunnings series) 09/19/2021       Patient Care Team   Patient Care Team:  Rajni Clinton MD as PCP - General (Internal Medicine)  Rajni Clinton MD as PCP - [facility]  Mitesh Aldridge MD (Urology)  Selvin Wick MD (Cardiology)  Don Muhammad MD (Endocrinology)    History     Patient Active Problem List   Diagnosis Code    Actinic keratosis L57.0    Annual physical exam Z00.00    ASHD (arteriosclerotic heart disease) I25.10    Chest pain R07.9    Chronic back pain M54.9, G89.29    Cough R05.9    Diabetes mellitus screening Z13.1    Diaphoresis R61    Dyslipidemia E78.5    Edema R60.9    Elevated LFTs R79.89    Fatigue R53.83    Hyperplasia of prostate N40.0    Obesity E66.9    Other long term (current) drug therapy Z79.899    Sinusitis J32.9    SK (seborrheic keratosis) L82.1    Severe obesity (BMI 35.0-39. 9) E66.01    Essential hypertension I10    SANDERS (dyspnea on exertion) R06.00    Acute bronchitis J20.9    Status post total knee replacement Z96.659    Generalized abdominal pain R10.84    Allergic conjunctivitis of both eyes H10.13    Seasonal allergic rhinitis due to pollen J30.1    Guerrier's esophagus K22.70    Asthma J45.909    Diabetes ([address] E11.9     Past Medical History:   Diagnosis Date    Actinic keratosis 9/15/2017    Annual physical exam 9/15/2017    Arthritis     ASHD (arteriosclerotic heart disease) 9/15/2017    Asthma     At risk for sleep apnea 11/20/2019    STOP BANG 5    Guerrier's esophagus     CAD (coronary artery disease)     Chest pain 9/15/2017    Chronic back pain 9/15/2017    Chronic obstructive pulmonary disease (HCC)     Chronic pain     Cough 9/15/2017    Diabetes ([address]     hypoglycemia hx    Diabetes mellitus screening 9/15/2017    Diaphoresis 9/15/2017    SANDERS (dyspnea on exertion)     Dyslipidemia 9/15/2017    Edema 9/15/2017    Elevated LFTs 9/15/2017    Essential hypertension 6/7/2019    Fatigue 9/15/2017    GERD (gastroesophageal reflux disease)     Guerrier's esophagus    Hypercholesterolemia     Hyperplasia of prostate 9/15/2017    Impaired glucose tolerance 1/17/2020    Obesity 9/15/2017    Other long term (current) drug therapy 9/15/2017    Seasonal allergic rhinitis due to pollen 3/18/2021    Sinusitis 9/15/2017    SK (seborrheic keratosis) 9/15/2017    Sleep apnea     CPAP      Past Surgical History:   Procedure Laterality Date    COLONOSCOPY N/A 11/27/2019    FLEXIBLE SIGMOIDOSCOPY performed by Tamela England MD at [address] COLONOSCOPY N/A 1/15/2020    COL[facility]OSCOPY performed [facility] MD at [facility] ENDOSCOPY    HX APPENDECTOMY      HX CATARACT REMOVAL Bilateral 11/2020    HX COLONOSCOPY      HX ENDOSCOPY      HX HEART CATHETERIZATION  ~2008    stent    HX HEENT Left 2021    lazer surgery    HX KNEE ARTHROSCOPY Right 06/2019    partial knee replacement Dr. Asher Sterling    HX Charolet Daniel Left 2021    UPPER GI ENDOSCOPY,BIOPSY  10/14/2021          Current Outpatient Medications   Medication Sig Dispense Refill    mupirocin (BACTROBAN) 2 % ointment Apply  to affected area daily.  22 g 0    levocetirizine (XYZAL) 5 mg tablet TAKE 1 TABLET BY MOUTH EVERY DAY 30 Tablet 3    nabumetone (RELAFEN) 500 mg tablet TAKE 1 TABLET BY MOUTH TWICE A  Tablet 2    cyclobenzaprine (FLEXERIL) 10 mg tablet Take 1 Tablet by mouth three (3) times daily as needed for Muscle Spasm(s). 30 Tablet 0    cholecalciferol (VITAMIN D3) (2,000 UNITS /50 MCG) cap capsule Take 2,000 Units by mouth three (3) times daily.  cyanocobalamin (Vitamin B-12) 1,000 mcg tablet Take 1,000 mcg by mouth daily.  Farxiga 10 mg tab tablet TAKE 1 TABLET BY MOUTH EVERY DAY      furosemide (LASIX) 40 mg tablet TAKE 1 TABLET BY MOUTH DAILY      metFORMIN ER (GLUCOPHAGE XR) 500 mg tablet TAKE 1 TABLET BY MOUTH TWICE A DAY      montelukast (SINGULAIR) 10 mg tablet       Symbicort 160-4.5 mcg/actuation HFAA       azelastine (OPTIVAR) 0.05 % ophthalmic solution Administer 1 Drop to both eyes two (2) times a day. Use in affected eye(s) 6 mL 0    azelastine (ASTELIN) 137 mcg (0.1 %) nasal spray       aspirin delayed-release 81 mg tablet Take 81 mg by mouth daily.  COQ10, UBIQUINOL, PO Take 100 mg by mouth daily.  Cholestyramine-Aspartame (CHOLESTYRAMINE LIGHT) 4 gram powder Take 4 g by mouth every morning.  albuterol (VENTOLIN HFA) 90 mcg/actuation inhaler Take 2 Puffs by inhalation every four (4) hours as needed for Wheezing.  dilTIAZem (TIAZAC) 360 mg SR capsule Take 360 mg by mouth every morning. 2    omeprazole (PRILOSEC) 40 mg capsule Take 40 mg by mouth daily. Indications: gastroesophageal reflux disease  1    ZETIA 10 mg tablet Take 1 Tab by mouth daily. 90 Tab 3    finasteride (PROSCAR) 5 mg tablet Take 5 mg by mouth every Monday, Wednesday, Friday. 0    multivitamin (ONE A DAY) tablet Take 1 Tab by mouth daily.        Allergies   Allergen Reactions    Claritin-D 12 Hour [Loratadine-Pseudoephedrine] Shortness of Breath    Iodine Shortness of Breath    Levaquin [Levofloxacin] Hives    Seafood [Shellfish Containing Products] Shortness of Breath    Statins-Hmg-Coa Reductase Inhibitors Other (comments)     \"liver problems\"    Sulfa (Sulfonamide Antibiotics) Hives       Family History   Problem Relation Age of Onset    Lung Disease Mother         COPD    Heart Disease Mother         pacemaker   Jessn Jazzmine Cancer Father         prostate/liver     Social History     Tobacco Use    Smoking status: Never Smoker    Smokeless tobacco: Former User   Substance Use Topics    Alcohol use: No     Alcohol/week: 0.0 standard drinks         C Tom Nelson MD Ramamadou Sanders is a 73 y.o. male and presents with Follow-up (6 month f/u)  . Subjective:  Mr. Juan J Box presents today for Medicare annual and his review as well as 6-month follow-up for multiple problems including atherosclerotic coronary vascular disease, dyslipidemia, diabetes mellitus, asthma, history of Guerrier's esophagus, and seasonal allergic rhinitis. He has noted some right ear discomfort on and off for the past couple of weeks. This is not severe. He has some nasal congestion and drainage intermittently. He remains on Singulair and Xyzal for seasonal allergic rhinitis. He has no shortness of breath, chest pain, palpitations, PND, orthopnea, or pedal edema. He has a follow-up with cardiology later today and was seen by endocrinology last month. He presents with copies of his labs which show a mild increase in his cholesterol numbers which correlates with his increase in weight secondary to dietary indiscretion. His A1c remains excellent at 6.2%. His diabetic eye exam is pending and is scheduled for later this year. In addition he notes that he has a lesion on his abdomen that has persisted for the past couple of months.     Past Medical History:   Diagnosis Date    Actinic keratosis 9/15/2017    Annual physical exam 9/15/2017    Arthritis     ASHD (arteriosclerotic heart disease) 9/15/2017    Asthma     At risk for sleep apnea 11/20/2019    STOP BANG 5    Guerrier's esophagus     CAD (coronary artery disease)     Chest pain 9/15/2017    Chronic back pain 9/15/2017    Chronic obstructive pulmonary disease (HCC)     Chronic pain  Cough 9/15/2017    Diabetes ([facility]     hypoglycemia hx    Diabetes mellitus screening 9/15/2017    Diaphoresis 9/15/2017    SANDERS (dyspnea on exertion)     Dyslipidemia 9/15/2017    Edema 9/15/2017    Elevated LFTs 9/15/2017    Essential hypertension 6/7/2019    Fatigue 9/15/2017    GERD (gastroesophageal reflux disease)     Guerrier's esophagus    Hypercholesterolemia     Hyperplasia of prostate 9/15/2017    Impaired glucose tolerance 1/17/2020    Obesity 9/15/2017    Other long term (current) drug therapy 9/15/2017    Seasonal allergic rhinitis due to pollen 3/18/2021    Sinusitis 9/15/2017    SK (seborrheic keratosis) 9/15/2017    Sleep apnea     CPAP     Past Surgical History:   Procedure Laterality Date    COLONOSCOPY N/A 11/27/2019    FLEXIBLE SIGMOIDOSCOPY performed by Uzma Clinton MD at [address] [place] [address] COLONOSCOPY N/A 1/15/2020    COL[facility] Clinton [facility] at [facility] ENDOSCOPY    HX APPENDECTOMY      HX CATARACT REMOVAL Bilateral 11/2020    HX COLONOSCOPY      HX ENDOSCOPY      HX HEART CATHETERIZATION  ~2008    stent    HX HEENT Left 2021    lazer surgery    HX KNEE ARTHROSCOPY Right 06/2019    partial knee replacement Dr. Aguilar Asper    HX KNEE REPLACEMENT Left     HX RETINAL DETACHMENT REPAIR Left 2021    UPPER GI ENDOSCOPY,BIOPSY  10/14/2021          Allergies   Allergen Reactions    Claritin-D 12 Hour [Loratadine-Pseudoephedrine] Shortness of Breath    Iodine Shortness of Breath    Levaquin [Levofloxacin] Hives    Seafood [Shellfish Containing Products] Shortness of Breath    Statins-Hmg-Coa Reductase Inhibitors Other (comments)     \"liver problems\"    Sulfa (Sulfonamide Antibiotics) Hives     Current Outpatient Medications   Medication Sig Dispense Refill    mupirocin (BACTROBAN) 2 % ointment Apply  to affected area daily.  22 g 0    levocetirizine (XYZAL) 5 mg tablet TAKE 1 TABLET BY MOUTH EVERY DAY 30 Tablet 3    nabumetone (RELAFEN) 500 mg tablet TAKE 1 TABLET BY MOUTH TWICE A  Tablet 2    cyclobenzaprine (FLEXERIL) 10 mg tablet Take 1 Tablet by mouth three (3) times daily as needed for Muscle Spasm(s). 30 Tablet 0    cholecalciferol (VITAMIN D3) (2,000 UNITS /50 MCG) cap capsule Take 2,000 Units by mouth three (3) times daily.  cyanocobalamin (Vitamin B-12) 1,000 mcg tablet Take 1,000 mcg by mouth daily.  Farxiga 10 mg tab tablet TAKE 1 TABLET BY MOUTH EVERY DAY      furosemide (LASIX) 40 mg tablet TAKE 1 TABLET BY MOUTH DAILY      metFORMIN ER (GLUCOPHAGE XR) 500 mg tablet TAKE 1 TABLET BY MOUTH TWICE A DAY      montelukast (SINGULAIR) 10 mg tablet       Symbicort 160-4.5 mcg/actuation HFAA       azelastine (OPTIVAR) 0.05 % ophthalmic solution Administer 1 Drop to both eyes two (2) times a day. Use in affected eye(s) 6 mL 0    azelastine (ASTELIN) 137 mcg (0.1 %) nasal spray       aspirin delayed-release 81 mg tablet Take 81 mg by mouth daily.  COQ10, UBIQUINOL, PO Take 100 mg by mouth daily.  Cholestyramine-Aspartame (CHOLESTYRAMINE LIGHT) 4 gram powder Take 4 g by mouth every morning.  albuterol (VENTOLIN HFA) 90 mcg/actuation inhaler Take 2 Puffs by inhalation every four (4) hours as needed for Wheezing.  dilTIAZem (TIAZAC) 360 mg SR capsule Take 360 mg by mouth every morning. 2    omeprazole (PRILOSEC) 40 mg capsule Take 40 mg by mouth daily. Indications: gastroesophageal reflux disease  1    ZETIA 10 mg tablet Take 1 Tab by mouth daily. 90 Tab 3    finasteride (PROSCAR) 5 mg tablet Take 5 mg by mouth every Monday, Wednesday, Friday. 0    multivitamin (ONE A DAY) tablet Take 1 Tab by mouth daily. Social History     Socioeconomic History    Marital status:    Tobacco Use    Smoking status: Never Smoker    Smokeless tobacco: Former User   Vaping Use    Vaping Use: Never used   Substance and Sexual Activity    Alcohol use: No     Alcohol/week: 0.0 standard drinks    Drug use:  No Family History   Problem Relation Age of Onset    Lung Disease Mother         COPD    Heart Disease Mother         pacemaker    Cancer Father         prostate/liver       Review of Systems  Constitutional:  negative for fevers, chills, anorexia and weight loss  Eyes:    negative for visual disturbance and irritation  ENT:    negative for tinnitus,sore throat,.hoarseness  Respiratory:     negative for  hemoptysis, dyspnea,wheezing  CV:    negative for chest pain, palpitations, lower extremity edema  GI:    negative for nausea, vomiting, diarrhea, abdominal pain,melena  Endo:               negative for polyuria,polydipsia,polyphagia,heat intolerance  Genitourinary : negative for frequency, dysuria and hematuria  Integumentary: negative for rash and pruritus  Hematologic:   negative for easy bruising and gum/nose bleeding  Musculoskel:  negative for myalgias, arthralgias, back pain, muscle weakness, joint pain  Neurological:   negative for headaches, dizziness, vertigo, memory problems and gait   Behavl/Psych:  negative for feelings of anxiety, depression, mood changes  ROS otherwise negative      Objective:  Visit Vitals  /82 (BP 1 Location: Left arm, BP Patient Position: Sitting, BP Cuff Size: Large adult)   Pulse (!) 54   Resp 18   Ht 5' 9\" (1.753 m)   Wt 254 lb (115.2 kg)   SpO2 95%   BMI 37.51 kg/m²     Physical Exam:   General appearance - alert, well appearing, and in no distress  Mental status - alert, oriented to person, place, and time  EYE-KAI, EOMI, fundi normal, corneas normal, no foreign bodies  ENT-mild opacification of the right tympanic membrane, no neck nodes or sinus tenderness  Nose - normal and patent, no erythema, discharge or polyps  Mouth - mucous membranes moist, pharynx normal without lesions  Neck - supple, no significant adenopathy   Chest - clear to auscultation, no wheezes, rales or rhonchi, symmetric air entry   Heart - normal rate, regular rhythm, normal S1, S2, no murmurs, rubs, clicks or gallops   Abdomen - soft, nontender, nondistended, no masses or organomegaly, small excoriated lesion left abdomen minimal erythema and raised  Lymph- no adenopathy palpable  Ext-peripheral pulses normal, no pedal edema, no clubbing or cyanosis  Skin-Warm and dry. no hyperpigmentation, vitiligo, or suspicious lesions  Neuro -alert, oriented, normal speech, no focal findings or movement disorder noted      Assessment/Plan:  Diagnoses and all orders for this visit:    1. Medicare annual wellness visit, subsequent    2. ASHD (arteriosclerotic heart disease)    3. Severe obesity (BMI 35.0-39. 9) with comorbidity ([facility]    4. Type 2 diabetes mellitus without complication, without long-term current use of insulin (HCC)  -     URINALYSIS W/ RFLX MICROSCOPIC; Future  -     MICROALBUMIN, UR, RAND W/ MICROALB/CREAT RATIO; Future    5. Essential hypertension  -     URINALYSIS W/ RFLX MICROSCOPIC; Future    6. Guerrier's esophagus without dysplasia    7. Dyslipidemia    8. Mild intermittent asthma without complication    9. Special screening for malignant neoplasm of prostate  -     PSA SCREENING (SCREENING); Future    10. Body mass index 37.0-37.9, adult    11. Screening for depression  -     DEPRESSION SCREEN ANNUAL    12. Folliculitis    Other orders  -     mupirocin (BACTROBAN) 2 % ointment; Apply  to affected area daily. ICD-10-CM ICD-9-CM    1. Medicare annual wellness visit, subsequent  Z00.00 V70.0    2. ASHD (arteriosclerotic heart disease)  I25.10 414.00    3. Severe obesity (BMI 35.0-39. 9) with comorbidity ([facility]  E66.01 278.01    4. Type 2 diabetes mellitus without complication, without long-term current use of insulin (HCC)  E11.9 250.00 URINALYSIS W/ RFLX MICROSCOPIC      MICROALBUMIN, UR, RAND W/ MICROALB/CREAT RATIO      MICROALBUMIN, UR, RAND W/ MICROALB/CREAT RATIO      URINALYSIS W/ RFLX MICROSCOPIC   5.  Essential hypertension  I10 401.9 URINALYSIS W/ RFLX MICROSCOPIC URINALYSIS W/ RFLX MICROSCOPIC   6. Guerrier's esophagus without dysplasia  K22.70 530.85    7. Dyslipidemia  E78.5 272.4    8. Mild intermittent asthma without complication  R68.10 466.92    9. Special screening for malignant neoplasm of prostate  Z12.5 V76.44 PSA SCREENING (SCREENING)      PSA SCREENING (SCREENING)   10. Body mass index 37.0-37.9, adult  Z68.37 V85.37    11. Screening for depression  Z13.31 V79.0 [facility]   12. Folliculitis  O47.2 316.0      Plan:    Continue current medical regimen as outlined above. Further recommendations based on labs as ordered. His labs were reviewed per his endocrinologist.  He will watch his diet and lose some weight to help manage his cholesterol in addition to his current medical regimen. He has follow-up with cardiology and a ophthalmology follow-up for his diabetes as noted above. Return to clinic 6 months unless otherwise indicated. The lesion on his abdomen appears to be related to a infected hair follicle and he will be treated with topical mupirocin ointment to see if this will resolve. Follow-up and Dispositions    · Return in about 6 months (around 9/14/2022) for follow up. Follow-up and Disposition History         I have reviewed with the patient details of the assessment and plan and all questions were answered. Relevent patient education was performed. Verbal and/or written instructions (see AVS) provided. The most recent lab findings were reviewed with the patient. Plan was discussed with patient who verbally expressed understanding. An After Visit Summary was printed and given to the patient.     Mayra Winston MD

## 2022-03-18 PROBLEM — E78.5 DYSLIPIDEMIA: Status: ACTIVE | Noted: 2017-09-15

## 2022-03-18 PROBLEM — I25.10 ASHD (ARTERIOSCLEROTIC HEART DISEASE): Status: ACTIVE | Noted: 2017-09-15

## 2022-03-18 PROBLEM — G89.29 CHRONIC BACK PAIN: Status: ACTIVE | Noted: 2017-09-15

## 2022-03-18 PROBLEM — J30.1 SEASONAL ALLERGIC RHINITIS DUE TO POLLEN: Status: ACTIVE | Noted: 2021-03-18

## 2022-03-18 PROBLEM — R61 DIAPHORESIS: Status: ACTIVE | Noted: 2017-09-15

## 2022-03-18 PROBLEM — J20.9 ACUTE BRONCHITIS: Status: ACTIVE | Noted: 2019-10-28

## 2022-03-18 PROBLEM — M54.9 CHRONIC BACK PAIN: Status: ACTIVE | Noted: 2017-09-15

## 2022-03-18 PROBLEM — R60.9 EDEMA: Status: ACTIVE | Noted: 2017-09-15

## 2022-03-19 PROBLEM — Z96.659 STATUS POST TOTAL KNEE REPLACEMENT: Status: ACTIVE | Noted: 2020-01-23

## 2022-03-19 PROBLEM — L57.0 ACTINIC KERATOSIS: Status: ACTIVE | Noted: 2017-09-15

## 2022-03-19 PROBLEM — E66.9 OBESITY: Status: ACTIVE | Noted: 2017-09-15

## 2022-03-19 PROBLEM — R10.84 GENERALIZED ABDOMINAL PAIN: Status: ACTIVE | Noted: 2020-08-13

## 2022-03-19 PROBLEM — H10.13 ALLERGIC CONJUNCTIVITIS OF BOTH EYES: Status: ACTIVE | Noted: 2020-08-13

## 2022-03-19 PROBLEM — R79.89 ELEVATED LFTS: Status: ACTIVE | Noted: 2017-09-15

## 2022-03-19 PROBLEM — R53.83 FATIGUE: Status: ACTIVE | Noted: 2017-09-15

## 2022-03-19 PROBLEM — R06.09 DOE (DYSPNEA ON EXERTION): Status: ACTIVE | Noted: 2019-10-28

## 2022-03-19 PROBLEM — N40.0 HYPERPLASIA OF PROSTATE: Status: ACTIVE | Noted: 2017-09-15

## 2022-03-19 PROBLEM — Z00.00 ANNUAL PHYSICAL EXAM: Status: ACTIVE | Noted: 2017-09-15

## 2022-03-19 PROBLEM — Z13.1 DIABETES MELLITUS SCREENING: Status: ACTIVE | Noted: 2017-09-15

## 2022-03-19 PROBLEM — R07.9 CHEST PAIN: Status: ACTIVE | Noted: 2017-09-15

## 2022-03-19 PROBLEM — I10 ESSENTIAL HYPERTENSION: Status: ACTIVE | Noted: 2019-06-07

## 2022-03-19 PROBLEM — J32.9 SINUSITIS: Status: ACTIVE | Noted: 2017-09-15

## 2022-03-19 NOTE — PROGRESS NOTES
Labs are stable. Your PSA test which is a screening test for prostate cancer was normal.  There is difficulty in urine. Urine analysis showed trace glucose.

## 2022-03-20 PROBLEM — R05.9 COUGH: Status: ACTIVE | Noted: 2017-09-15

## 2022-03-20 PROBLEM — Z79.899 OTHER LONG TERM (CURRENT) DRUG THERAPY: Status: ACTIVE | Noted: 2017-09-15

## 2022-03-20 PROBLEM — L82.1 SK (SEBORRHEIC KERATOSIS): Status: ACTIVE | Noted: 2017-09-15

## 2022-05-24 ENCOUNTER — OFFICE VISIT (OUTPATIENT)
Dept: URGENT CARE | Age: 72
End: 2022-05-24
Payer: MEDICARE

## 2022-05-24 VITALS
BODY MASS INDEX: 35.22 KG/M2 | DIASTOLIC BLOOD PRESSURE: 80 MMHG | RESPIRATION RATE: 16 BRPM | HEIGHT: 70 IN | TEMPERATURE: 98.3 F | HEART RATE: 65 BPM | SYSTOLIC BLOOD PRESSURE: 161 MMHG | OXYGEN SATURATION: 96 % | WEIGHT: 246 LBS

## 2022-05-24 DIAGNOSIS — J45.909 BRONCHITIS, ALLERGIC, UNSPECIFIED ASTHMA SEVERITY, UNCOMPLICATED: Primary | ICD-10-CM

## 2022-05-24 PROCEDURE — 3017F COLORECTAL CA SCREEN DOC REV: CPT | Performed by: FAMILY MEDICINE

## 2022-05-24 PROCEDURE — G8753 SYS BP > OR = 140: HCPCS | Performed by: FAMILY MEDICINE

## 2022-05-24 PROCEDURE — G8427 DOCREV CUR MEDS BY ELIG CLIN: HCPCS | Performed by: FAMILY MEDICINE

## 2022-05-24 PROCEDURE — G8417 CALC BMI ABV UP PARAM F/U: HCPCS | Performed by: FAMILY MEDICINE

## 2022-05-24 PROCEDURE — G8536 NO DOC ELDER MAL SCRN: HCPCS | Performed by: FAMILY MEDICINE

## 2022-05-24 PROCEDURE — 1101F PT FALLS ASSESS-DOCD LE1/YR: CPT | Performed by: FAMILY MEDICINE

## 2022-05-24 PROCEDURE — 99214 OFFICE O/P EST MOD 30 MIN: CPT | Performed by: FAMILY MEDICINE

## 2022-05-24 PROCEDURE — G8754 DIAS BP LESS 90: HCPCS | Performed by: FAMILY MEDICINE

## 2022-05-24 PROCEDURE — G8432 DEP SCR NOT DOC, RNG: HCPCS | Performed by: FAMILY MEDICINE

## 2022-05-24 RX ORDER — ALBUTEROL SULFATE 90 UG/1
2 AEROSOL, METERED RESPIRATORY (INHALATION)
Qty: 18 G | Refills: 0 | Status: SHIPPED | OUTPATIENT
Start: 2022-05-24

## 2022-05-24 RX ORDER — PREDNISONE 10 MG/1
TABLET ORAL
Qty: 21 TABLET | Refills: 0 | Status: SHIPPED | OUTPATIENT
Start: 2022-05-24 | End: 2022-09-15 | Stop reason: SDUPTHER

## 2022-05-24 RX ORDER — AZITHROMYCIN 250 MG/1
TABLET, FILM COATED ORAL
Qty: 6 TABLET | Refills: 0 | Status: SHIPPED | OUTPATIENT
Start: 2022-05-24 | End: 2022-09-15 | Stop reason: SDUPTHER

## 2022-05-24 RX ORDER — GUAIFENESIN AND DEXTROMETHORPHAN HYDROBROMIDE 1200; 60 MG/1; MG/1
1 TABLET, EXTENDED RELEASE ORAL 2 TIMES DAILY
Qty: 20 TABLET | Refills: 0 | Status: SHIPPED | OUTPATIENT
Start: 2022-05-24

## 2022-05-24 NOTE — PROGRESS NOTES
Cough  The history is provided by the patient. This is a new problem. The current episode started 2 days ago. The problem occurs constantly. The problem has been rapidly worsening. The cough is productive of brown sputum. There has been no fever. Associated symptoms include rhinorrhea, sore throat and wheezing. Pertinent negatives include no chest pain, no chills and no sweats. He has tried nothing for the symptoms. Risk factors: expose to erin. He is not a smoker. His past medical history is significant for bronchitis, pneumonia and asthma. His past medical history does not include COPD.         Past Medical History:   Diagnosis Date    Actinic keratosis 9/15/2017    Annual physical exam 9/15/2017    Arthritis     ASHD (arteriosclerotic heart disease) 9/15/2017    Asthma     At risk for sleep apnea 11/20/2019    STOP BANG 5    Guerrier's esophagus     CAD (coronary artery disease)     Chest pain 9/15/2017    Chronic back pain 9/15/2017    Chronic obstructive pulmonary disease (HCC)     Chronic pain     Cough 9/15/2017    Diabetes ([address]     hypoglycemia hx    Diabetes mellitus screening 9/15/2017    Diaphoresis 9/15/2017    SANDERS (dyspnea on exertion)     Dyslipidemia 9/15/2017    Edema 9/15/2017    Elevated LFTs 9/15/2017    Essential hypertension 6/7/2019    Fatigue 9/15/2017    GERD (gastroesophageal reflux disease)     Guerrier's esophagus    Hypercholesterolemia     Hyperplasia of prostate 9/15/2017    Impaired glucose tolerance 1/17/2020    Obesity 9/15/2017    Other long term (current) drug therapy 9/15/2017    Seasonal allergic rhinitis due to pollen 3/18/2021    Sinusitis 9/15/2017    SK (seborrheic keratosis) 9/15/2017    Sleep apnea     CPAP        Past Surgical History:   Procedure Laterality Date    COLONOSCOPY N/A 11/27/2019    FLEXIBLE SIGMOIDOSCOPY performed by Ayana Marroquin MD at [place] [id number] COLONOSCOPY N/A 1/15/2020    COLONOSCOPY performed by Miracle Keys Tk Coelho MD at [facility] ENDOSCOPY    HX APPENDECTOMY      HX CATARACT REMOVAL Bilateral 11/2020    HX COLONOSCOPY      HX ENDOSCOPY      HX HEART CATHETERIZATION  ~2008    stent    HX HEENT Left 2021    lazer surgery    HX KNEE ARTHROSCOPY Right 06/2019    partial knee replacement Dr. Hiro Majano    HX KNEE REPLACEMENT Left     HX RETINAL DETACHMENT REPAIR Left 2021    UPPER GI ENDOSCOPY,BIOPSY  10/14/2021              Family History   Problem Relation Age of Onset    Lung Disease Mother         COPD    Heart Disease Mother         pacemaker    Cancer Father         prostate/liver        Social History     Socioeconomic History    Marital status:      Spouse name: Not on file    Number of children: Not on file    Years of education: Not on file    Highest education level: Not on file   Occupational History    Not on file   Tobacco Use    Smoking status: Never Smoker    Smokeless tobacco: Former User   Vaping Use    Vaping Use: Never used   Substance and Sexual Activity    Alcohol use: No     Alcohol/week: 0.0 standard drinks    Drug use: No    Sexual activity: Not on file   Other Topics Concern    Not on file   Social History Narrative    Not on file     Social Determinants of Health     Financial Resource Strain:     Difficulty of Paying Living Expenses: Not on file   Food Insecurity:     Worried About [address] in the Last Year: Not on file    [address] in the Last Year: Not on file   Transportation Needs:     Lack of Transportation (Medical): Not on file    Lack of Transportation (Non-Medical):  Not on file   Physical Activity:     Days of Exercise per Week: Not on file    Minutes of Exercise per Session: Not on file   Stress:     Feeling of Stress : Not on file   Social Connections:     Frequency of Communication with Friends and Family: Not on file    Frequency of Social Gatherings with Friends and Family: Not on file    Attends Mandaen Services: Not on file  Active Member of Clubs or Organizations: Not on file    Attends Club or Organization Meetings: Not on file    Marital Status: Not on file   Intimate Partner Violence:     Fear of Current or Ex-Partner: Not on file    Emotionally Abused: Not on file    Physically Abused: Not on file    Sexually Abused: Not on file   Housing Stability:     Unable to Pay for Housing in the Last Year: Not on file    Number of [place] in the Last Year: Not on file    Unstable Housing in the Last Year: Not on file                ALLERGIES: Claritin-d 12 hour [loratadine-pseudoephedrine], Iodine, Levaquin [levofloxacin], Seafood [shellfish containing products], Statins-hmg-coa reductase inhibitors, and Sulfa (sulfonamide antibiotics)    Review of Systems   Constitutional: Negative for chills and fever. HENT: Positive for congestion, rhinorrhea and sore throat. Respiratory: Positive for cough and wheezing. Cardiovascular: Negative for chest pain. All other systems reviewed and are negative. Vitals:    05/24/22 1012   BP: (!) 161/80   Pulse: 65   Resp: 16   Temp: 98.3 °F (36.8 °C)   SpO2: 96%   Weight: 246 lb (111.6 kg)   Height: 5' 10\" (1.778 m)       Physical Exam  Vitals and nursing note reviewed. Constitutional:       General: He is not in acute distress. HENT:      Right Ear: Tympanic membrane and ear canal normal.      Left Ear: Tympanic membrane and ear canal normal.      Nose: Nose normal.      Mouth/Throat:      Pharynx: No oropharyngeal exudate or posterior oropharyngeal erythema. Eyes:      General:         Right eye: No discharge. Left eye: No discharge. Conjunctiva/sclera: Conjunctivae normal.   Pulmonary:      Effort: Pulmonary effort is normal. No respiratory distress. Breath sounds: Decreased breath sounds and rhonchi present. No wheezing or rales. Musculoskeletal:      Cervical back: Neck supple. Lymphadenopathy:      Cervical: No cervical adenopathy.    Skin: Findings: No rash. MDM    Procedures             ICD-10-CM ICD-9-CM    1. Bronchitis, allergic, unspecified asthma severity, uncomplicated  J27.051 295.80     secondary to exposure to [place]     Medications Ordered Today   Medications    dextromethorphan-guaiFENesin (Mucinex DM) 60-1,200 mg Tb12     Sig: Take 1 Tablet by mouth two (2) times a day. Dispense:  20 Tablet     Refill:  0    predniSONE (STERAPRED DS) 10 mg dose pack     Sig: As directed     Dispense:  21 Tablet     Refill:  0    azithromycin (ZITHROMAX) 250 mg tablet     Sig: Take two tablets today then one tablet daily- use if not better in 5-7 days     Dispense:  6 Tablet     Refill:  0    albuterol (Ventolin HFA) 90 mcg/actuation inhaler     Sig: Take 2 Puffs by inhalation every four (4) hours as needed for Wheezing. Dispense:  18 g     Refill:  0     No results found for any visits on 05/24/22. The patients condition was discussed with the patient and they understand. The patient is to follow up with primary care doctor. If signs and symptoms become worse the pt is to go to the ER. The patient is to take medications as prescribed.

## 2022-06-02 LAB — HBA1C MFR BLD HPLC: 6.2 %

## 2022-09-15 ENCOUNTER — OFFICE VISIT (OUTPATIENT)
Dept: INTERNAL MEDICINE CLINIC | Age: 72
End: 2022-09-15
Payer: MEDICARE

## 2022-09-15 VITALS
WEIGHT: 247.2 LBS | BODY MASS INDEX: 35.39 KG/M2 | DIASTOLIC BLOOD PRESSURE: 70 MMHG | HEIGHT: 70 IN | TEMPERATURE: 98.1 F | OXYGEN SATURATION: 97 % | RESPIRATION RATE: 20 BRPM | HEART RATE: 55 BPM | SYSTOLIC BLOOD PRESSURE: 128 MMHG

## 2022-09-15 DIAGNOSIS — J45.20 MILD INTERMITTENT ASTHMA WITHOUT COMPLICATION: ICD-10-CM

## 2022-09-15 DIAGNOSIS — M25.512 LEFT SHOULDER PAIN, UNSPECIFIED CHRONICITY: ICD-10-CM

## 2022-09-15 DIAGNOSIS — D49.2 ATYPICAL SQUAMOPROLIFERATIVE SKIN LESION: ICD-10-CM

## 2022-09-15 DIAGNOSIS — K22.70 BARRETT'S ESOPHAGUS WITHOUT DYSPLASIA: ICD-10-CM

## 2022-09-15 DIAGNOSIS — M62.08 RECTUS DIASTASIS: ICD-10-CM

## 2022-09-15 DIAGNOSIS — I10 ESSENTIAL HYPERTENSION: Primary | ICD-10-CM

## 2022-09-15 DIAGNOSIS — I25.10 ASHD (ARTERIOSCLEROTIC HEART DISEASE): ICD-10-CM

## 2022-09-15 DIAGNOSIS — E78.5 DYSLIPIDEMIA: ICD-10-CM

## 2022-09-15 DIAGNOSIS — J30.1 SEASONAL ALLERGIC RHINITIS DUE TO POLLEN: ICD-10-CM

## 2022-09-15 DIAGNOSIS — E11.9 TYPE 2 DIABETES MELLITUS WITHOUT COMPLICATION, WITHOUT LONG-TERM CURRENT USE OF INSULIN (HCC): ICD-10-CM

## 2022-09-15 PROCEDURE — G8752 SYS BP LESS 140: HCPCS | Performed by: INTERNAL MEDICINE

## 2022-09-15 PROCEDURE — 3044F HG A1C LEVEL LT 7.0%: CPT | Performed by: INTERNAL MEDICINE

## 2022-09-15 PROCEDURE — 2022F DILAT RTA XM EVC RTNOPTHY: CPT | Performed by: INTERNAL MEDICINE

## 2022-09-15 PROCEDURE — 3017F COLORECTAL CA SCREEN DOC REV: CPT | Performed by: INTERNAL MEDICINE

## 2022-09-15 PROCEDURE — 1123F ACP DISCUSS/DSCN MKR DOCD: CPT | Performed by: INTERNAL MEDICINE

## 2022-09-15 PROCEDURE — G8432 DEP SCR NOT DOC, RNG: HCPCS | Performed by: INTERNAL MEDICINE

## 2022-09-15 PROCEDURE — G8754 DIAS BP LESS 90: HCPCS | Performed by: INTERNAL MEDICINE

## 2022-09-15 PROCEDURE — 1101F PT FALLS ASSESS-DOCD LE1/YR: CPT | Performed by: INTERNAL MEDICINE

## 2022-09-15 PROCEDURE — G8417 CALC BMI ABV UP PARAM F/U: HCPCS | Performed by: INTERNAL MEDICINE

## 2022-09-15 PROCEDURE — G8427 DOCREV CUR MEDS BY ELIG CLIN: HCPCS | Performed by: INTERNAL MEDICINE

## 2022-09-15 PROCEDURE — G8536 NO DOC ELDER MAL SCRN: HCPCS | Performed by: INTERNAL MEDICINE

## 2022-09-15 PROCEDURE — 99214 OFFICE O/P EST MOD 30 MIN: CPT | Performed by: INTERNAL MEDICINE

## 2022-09-15 RX ORDER — PREDNISONE 10 MG/1
TABLET ORAL
Qty: 21 TABLET | Refills: 0 | Status: SHIPPED | OUTPATIENT
Start: 2022-09-15 | End: 2022-10-08 | Stop reason: ALTCHOICE

## 2022-09-15 RX ORDER — AZITHROMYCIN 250 MG/1
TABLET, FILM COATED ORAL
Qty: 6 TABLET | Refills: 0 | Status: SHIPPED | OUTPATIENT
Start: 2022-09-15 | End: 2022-10-29 | Stop reason: ALTCHOICE

## 2022-09-15 NOTE — PROGRESS NOTES
Teresa Beard is a 81 y.o. male and presents with Follow Up Chronic Condition (6 mo fu)  . Subjective:  Mr. Ava KURTZ. Dipti Goldman presents today for follow-up of multiple problems including coronary disease, Guerrier's esophagus, diabetes mellitus, hyperlipidemia, seasonal allergic rhinitis, asthma, and hypertension. He has had increasing congestion and cough recently. He always appears to experience this with change in the season and weather. 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 He reports no side effects with his medications. He has no chest pain, palpitations, PND, orthopnea, or pedal edema. He does note some swelling of his abdomen without discomfort. This usually occurs with increased intra-abdominal pressure when getting up from a lying position. He has no change in his bowel movements. He is also noted some discomfort in his left shoulder that seems to come and go. This can be exacerbated by change in position or movement. He describes it as a sharp pain that comes and goes. He is not taking medication for this currently. He has no loss of  strength in his left upper extremity.     Past Medical History:   Diagnosis Date    Actinic keratosis 9/15/2017    Annual physical exam 9/15/2017    Arthritis     ASHD (arteriosclerotic heart disease) 9/15/2017    Asthma     At risk for sleep apnea 11/20/2019    STOP BANG 5    Guerrier's esophagus     CAD (coronary artery disease)     Chest pain 9/15/2017    Chronic back pain 9/15/2017    Chronic obstructive pulmonary disease ([facility] [address]     Chronic pain     Cough 9/15/2017    Diabetes ([facility] [address]     hypoglycemia hx    Diabetes mellitus screening 9/15/2017    Diaphoresis 9/15/2017    SANDERS (dyspnea on exertion)     Dyslipidemia 9/15/2017    Edema 9/15/2017    Elevated LFTs 9/15/2017    Essential hypertension 6/7/2019    Fatigue 9/15/2017    GERD (gastroesophageal reflux disease)     Guerrier's esophagus    Hypercholesterolemia     Hyperplasia of prostate 9/15/2017    Impaired glucose tolerance 1/17/2020    Obesity 9/15/2017    Other long term (current) drug therapy 9/15/2017    Seasonal allergic rhinitis due to pollen 3/18/2021    Sinusitis 9/15/2017    SK (seborrheic keratosis) 9/15/2017    Sleep apnea     CPAP     Past Surgical History:   Procedure Laterality Date    COLONOSCOPY N/A 11/27/2019    FLEXIBLE SIGMOIDOSCOPY performed by Mike Casas MD at [facility] AMBULATORY OR    COLONOSCOPY N/A 1/15/2020    COLONOSCOPY performed by Mike Casas MD at [facility] ENDOSCOPY    HX APPENDECTOMY      HX CATARACT REMOVAL Bilateral 11/2020    HX COLONOSCOPY      HX ENDOSCOPY      HX HEART CATHETERIZATION  ~2008    stent    HX HEENT Left 2021    lazer surgery    HX KNEE ARTHROSCOPY Right 06/2019    partial knee replacement Dr. Isaac Bowers KNEE REPLACEMENT Left     HX RETINAL DETACHMENT REPAIR Left 2021    UPPER GI ENDOSCOPY,BIOPSY  10/14/2021          Allergies   Allergen Reactions    Claritin-D 12 Hour [Loratadine-Pseudoephedrine] Shortness of Breath    Iodine Shortness of Breath    Levaquin [Levofloxacin] Hives    Seafood [Shellfish Containing Products] Shortness of Breath    Statins-Hmg-Coa Reductase Inhibitors Other (comments)     \"liver problems\"    Sulfa (Sulfonamide Antibiotics) Hives     Current Outpatient Medications   Medication Sig Dispense Refill    predniSONE (STERAPRED DS) 10 mg dose pack As directed 21 Tablet 0    azithromycin (ZITHROMAX) 250 mg tablet Take two tablets today then one tablet daily- use if not better in 5-7 days 6 Tablet 0    dextromethorphan-guaiFENesin (Mucinex DM) 60-1,200 mg Tb12 Take 1 Tablet by mouth two (2) times a day. 20 Tablet 0    albuterol (Ventolin HFA) 90 mcg/actuation inhaler Take 2 Puffs by inhalation every four (4) hours as needed for Wheezing.  18 g 0    levocetirizine (XYZAL) 5 mg tablet TAKE 1 TABLET BY MOUTH EVERY DAY 30 Tablet 3    nabumetone (RELAFEN) 500 mg tablet TAKE 1 TABLET BY MOUTH TWICE A  Tablet 2 cholecalciferol (VITAMIN D3) (2,000 UNITS /50 MCG) cap capsule Take 2,000 Units by mouth three (3) times daily. cyanocobalamin 1,000 mcg tablet Take 1,000 mcg by mouth daily. Farxiga 10 mg tab tablet TAKE 1 TABLET BY MOUTH EVERY DAY      furosemide (LASIX) 40 mg tablet TAKE 1 TABLET BY MOUTH DAILY      metFORMIN ER (GLUCOPHAGE XR) 500 mg tablet TAKE 1 TABLET BY MOUTH TWICE A DAY      montelukast (SINGULAIR) 10 mg tablet       azelastine (OPTIVAR) 0.05 % ophthalmic solution Administer 1 Drop to both eyes two (2) times a day. Use in affected eye(s) 6 mL 0    azelastine (ASTELIN) 137 mcg (0.1 %) nasal spray       aspirin delayed-release 81 mg tablet Take 81 mg by mouth daily. COQ10, UBIQUINOL, PO Take 100 mg by mouth daily. Cholestyramine-Aspartame 4 gram powder Take 4 g by mouth every morning. dilTIAZem (TIAZAC) 360 mg SR capsule Take 360 mg by mouth every morning. 2    omeprazole (PRILOSEC) 40 mg capsule Take 40 mg by mouth daily. Indications: gastroesophageal reflux disease  1    ZETIA 10 mg tablet Take 1 Tab by mouth daily. 90 Tab 3    finasteride (PROSCAR) 5 mg tablet Take 5 mg by mouth every Monday, Wednesday, Friday. 0    multivitamin (ONE A DAY) tablet Take 1 Tab by mouth daily. Social History     Socioeconomic History    Marital status:    Tobacco Use    Smoking status: Never    Smokeless tobacco: Former   Vaping Use    Vaping Use: Never used   Substance and Sexual Activity    Alcohol use: No     Alcohol/week: 0.0 standard drinks    Drug use: No     Family History   Problem Relation Age of Onset    Lung Disease Mother         COPD    Heart Disease Mother         pacemaker    Cancer Father         prostate/liver       Review of Systems  Constitutional:  negative for fevers, chills, anorexia and weight loss  Eyes:    negative for visual disturbance and irritation  ENT:    negative for tinnitus,sore throat,nasal congestion,ear pains. hoarseness  Respiratory:     negative for cough, hemoptysis, dyspnea,wheezing  CV:    negative for chest pain, palpitations, lower extremity edema  GI:    negative for nausea, vomiting, diarrhea, abdominal pain,melena  Endo:               negative for polyuria,polydipsia,polyphagia,heat intolerance  Genitourinary : negative for frequency, dysuria and hematuria  Integumentary: negative for rash and pruritus  Hematologic:   negative for easy bruising and gum/nose bleeding  Musculoskel:  negative for myalgias, arthralgias, back pain, muscle weakness, joint pain  Neurological:   negative for headaches, dizziness, vertigo, memory problems and gait   Behavl/Psych:  negative for feelings of anxiety, depression, mood changes  ROS otherwise negative      Objective:  Visit Vitals  /70 (BP 1 Location: Left upper arm, BP Patient Position: Sitting, BP Cuff Size: Adult)   Pulse (!) 55   Temp 98.1 °F (36.7 °C) (Oral)   Resp 20   Ht 5' 10\" (1.778 m)   Wt 247 lb 3.2 oz (112.1 kg)   SpO2 97%   BMI 35.47 kg/m²     Physical Exam:   General appearance - alert, well appearing, and in no distress  Mental status - alert, oriented to person, place, and time  EYE-KAI, EOMI, fundi normal, corneas normal, no foreign bodies  ENT-ENT exam normal, no neck nodes or sinus tenderness  Nose - normal and patent, no erythema, discharge or polyps  Mouth - mucous membranes moist, pharynx normal without lesions  Neck - supple, no significant adenopathy, decreased range of motion flexion extension and rotation  Chest - clear to auscultation, no wheezes, rales or rhonchi, symmetric air entry   Heart - normal rate, regular rhythm, normal S1, S2, no murmurs, rubs, clicks or gallops   Abdomen - soft, obese, bowel sounds present, nontender, nondistended, no masses or organomegaly, rectus diastases  Lymph- no adenopathy palpable  Ext-peripheral pulses normal, no pedal edema, no clubbing or cyanosis, question tenderness left AC joint, shoulder demonstrates full range of motion AB duction crossover internal rotation and supraspinatus are negative  Skin-Warm and dry. no hyperpigmentation, vitiligo, or suspicious lesions  Neuro -alert, oriented, normal speech, no focal findings or movement disorder noted      Assessment/Plan:  Diagnoses and all orders for this visit:    1. Essential hypertension    2. ASHD (arteriosclerotic heart disease)    3. Type 2 diabetes mellitus without complication, without long-term current use of insulin ([facility] [address]    4. Dyslipidemia    5. Rectus diastasis    6. Left shoulder pain, unspecified chronicity    7. Guerrier's esophagus without dysplasia    8. Seasonal allergic rhinitis due to pollen    9. Mild intermittent asthma without complication    Other orders  -     predniSONE (STERAPRED DS) 10 mg dose pack; As directed  -     azithromycin (ZITHROMAX) 250 mg tablet; Take two tablets today then one tablet daily- use if not better in 5-7 days          ICD-10-CM ICD-9-CM    1. Essential hypertension  I10 401.9       2. ASHD (arteriosclerotic heart disease)  I25.10 414.00       3. Type 2 diabetes mellitus without complication, without long-term current use of insulin ([facility])  E11.9 250.00       4. Dyslipidemia  E78.5 272.4       5. Rectus diastasis  M62.08 728.84       6. Left shoulder pain, unspecified chronicity  M25.512 719.41       7. Guerrier's esophagus without dysplasia  K22.70 530.85       8. Seasonal allergic rhinitis due to pollen  J30.1 477.0       9. Mild intermittent asthma without complication  Q20.94 242.48         Plan:    Patient will be prescribed a steroid Dosepak to take in addition to his scheduled maintenance inhaler and he will take his rescue inhaler on an as-needed basis. He remains on over-the-counter antihistamines, nasal steroids, and Singulair. Further recommendations based on labs as ordered.   If his left shoulder discomfort becomes more persistent or progressive will consider orthopedic referral.  With regard to his rectus diastases I think this is something that will persist and if he gets worse or larger and/or he experiences any pain or discomfort he will be referred to surgery for further evaluation. I think the next most significant thing that he can do for this is to lose weight. His diabetes has been under fair control on his current regimen and he had labs done by his endocrinologist recently which will not be repeated today. We will obtain these labs to review and follow-up in 6 months for a complete physical unless otherwise indicated. Follow-up and Dispositions    Return in about 6 months (around 3/15/2023) for follow up. I have reviewed with the patient details of the assessment and plan and all questions were answered. Relevent patient education was performed. Verbal and/or written instructions (see AVS) provided. The most recent lab findings were reviewed with the patient. Plan was discussed with patient who verbally expressed understanding. An After Visit Summary was printed and given to the patient.     Krystal Crouch MD

## 2022-10-07 LAB — HBA1C MFR BLD HPLC: 6 %

## 2022-10-08 ENCOUNTER — OFFICE VISIT (OUTPATIENT)
Dept: URGENT CARE | Age: 72
End: 2022-10-08
Payer: MEDICARE

## 2022-10-08 VITALS
RESPIRATION RATE: 18 BRPM | TEMPERATURE: 98.6 F | OXYGEN SATURATION: 98 % | HEART RATE: 71 BPM | BODY MASS INDEX: 35.44 KG/M2 | SYSTOLIC BLOOD PRESSURE: 133 MMHG | WEIGHT: 247 LBS | DIASTOLIC BLOOD PRESSURE: 76 MMHG

## 2022-10-08 DIAGNOSIS — J06.9 UPPER RESPIRATORY TRACT INFECTION, UNSPECIFIED TYPE: Primary | ICD-10-CM

## 2022-10-08 PROCEDURE — G8752 SYS BP LESS 140: HCPCS | Performed by: NURSE PRACTITIONER

## 2022-10-08 PROCEDURE — G8536 NO DOC ELDER MAL SCRN: HCPCS | Performed by: NURSE PRACTITIONER

## 2022-10-08 PROCEDURE — G8432 DEP SCR NOT DOC, RNG: HCPCS | Performed by: NURSE PRACTITIONER

## 2022-10-08 PROCEDURE — G8417 CALC BMI ABV UP PARAM F/U: HCPCS | Performed by: NURSE PRACTITIONER

## 2022-10-08 PROCEDURE — G8427 DOCREV CUR MEDS BY ELIG CLIN: HCPCS | Performed by: NURSE PRACTITIONER

## 2022-10-08 PROCEDURE — 1101F PT FALLS ASSESS-DOCD LE1/YR: CPT | Performed by: NURSE PRACTITIONER

## 2022-10-08 PROCEDURE — 3017F COLORECTAL CA SCREEN DOC REV: CPT | Performed by: NURSE PRACTITIONER

## 2022-10-08 PROCEDURE — 99213 OFFICE O/P EST LOW 20 MIN: CPT | Performed by: NURSE PRACTITIONER

## 2022-10-08 PROCEDURE — 1123F ACP DISCUSS/DSCN MKR DOCD: CPT | Performed by: NURSE PRACTITIONER

## 2022-10-08 PROCEDURE — G8754 DIAS BP LESS 90: HCPCS | Performed by: NURSE PRACTITIONER

## 2022-10-08 RX ORDER — PREDNISONE 20 MG/1
40 TABLET ORAL
Qty: 10 TABLET | Refills: 0 | Status: SHIPPED | OUTPATIENT
Start: 2022-10-08 | End: 2022-10-13

## 2022-10-08 RX ORDER — BENZONATATE 200 MG/1
200 CAPSULE ORAL
Qty: 21 CAPSULE | Refills: 0 | Status: SHIPPED | OUTPATIENT
Start: 2022-10-08 | End: 2022-10-15

## 2022-10-08 NOTE — PATIENT INSTRUCTIONS
Follow up with your primary care provider in 5-7 days if symptoms persist.  Go to the Emergency Department with development of any acute symptoms.

## 2022-10-08 NOTE — PROGRESS NOTES
Oli Keita is a 81 y.o. male with asthma and seasonal allergies presenting to clinic today with cough, throat pain, congestion, and ear congestion x2 days. Denies fevers, chills, or chest pain. Endorses shortness of breath with cough, which he states is occasionally productive of clear or dark yellow sputum. Denies exposure to COVID, states that he has been vaccinated. No other complaints today. The history is provided by the patient. History limited by: nothing. Cough  Associated symptoms include rhinorrhea and sore throat. Pertinent negatives include no chest pain, no chills and no shortness of breath.       Past Medical History:   Diagnosis Date    Actinic keratosis 9/15/2017    Annual physical exam 9/15/2017    Arthritis     ASHD (arteriosclerotic heart disease) 9/15/2017    Asthma     At risk for sleep apnea 11/20/2019    STOP BANG 5    Guerrier's esophagus     CAD (coronary artery disease)     Chest pain 9/15/2017    Chronic back pain 9/15/2017    Chronic obstructive pulmonary disease (HCC)     Chronic pain     Cough 9/15/2017    Diabetes ([address]     hypoglycemia hx    Diabetes mellitus screening 9/15/2017    Diaphoresis 9/15/2017    SANDERS (dyspnea on exertion)     Dyslipidemia 9/15/2017    Edema 9/15/2017    Elevated LFTs 9/15/2017    Essential hypertension 6/7/2019    Fatigue 9/15/2017    GERD (gastroesophageal reflux disease)     Guerrier's esophagus    Hypercholesterolemia     Hyperplasia of prostate 9/15/2017    Impaired glucose tolerance 1/17/2020    Obesity 9/15/2017    Other long term (current) drug therapy 9/15/2017    Seasonal allergic rhinitis due to pollen 3/18/2021    Sinusitis 9/15/2017    SK (seborrheic keratosis) 9/15/2017    Sleep apnea     CPAP        Past Surgical History:   Procedure Laterality Date    COLONOSCOPY N/A 11/27/2019    FLEXIBLE SIGMOIDOSCOPY performed by Uzma Clinton MD at [facility] AMBULATORY OR    COLONOSCOPY N/A 1/15/2020    COLONOSCOPY performed by Umza Clinton MD at [facility] ENDOSCOPY    HX APPENDECTOMY      HX CATARACT REMOVAL Bilateral 11/2020    HX COLONOSCOPY      HX ENDOSCOPY      HX HEART CATHETERIZATION  ~2008    stent    HX HEENT Left 2021    lazer surgery    HX KNEE ARTHROSCOPY Right 06/2019    partial knee replacement Dr. Chavez Morning KNEE REPLACEMENT Left     HX RETINAL DETACHMENT REPAIR Left 2021    UPPER GI ENDOSCOPY,BIOPSY  10/14/2021              Family History   Problem Relation Age of Onset    Lung Disease Mother         COPD    Heart Disease Mother         pacemaker    Cancer Father         prostate/liver        Social History     Socioeconomic History    Marital status:      Spouse name: Not on file    Number of children: Not on file    Years of education: Not on file    Highest education level: Not on file   Occupational History    Not on file   Tobacco Use    Smoking status: Never    Smokeless tobacco: Former   Vaping Use    Vaping Use: Never used   Substance and Sexual Activity    Alcohol use: No     Alcohol/week: 0.0 standard drinks    Drug use: No    Sexual activity: Not on file   Other Topics Concern    Not on file   Social History Narrative    Not on file     Social Determinants of Health     Financial Resource Strain: Not on file   Food Insecurity: Not on file   Transportation Needs: Not on file   Physical Activity: Not on file   Stress: Not on file   Social Connections: Not on file   Intimate Partner Violence: Not on file   Housing Stability: Not on file                ALLERGIES: Claritin-d 12 hour [loratadine-pseudoephedrine], Iodine, Levaquin [levofloxacin], Seafood [shellfish containing products], Statins-hmg-coa reductase inhibitors, and Sulfa (sulfonamide antibiotics)    Review of Systems   Constitutional:  Negative for chills and fever. HENT:  Positive for congestion, rhinorrhea and sore throat. Negative for sinus pain. Respiratory:  Positive for cough. Negative for chest tightness and shortness of breath.     Cardiovascular:  Negative for chest pain. Gastrointestinal:  Negative for abdominal pain. Vitals:    10/08/22 1329 10/08/22 1357   BP: (!) 172/84 133/76   Pulse: 71    Resp: 18    Temp: 98.6 °F (37 °C)    SpO2: 98%    Weight: 247 lb (112 kg)        Physical Exam  Vitals and nursing note reviewed. Constitutional:       General: He is not in acute distress. Appearance: Normal appearance. He is not ill-appearing, toxic-appearing or diaphoretic. HENT:      Head: Normocephalic and atraumatic. Comments: R TM pearly gray, no erythema, no effusion, no bulging  L TM pearly gray, no erythema, +effusion, no bulging  Tonsils 2+BL, no erythema, no exudate, uvula midline. Overall good dentition. No visible nasal congestion. No obvious palpable lymphadenopathy. No sinus tenderness  Eyes:      Extraocular Movements: Extraocular movements intact. Conjunctiva/sclera: Conjunctivae normal.   Cardiovascular:      Rate and Rhythm: Normal rate. Heart sounds: Normal heart sounds. Pulmonary:      Effort: Pulmonary effort is normal. No respiratory distress. Breath sounds: Normal breath sounds. Comments: Speaking in complete sentences without difficulty. Very spastic and strong cough disrupting patient's speech intermittently  Skin:     General: Skin is warm and dry. Neurological:      Mental Status: He is alert. Psychiatric:         Mood and Affect: Mood normal.         Behavior: Behavior normal.       MDM  PLAN:  Patient presents to clinic today with URI symptoms x2 days. On exam, very spastic and strong cough disrupting patient's ability to speak. No COVID exposure, patient vaccinated. Rx prednisone burst  Rx tessalon perles as needed for cough  Advised patient to use his albuterol inhaler, he states he has refills already. OTC for symptom management, increase fluid intake, ensure adequate nutritional intake. Discussed no indication for antibiotics today.  Patient is afebrile, duration of illness only 2 days, lungs clear sounding. Advised patient to follow up with PCP or return to this clinic in 5-7 more days if symptoms persist.  Go to ED with development of any acute symptoms. DIAGNOSIS:    ICD-10-CM ICD-9-CM    1. Upper respiratory tract infection, unspecified type  J06.9 465.9         Medications Ordered Today   Medications    benzonatate (TESSALON) 200 mg capsule     Sig: Take 1 Capsule by mouth three (3) times daily as needed for Cough for up to 7 days. Dispense:  21 Capsule     Refill:  0    predniSONE (DELTASONE) 20 mg tablet     Sig: Take 2 Tablets by mouth daily (with breakfast) for 5 days.      Dispense:  10 Tablet     Refill:  0         Procedures

## 2022-10-29 ENCOUNTER — OFFICE VISIT (OUTPATIENT)
Dept: URGENT CARE | Age: 72
End: 2022-10-29
Payer: MEDICARE

## 2022-10-29 VITALS
OXYGEN SATURATION: 96 % | DIASTOLIC BLOOD PRESSURE: 60 MMHG | TEMPERATURE: 98.1 F | SYSTOLIC BLOOD PRESSURE: 133 MMHG | BODY MASS INDEX: 35.15 KG/M2 | RESPIRATION RATE: 18 BRPM | HEART RATE: 60 BPM | WEIGHT: 245 LBS

## 2022-10-29 DIAGNOSIS — J40 BRONCHITIS: Primary | ICD-10-CM

## 2022-10-29 LAB — SARS-COV-2 PCR, POC: NEGATIVE

## 2022-10-29 PROCEDURE — 87635 SARS-COV-2 COVID-19 AMP PRB: CPT | Performed by: NURSE PRACTITIONER

## 2022-10-29 PROCEDURE — 3078F DIAST BP <80 MM HG: CPT | Performed by: NURSE PRACTITIONER

## 2022-10-29 PROCEDURE — 3074F SYST BP LT 130 MM HG: CPT | Performed by: NURSE PRACTITIONER

## 2022-10-29 PROCEDURE — 99213 OFFICE O/P EST LOW 20 MIN: CPT | Performed by: NURSE PRACTITIONER

## 2022-10-29 PROCEDURE — 1123F ACP DISCUSS/DSCN MKR DOCD: CPT | Performed by: NURSE PRACTITIONER

## 2022-10-29 RX ORDER — AZITHROMYCIN 250 MG/1
TABLET, FILM COATED ORAL
Qty: 6 TABLET | Refills: 0 | Status: SHIPPED | OUTPATIENT
Start: 2022-10-29 | End: 2022-11-03

## 2022-10-29 RX ORDER — BENZONATATE 200 MG/1
200 CAPSULE ORAL
Qty: 21 CAPSULE | Refills: 0 | Status: SHIPPED | OUTPATIENT
Start: 2022-10-29 | End: 2022-11-05

## 2022-10-29 NOTE — PROGRESS NOTES
80 yo M presents to  with   Patient presents with:  Cough: Chest congestion and jaw pain    Reports recent treatment for cough, congestion with initially steroid pack and tessalon followed by course of amoxicillin from endocrinologist for concern for lower lobe pneumonia. No CXR done. Reports had some symptomatic resolution, and then had recurrence and progression of symptoms with onset of worsening productive cough, nasal congestion. No SOB reported, no SANDERS. Reports sometimes has prolonged bronchitis vs pna courses with requirement for prolonged abx treatments.          Past Medical History:   Diagnosis Date    Actinic keratosis 9/15/2017    Annual physical exam 9/15/2017    Arthritis     ASHD (arteriosclerotic heart disease) 9/15/2017    Asthma     At risk for sleep apnea 11/20/2019    STOP BANG 5    Guerrier's esophagus     CAD (coronary artery disease)     Chest pain 9/15/2017    Chronic back pain 9/15/2017    Chronic obstructive pulmonary disease (HCC)     Chronic pain     Cough 9/15/2017    Diabetes ([address]     hypoglycemia hx    Diabetes mellitus screening 9/15/2017    Diaphoresis 9/15/2017    SANDERS (dyspnea on exertion)     Dyslipidemia 9/15/2017    Edema 9/15/2017    Elevated LFTs 9/15/2017    Essential hypertension 6/7/2019    Fatigue 9/15/2017    GERD (gastroesophageal reflux disease)     Guerrier's esophagus    Hypercholesterolemia     Hyperplasia of prostate 9/15/2017    Impaired glucose tolerance 1/17/2020    Obesity 9/15/2017    Other long term (current) drug therapy 9/15/2017    Seasonal allergic rhinitis due to pollen 3/18/2021    Sinusitis 9/15/2017    SK (seborrheic keratosis) 9/15/2017    Sleep apnea     CPAP        Past Surgical History:   Procedure Laterality Date    COLONOSCOPY N/A 11/27/2019    FLEXIBLE SIGMOIDOSCOPY performed by Pita Flood MD at [place] [address] COLONOSCOPY N/A 1/15/2020    COLONOSCOPY performed by Pita Flood MD at \[facility]"" ENDOSCOPY    HX APPENDECTOMY      HX CATARACT REMOVAL Bilateral 11/2020    HX COLONOSCOPY      HX ENDOSCOPY      HX HEART CATHETERIZATION  ~2008    stent    HX HEENT Left 2021    lazer surgery    HX KNEE ARTHROSCOPY Right 06/2019    partial knee replacement Dr. Jennifer Coppola    HX KNEE REPLACEMENT Left     HX RETINAL DETACHMENT REPAIR Left 2021    UPPER GI ENDOSCOPY,BIOPSY  10/14/2021              Family History   Problem Relation Age of Onset    Lung Disease Mother         COPD    Heart Disease Mother         pacemaker    Cancer Father         prostate/liver        Social History     Socioeconomic History    Marital status:      Spouse name: Not on file    Number of children: Not on file    Years of education: Not on file    Highest education level: Not on file   Occupational History    Not on file   Tobacco Use    Smoking status: Never    Smokeless tobacco: Former   Vaping Use    Vaping Use: Never used   Substance and Sexual Activity    Alcohol use: No     Alcohol/week: 0.0 standard drinks    Drug use: No    Sexual activity: Not on file   Other Topics Concern    Not on file   Social History Narrative    Not on file     Social Determinants of Health     Financial Resource Strain: Not on file   Food Insecurity: Not on file   Transportation Needs: Not on file   Physical Activity: Not on file   Stress: Not on file   Social Connections: Not on file   Intimate Partner Violence: Not on file   Housing Stability: Not on file                ALLERGIES: Claritin-d 12 hour [loratadine-pseudoephedrine], Iodine, Levaquin [levofloxacin], Seafood [shellfish containing products], Statins-hmg-coa reductase inhibitors, and Sulfa (sulfonamide antibiotics)    Review of Systems   Constitutional:  Positive for fatigue. Negative for chills and fever. HENT:  Positive for congestion, ear pain, postnasal drip, rhinorrhea and sinus pressure. Negative for ear discharge. Respiratory:  Positive for cough.  Negative for shortness of breath. Cardiovascular:  Negative for chest pain. Gastrointestinal: Negative. Genitourinary: Negative. Musculoskeletal: Negative. All other systems reviewed and are negative. Vitals:    10/29/22 1452   BP: 133/60   Pulse: 60   Resp: 18   Temp: 98.1 °F (36.7 °C)   SpO2: 96%   Weight: 245 lb (111.1 kg)       Physical Exam  Vitals and nursing note reviewed. Constitutional:       General: He is not in acute distress. Appearance: Normal appearance. He is ill-appearing. He is not toxic-appearing or diaphoretic. HENT:      Head: Normocephalic and atraumatic. Right Ear: Tympanic membrane normal.      Left Ear: Tympanic membrane normal.      Nose: Congestion and rhinorrhea present. Right Turbinates: Enlarged and swollen. Left Turbinates: Enlarged and swollen. Right Sinus: No maxillary sinus tenderness or frontal sinus tenderness. Left Sinus: No maxillary sinus tenderness or frontal sinus tenderness. Mouth/Throat:      Mouth: Mucous membranes are moist.      Pharynx: Oropharynx is clear. No oropharyngeal exudate or posterior oropharyngeal erythema. Cardiovascular:      Rate and Rhythm: Normal rate and regular rhythm. Pulmonary:      Breath sounds: Wheezing present. Comments: +frequent cough  Musculoskeletal:      Cervical back: Normal range of motion. Neurological:      Mental Status: He is alert. MDM     Differential Diagnosis; Clinical Impression; Plan:     Bronchitis  CXR negative for acute pneumonia  Based on symptoms, second sickening and prolonged symptom course with allergies to levaquin and sulfas, will trial z pack for symptoms  Symptom management as below and already doing at home. Orders Placed This Encounter      benzonatate (TESSALON) 200 mg capsule          Sig: Take 1 Capsule by mouth three (3) times daily as needed for Cough for up to 7 days.           Dispense:  21 Capsule          Refill:  0      azithromycin (ZITHROMAX) 250 mg tablet          Sig: Take 2 tablets today, then take 1 tablet daily          Dispense:  6 Tablet          Refill:  0    The patients condition was discussed with the patient and they understand. The patient is to follow up with PCP. If signs and symptoms become worse the pt is to go to the ER. The patient is to take medications as prescribed.      Procedures

## 2022-12-06 RX ORDER — NABUMETONE 500 MG/1
TABLET, FILM COATED ORAL
Qty: 180 TABLET | Refills: 2 | Status: SHIPPED | OUTPATIENT
Start: 2022-12-06

## 2023-01-27 NOTE — PERIOP NOTES
[facility]  Ambulatory Surgery Unit  Pre-operative Instructions    Surgery/Procedure Date  Monday, February 6, 2023            Tentative Arrival Time TBD      1. On the day of your surgery/procedure, please report to the Ambulatory Surgery Unit Registration Desk and sign in at your designated time. The Ambulatory Surgery Unit is located in [facility] on the Critical access hospital side of the [facility] across from the [address] [place] [address]. Please have all of your health insurance cards, co-payment, and a photo ID.    **TWO adults may accompany you the day of the procedure. We have limited seating available. If our waiting room is at capacity, your ride may be asked to remain in their vehicle. No one under 15 is allowed in the waiting room. 2. You must have someone with you to drive you home, as you should not drive a car for 24 hours following anesthesia. Please make arrangements for a responsible adult friend or family member to stay with you for at least the first 24 hours after your surgery. 3. Do not have anything to eat or drink (including water, gum, mints, coffee, juice) after 11:59 PM, Sunday. This may not apply to medications prescribed by your physician. (Please note below the special instructions with medications to take the morning of surgery, if applicable.)    4. We recommend you do not drink any alcoholic beverages for 24 hours before and after your surgery. 5. Contact your surgeons office for instructions on the following medications: non-steroidal anti-inflammatory drugs (i.e. Advil, Aleve), vitamins, and supplements. (Some surgeons will want you to stop these medications prior to surgery and others may allow you to take them)   **If you are currently taking Plavix, Coumadin, Aspirin and/or other blood-thinning agents, contact your surgeon for instructions. ** Your surgeon will partner with the physician prescribing these medications to determine if it is safe to stop or if you need to continue taking. Please do not stop taking these medications without instructions from your surgeon. 6. In an effort to help prevent surgical site infection, we ask that you shower with an anti-bacterial soap (i.e. Dial/Safeguard, or the soap provided to you at your preadmission testing appointment) for 3 days prior to and on the morning of surgery, using a fresh towel after each shower. (Please begin this process with fresh bed linens.) Do not apply any lotions, powders, or deodorants after the shower on the day of your procedure. If applicable, please do not shave the operative site for 48 hours prior to surgery. 7. Wear comfortable clothes. Wear glasses instead of contacts. Do not bring any jewelry or money (other than copays or fees as instructed). Do not wear make-up, particularly mascara, the morning of your surgery. Do not wear nail polish, particularly if you are having foot /hand surgery. Wear your hair loose or down, no ponytails, buns, mathew pins or clips. All body piercings must be removed. 8. You should understand that if you do not follow these instructions your surgery may be cancelled. If your physical condition changes (i.e. fever, cold or flu) please contact your surgeon as soon as possible. 9. It is important that you be on time. If a situation occurs where you may be late, or if you have any questions or problems, please call [phone].    10. Your surgery time may be subject to change. You will receive a phone call the day prior to surgery to confirm your arrival time. 11. Pediatric patients: please bring a change of clothes, diapers, bottle/sippy cup, pacifier, etc.      Special Instructions: Take all medications and inhalers, as prescribed, on the morning of surgery with a sip of water EXCEPT: no diabetic medications day of surgery. Insulin Dependent Diabetic patients: Take your diabetic medications as prescribed the day before surgery.   Hold all diabetic medications the day of surgery. If you are scheduled to arrive for surgery after 8:00 AM, and your AM blood sugar is >200, please call Ambulatory Surgery. I understand a pre-operative phone call will be made to verify my surgery time. In the event that I am not available, I give permission for a message to be left on my answering service and/or with another person?       yes    Preop instructions reviewed  Pt verbalized understanding.       ___________________      ___________________      ________________  (Signature of Patient)          (Witness)                   (Date and Time)

## 2023-01-31 LAB — HBA1C MFR BLD HPLC: 5.8 %

## 2023-02-01 LAB — HBA1C MFR BLD HPLC: 5.8 %

## 2023-02-03 ENCOUNTER — ANESTHESIA EVENT (OUTPATIENT)
Dept: SURGERY | Age: 73
End: 2023-02-03
Payer: MEDICARE

## 2023-02-03 RX ORDER — BUDESONIDE AND FORMOTEROL FUMARATE DIHYDRATE 160; 4.5 UG/1; UG/1
2 AEROSOL RESPIRATORY (INHALATION) 2 TIMES DAILY
COMMUNITY

## 2023-02-06 ENCOUNTER — ANESTHESIA (OUTPATIENT)
Dept: SURGERY | Age: 73
End: 2023-02-06
Payer: MEDICARE

## 2023-02-06 ENCOUNTER — HOSPITAL ENCOUNTER (OUTPATIENT)
Age: 73
Setting detail: OUTPATIENT SURGERY
Discharge: HOME | End: 2023-02-06
Attending: ORTHOPAEDIC SURGERY | Admitting: ORTHOPAEDIC SURGERY
Payer: MEDICARE

## 2023-02-06 VITALS
SYSTOLIC BLOOD PRESSURE: 129 MMHG | BODY MASS INDEX: 33.64 KG/M2 | OXYGEN SATURATION: 99 % | RESPIRATION RATE: 13 BRPM | DIASTOLIC BLOOD PRESSURE: 74 MMHG | TEMPERATURE: 98 F | HEART RATE: 51 BPM | HEIGHT: 70 IN | WEIGHT: 235 LBS

## 2023-02-06 DIAGNOSIS — G89.18 POST-OP PAIN: Primary | ICD-10-CM

## 2023-02-06 LAB
GLUCOSE BLD STRIP.AUTO-MCNC: 105 MG/DL (ref 65–117)
GLUCOSE BLD STRIP.AUTO-MCNC: 113 MG/DL (ref 65–117)
SERVICE CMNT-IMP: NORMAL
SERVICE CMNT-IMP: NORMAL

## 2023-02-06 PROCEDURE — 76210000046 HC AMBSU PH II REC FIRST 0.5 HR: Performed by: ORTHOPAEDIC SURGERY

## 2023-02-06 PROCEDURE — 76030000000 HC AMB SURG OR TIME 0.5 TO 1: Performed by: ORTHOPAEDIC SURGERY

## 2023-02-06 PROCEDURE — 77030000032 HC CUF TRNQT ZIMM -B: Performed by: ORTHOPAEDIC SURGERY

## 2023-02-06 PROCEDURE — 74011250636 HC RX REV CODE- 250/636: Performed by: ORTHOPAEDIC SURGERY

## 2023-02-06 PROCEDURE — 74011250636 HC RX REV CODE- 250/636: Performed by: NURSE ANESTHETIST, CERTIFIED REGISTERED

## 2023-02-06 PROCEDURE — 77030002916 HC SUT ETHLN J&J -A: Performed by: ORTHOPAEDIC SURGERY

## 2023-02-06 PROCEDURE — 76060000061 HC AMB SURG ANES 0.5 TO 1 HR: Performed by: ORTHOPAEDIC SURGERY

## 2023-02-06 PROCEDURE — 74011000250 HC RX REV CODE- 250: Performed by: ORTHOPAEDIC SURGERY

## 2023-02-06 PROCEDURE — 77030039825 HC MSK NSL PAP SUPERNO2VA VYRM -B: Performed by: ANESTHESIOLOGY

## 2023-02-06 PROCEDURE — 74011250636 HC RX REV CODE- 250/636: Performed by: ANESTHESIOLOGY

## 2023-02-06 PROCEDURE — 2709999900 HC NON-CHARGEABLE SUPPLY: Performed by: ORTHOPAEDIC SURGERY

## 2023-02-06 PROCEDURE — 77030040356 HC CORD BPLR FRCP COVD -A: Performed by: ORTHOPAEDIC SURGERY

## 2023-02-06 PROCEDURE — 82962 GLUCOSE BLOOD TEST: CPT

## 2023-02-06 PROCEDURE — 76210000034 HC AMBSU PH I REC 0.5 TO 1 HR: Performed by: ORTHOPAEDIC SURGERY

## 2023-02-06 RX ORDER — LIDOCAINE HYDROCHLORIDE 10 MG/ML
0.1 INJECTION, SOLUTION EPIDURAL; INFILTRATION; INTRACAUDAL; PERINEURAL AS NEEDED
Status: DISCONTINUED | OUTPATIENT
Start: 2023-02-06 | End: 2023-02-06 | Stop reason: HOSPADM

## 2023-02-06 RX ORDER — SODIUM CHLORIDE 0.9 % (FLUSH) 0.9 %
5-40 SYRINGE (ML) INJECTION EVERY 8 HOURS
Status: DISCONTINUED | OUTPATIENT
Start: 2023-02-06 | End: 2023-02-06 | Stop reason: HOSPADM

## 2023-02-06 RX ORDER — PROPOFOL 10 MG/ML
INJECTION, EMULSION INTRAVENOUS AS NEEDED
Status: DISCONTINUED | OUTPATIENT
Start: 2023-02-06 | End: 2023-02-06 | Stop reason: HOSPADM

## 2023-02-06 RX ORDER — SODIUM CHLORIDE 0.9 % (FLUSH) 0.9 %
5-40 SYRINGE (ML) INJECTION AS NEEDED
Status: DISCONTINUED | OUTPATIENT
Start: 2023-02-06 | End: 2023-02-06 | Stop reason: HOSPADM

## 2023-02-06 RX ORDER — MIDAZOLAM HYDROCHLORIDE 1 MG/ML
INJECTION, SOLUTION INTRAMUSCULAR; INTRAVENOUS AS NEEDED
Status: DISCONTINUED | OUTPATIENT
Start: 2023-02-06 | End: 2023-02-06 | Stop reason: HOSPADM

## 2023-02-06 RX ORDER — FENTANYL CITRATE 50 UG/ML
25 INJECTION, SOLUTION INTRAMUSCULAR; INTRAVENOUS
Status: DISCONTINUED | OUTPATIENT
Start: 2023-02-06 | End: 2023-02-06 | Stop reason: HOSPADM

## 2023-02-06 RX ORDER — ONDANSETRON 2 MG/ML
INJECTION INTRAMUSCULAR; INTRAVENOUS AS NEEDED
Status: DISCONTINUED | OUTPATIENT
Start: 2023-02-06 | End: 2023-02-06 | Stop reason: HOSPADM

## 2023-02-06 RX ORDER — DIPHENHYDRAMINE HYDROCHLORIDE 50 MG/ML
12.5 INJECTION, SOLUTION INTRAMUSCULAR; INTRAVENOUS AS NEEDED
Status: DISCONTINUED | OUTPATIENT
Start: 2023-02-06 | End: 2023-02-06 | Stop reason: HOSPADM

## 2023-02-06 RX ORDER — PROPOFOL 10 MG/ML
INJECTION, EMULSION INTRAVENOUS
Status: DISCONTINUED | OUTPATIENT
Start: 2023-02-06 | End: 2023-02-06 | Stop reason: HOSPADM

## 2023-02-06 RX ORDER — SODIUM CHLORIDE, SODIUM LACTATE, POTASSIUM CHLORIDE, CALCIUM CHLORIDE 600; 310; 30; 20 MG/100ML; MG/100ML; MG/100ML; MG/100ML
25 INJECTION, SOLUTION INTRAVENOUS CONTINUOUS
Status: DISCONTINUED | OUTPATIENT
Start: 2023-02-06 | End: 2023-02-06 | Stop reason: HOSPADM

## 2023-02-06 RX ORDER — FENTANYL CITRATE 50 UG/ML
INJECTION, SOLUTION INTRAMUSCULAR; INTRAVENOUS AS NEEDED
Status: DISCONTINUED | OUTPATIENT
Start: 2023-02-06 | End: 2023-02-06 | Stop reason: HOSPADM

## 2023-02-06 RX ORDER — TRAMADOL HYDROCHLORIDE 50 MG/1
50 TABLET ORAL
Qty: 20 TABLET | Refills: 0 | Status: SHIPPED | OUTPATIENT
Start: 2023-02-06 | End: 2023-02-11

## 2023-02-06 RX ORDER — ONDANSETRON 2 MG/ML
4 INJECTION INTRAMUSCULAR; INTRAVENOUS AS NEEDED
Status: DISCONTINUED | OUTPATIENT
Start: 2023-02-06 | End: 2023-02-06 | Stop reason: HOSPADM

## 2023-02-06 RX ADMIN — FENTANYL CITRATE 25 MCG: 50 INJECTION, SOLUTION INTRAMUSCULAR; INTRAVENOUS at 10:14

## 2023-02-06 RX ADMIN — FENTANYL CITRATE 25 MCG: 50 INJECTION, SOLUTION INTRAMUSCULAR; INTRAVENOUS at 10:00

## 2023-02-06 RX ADMIN — ONDANSETRON HYDROCHLORIDE 4 MG: 2 INJECTION, SOLUTION INTRAMUSCULAR; INTRAVENOUS at 10:18

## 2023-02-06 RX ADMIN — WATER 2 G: 1 INJECTION INTRAMUSCULAR; INTRAVENOUS; SUBCUTANEOUS at 09:52

## 2023-02-06 RX ADMIN — MIDAZOLAM HYDROCHLORIDE 1 MG: 1 INJECTION, SOLUTION INTRAMUSCULAR; INTRAVENOUS at 09:52

## 2023-02-06 RX ADMIN — SODIUM CHLORIDE, POTASSIUM CHLORIDE, SODIUM LACTATE AND CALCIUM CHLORIDE 25 ML/HR: 600; 310; 30; 20 INJECTION, SOLUTION INTRAVENOUS at 09:24

## 2023-02-06 RX ADMIN — PROPOFOL 50 MCG/KG/MIN: 10 INJECTION, EMULSION INTRAVENOUS at 09:56

## 2023-02-06 RX ADMIN — FENTANYL CITRATE 25 MCG: 50 INJECTION, SOLUTION INTRAMUSCULAR; INTRAVENOUS at 10:10

## 2023-02-06 RX ADMIN — PROPOFOL 20 MG: 10 INJECTION, EMULSION INTRAVENOUS at 10:00

## 2023-02-06 RX ADMIN — FENTANYL CITRATE 25 MCG: 50 INJECTION, SOLUTION INTRAMUSCULAR; INTRAVENOUS at 10:18

## 2023-02-06 RX ADMIN — PROPOFOL 20 MG: 10 INJECTION, EMULSION INTRAVENOUS at 09:56

## 2023-02-06 RX ADMIN — PROPOFOL 20 MG: 10 INJECTION, EMULSION INTRAVENOUS at 10:02

## 2023-02-06 RX ADMIN — MIDAZOLAM HYDROCHLORIDE 1 MG: 1 INJECTION, SOLUTION INTRAMUSCULAR; INTRAVENOUS at 10:03

## 2023-02-06 RX ADMIN — PROPOFOL 20 MG: 10 INJECTION, EMULSION INTRAVENOUS at 09:58

## 2023-02-06 NOTE — ANESTHESIA POSTPROCEDURE EVALUATION
Procedure(s):  RIGHT OPEN CARPAL TUNNEL RELEASE (ANES LOCAL W/ MAC).     MAC    Anesthesia Post Evaluation      Multimodal analgesia: multimodal analgesia used between 6 hours prior to anesthesia start to PACU discharge  Patient location during evaluation: PACU  Patient participation: complete - patient participated  Level of consciousness: awake and alert  Pain score: 0  Airway patency: patent  Anesthetic complications: no  Cardiovascular status: acceptable  Respiratory status: acceptable  Hydration status: acceptable  Post anesthesia nausea and vomiting:  none  Final Post Anesthesia Temperature Assessment:  Normothermia (36.0-37.5 degrees C)      INITIAL Post-op Vital signs:   Vitals Value Taken Time   /74 02/06/23 1101   Temp 36.7 °C (98 °F) 02/06/23 1101   Pulse 51 02/06/23 1101   Resp 13 02/06/23 1101   SpO2 99 % 02/06/23 1101

## 2023-02-06 NOTE — ANESTHESIA PREPROCEDURE EVALUATION
Relevant Problems   RESPIRATORY SYSTEM   (+) Asthma   (+) SANDERS (dyspnea on exertion)      CARDIOVASCULAR   (+) ASHD (arteriosclerotic heart disease)   (+) Essential hypertension      ENDOCRINE   (+) Diabetes (HCC)   (+) Obesity   (+) Severe obesity (BMI 35.0-39. 9)       Anesthetic History   No history of anesthetic complications            Review of Systems / Medical History  Patient summary reviewed, nursing notes reviewed and pertinent labs reviewed    Pulmonary    COPD    Sleep apnea: CPAP  Shortness of breath  Asthma        Neuro/Psych   Within defined limits           Cardiovascular    Hypertension          CAD, cardiac stents (2017) and hyperlipidemia    Exercise tolerance: <4 METS  Comments: March 2021: Negative Stress test & Normal ECHO   GI/Hepatic/Renal     GERD: well controlled          Comments: Elevated LFTs  Guerrier's esophagus Endo/Other    Diabetes    Obesity and arthritis     Other Findings   Comments: Right carpal Tunnel syndrome    Actinic keratosis  Chronic Back Pain  BPH  SK (seborrheic keratosis)         Physical Exam    Airway  Mallampati: III  TM Distance: 4 - 6 cm  Neck ROM: normal range of motion   Mouth opening: Normal     Cardiovascular    Rhythm: regular          Comments: Bradycardia Dental      Comments: Several missing teeth, none loose.    Pulmonary  Breath sounds clear to auscultation               Abdominal  GI exam deferred       Other Findings            Anesthetic Plan    ASA: 3  Anesthesia type: MAC          Induction: Intravenous  Anesthetic plan and risks discussed with: Patient

## 2023-02-06 NOTE — H&P
Subjective:   Patient ID: Rafaela Garcia is a 81 y.o. male. Chief Complaint: Pain and Follow-up of the Right Wrist    Comes today for follow-up of his right carpal tunnel syndrome. The injection I performed in June of last year was very helpful. He reports symptoms recurred and worsened about a month and a half ago. Is losing strength and experiencing numbness. Can not hold things. ROS and PMHx reviewed with no changes    Objective:   Constitutional: No acute distress. Well nourished. Well developed. Eyes: Sclera are nonicteric. Respiratory: No labored breathing. See anesthesia note for full exam.  Cardiovascular: No marked edema. See anesthesia note for full exam.  Skin: No marked skin ulcers. Neurological: see below  Psychiatric: Alert and oriented x3. Musculoskeletal   Examination of the right upper extremity demonstrates there is a positive Tinel's at the wrist. Positive Durkan's at the wrist. There is variable sensation distally. Does have APB atrophy and weakness noted. Radiographs:       No imaging obtained     Assessment:     1. Carpal tunnel syndrome of right wrist     Plan:   Discussed nature of condition as well as treatment options. I do think his carpal tunnel syndrome has progressed to severe with APB weakness and atrophy. Have recommended surgical management. He does agree. Risks benefits and alternatives including the significant risk of limited or prolonged relief due to severity are discussed. Postoperative course discussed. He consents to proceed. Surgical date chosen. Orders Placed This Encounter   Surgical Posting Sheet     No follow-ups on file. Date of Surgery Update:  Rafaela Garcia was seen and examined. History and physical has been reviewed. The patient has been examined.  There have been no significant clinical changes since the completion of the originally dated History and Physical.    Signed By: Eunice Espinosa MD     February 6, 2023 9:23 AM

## 2023-02-06 NOTE — DISCHARGE INSTRUCTIONS
[facility]  Post-operative instructions  For: [facility] first postop appointment should be scheduled with Dr. Faizan Moreira for 2-3 weeks post-op. [address], [address]  [place], [facility] Randa Mcguire  Phone: [phone]  Hand Therapy Phone: [phone]  Fax: [phone]    Please follow these instructions for a safe and speedy recovery:    1. Surgical Bandage: Leave the bandage in place until 2 weeks after surgery. Please keep it clean and dry. To shower or bathe, apply a plastic bag or GLAD Press'n Seal® plastic wrap around the bandage or simply sponge bathe. After 2 weeks, you can remove the dressing and get incision wet but NO SOAKING. 2. Elevation: Hand swelling is best prevented by keeping your hand elevated above the level of your heart at all times, night and day. The opposite, dangling your hand below your waist, will cause additional pain, swelling, and later stiffness. You can elevate the hand in a sling or by propping it on a pillow at night. Occasionally, we will provide you with a custom-made foam block for elevating the arm. Ice compresses may help but do not rep[lace elevation. Frequently, extreme pain is caused by a tight bandage, which should be loosened. If pain is severe and progressive, call us at [phone] during the day (ask for immediate connection to Dr. Loni Padilla Team) or during the night (ask for the on-call physician). 3. Medication: You will be provided with an appropriate pain medication (over-the-counter or prescription). Please fill this at a pharmacy promptly so you will have it available when all local anesthetic wears off. Take this to relieve pain as directed on the bottle. Please refrain from driving, drinking alcohol, and making important medical decisions while taking the medication. Please call us if you need something stronger.  Medication changes or refills must be made before 5pm or through your pharmacy. 4. Weight bearing: Do NOT bear any weight on the operative extremity for the first 2 weeks after surgery. After 2 weeks, you have a 5 pound weight lifting restriction. I want to thank you for choosing us for your hand care needs. My staff and I are committed to providing all our customers with the highest quality hand surgery and subsequent hand therapy care as possible. We want your recovery to be comfortable. If you have clinical non-emergent questions about your surgery or other hand conditions please call [phone] and ask for my team. Your call will be returned within 24 hours. TO PREVENT AN INFECTION      [address] YOUR HANDS    To prevent infection, good handwashing is the most important thing you or your caregiver can do. Wash your hands with soap and water or use the hand  we gave you before you touch any wounds. SHOWER    Use the antibacterial soap we gave you when you take a shower. Shower with this soap until your wounds are healed. To reach all areas of your body, you may need someone to help you. Dont forget to clean your belly button with every shower. USE CLEAN SHEETS    Use freshly cleaned sheets on your bed after surgery. To keep the surgery site clean, do not allow pets to sleep with you while your wound is still healing. CONTROL YOUR BLOOD SUGAR    High blood sugars slow wound healing. If you are diabetic, control your blood sugar levels before and after your surgery. TAKE TYLENOL/ACETAMINOPHEN 1000 mg every 6 hours for first few days then can back off 500 mg every 6 hours and take ibuprofen 600 mg every 6 hours and use tramadol for breakthrough pain. TAKE NARCOTIC PAIN MEDICATIONS WITH FOOD     Narcotics tend to be constipating, we suggest taking a stool softener such as Colace or Miralax (follow package instructions).     DO NOT DRIVE WHILE TAKING NARCOTIC PAIN MEDICATIONS. DO NOT TAKE SLEEPING MEDICATIONS OR ANTIANXIETY MEDICATIONS WHILE TAKING NARCOTIC PAIN MEDICATIONS,  ESPECIALLY THE NIGHT OF ANESTHESIA! CPAP PATIENTS BE SURE TO WEAR MACHINE WHENEVER NAPPING OR SLEEPING! DISCHARGE SUMMARY from Nurse    The following personal items collected during your admission are returned to you:   Dental Appliance: Dental Appliances: None  Vision: Visual Aid: Glasses (PACU)  Hearing Aid:    Jewelry: Jewelry: None  Clothing: Clothing: With patient  Other Valuables: Other Valuables: Money (comment), With patient  Valuables sent to safe:        PATIENT INSTRUCTIONS:    After General Anesthesia or Intravenous Sedation, for 24 hours or while taking prescription Narcotics:        Someone should be with you for the next 24 hours. For your own safety, a responsible adult must drive you home. Limit your activities  Recommended activity: Rest today, up with assistance today. Do not climb stairs or shower unattended for the next 24 hours. Please start with a soft bland diet and advance as tolerated (no nausea) to regular diet. If you have a sore throat you should try the following: fluids, warm salt water gargles, or throat lozenges. If it does not improve after several days please follow up with your primary physician. Do not drive and operate hazardous machinery  Do not make important personal or business decisions  Do  not drink alcoholic beverages  If you have not urinated within 8 hours after discharge, please contact your surgeon on call.     Report the following to your surgeon:  Excessive pain, swelling, redness or odor of or around the surgical area  Temperature over 100.5  Nausea and vomiting lasting longer than 4 hours or if unable to take medications  Any signs of decreased circulation or nerve impairment to extremity: change in color, persistent  numbness, tingling, coldness or increase pain      You will receive a Post Operative Call from one of the Recovery Room Nurses on the day after your surgery to check on you. It is very important for us to know how you are recovering after your surgery. If you have an issue or need to speak with someone, please call your surgeon, do not wait for the post operative call. You may receive an e-mail or letter in the mail from [employer] regarding your experience with us in the Ambulatory Surgery Unit. Your feedback is valuable to us and we appreciate your participation in the survey. If the above instructions are not adequate or you are having problems after your surgery, call the physician at their office number. We wish you a speedy recovery ? What to do at Home:      *  Please give a list of your current medications to your Primary Care Provider. *  Please update this list whenever your medications are discontinued, doses are      changed, or new medications (including over-the-counter products) are added. *  Please carry medication information at all times in case of emergency situations. If you have not received your influenza and/or pneumococcal vaccine, please follow up with your primary care physician. The discharge information has been reviewed with the patient and caregiver. The patient and caregiver verbalized understanding.

## 2023-02-06 NOTE — PERIOP NOTES
[address]  1950  [id number]    Situation:  Verbal report given from: JON Shepard CRNA and [place]  Procedure: Procedure(s):  RIGHT OPEN CARPAL TUNNEL RELEASE (ANES LOCAL W/ MAC)    Background:    Preoperative diagnosis: CARPAL TUNNEL SYNDROME    Postoperative diagnosis: CARPAL TUNNEL SYNDROME    :  Dr. Jamie Keating    Assistant(s): Circ-1: Sally Doherty RN  Scrub Tech-1: Pam Gray    Specimens: * No specimens in log *    Assessment:  Intra-procedure medications         Anesthesia gave intra-procedure sedation and medications, see anesthesia flow sheet     Intravenous fluids: LR@ KVO     Vital signs stable       Recommendation:    Permission to share finding with  : sister Carlos Anderson

## 2023-02-06 NOTE — OP NOTES
PATIENT NAME:  Adrian Coelho    SURGEON:  Dayan Cramer MD    DATE OF SURGERY:  2/6/2023    LOCATION: [facility]    PREOPERATIVE DIAGNOSIS:   Right carpal tunnel syndrome    POSTOPERATIVE DIAGNOSIS:  Same    PROCEDURE:  Right Open carpal tunnel decompression             ANESTHESIA:  Local (1% lidocaine with 0.25% bupivicaine in a 1:1 mixture) with sedation     BLOOD LOSS:  Minimal    TOURNIQUET TIME:  10 min    Assistant: Brendon Desai PA-C     OPERATIVE INDICATIONS: The patient is a 81 y.o. old male who has developed progressive right carpal tunnel syndrome, unresponsive to all conservative treatment. Symptoms have failed to respond consistently to conservative treatment such that patient has elected to undergo carpal tunnel decompression. He understands the alternatives to surgery, the nature of this elective procedure, the usual recovery, possible variations in healing, and the potential for shortcomings and complications (including but not exclusive to bleeding, infection, scar tenderness,  weakness, residual numbness, or thenar muscle weakness). DESCRIPTION  OF PROCEDURE: Patient identified correctly in the pre-operative holding area and correct extremity marked. Was then taken stable to the operating room and placed supine with the operative extremity on a hand table. After sedation was administered by the anesthesia team, the right hand and forearm were prepped and draped in a sterile field. A timeout was taken and the operative site was confirmed. Local anesthesia was instilled into the wound. The extremity was then elevated and exsanguinated and a forearm tourniquet was inflated to 200 mm of mercury. An incision was made in the proximal palm in line with the radial side of the ring finger from the distal wrist crease to Kaplans line. Dissection was carried through the subcutaneous tissue and the transverse carpal ligament was visualized.   This was released under direct visualization first distally followed by proximally and carried up past the wrist wrist crease. A complete decompression was confirmed by direct visualization of the decompressed median nerve as well as palpation. No other synovial pathology was noted. The tourniquet was then released. The nerve was noted to become hyperemic. Copious irrigation was performed. Hemostasis was obtained with bipolar cautery and the wound was closed with 3-0 nylon sutures. A sterile dressing was then applied leaving the fingers free for range of motion. The patient tolerated the procedure well and was discharged to the recovery area uneventfully. All instrument needle and lap counts were correct at the end of the case.

## 2023-02-06 NOTE — PERIOP NOTES
Permission received to review discharge instructions and discuss private health information with Jay Alvarez, .

## 2023-02-27 RX ORDER — LEVOCETIRIZINE DIHYDROCHLORIDE 5 MG/1
TABLET, FILM COATED ORAL
Qty: 90 TABLET | Refills: 3 | Status: SHIPPED | OUTPATIENT
Start: 2023-02-27

## 2023-04-04 ENCOUNTER — OFFICE VISIT (OUTPATIENT)
Dept: INTERNAL MEDICINE CLINIC | Age: 73
End: 2023-04-04
Payer: MEDICARE

## 2023-04-04 LAB — ANA SER QL: NEGATIVE

## 2023-04-04 PROCEDURE — 2022F DILAT RTA XM EVC RTNOPTHY: CPT | Performed by: INTERNAL MEDICINE

## 2023-04-04 PROCEDURE — 3078F DIAST BP <80 MM HG: CPT | Performed by: INTERNAL MEDICINE

## 2023-04-04 PROCEDURE — 3046F HEMOGLOBIN A1C LEVEL >9.0%: CPT | Performed by: INTERNAL MEDICINE

## 2023-04-04 PROCEDURE — 1101F PT FALLS ASSESS-DOCD LE1/YR: CPT | Performed by: INTERNAL MEDICINE

## 2023-04-04 PROCEDURE — G8417 CALC BMI ABV UP PARAM F/U: HCPCS | Performed by: INTERNAL MEDICINE

## 2023-04-04 PROCEDURE — G8510 SCR DEP NEG, NO PLAN REQD: HCPCS | Performed by: INTERNAL MEDICINE

## 2023-04-04 PROCEDURE — 99214 OFFICE O/P EST MOD 30 MIN: CPT | Performed by: INTERNAL MEDICINE

## 2023-04-04 PROCEDURE — 3074F SYST BP LT 130 MM HG: CPT | Performed by: INTERNAL MEDICINE

## 2023-04-04 PROCEDURE — G8427 DOCREV CUR MEDS BY ELIG CLIN: HCPCS | Performed by: INTERNAL MEDICINE

## 2023-04-04 PROCEDURE — G8536 NO DOC ELDER MAL SCRN: HCPCS | Performed by: INTERNAL MEDICINE

## 2023-04-04 PROCEDURE — G0439 PPPS, SUBSEQ VISIT: HCPCS | Performed by: INTERNAL MEDICINE

## 2023-04-04 PROCEDURE — 1123F ACP DISCUSS/DSCN MKR DOCD: CPT | Performed by: INTERNAL MEDICINE

## 2023-04-04 PROCEDURE — 3017F COLORECTAL CA SCREEN DOC REV: CPT | Performed by: INTERNAL MEDICINE

## 2023-04-04 NOTE — PROGRESS NOTES
Chief Complaint   Patient presents with    Follow Up Chronic Condition     6 mo fu    Annual Wellness Visit       Depression Risk Factor Screening:     3 most recent PHQ Screens 4/4/2023   Little interest or pleasure in doing things Not at all   Feeling down, depressed, irritable, or hopeless Not at all   Total Score PHQ 2 0       Functional Ability and Level of Safety:     Activities of Daily Living  ADL Assessment 4/4/2023   Feeding yourself No Help Needed   Getting from bed to chair No Help Needed   Getting dressed No Help Needed   Bathing or showering No Help Needed   Walk across the room (includes cane/walker) No Help Needed   Using the telphone No Help Needed   Taking your medications No Help Needed   Preparing meals No Help Needed   Managing money (expenses/bills) No Help Needed   Moderately strenuous housework (laundry) No Help Needed   Shopping for personal items (toiletries/medicines) No Help Needed   Shopping for groceries No Help Needed   Driving No Help Needed   Climbing a flight of stairs No Help Needed   Getting to places beyond walking distances No Help Needed       Fall Risk  Fall Risk Assessment, last 12 mths 4/4/2023   Able to walk? Yes   Fall in past 12 months? 0   Do you feel unsteady? 0   Are you worried about falling 0       Abuse Screen  Abuse Screening Questionnaire 4/4/2023   Do you ever feel afraid of your partner? N   Are you in a relationship with someone who physically or mentally threatens you? N   Is it safe for you to go home?  Y         Patient Care Team   Patient Care Team:  Minor Jules MD as PCP - General (Internal Medicine Physician)  Minor Jules MD as PCP - [facility] Empaneled Provider  Joyce Camacho MD (Urology)  Farhat Cota MD (Cardiovascular Disease Physician)  Gavino Christine MD (Endocrinology Physician)

## 2023-04-04 NOTE — PATIENT INSTRUCTIONS
Medicare Wellness Visit, Male    The best way to live healthy is to have a lifestyle where you eat a well-balanced diet, exercise regularly, limit alcohol use, and quit all forms of tobacco/nicotine, if applicable. Regular preventive services are another way to keep healthy. Preventive services (vaccines, screening tests, monitoring & exams) can help personalize your care plan, which helps you manage your own care. Screening tests can find health problems at the earliest stages, when they are easiest to treat. [facility] follows the current, evidence-based guidelines published by the [facility] ([facility]) when recommending preventive services for our patients. Because we follow these guidelines, sometimes recommendations change over time as research supports it. (For example, a prostate screening blood test is no longer routinely recommended for men with no symptoms). Of course, you and your doctor may decide to screen more often for some diseases, based on your risk and co-morbidities (chronic disease you are already diagnosed with). Preventive services for you include:  - Medicare offers their members a free annual wellness visit, which is time for you and your primary care provider to discuss and plan for your preventive service needs.  Take advantage of this benefit every year!    -Over the age of 72 should receive the recommended pneumonia vaccines.   -All adults should have a flu vaccine yearly.  -All adults should receive a tetanus vaccine every 10 years.  -Over the age of 48 should receive the shingles vaccines.    -All adults should be screened once for Hepatitis C.  -All adults age 38-68 who are overweight should have a diabetes screening test once every three years.   -Other screening tests & preventive services for persons with diabetes include: an eye exam to screen for diabetic retinopathy, a kidney function test, a foot exam, and stricter control over your cholesterol.   -Cardiovascular screening for adults with routine risk involves an electrocardiogram (ECG) at intervals determined by the provider.     -Colorectal cancer screening should be done for adults age 43-69 with no increased risk factors for colorectal cancer. There are a number of acceptable methods of screening for this type of cancer. Each test has its own benefits and drawbacks. Discuss with your provider what is most appropriate for you during your annual wellness visit. The different tests include: colonoscopy (considered the best screening method), a fecal occult blood test, a fecal DNA test, and sigmoidoscopy.    -Lung cancer screening is recommended annually with a low dose CT scan for adults between age 54 and 68, who have smoked at least 30 pack years (equivalent of 1 pack per day for 30 days), and who is a current smoker or quit less than 15 years ago. -An Abdominal Aortic Aneurysm (AAA) Screening is recommended for men age 73-68 who has ever smoked in their lifetime.      Here is a list of your current Health Maintenance items (your personalized list of preventive services) with a due date:  Health Maintenance Due   Topic Date Due    Diabetic Foot Care  Never done    Eye Exam  Never done    DTaP/Tdap/Td  (1 - Tdap) Never done    Shingles Vaccine (1 of 2) Never done    Pneumococcal Vaccine (2 - PPSV23 if available, else PCV20) 09/12/2020    COVID-19 Vaccine (3 - Booster for Moderna series) 06/14/2021    Kidney Testing  09/10/2022    URINE CHECK FOR KIDNEY PROBLEMS  03/14/2023    Depresssion Screening  03/14/2023

## 2023-04-04 NOTE — PROGRESS NOTES
This is the Subsequent Medicare Annual Wellness Exam, performed 12 months or more after the Initial AWV or the last Subsequent AWV    I have reviewed the patient's medical history in detail and updated the computerized patient record. Assessment/Plan   Education and counseling provided:  Are appropriate based on today's review and evaluation    1. Medicare annual wellness visit, subsequent  2. Essential hypertension  3. Type 2 diabetes mellitus without complication, without long-term current use of insulin ([facility] [address]  4. ASHD (arteriosclerotic heart disease)  5. Guerrier's esophagus without dysplasia  6. Hyperplasia of prostate  7. Seasonal allergic rhinitis due to pollen  8. Mild intermittent asthma without complication  9. Dyslipidemia  10. Special screening for malignant neoplasm of prostate  -     PSA SCREENING (SCREENING); Future  11. Arthritis  -     MENG, DIRECT, W/REFLEX; Future  -     SED RATE (ESR); Future  -     RHEUMATOID FACTOR, QT; Future  12. Advanced directives, counseling/discussion  13. Screening for ischemic heart disease  14. Special screening for malignant neoplasm of prostate  Comments:  Discussed the potential benefits and harms of the prostate-specific antigen (PSA) serum level test as a screening tool for early prostate cancer detection. Orders:  -     PSA SCREENING (SCREENING); Future  15. Screening for depression  -     [facility]  16.  Body mass index 33.0-33.9, adult       Depression Risk Factor Screening     3 most recent PHQ Screens 4/4/2023   Little interest or pleasure in doing things Not at all   Feeling down, depressed, irritable, or hopeless Not at all   Total Score PHQ 2 0       Alcohol & Drug Abuse Risk Screen    Do you average more than 1 drink per night or more than 7 drinks a week: No    In the past three months have you have had more than 4 drinks containing alcohol on one occasion: No          Functional Ability and Level of Safety    Hearing: Hearing is good.      Activities of Daily Living: The home contains: no safety equipment. Patient does total self care      Ambulation: with no difficulty     Fall Risk:  Fall Risk Assessment, last 12 mths 4/4/2023   Able to walk? Yes   Fall in past 12 months? 0   Do you feel unsteady? 0   Are you worried about falling 0      Abuse Screen:  Patient is not abused       Cognitive Screening    Has your family/caregiver stated any concerns about your memory: no     Cognitive Screening: Normal - Verbal Fluency Test    Health Maintenance Due     Health Maintenance Due   Topic Date Due    Foot Exam Q1  Never done    Eye Exam Retinal or Dilated  Never done    DTaP/Tdap/Td series (1 - Tdap) Never done    Shingles Vaccine (1 of 2) Never done    Pneumococcal 65+ years (2 - PPSV23 if available, else PCV20) 09/12/2020    COVID-19 Vaccine (3 - Booster for Moderna series) 06/14/2021    GFR test (Diabetes, CKD 3-4, OR last GFR 15-59)  09/10/2022    Diabetic Alb to Cr ratio (uACR) test  03/14/2023    Depression Screen  03/14/2023       Patient Care Team   Patient Care Team:  Agueda Rivas MD as PCP - General (Internal Medicine Physician)  Agueda Rivas MD as PCP - [address] [place] [address] Provider  Ludy Baez MD (Urology)  Alessandro Mcclelland MD (Cardiovascular Disease Physician)  Ly Miner MD (Endocrinology Physician)    History     Patient Active Problem List   Diagnosis Code    Actinic keratosis L57.0    Annual physical exam Z00.00    ASHD (arteriosclerotic heart disease) I25.10    Chest pain R07.9    Chronic back pain M54.9, G89.29    Cough R05.9    Diabetes mellitus screening Z13.1    Diaphoresis R61    Dyslipidemia E78.5    Edema R60.9    Elevated LFTs R79.89    Fatigue R53.83    Hyperplasia of prostate N40.0    Obesity E66.9    Other long term (current) drug therapy Z79.899    Sinusitis J32.9    SK (seborrheic keratosis) L82.1    Severe obesity (BMI 35.0-39. 9) E66.01    Essential hypertension I10    SANDERS (dyspnea on exertion) R06.09    Acute bronchitis J20.9    Status post total knee replacement Z96.659    Generalized abdominal pain R10.84    Allergic conjunctivitis of both eyes H10.13    Seasonal allergic rhinitis due to pollen J30.1    Guerrier's esophagus K22.70    Asthma J45.909    Diabetes ([facility] [address] E11.9     Past Medical History:   Diagnosis Date    Actinic keratosis 09/15/2017    Annual physical exam 09/15/2017    Arthritis     ASHD (arteriosclerotic heart disease) 09/15/2017    Asthma     Guerrier's esophagus     CAD (coronary artery disease)     Chest pain 09/15/2017    Chronic back pain 09/15/2017    Chronic obstructive pulmonary disease ([facility] [address]     Chronic pain     Cough 09/15/2017    Diabetes ([facility] [address]     hypoglycemia hx    Diabetes mellitus screening 09/15/2017    Diaphoresis 09/15/2017    SANDERS (dyspnea on exertion)     Dyslipidemia 09/15/2017    Edema 09/15/2017    Elevated LFTs 09/15/2017    Essential hypertension 06/07/2019    Fatigue 09/15/2017    GERD (gastroesophageal reflux disease)     Guerrier's esophagus    Hypercholesterolemia     Hyperplasia of prostate 09/15/2017    Impaired glucose tolerance 01/17/2020    Obesity 09/15/2017    Other long term (current) drug therapy 09/15/2017    Seasonal allergic rhinitis due to pollen 03/18/2021    Sinusitis 09/15/2017    SK (seborrheic keratosis) 09/15/2017    Sleep apnea     CPAP compliant per pt, 01/27/2023      Past Surgical History:   Procedure Laterality Date    COLONOSCOPY N/A 11/27/2019    FLEXIBLE SIGMOIDOSCOPY performed by Kirk Farris MD at [facility] AMBULATORY OR    COLONOSCOPY N/A 01/15/2020    COLONOSCOPY performed by Kirk Farris MD at [facility] ENDOSCOPY    HX APPENDECTOMY      HX CATARACT REMOVAL Bilateral 11/2020    HX COLONOSCOPY      HX ENDOSCOPY      HX HEART CATHETERIZATION  ~2008    stent x 1, Hiro    HX HEENT Left 2021    yag laser capsulotomy eye    HX KNEE ARTHROSCOPY Right 06/2019    HX KNEE REPLACEMENT Left     HX KNEE REPLACEMENT Right     partial with Dr. Ai Espinoza Left 2021    UPPER GI ENDOSCOPY,BIOPSY  10/14/2021          Current Outpatient Medications   Medication Sig Dispense Refill    levocetirizine (XYZAL) 5 mg tablet TAKE ONE TABLET BY MOUTH DAILY 90 Tablet 3    budesonide-formoteroL (SYMBICORT) 160-4.5 mcg/actuation HFAA Take 2 Puffs by inhalation two (2) times a day. zinc 50 mg tab tablet Take 50 mg by mouth daily. nabumetone (RELAFEN) 500 mg tablet TAKE ONE TABLET BY MOUTH TWICE A  Tablet 2    dextromethorphan-guaiFENesin (Mucinex DM) 60-1,200 mg Tb12 Take 1 Tablet by mouth two (2) times a day. 20 Tablet 0    albuterol (Ventolin HFA) 90 mcg/actuation inhaler Take 2 Puffs by inhalation every four (4) hours as needed for Wheezing. 18 g 0    cholecalciferol (VITAMIN D3) (2,000 UNITS /50 MCG) cap capsule Take 2,000 Units by mouth three (3) times daily. cyanocobalamin 1,000 mcg tablet Take 1,000 mcg by mouth daily. Farxiga 10 mg tab tablet TAKE 1 TABLET BY MOUTH EVERY DAY      furosemide (LASIX) 40 mg tablet Take 20 mg by mouth daily. metFORMIN ER (GLUCOPHAGE XR) 500 mg tablet TAKE 1 TABLET BY MOUTH TWICE A DAY      montelukast (SINGULAIR) 10 mg tablet Take 10 mg by mouth daily. azelastine (ASTELIN) 137 mcg (0.1 %) nasal spray 2 Sprays by Both Nostrils route two (2) times a day. aspirin delayed-release 81 mg tablet Take 81 mg by mouth daily. COQ10, UBIQUINOL, PO Take 100 mg by mouth daily. Cholestyramine-Aspartame 4 gram powder Take 4 g by mouth every morning. dilTIAZem (TIAZAC) 360 mg SR capsule Take 360 mg by mouth every morning. 2    omeprazole (PRILOSEC) 40 mg capsule Take 40 mg by mouth daily. Indications: gastroesophageal reflux disease  1    ZETIA 10 mg tablet Take 1 Tab by mouth daily. 90 Tab 3    finasteride (PROSCAR) 5 mg tablet Take 5 mg by mouth every Monday, Wednesday, Friday. 0    multivitamin (ONE A DAY) tablet Take 1 Tab by mouth daily. Allergies   Allergen Reactions    Claritin-D 12 Hour [Loratadine-Pseudoephedrine] Shortness of Breath    Iodine Shortness of Breath    Levaquin [Levofloxacin] Hives    Seafood [Shellfish Containing Products] Shortness of Breath    Statins-Hmg-Coa Reductase Inhibitors Other (comments)     \"liver problems\"    Sulfa (Sulfonamide Antibiotics) Hives       Family History   Problem Relation Age of Onset    Lung Disease Mother         COPD    Heart Disease Mother         pacemaker    Cancer Father         prostate/liver     Social History     Tobacco Use    Smoking status: Never     Passive exposure: Never    Smokeless tobacco: Former     Quit date: 2013   Substance Use Topics    Alcohol use: No     Alcohol/week: 0.0 standard drinks         C Ashwini Lopez MD           Sunny Romo is a 81 y.o. male and presents with Follow Up Chronic Condition (6 mo fu) and Annual Wellness Visit  . Subjective:  Mr. Barahona January presents today for Medicare annual notes review as well as follow-up of several chronic medical conditions including diabetes, coronary disease, asthma, seasonal allergic rhinitis, dyslipidemia, and sleep apnea. He continues to follow-up with cardiology and endocrinology. He has no shortness of breath, chest pain, palpitations, PND, orthopnea, or pedal edema. He does note increasing pain in his joints especially his MCP and PIP joints of both hands. He describes pain in his feet as well. This seems to be worse with activity. He has a daughter with a history of rheumatoid arthritis and lupus. His most recent A1c was down to 5.9%.     Past Medical History:   Diagnosis Date    Actinic keratosis 09/15/2017    Annual physical exam 09/15/2017    Arthritis     ASHD (arteriosclerotic heart disease) 09/15/2017    Asthma     Guerrier's esophagus     CAD (coronary artery disease)     Chest pain 09/15/2017    Chronic back pain 09/15/2017    Chronic obstructive pulmonary disease (HCC)     Chronic pain     Cough 09/15/2017    Diabetes ([facility] [address]     hypoglycemia hx    Diabetes mellitus screening 09/15/2017    Diaphoresis 09/15/2017    SANDERS (dyspnea on exertion)     Dyslipidemia 09/15/2017    Edema 09/15/2017    Elevated LFTs 09/15/2017    Essential hypertension 06/07/2019    Fatigue 09/15/2017    GERD (gastroesophageal reflux disease)     Guerrier's esophagus    Hypercholesterolemia     Hyperplasia of prostate 09/15/2017    Impaired glucose tolerance 01/17/2020    Obesity 09/15/2017    Other long term (current) drug therapy 09/15/2017    Seasonal allergic rhinitis due to pollen 03/18/2021    Sinusitis 09/15/2017    SK (seborrheic keratosis) 09/15/2017    Sleep apnea     CPAP compliant per pt, 01/27/2023     Past Surgical History:   Procedure Laterality Date    COLONOSCOPY N/A 11/27/2019    FLEXIBLE SIGMOIDOSCOPY performed by Jason Bocanegra MD at [facility] AMBULATORY OR    COLONOSCOPY N/A 01/15/2020    COLONOSCOPY performed by Jason Bocanegra MD at [facility] ENDOSCOPY    HX APPENDECTOMY      HX CATARACT REMOVAL Bilateral 11/2020    HX COLONOSCOPY      HX ENDOSCOPY      HX HEART CATHETERIZATION  ~2008    stent x 1, Hiro    HX HEENT Left 2021    yag laser capsulotomy eye    HX KNEE ARTHROSCOPY Right 06/2019    HX KNEE REPLACEMENT Left     HX KNEE REPLACEMENT Right     partial with Dr. Thu Horton Left 2021    UPPER GI ENDOSCOPY,BIOPSY  10/14/2021          Allergies   Allergen Reactions    Claritin-D 12 Hour [Loratadine-Pseudoephedrine] Shortness of Breath    Iodine Shortness of Breath    Levaquin [Levofloxacin] Hives    Seafood [Shellfish Containing Products] Shortness of Breath    Statins-Hmg-Coa Reductase Inhibitors Other (comments)     \"liver problems\"    Sulfa (Sulfonamide Antibiotics) Hives     Current Outpatient Medications   Medication Sig Dispense Refill    levocetirizine (XYZAL) 5 mg tablet TAKE ONE TABLET BY MOUTH DAILY 90 Tablet 3    budesonide-formoteroL (SYMBICORT) 160-4.5 mcg/actuation HFAA Take 2 Puffs by inhalation two (2) times a day. zinc 50 mg tab tablet Take 50 mg by mouth daily. nabumetone (RELAFEN) 500 mg tablet TAKE ONE TABLET BY MOUTH TWICE A  Tablet 2    dextromethorphan-guaiFENesin (Mucinex DM) 60-1,200 mg Tb12 Take 1 Tablet by mouth two (2) times a day. 20 Tablet 0    albuterol (Ventolin HFA) 90 mcg/actuation inhaler Take 2 Puffs by inhalation every four (4) hours as needed for Wheezing. 18 g 0    cholecalciferol (VITAMIN D3) (2,000 UNITS /50 MCG) cap capsule Take 2,000 Units by mouth three (3) times daily. cyanocobalamin 1,000 mcg tablet Take 1,000 mcg by mouth daily. Farxiga 10 mg tab tablet TAKE 1 TABLET BY MOUTH EVERY DAY      furosemide (LASIX) 40 mg tablet Take 20 mg by mouth daily. metFORMIN ER (GLUCOPHAGE XR) 500 mg tablet TAKE 1 TABLET BY MOUTH TWICE A DAY      montelukast (SINGULAIR) 10 mg tablet Take 10 mg by mouth daily. azelastine (ASTELIN) 137 mcg (0.1 %) nasal spray 2 Sprays by Both Nostrils route two (2) times a day. aspirin delayed-release 81 mg tablet Take 81 mg by mouth daily. COQ10, UBIQUINOL, PO Take 100 mg by mouth daily. Cholestyramine-Aspartame 4 gram powder Take 4 g by mouth every morning. dilTIAZem (TIAZAC) 360 mg SR capsule Take 360 mg by mouth every morning. 2    omeprazole (PRILOSEC) 40 mg capsule Take 40 mg by mouth daily. Indications: gastroesophageal reflux disease  1    ZETIA 10 mg tablet Take 1 Tab by mouth daily. 90 Tab 3    finasteride (PROSCAR) 5 mg tablet Take 5 mg by mouth every Monday, Wednesday, Friday. 0    multivitamin (ONE A DAY) tablet Take 1 Tab by mouth daily. Social History     Socioeconomic History    Marital status:    Tobacco Use    Smoking status: Never     Passive exposure: Never    Smokeless tobacco: Former     Quit date: 2013   Vaping Use    Vaping Use: Never used   Substance and Sexual Activity    Alcohol use: No     Alcohol/week: 0.0 standard drinks    Drug use:  No Family History   Problem Relation Age of Onset    Lung Disease Mother         COPD    Heart Disease Mother         pacemaker    Cancer Father         prostate/liver       Review of Systems  Constitutional:  negative for fevers, chills, anorexia and weight loss  Eyes:    negative for visual disturbance and irritation  ENT:    negative for tinnitus,sore throat,nasal congestion,ear pains. hoarseness  Respiratory:     negative for cough, hemoptysis, dyspnea,wheezing  CV:    negative for chest pain, palpitations, lower extremity edema  GI:    negative for nausea, vomiting, diarrhea, abdominal pain,melena  Endo:               negative for polyuria,polydipsia,polyphagia,heat intolerance  Genitourinary : negative for frequency, dysuria and hematuria  Integumentary: negative for rash and pruritus  Hematologic:   negative for easy bruising and gum/nose bleeding  Musculoskel:  negative for myalgias, arthralgias, back pain, muscle weakness, joint pain  Neurological:   negative for headaches, dizziness, vertigo, memory problems and gait   Behavl/Psych:  negative for feelings of anxiety, depression, mood changes  ROS otherwise negative      Objective:  Visit Vitals  /76 (BP 1 Location: Left upper arm, BP Patient Position: Sitting, BP Cuff Size: Adult)   Pulse (!) 55   Temp 98.4 °F (36.9 °C) (Oral)   Resp 18   Ht 5' 10\" (1.778 m)   Wt 231 lb 6.4 oz (105 kg)   SpO2 94%   BMI 33.20 kg/m²     Physical Exam:   General appearance - alert, well appearing, and in no distress  Mental status - alert, oriented to person, place, and time  EYE-KAI, EOMI, fundi normal, corneas normal, no foreign bodies  ENT-ENT exam normal, no neck nodes or sinus tenderness  Nose - normal and patent, no erythema, discharge or polyps  Mouth - mucous membranes moist, pharynx normal without lesions  Neck - supple, no significant adenopathy   Chest - clear to auscultation, no wheezes, rales or rhonchi, symmetric air entry   Heart - normal rate, regular rhythm, normal S1, S2, no murmurs, rubs, clicks or gallops   Abdomen - soft, nontender, nondistended, no masses or organomegaly  Lymph- no adenopathy palpable  Ext-peripheral pulses normal, no pedal edema, no clubbing or cyanosis  Skin-Warm and dry. no hyperpigmentation, vitiligo, or suspicious lesions  Neuro -alert, oriented, normal speech, no focal findings or movement disorder noted      Assessment/Plan:  Diagnoses and all orders for this visit:    1. Medicare annual wellness visit, subsequent    2. Essential hypertension    3. Type 2 diabetes mellitus without complication, without long-term current use of insulin ([facility] [address]    4. ASHD (arteriosclerotic heart disease)    5. Guerrier's esophagus without dysplasia    6. Hyperplasia of prostate    7. Seasonal allergic rhinitis due to pollen    8. Mild intermittent asthma without complication    9. Dyslipidemia    10. Special screening for malignant neoplasm of prostate  -     PSA SCREENING (SCREENING); Future    11. Arthritis  -     MENG, DIRECT, W/REFLEX; Future  -     SED RATE (ESR); Future  -     RHEUMATOID FACTOR, QT; Future    12. Advanced directives, counseling/discussion    13. Screening for ischemic heart disease    14. Special screening for malignant neoplasm of prostate  Comments:  Discussed the potential benefits and harms of the prostate-specific antigen (PSA) serum level test as a screening tool for early prostate cancer detection. Orders:  -     PSA SCREENING (SCREENING); Future    15. Screening for depression  -     Emerson Long    16. Body mass index 33.0-33.9, adult          ICD-10-CM ICD-9-CM    1. Medicare annual wellness visit, subsequent  Z00.00 V70.0       2. Essential hypertension  I10 401.9       3. Type 2 diabetes mellitus without complication, without long-term current use of insulin (HCC)  E11.9 250.00       4. ASHD (arteriosclerotic heart disease)  I25.10 414.00       5. Guerrier's esophagus without dysplasia  K22.70 530.85       6. Hyperplasia of prostate  N40.0 600.90       7. Seasonal allergic rhinitis due to pollen  J30.1 477.0       8. Mild intermittent asthma without complication  B67.20 333.60       9. Dyslipidemia  E78.5 272.4       10. Special screening for malignant neoplasm of prostate  Z12.5 V76.44 PSA SCREENING (SCREENING)      PSA SCREENING (SCREENING)      11. Arthritis  M19.90 716.90 MENG, DIRECT, W/REFLEX      SED RATE (ESR)      RHEUMATOID FACTOR, QT      RHEUMATOID FACTOR, QT      SED RATE (ESR)      MENG, DIRECT, W/REFLEX      12. Advanced directives, counseling/discussion  Z71.89 V65.49       13. Screening for ischemic heart disease  Z13.6 V81.0       14. Special screening for malignant neoplasm of prostate  Z12.5 V76.44 PSA SCREENING (SCREENING)      PSA SCREENING (SCREENING)    Discussed the potential benefits and harms of the prostate-specific antigen (PSA) serum level test as a screening tool for early prostate cancer detection. 15. Screening for depression  Z13.31 V79.0 [facility]      16. Body mass index 33.0-33.9, adult  Z68.33 V85.33         Plan:    Continue current medical regimen as outlined above. We have reviewed labs from his endocrinologist which are stable. He will have additional labs today including PSA test, rheumatoid factor, MENG, and sed rate. I suspect his arthritis is mostly degenerative arthritis in nature but if his lab test are positive would consider referral to rheumatology for further evaluation. I have reviewed with the patient details of the assessment and plan and all questions were answered. Relevent patient education was performed. Verbal and/or written instructions (see AVS) provided. The most recent lab findings were reviewed with the patient. Plan was discussed with patient who verbally expressed understanding. An After Visit Summary was printed and given to the patient.     Salena Harper MD

## 2023-04-05 LAB
ERYTHROCYTE [SEDIMENTATION RATE] IN BLOOD: 4 MM/HR (ref 0–20)
PSA SERPL-MCNC: 0.3 NG/ML (ref 0.01–4)
RHEUMATOID FACT SERPL-ACNC: <10 IU/ML

## 2023-05-02 ENCOUNTER — TELEPHONE (OUTPATIENT)
Dept: INTERNAL MEDICINE CLINIC | Age: 73
End: 2023-05-02

## 2023-05-07 ENCOUNTER — HOSPITAL ENCOUNTER (INPATIENT)
Facility: HOSPITAL | Age: 73
LOS: 2 days | Discharge: HOME | DRG: 247 | End: 2023-05-09
Attending: STUDENT IN AN ORGANIZED HEALTH CARE EDUCATION/TRAINING PROGRAM | Admitting: GENERAL ACUTE CARE HOSPITAL
Payer: MEDICARE

## 2023-05-07 DIAGNOSIS — I21.21 ST ELEVATION MYOCARDIAL INFARCTION INVOLVING LEFT CIRCUMFLEX CORONARY ARTERY (HCC): ICD-10-CM

## 2023-05-07 DIAGNOSIS — I21.3 ST ELEVATION MYOCARDIAL INFARCTION (STEMI), UNSPECIFIED ARTERY (HCC): Primary | ICD-10-CM

## 2023-05-07 DIAGNOSIS — I21.3 STEMI (ST ELEVATION MYOCARDIAL INFARCTION) (HCC): ICD-10-CM

## 2023-05-07 LAB
ABO + RH BLD: NORMAL
ACT BLD: 209 SECS (ref 79–138)
ACT BLD: 317 SECS (ref 79–138)
ALBUMIN SERPL-MCNC: 4.1 G/DL (ref 3.5–5)
ALBUMIN/GLOB SERPL: 1.1 (ref 1.1–2.2)
ALP SERPL-CCNC: 60 U/L (ref 45–117)
ALT SERPL-CCNC: 29 U/L (ref 12–78)
ANION GAP SERPL CALC-SCNC: 8 MMOL/L (ref 5–15)
AST SERPL-CCNC: 20 U/L (ref 15–37)
BASOPHILS # BLD: 0.1 K/UL (ref 0–0.1)
BASOPHILS NFR BLD: 0 % (ref 0–1)
BILIRUB SERPL-MCNC: 0.6 MG/DL (ref 0.2–1)
BLOOD GROUP ANTIBODIES SERPL: NORMAL
BUN SERPL-MCNC: 22 MG/DL (ref 6–20)
BUN/CREAT SERPL: 27 (ref 12–20)
CALCIUM SERPL-MCNC: 9.8 MG/DL (ref 8.5–10.1)
CHLORIDE SERPL-SCNC: 102 MMOL/L (ref 97–108)
CO2 SERPL-SCNC: 26 MMOL/L (ref 21–32)
CREAT SERPL-MCNC: 0.82 MG/DL (ref 0.7–1.3)
DIFFERENTIAL METHOD BLD: ABNORMAL
ECHO BSA: 2.27 M2
EKG ATRIAL RATE: 75 BPM
EKG DIAGNOSIS: NORMAL
EKG P AXIS: 42 DEGREES
EKG P-R INTERVAL: 178 MS
EKG Q-T INTERVAL: 380 MS
EKG QRS DURATION: 82 MS
EKG QTC CALCULATION (BAZETT): 424 MS
EKG R AXIS: -13 DEGREES
EKG T AXIS: 67 DEGREES
EKG VENTRICULAR RATE: 75 BPM
EOSINOPHIL # BLD: 0 K/UL (ref 0–0.4)
EOSINOPHIL NFR BLD: 0 % (ref 0–7)
ERYTHROCYTE [DISTWIDTH] IN BLOOD BY AUTOMATED COUNT: 13.1 % (ref 11.5–14.5)
GLOBULIN SER CALC-MCNC: 3.8 G/DL (ref 2–4)
GLUCOSE BLD STRIP.AUTO-MCNC: 122 MG/DL (ref 65–117)
GLUCOSE SERPL-MCNC: 132 MG/DL (ref 65–100)
HCT VFR BLD AUTO: 47 % (ref 36.6–50.3)
HGB BLD-MCNC: 16 G/DL (ref 12.1–17)
IMM GRANULOCYTES # BLD AUTO: 0.1 K/UL (ref 0–0.04)
IMM GRANULOCYTES NFR BLD AUTO: 1 % (ref 0–0.5)
INR PPP: 1 (ref 0.9–1.1)
LYMPHOCYTES # BLD: 2.1 K/UL (ref 0.8–3.5)
LYMPHOCYTES NFR BLD: 19 % (ref 12–49)
MCH RBC QN AUTO: 30.7 PG (ref 26–34)
MCHC RBC AUTO-ENTMCNC: 34 G/DL (ref 30–36.5)
MCV RBC AUTO: 90.2 FL (ref 80–99)
MONOCYTES # BLD: 0.9 K/UL (ref 0–1)
MONOCYTES NFR BLD: 8 % (ref 5–13)
NEUTS SEG # BLD: 8.1 K/UL (ref 1.8–8)
NEUTS SEG NFR BLD: 72 % (ref 32–75)
NRBC # BLD: 0 K/UL (ref 0–0.01)
NRBC BLD-RTO: 0 PER 100 WBC
PLATELET # BLD AUTO: 259 K/UL (ref 150–400)
PMV BLD AUTO: 9.6 FL (ref 8.9–12.9)
POTASSIUM SERPL-SCNC: 3.7 MMOL/L (ref 3.5–5.1)
PROT SERPL-MCNC: 7.9 G/DL (ref 6.4–8.2)
PROTHROMBIN TIME: 10.1 SEC (ref 9–11.1)
RBC # BLD AUTO: 5.21 M/UL (ref 4.1–5.7)
SERVICE CMNT-IMP: ABNORMAL
SODIUM SERPL-SCNC: 136 MMOL/L (ref 136–145)
SPECIMEN EXP DATE BLD: NORMAL
TROPONIN I SERPL HS-MCNC: 121 NG/L (ref 0–76)
UFH PPP CHRO-ACNC: <0.1 IU/ML
WBC # BLD AUTO: 11.2 K/UL (ref 4.1–11.1)

## 2023-05-07 PROCEDURE — 6360000002 HC RX W HCPCS: Performed by: STUDENT IN AN ORGANIZED HEALTH CARE EDUCATION/TRAINING PROGRAM

## 2023-05-07 PROCEDURE — 86850 RBC ANTIBODY SCREEN: CPT

## 2023-05-07 PROCEDURE — 85610 PROTHROMBIN TIME: CPT

## 2023-05-07 PROCEDURE — 84484 ASSAY OF TROPONIN QUANT: CPT

## 2023-05-07 PROCEDURE — 86901 BLOOD TYPING SEROLOGIC RH(D): CPT

## 2023-05-07 PROCEDURE — 85520 HEPARIN ASSAY: CPT

## 2023-05-07 PROCEDURE — 99152 MOD SED SAME PHYS/QHP 5/>YRS: CPT | Performed by: STUDENT IN AN ORGANIZED HEALTH CARE EDUCATION/TRAINING PROGRAM

## 2023-05-07 PROCEDURE — 36415 COLL VENOUS BLD VENIPUNCTURE: CPT

## 2023-05-07 PROCEDURE — C1887 CATHETER, GUIDING: HCPCS | Performed by: STUDENT IN AN ORGANIZED HEALTH CARE EDUCATION/TRAINING PROGRAM

## 2023-05-07 PROCEDURE — C1894 INTRO/SHEATH, NON-LASER: HCPCS | Performed by: STUDENT IN AN ORGANIZED HEALTH CARE EDUCATION/TRAINING PROGRAM

## 2023-05-07 PROCEDURE — 94640 AIRWAY INHALATION TREATMENT: CPT

## 2023-05-07 PROCEDURE — 94760 N-INVAS EAR/PLS OXIMETRY 1: CPT

## 2023-05-07 PROCEDURE — 82962 GLUCOSE BLOOD TEST: CPT

## 2023-05-07 PROCEDURE — 2140000000 HC CCU INTERMEDIATE R&B

## 2023-05-07 PROCEDURE — 2709999900 HC NON-CHARGEABLE SUPPLY: Performed by: STUDENT IN AN ORGANIZED HEALTH CARE EDUCATION/TRAINING PROGRAM

## 2023-05-07 PROCEDURE — 4A023N7 MEASUREMENT OF CARDIAC SAMPLING AND PRESSURE, LEFT HEART, PERCUTANEOUS APPROACH: ICD-10-PCS | Performed by: STUDENT IN AN ORGANIZED HEALTH CARE EDUCATION/TRAINING PROGRAM

## 2023-05-07 PROCEDURE — 96374 THER/PROPH/DIAG INJ IV PUSH: CPT

## 2023-05-07 PROCEDURE — 6370000000 HC RX 637 (ALT 250 FOR IP): Performed by: STUDENT IN AN ORGANIZED HEALTH CARE EDUCATION/TRAINING PROGRAM

## 2023-05-07 PROCEDURE — 85347 COAGULATION TIME ACTIVATED: CPT

## 2023-05-07 PROCEDURE — 85025 COMPLETE CBC W/AUTO DIFF WBC: CPT

## 2023-05-07 PROCEDURE — 6370000000 HC RX 637 (ALT 250 FOR IP): Performed by: GENERAL ACUTE CARE HOSPITAL

## 2023-05-07 PROCEDURE — 027236Z DILATION OF CORONARY ARTERY, THREE ARTERIES WITH THREE DRUG-ELUTING INTRALUMINAL DEVICES, PERCUTANEOUS APPROACH: ICD-10-PCS | Performed by: STUDENT IN AN ORGANIZED HEALTH CARE EDUCATION/TRAINING PROGRAM

## 2023-05-07 PROCEDURE — 93005 ELECTROCARDIOGRAM TRACING: CPT | Performed by: STUDENT IN AN ORGANIZED HEALTH CARE EDUCATION/TRAINING PROGRAM

## 2023-05-07 PROCEDURE — 2500000003 HC RX 250 WO HCPCS: Performed by: STUDENT IN AN ORGANIZED HEALTH CARE EDUCATION/TRAINING PROGRAM

## 2023-05-07 PROCEDURE — 6360000004 HC RX CONTRAST MEDICATION: Performed by: STUDENT IN AN ORGANIZED HEALTH CARE EDUCATION/TRAINING PROGRAM

## 2023-05-07 PROCEDURE — C1725 CATH, TRANSLUMIN NON-LASER: HCPCS | Performed by: STUDENT IN AN ORGANIZED HEALTH CARE EDUCATION/TRAINING PROGRAM

## 2023-05-07 PROCEDURE — 93452 LEFT HRT CATH W/VENTRCLGRPHY: CPT | Performed by: STUDENT IN AN ORGANIZED HEALTH CARE EDUCATION/TRAINING PROGRAM

## 2023-05-07 PROCEDURE — 86900 BLOOD TYPING SEROLOGIC ABO: CPT

## 2023-05-07 PROCEDURE — C1769 GUIDE WIRE: HCPCS | Performed by: STUDENT IN AN ORGANIZED HEALTH CARE EDUCATION/TRAINING PROGRAM

## 2023-05-07 PROCEDURE — B2111ZZ FLUOROSCOPY OF MULTIPLE CORONARY ARTERIES USING LOW OSMOLAR CONTRAST: ICD-10-PCS | Performed by: STUDENT IN AN ORGANIZED HEALTH CARE EDUCATION/TRAINING PROGRAM

## 2023-05-07 PROCEDURE — 6360000002 HC RX W HCPCS

## 2023-05-07 PROCEDURE — C1874 STENT, COATED/COV W/DEL SYS: HCPCS | Performed by: STUDENT IN AN ORGANIZED HEALTH CARE EDUCATION/TRAINING PROGRAM

## 2023-05-07 PROCEDURE — 80053 COMPREHEN METABOLIC PANEL: CPT

## 2023-05-07 PROCEDURE — 93010 ELECTROCARDIOGRAM REPORT: CPT | Performed by: INTERNAL MEDICINE

## 2023-05-07 PROCEDURE — 2580000003 HC RX 258: Performed by: GENERAL ACUTE CARE HOSPITAL

## 2023-05-07 PROCEDURE — 99153 MOD SED SAME PHYS/QHP EA: CPT | Performed by: STUDENT IN AN ORGANIZED HEALTH CARE EDUCATION/TRAINING PROGRAM

## 2023-05-07 PROCEDURE — 99285 EMERGENCY DEPT VISIT HI MDM: CPT

## 2023-05-07 DEVICE — STENT ONYXNG20012UX ONYX 2.00X12RX
Type: IMPLANTABLE DEVICE | Status: FUNCTIONAL
Brand: ONYX FRONTIER™

## 2023-05-07 DEVICE — STENT ONYXNG30022UX ONYX 3.00X22RX
Type: IMPLANTABLE DEVICE | Status: FUNCTIONAL
Brand: ONYX FRONTIER™

## 2023-05-07 DEVICE — STENT ONYXNG35015UX ONYX 3.50X15RX
Type: IMPLANTABLE DEVICE | Status: FUNCTIONAL
Brand: ONYX FRONTIER™

## 2023-05-07 RX ORDER — HEPARIN SODIUM 1000 [USP'U]/ML
INJECTION, SOLUTION INTRAVENOUS; SUBCUTANEOUS PRN
Status: DISCONTINUED | OUTPATIENT
Start: 2023-05-07 | End: 2023-05-07 | Stop reason: HOSPADM

## 2023-05-07 RX ORDER — FUROSEMIDE 20 MG/1
20 TABLET ORAL DAILY
Status: DISCONTINUED | OUTPATIENT
Start: 2023-05-08 | End: 2023-05-09 | Stop reason: HOSPADM

## 2023-05-07 RX ORDER — PANTOPRAZOLE SODIUM 40 MG/1
40 TABLET, DELAYED RELEASE ORAL
Status: DISCONTINUED | OUTPATIENT
Start: 2023-05-08 | End: 2023-05-09 | Stop reason: HOSPADM

## 2023-05-07 RX ORDER — FINASTERIDE 5 MG/1
5 TABLET, FILM COATED ORAL DAILY
Status: DISCONTINUED | OUTPATIENT
Start: 2023-05-07 | End: 2023-05-07

## 2023-05-07 RX ORDER — EZETIMIBE 10 MG/1
10 TABLET ORAL NIGHTLY
Status: DISCONTINUED | OUTPATIENT
Start: 2023-05-07 | End: 2023-05-09 | Stop reason: HOSPADM

## 2023-05-07 RX ORDER — ALBUTEROL SULFATE 2.5 MG/3ML
2.5 SOLUTION RESPIRATORY (INHALATION) EVERY 4 HOURS PRN
Status: DISCONTINUED | OUTPATIENT
Start: 2023-05-07 | End: 2023-05-09 | Stop reason: HOSPADM

## 2023-05-07 RX ORDER — ACETAMINOPHEN 650 MG/1
650 SUPPOSITORY RECTAL EVERY 6 HOURS PRN
Status: DISCONTINUED | OUTPATIENT
Start: 2023-05-07 | End: 2023-05-09 | Stop reason: HOSPADM

## 2023-05-07 RX ORDER — FLUOROURACIL 50 MG/G
5 CREAM TOPICAL 2 TIMES DAILY
COMMUNITY

## 2023-05-07 RX ORDER — ONDANSETRON 2 MG/ML
4 INJECTION INTRAMUSCULAR; INTRAVENOUS EVERY 6 HOURS PRN
Status: DISCONTINUED | OUTPATIENT
Start: 2023-05-07 | End: 2023-05-09 | Stop reason: HOSPADM

## 2023-05-07 RX ORDER — SODIUM CHLORIDE 0.9 % (FLUSH) 0.9 %
5-40 SYRINGE (ML) INJECTION PRN
Status: DISCONTINUED | OUTPATIENT
Start: 2023-05-07 | End: 2023-05-09 | Stop reason: HOSPADM

## 2023-05-07 RX ORDER — FUROSEMIDE 10 MG/ML
20 INJECTION INTRAMUSCULAR; INTRAVENOUS ONCE
Status: COMPLETED | OUTPATIENT
Start: 2023-05-07 | End: 2023-05-07

## 2023-05-07 RX ORDER — FENTANYL CITRATE 50 UG/ML
50 INJECTION, SOLUTION INTRAMUSCULAR; INTRAVENOUS
Status: ACTIVE | OUTPATIENT
Start: 2023-05-07 | End: 2023-05-07

## 2023-05-07 RX ORDER — METOPROLOL SUCCINATE 25 MG/1
25 TABLET, EXTENDED RELEASE ORAL DAILY
Status: DISCONTINUED | OUTPATIENT
Start: 2023-05-07 | End: 2023-05-09 | Stop reason: HOSPADM

## 2023-05-07 RX ORDER — ENOXAPARIN SODIUM 100 MG/ML
30 INJECTION SUBCUTANEOUS 2 TIMES DAILY
Status: DISCONTINUED | OUTPATIENT
Start: 2023-05-08 | End: 2023-05-09 | Stop reason: HOSPADM

## 2023-05-07 RX ORDER — ONDANSETRON 4 MG/1
4 TABLET, ORALLY DISINTEGRATING ORAL EVERY 8 HOURS PRN
Status: DISCONTINUED | OUTPATIENT
Start: 2023-05-07 | End: 2023-05-09 | Stop reason: HOSPADM

## 2023-05-07 RX ORDER — HEPARIN SODIUM 1000 [USP'U]/ML
INJECTION, SOLUTION INTRAVENOUS; SUBCUTANEOUS DAILY PRN
Status: COMPLETED | OUTPATIENT
Start: 2023-05-07 | End: 2023-05-07

## 2023-05-07 RX ORDER — ACETAMINOPHEN 325 MG/1
650 TABLET ORAL EVERY 6 HOURS PRN
Status: DISCONTINUED | OUTPATIENT
Start: 2023-05-07 | End: 2023-05-09 | Stop reason: HOSPADM

## 2023-05-07 RX ORDER — FENTANYL CITRATE 50 UG/ML
INJECTION, SOLUTION INTRAMUSCULAR; INTRAVENOUS PRN
Status: DISCONTINUED | OUTPATIENT
Start: 2023-05-07 | End: 2023-05-07 | Stop reason: HOSPADM

## 2023-05-07 RX ORDER — AZELASTINE 1 MG/ML
2 SPRAY, METERED NASAL 2 TIMES DAILY
Status: DISCONTINUED | OUTPATIENT
Start: 2023-05-07 | End: 2023-05-09 | Stop reason: HOSPADM

## 2023-05-07 RX ORDER — CHOLECALCIFEROL (VITAMIN D3) 125 MCG
1000 CAPSULE ORAL DAILY
Status: DISCONTINUED | OUTPATIENT
Start: 2023-05-07 | End: 2023-05-09 | Stop reason: HOSPADM

## 2023-05-07 RX ORDER — HEPARIN SODIUM 1000 [USP'U]/ML
5000 INJECTION, SOLUTION INTRAVENOUS; SUBCUTANEOUS ONCE
Status: DISCONTINUED | OUTPATIENT
Start: 2023-05-07 | End: 2023-05-08

## 2023-05-07 RX ORDER — HEPARIN SODIUM 10000 [USP'U]/ML
INJECTION, SOLUTION INTRAVENOUS; SUBCUTANEOUS PRN
Status: DISCONTINUED | OUTPATIENT
Start: 2023-05-07 | End: 2023-05-07 | Stop reason: HOSPADM

## 2023-05-07 RX ORDER — DILTIAZEM HYDROCHLORIDE 180 MG/1
360 CAPSULE, COATED, EXTENDED RELEASE ORAL DAILY
Status: DISCONTINUED | OUTPATIENT
Start: 2023-05-08 | End: 2023-05-09 | Stop reason: HOSPADM

## 2023-05-07 RX ORDER — FINASTERIDE 5 MG/1
5 TABLET, FILM COATED ORAL
Status: DISCONTINUED | OUTPATIENT
Start: 2023-05-08 | End: 2023-05-09 | Stop reason: HOSPADM

## 2023-05-07 RX ORDER — NITROGLYCERIN 0.4 MG/1
TABLET SUBLINGUAL DAILY PRN
Status: COMPLETED | OUTPATIENT
Start: 2023-05-07 | End: 2023-05-07

## 2023-05-07 RX ORDER — BUDESONIDE AND FORMOTEROL FUMARATE DIHYDRATE 160; 4.5 UG/1; UG/1
2 AEROSOL RESPIRATORY (INHALATION) 2 TIMES DAILY
Status: DISCONTINUED | OUTPATIENT
Start: 2023-05-07 | End: 2023-05-07

## 2023-05-07 RX ORDER — ASPIRIN 81 MG/1
81 TABLET ORAL DAILY
Status: DISCONTINUED | OUTPATIENT
Start: 2023-05-08 | End: 2023-05-09 | Stop reason: HOSPADM

## 2023-05-07 RX ORDER — CETIRIZINE HYDROCHLORIDE 10 MG/1
5 TABLET ORAL DAILY
Status: DISCONTINUED | OUTPATIENT
Start: 2023-05-07 | End: 2023-05-09 | Stop reason: HOSPADM

## 2023-05-07 RX ORDER — ALBUTEROL SULFATE 90 UG/1
2 AEROSOL, METERED RESPIRATORY (INHALATION) EVERY 4 HOURS PRN
Status: DISCONTINUED | OUTPATIENT
Start: 2023-05-07 | End: 2023-05-07

## 2023-05-07 RX ORDER — POLYETHYLENE GLYCOL 3350 17 G/17G
17 POWDER, FOR SOLUTION ORAL DAILY PRN
Status: DISCONTINUED | OUTPATIENT
Start: 2023-05-07 | End: 2023-05-09 | Stop reason: HOSPADM

## 2023-05-07 RX ORDER — MONTELUKAST SODIUM 10 MG/1
10 TABLET ORAL DAILY
Status: DISCONTINUED | OUTPATIENT
Start: 2023-05-07 | End: 2023-05-09 | Stop reason: HOSPADM

## 2023-05-07 RX ORDER — SODIUM CHLORIDE 9 MG/ML
INJECTION, SOLUTION INTRAVENOUS PRN
Status: DISCONTINUED | OUTPATIENT
Start: 2023-05-07 | End: 2023-05-09 | Stop reason: HOSPADM

## 2023-05-07 RX ORDER — CHOLESTYRAMINE LIGHT 4 G/5.7G
4 POWDER, FOR SUSPENSION ORAL DAILY
Status: DISCONTINUED | OUTPATIENT
Start: 2023-05-07 | End: 2023-05-09 | Stop reason: HOSPADM

## 2023-05-07 RX ORDER — LIDOCAINE HYDROCHLORIDE 10 MG/ML
INJECTION, SOLUTION INFILTRATION; PERINEURAL PRN
Status: DISCONTINUED | OUTPATIENT
Start: 2023-05-07 | End: 2023-05-07 | Stop reason: HOSPADM

## 2023-05-07 RX ORDER — METHYLPREDNISOLONE SODIUM SUCCINATE 125 MG/2ML
INJECTION, POWDER, LYOPHILIZED, FOR SOLUTION INTRAMUSCULAR; INTRAVENOUS PRN
Status: DISCONTINUED | OUTPATIENT
Start: 2023-05-07 | End: 2023-05-07 | Stop reason: HOSPADM

## 2023-05-07 RX ORDER — EZETIMIBE 10 MG/1
10 TABLET ORAL DAILY
Status: DISCONTINUED | OUTPATIENT
Start: 2023-05-07 | End: 2023-05-07

## 2023-05-07 RX ORDER — SODIUM CHLORIDE 0.9 % (FLUSH) 0.9 %
5-40 SYRINGE (ML) INJECTION EVERY 12 HOURS SCHEDULED
Status: DISCONTINUED | OUTPATIENT
Start: 2023-05-07 | End: 2023-05-09 | Stop reason: HOSPADM

## 2023-05-07 RX ORDER — DIPHENHYDRAMINE HYDROCHLORIDE 50 MG/ML
INJECTION INTRAMUSCULAR; INTRAVENOUS PRN
Status: DISCONTINUED | OUTPATIENT
Start: 2023-05-07 | End: 2023-05-07 | Stop reason: HOSPADM

## 2023-05-07 RX ORDER — VERAPAMIL HYDROCHLORIDE 2.5 MG/ML
INJECTION, SOLUTION INTRAVENOUS PRN
Status: DISCONTINUED | OUTPATIENT
Start: 2023-05-07 | End: 2023-05-07 | Stop reason: HOSPADM

## 2023-05-07 RX ADMIN — TICAGRELOR 180 MG: 90 TABLET ORAL at 10:26

## 2023-05-07 RX ADMIN — ALBUTEROL SULFATE 2.5 MG: 2.5 SOLUTION RESPIRATORY (INHALATION) at 23:00

## 2023-05-07 RX ADMIN — EZETIMIBE 10 MG: 10 TABLET ORAL at 21:07

## 2023-05-07 RX ADMIN — SODIUM CHLORIDE, PRESERVATIVE FREE 10 ML: 5 INJECTION INTRAVENOUS at 12:37

## 2023-05-07 RX ADMIN — NITROGLYCERIN 0.4 MG: 0.4 TABLET, ORALLY DISINTEGRATING SUBLINGUAL at 10:28

## 2023-05-07 RX ADMIN — HEPARIN SODIUM 5000 UNITS: 1000 INJECTION, SOLUTION INTRAVENOUS; SUBCUTANEOUS at 10:25

## 2023-05-07 RX ADMIN — SODIUM CHLORIDE, PRESERVATIVE FREE 10 ML: 5 INJECTION INTRAVENOUS at 21:07

## 2023-05-07 RX ADMIN — ARFORMOTEROL TARTRATE: 15 SOLUTION RESPIRATORY (INHALATION) at 23:00

## 2023-05-07 RX ADMIN — NITROGLYCERIN: 0.4 TABLET, ORALLY DISINTEGRATING SUBLINGUAL at 10:30

## 2023-05-07 RX ADMIN — METOPROLOL SUCCINATE 25 MG: 25 TABLET, EXTENDED RELEASE ORAL at 14:09

## 2023-05-07 RX ADMIN — ACETAMINOPHEN 650 MG: 325 TABLET ORAL at 23:26

## 2023-05-07 RX ADMIN — TICAGRELOR 90 MG: 90 TABLET ORAL at 21:07

## 2023-05-07 RX ADMIN — FUROSEMIDE 20 MG: 10 INJECTION, SOLUTION INTRAMUSCULAR; INTRAVENOUS at 14:09

## 2023-05-07 ASSESSMENT — PAIN DESCRIPTION - LOCATION
LOCATION: ARM
LOCATION: ARM
LOCATION: SHOULDER

## 2023-05-07 ASSESSMENT — PAIN DESCRIPTION - ORIENTATION
ORIENTATION: RIGHT;LEFT
ORIENTATION: LEFT

## 2023-05-07 ASSESSMENT — PAIN DESCRIPTION - DESCRIPTORS: DESCRIPTORS: ACHING

## 2023-05-07 ASSESSMENT — PAIN SCALES - GENERAL
PAINLEVEL_OUTOF10: 0
PAINLEVEL_OUTOF10: 3
PAINLEVEL_OUTOF10: 9
PAINLEVEL_OUTOF10: 9

## 2023-05-07 ASSESSMENT — PAIN - FUNCTIONAL ASSESSMENT: PAIN_FUNCTIONAL_ASSESSMENT: 0-10

## 2023-05-08 ENCOUNTER — APPOINTMENT (OUTPATIENT)
Facility: HOSPITAL | Age: 73
DRG: 247 | End: 2023-05-08
Payer: MEDICARE

## 2023-05-08 LAB
ANION GAP SERPL CALC-SCNC: 7 MMOL/L (ref 5–15)
BUN SERPL-MCNC: 21 MG/DL (ref 6–20)
BUN/CREAT SERPL: 24 (ref 12–20)
CALCIUM SERPL-MCNC: 9.1 MG/DL (ref 8.5–10.1)
CHLORIDE SERPL-SCNC: 106 MMOL/L (ref 97–108)
CO2 SERPL-SCNC: 22 MMOL/L (ref 21–32)
CREAT SERPL-MCNC: 0.87 MG/DL (ref 0.7–1.3)
EKG ATRIAL RATE: 64 BPM
EKG ATRIAL RATE: 77 BPM
EKG DIAGNOSIS: NORMAL
EKG DIAGNOSIS: NORMAL
EKG P AXIS: 32 DEGREES
EKG P AXIS: 49 DEGREES
EKG P-R INTERVAL: 166 MS
EKG P-R INTERVAL: 178 MS
EKG Q-T INTERVAL: 390 MS
EKG Q-T INTERVAL: 410 MS
EKG QRS DURATION: 94 MS
EKG QRS DURATION: 96 MS
EKG QTC CALCULATION (BAZETT): 422 MS
EKG QTC CALCULATION (BAZETT): 441 MS
EKG R AXIS: -53 DEGREES
EKG R AXIS: -54 DEGREES
EKG T AXIS: 32 DEGREES
EKG T AXIS: 60 DEGREES
EKG VENTRICULAR RATE: 64 BPM
EKG VENTRICULAR RATE: 77 BPM
GLUCOSE SERPL-MCNC: 131 MG/DL (ref 65–100)
POTASSIUM SERPL-SCNC: 3.6 MMOL/L (ref 3.5–5.1)
SODIUM SERPL-SCNC: 135 MMOL/L (ref 136–145)

## 2023-05-08 PROCEDURE — 2060000000 HC ICU INTERMEDIATE R&B

## 2023-05-08 PROCEDURE — 6360000002 HC RX W HCPCS

## 2023-05-08 PROCEDURE — 6360000004 HC RX CONTRAST MEDICATION: Performed by: INTERNAL MEDICINE

## 2023-05-08 PROCEDURE — 2580000003 HC RX 258: Performed by: GENERAL ACUTE CARE HOSPITAL

## 2023-05-08 PROCEDURE — 6370000000 HC RX 637 (ALT 250 FOR IP): Performed by: STUDENT IN AN ORGANIZED HEALTH CARE EDUCATION/TRAINING PROGRAM

## 2023-05-08 PROCEDURE — 36415 COLL VENOUS BLD VENIPUNCTURE: CPT

## 2023-05-08 PROCEDURE — 94640 AIRWAY INHALATION TREATMENT: CPT

## 2023-05-08 PROCEDURE — 6360000002 HC RX W HCPCS: Performed by: GENERAL ACUTE CARE HOSPITAL

## 2023-05-08 PROCEDURE — 80048 BASIC METABOLIC PNL TOTAL CA: CPT

## 2023-05-08 PROCEDURE — 6370000000 HC RX 637 (ALT 250 FOR IP): Performed by: GENERAL ACUTE CARE HOSPITAL

## 2023-05-08 PROCEDURE — C8929 TTE W OR WO FOL WCON,DOPPLER: HCPCS

## 2023-05-08 RX ADMIN — PANTOPRAZOLE SODIUM 40 MG: 40 TABLET, DELAYED RELEASE ORAL at 06:56

## 2023-05-08 RX ADMIN — DILTIAZEM HYDROCHLORIDE 360 MG: 180 CAPSULE, EXTENDED RELEASE ORAL at 08:25

## 2023-05-08 RX ADMIN — ARFORMOTEROL TARTRATE: 15 SOLUTION RESPIRATORY (INHALATION) at 20:20

## 2023-05-08 RX ADMIN — TICAGRELOR 90 MG: 90 TABLET ORAL at 20:49

## 2023-05-08 RX ADMIN — ENOXAPARIN SODIUM 30 MG: 100 INJECTION SUBCUTANEOUS at 08:26

## 2023-05-08 RX ADMIN — TICAGRELOR 90 MG: 90 TABLET ORAL at 08:26

## 2023-05-08 RX ADMIN — ARFORMOTEROL TARTRATE: 15 SOLUTION RESPIRATORY (INHALATION) at 09:28

## 2023-05-08 RX ADMIN — CETIRIZINE HYDROCHLORIDE 5 MG: 10 TABLET, FILM COATED ORAL at 20:48

## 2023-05-08 RX ADMIN — SODIUM CHLORIDE, PRESERVATIVE FREE 10 ML: 5 INJECTION INTRAVENOUS at 08:31

## 2023-05-08 RX ADMIN — METOPROLOL SUCCINATE 25 MG: 25 TABLET, EXTENDED RELEASE ORAL at 08:25

## 2023-05-08 RX ADMIN — FUROSEMIDE 20 MG: 20 TABLET ORAL at 08:26

## 2023-05-08 RX ADMIN — AZELASTINE HYDROCHLORIDE 2 SPRAY: 137 SPRAY, METERED NASAL at 08:34

## 2023-05-08 RX ADMIN — ENOXAPARIN SODIUM 30 MG: 100 INJECTION SUBCUTANEOUS at 20:47

## 2023-05-08 RX ADMIN — AZELASTINE HYDROCHLORIDE 2 SPRAY: 137 SPRAY, METERED NASAL at 22:04

## 2023-05-08 RX ADMIN — FINASTERIDE 5 MG: 5 TABLET, FILM COATED ORAL at 22:07

## 2023-05-08 RX ADMIN — MONTELUKAST 10 MG: 10 TABLET, FILM COATED ORAL at 08:26

## 2023-05-08 RX ADMIN — CHOLESTYRAMINE 4 G: 4 POWDER, FOR SUSPENSION ORAL at 08:26

## 2023-05-08 RX ADMIN — Medication 1000 MCG: at 08:25

## 2023-05-08 RX ADMIN — PERFLUTREN 1.5 ML: 6.52 INJECTION, SUSPENSION INTRAVENOUS at 15:45

## 2023-05-08 RX ADMIN — ASPIRIN 81 MG: 81 TABLET, COATED ORAL at 08:26

## 2023-05-08 RX ADMIN — EZETIMIBE 10 MG: 10 TABLET ORAL at 20:48

## 2023-05-08 NOTE — PROGRESS NOTES
Hospitalist Progress Note    NAME:   Bee Cueto   : 1950   MRN: [id number]     Date/Time: 2023 12:14 PM  Patient PCP: Donnie Anna MD    Estimated discharge date: 2023  Barriers: Watching his tolerance to New cardiac medications      Assessment / Plan:      Inferolateral ST elevation myocardial infarction  History of CAD and LAD PCI in   Hypertension  Hyperlipidemia  S/p Cath status post 3 LUIS FERNANDO  Echocardiogram still pending hopefully will be completed today  Continue aspirin, Brilinta  Patient intolerant of statin, follow-up with cardiology regarding PCSK9. Currently on Zetia  Continue metoprolol        Asthma, not in exacerbation  Resume home inhalers     Norris's esophagus  Continue Protonix     Diabetes mellitus type 2  Hold oral agents  Start ISS        Medical Decision Making:   I personally reviewed labs: BMP  I personally reviewed imaging: EKG  Discussed case with: Patient and his wife at bedside, Dr. Pallavi Skelton from cardiology        Code Status: Full code  DVT Prophylaxis: Lovenox  GI Prophylaxis: None    Subjective:     Patient was seen and examined. No acute events overnight. Discussed with RN overnight events. All patient's questions were answered. Patient's wife was at bedside, all questions were answered. \"Doing much better\"    Review of Systems:  Symptom Y/N Comments  Symptom Y/N Comments   Fever/Chills n   Chest Pain n    Poor Appetite    Edema     Cough n   Abdominal Pain n    Sputum    Joint Pain     SOB/SARAVIA n   Pruritis/Rash     Nausea/vomit n   Tolerating PT/OT     Diarrhea    Tolerating Diet y    Constipation    Other       Could NOT obtain due to:            Objective:     VITALS:   Last 24hrs VS reviewed since prior progress note.  Most recent are:  Patient Vitals for the past 24 hrs:   BP Temp Temp src Pulse Resp SpO2   23 1100 131/72 98 °F (36.7 °C) Oral 57 16 95 %   23 0930 -- -- -- 63 14 99 %   23 0747 (!) 147/80 97.8 °F (36.6 °C)

## 2023-05-08 NOTE — PROGRESS NOTES
Discussed with patient and family   No chest pain   Some back issues   Cath access site okay   Cont aspirin, ticagrelor   Cont zetia   PCSK9 inhibitor as OP   Did not tolerate zetia   If tolerate then cont metoprolol, okay to hold if any issues   Echo pending   Cardiac rehab as OP  Likely discharge tomorrow am.       _____________________    Patient seen yesterday               Progress Note      5/9/2023 9:02 AM  NAME: Krystian Perez   MRN:  [id number]   Admit Diagnosis: STEMI (ST elevation myocardial infarction) [facility]) [I21.3]    Primary Cardiologist: Dr Angel Pass:     Inferolateral ST elevation myocardial infarction s/p PCI of OM3 - culprit, OM2 and RCA   History of CAD and PCI in 2008  Hypertension  Hyperlipidemia, intolerant to statin, being considered for Repatha   Diabetes            Recommendations:     Cont aspirin and brilinta   Cont b blocker   Cont statin    D/C TOMORROW       [x]        High complexity decision making was performed    Subjective:     HPI:  No CP   Cath access site okay     Objective:      Physical Exam:       General: Alert and oriented x3, no acute distress   Neck: Supple   Respiratory: No respiratory distress, clear lung sound   Cardiovascular: Regular rate rhythm, S1S2, no murmur   Abdomen: soft, non tender, non distended   Neuro: moves all extremities, oriented x3   Skin: warm and dry   Extremity: no edema, warm to touch           Objective:      VITALS:   Last 24hrs VS reviewed since prior progress note.  Most recent are:  Patient Vitals for the past 24 hrs:    BP Temp Temp src Pulse Resp SpO2   05/08/23 1100 131/72 98 °F (36.7 °C) Oral 57 16 95 %   05/08/23 0930 -- -- -- 63 14 99 %   05/08/23 0747 (!) 147/80 97.8 °F (36.6 °C) Oral 68 18 92 %   05/08/23 0330 137/80 97.9 °F (36.6 °C) Oral 81 18 92 %   05/08/23 0020 -- -- -- -- -- 97 %   05/07/23 2325 (!) 142/83 98 °F (36.7 °C) Oral 91 16 92 %   05/07/23 1925 (!) 155/86 97.3 °F (36.3 °C) Oral 77 18 94 %   05/07/23 1530 (!)

## 2023-05-08 NOTE — DISCHARGE INSTRUCTIONS
[address] [facility], [address]    [phone]  [address]    [email or web address]    Patient Discharge Instructions    Justin Graves / [id number] : 1950    Admitted 2023 Discharged: 2023     You presented with heart attack and found to have blockage in heart artery. Your heart artery (two branches of left circumflex artery and right coronary artery) was opened with drug eluting stent. Please take two blood thinners (Aspirin and Brilinta) on regular bases. These medications keep your stent open. Not taking these medications may result in heart attack. DO NOT STOP them without talking to your heart doctor. It is important that you take the medication exactly as they are prescribed. Keep your medication in the bottles provided by the pharmacist and keep a list of the medication names, dosages, and times to be taken in your wallet. Do not take other medications without consulting your doctor. BRING ALL OF YOUR MEDICINES TO YOUR OFFICE VISIT. Follow-up with Dr Lorin Dancer after discharge. Heart Attack: After Your Hospitalization     Your Care Instructions    A heart attack (myocardial infarction, or MI) occurs when one or more of the coronary arteries, which supply the heart with oxygen-rich blood, is blocked. A blockage usually occurs when plaque inside the artery breaks open and a blood clot forms in the artery. After a heart attack, you may be worried about your future. Over the next several weeks, your heart will start to heal. Although it is sometimes hard to break old habits, you can prevent another heart attack by making some lifestyle changes and by taking medicines. You may use the following information for ideas about what to do at home to speed your recovery. Follow-up care is a key part of your treatment and safety. Be sure to make and go to all appointments, and call your doctor if you are having problems.  It is also a good idea to keep a list of the

## 2023-05-09 VITALS
OXYGEN SATURATION: 96 % | DIASTOLIC BLOOD PRESSURE: 74 MMHG | WEIGHT: 230 LBS | HEART RATE: 69 BPM | RESPIRATION RATE: 16 BRPM | TEMPERATURE: 98.4 F | BODY MASS INDEX: 32.93 KG/M2 | SYSTOLIC BLOOD PRESSURE: 119 MMHG | HEIGHT: 70 IN

## 2023-05-09 DIAGNOSIS — I21.3 ST ELEVATION MYOCARDIAL INFARCTION (STEMI), UNSPECIFIED ARTERY (HCC): Primary | ICD-10-CM

## 2023-05-09 PROCEDURE — 2580000003 HC RX 258: Performed by: GENERAL ACUTE CARE HOSPITAL

## 2023-05-09 PROCEDURE — 94640 AIRWAY INHALATION TREATMENT: CPT

## 2023-05-09 PROCEDURE — 6370000000 HC RX 637 (ALT 250 FOR IP): Performed by: GENERAL ACUTE CARE HOSPITAL

## 2023-05-09 PROCEDURE — 6360000002 HC RX W HCPCS: Performed by: GENERAL ACUTE CARE HOSPITAL

## 2023-05-09 PROCEDURE — 6370000000 HC RX 637 (ALT 250 FOR IP): Performed by: STUDENT IN AN ORGANIZED HEALTH CARE EDUCATION/TRAINING PROGRAM

## 2023-05-09 PROCEDURE — 6360000002 HC RX W HCPCS

## 2023-05-09 RX ORDER — METOPROLOL SUCCINATE 25 MG/1
25 TABLET, EXTENDED RELEASE ORAL DAILY
Qty: 30 TABLET | Refills: 3 | Status: SHIPPED | OUTPATIENT
Start: 2023-05-10

## 2023-05-09 RX ADMIN — PANTOPRAZOLE SODIUM 40 MG: 40 TABLET, DELAYED RELEASE ORAL at 06:43

## 2023-05-09 RX ADMIN — DILTIAZEM HYDROCHLORIDE 360 MG: 180 CAPSULE, EXTENDED RELEASE ORAL at 08:16

## 2023-05-09 RX ADMIN — METOPROLOL SUCCINATE 25 MG: 25 TABLET, EXTENDED RELEASE ORAL at 08:15

## 2023-05-09 RX ADMIN — ENOXAPARIN SODIUM 30 MG: 100 INJECTION SUBCUTANEOUS at 08:16

## 2023-05-09 RX ADMIN — FUROSEMIDE 20 MG: 20 TABLET ORAL at 08:15

## 2023-05-09 RX ADMIN — MONTELUKAST 10 MG: 10 TABLET, FILM COATED ORAL at 08:16

## 2023-05-09 RX ADMIN — CHOLESTYRAMINE 4 G: 4 POWDER, FOR SUSPENSION ORAL at 08:16

## 2023-05-09 RX ADMIN — ARFORMOTEROL TARTRATE: 15 SOLUTION RESPIRATORY (INHALATION) at 08:08

## 2023-05-09 RX ADMIN — SODIUM CHLORIDE, PRESERVATIVE FREE 10 ML: 5 INJECTION INTRAVENOUS at 08:16

## 2023-05-09 RX ADMIN — AZELASTINE HYDROCHLORIDE 2 SPRAY: 137 SPRAY, METERED NASAL at 08:19

## 2023-05-09 RX ADMIN — TICAGRELOR 90 MG: 90 TABLET ORAL at 08:15

## 2023-05-09 RX ADMIN — SODIUM CHLORIDE, PRESERVATIVE FREE 10 ML: 5 INJECTION INTRAVENOUS at 00:29

## 2023-05-09 RX ADMIN — Medication 1000 MCG: at 08:14

## 2023-05-09 RX ADMIN — ASPIRIN 81 MG: 81 TABLET, COATED ORAL at 08:16

## 2023-05-09 NOTE — PROGRESS NOTES
I have reviewed Discharge Instructions with the patient. The patient verbalized understanding. Discharge medications reviewed with patient along with appropriate educational materials. Opportunity for questions and clarification was provided. All lines removed without difficulty. Cath sites are clean, dry, and intact. Patient's belongings gathered and with patient. Patient is ready for discharge.       Jossy Vega Rn

## 2023-05-09 NOTE — CARDIO/PULMONARY
Chart reviewed: Patient is 81 y.o. male admitted with STEMI (ST elevation myocardial infarction) ([facility] [address] [I21.3]    Nonsmoker. Pt has been discharged before visited. MI teaching packet with cardiac rehab enrollment information has been mailed to pt.  Will follow op outpatient

## 2023-05-09 NOTE — PLAN OF CARE
Problem: Chronic Conditions and Co-morbidities  Goal: Patient's chronic conditions and co-morbidity symptoms are monitored and maintained or improved  Outcome: Adequate for Discharge     Problem: Pain  Goal: Verbalizes/displays adequate comfort level or baseline comfort level  Outcome: Adequate for Discharge     Problem: Discharge Planning  Goal: Discharge to home or other facility with appropriate resources  Outcome: Adequate for Discharge     Problem: ABCDS Injury Assessment  Goal: Absence of physical injury  Outcome: Adequate for Discharge     Problem: Safety - Adult  Goal: Free from fall injury  Outcome: Adequate for Discharge

## 2023-05-09 NOTE — DISCHARGE SUMMARY
Hospitalist Discharge Summary     Patient ID:  Viridiana Thompson  [id number]  89 y.o.  1950 5/7/2023    PCP on record: Tamiko Little MD    Admit date: 5/7/2023  Discharge date and time: 5/9/2023    DISCHARGE DIAGNOSIS:    Inferolateral ST elevation myocardial infarction  History of CAD and LAD PCI in 2008  Hypertension  Hyperlipidemia     Asthma, not in exacerbation     Norris's esophagus     Diabetes mellitus type 2    CONSULTATIONS:  IP CONSULT TO HOSPITALIST    Excerpted HPI from H&P of Josepha Ganser, MD:    Ravin Oquendo is a 81 y.o.  male with PMHx significant for  We were asked to admit for work up and evaluation of the above problems. \Patient with known history of CAD status post LAD stent, presented to the hospital with chief complaint of chest pain started around 6:30 in the morning, initially patient thought that it is related to indigestion, pain radiates to arm and associated with shortness of breath. In the emergency room patient noted to have inferior lateral ST elevation MI and cardiology consulted and taken to cardiac cath.  ______________________________________________________________________  DISCHARGE SUMMARY/HOSPITAL COURSE:  for full details see H&P, daily progress notes, labs, consult notes. Inferolateral ST elevation myocardial infarction  History of CAD and LAD PCI in 2008  Hypertension  Hyperlipidemia  S/p Cath status post 3 LUIS FERNANDO  Echocardiogram completed and reviewed by cardiology  Continue aspirin, Brilinta  Patient intolerant of statin, follow-up with cardiology regarding PCSK9. Currently on Zetia  Continue metoprolol  Cardiology follow up        Asthma, not in exacerbation  Resume home inhalers     Norris's esophagus  Continue Protonix     Diabetes mellitus type 2  Resume home meds      _______________________________________________________________________  Patient seen and examined by me on discharge day.   Pertinent Findings:    GEN: awake, alert,

## 2023-05-09 NOTE — PROGRESS NOTES
Pharmacist Discharge Medication Reconciliation      Significant PMH:   Past Medical History:   Diagnosis Date    Actinic keratosis 09/15/2017    Annual physical exam 09/15/2017    Arthritis     ASHD (arteriosclerotic heart disease) 09/15/2017    Asthma     Norris's esophagus     CAD (coronary artery disease)     Chest pain 09/15/2017    Chronic back pain 09/15/2017    Chronic obstructive pulmonary disease ([address]     Chronic pain     Cough 09/15/2017    Diabetes ([facility] [address]     hypoglycemia hx    Diabetes mellitus screening 09/15/2017    Diaphoresis 09/15/2017    SARAVIA (dyspnea on exertion)     Dyslipidemia 09/15/2017    Edema 09/15/2017    Elevated LFTs 09/15/2017    Essential hypertension 06/07/2019    Fatigue 09/15/2017    GERD (gastroesophageal reflux disease)     Norris's esophagus    Hypercholesterolemia     Hyperplasia of prostate 09/15/2017    Impaired glucose tolerance 01/17/2020    Obesity 09/15/2017    Other long term (current) drug therapy 09/15/2017    Seasonal allergic rhinitis due to pollen 03/18/2021    Sinusitis 09/15/2017    SK (seborrheic keratosis) 09/15/2017    Sleep apnea     CPAP compliant per pt, 01/27/2023     Encounter Diagnoses:   Encounter Diagnoses   Name Primary? STEMI (ST elevation myocardial infarction) ([address]     ST elevation myocardial infarction (STEMI), unspecified artery (HCC) Yes    ST elevation myocardial infarction involving left circumflex coronary artery (HCC)      Allergies: Iodine, Shellfish allergy, Levofloxacin, Statins, and Sulfa antibiotics    Admission date: 5/7/23    The patient's chart, MAR and AVS were reviewed by Yohannes Chávez [facility].     Discharging Provider: Silvia Mcdaniel MD     Thank you,     [facility], [address] [place] [address]

## 2023-05-11 LAB
ECHO AO ROOT DIAM: 3.3 CM
ECHO AO ROOT INDEX: 1.49 CM/M2
ECHO AV AREA PEAK VELOCITY: 3.3 CM2
ECHO AV AREA/BSA PEAK VELOCITY: 1.5 CM2/M2
ECHO AV PEAK GRADIENT: 11 MMHG
ECHO AV PEAK VELOCITY: 1.6 M/S
ECHO AV VELOCITY RATIO: 0.88
ECHO BSA: 2.27 M2
ECHO LA DIAMETER INDEX: 1.8 CM/M2
ECHO LA DIAMETER: 4 CM
ECHO LA TO AORTIC ROOT RATIO: 1.21
ECHO LA VOL 4C: 53 ML (ref 18–58)
ECHO LA VOL 4C: 55 ML (ref 18–58)
ECHO LV E' LATERAL VELOCITY: 6 CM/S
ECHO LV E' SEPTAL VELOCITY: 5 CM/S
ECHO LV EDV A2C: 79 ML
ECHO LV EDV A4C: 132 ML
ECHO LV EDV BP: 103 ML (ref 67–155)
ECHO LV EDV INDEX A4C: 59 ML/M2
ECHO LV EDV INDEX BP: 46 ML/M2
ECHO LV EDV NDEX A2C: 36 ML/M2
ECHO LV EJECTION FRACTION A2C: 53 %
ECHO LV EJECTION FRACTION A4C: 68 %
ECHO LV EJECTION FRACTION BIPLANE: 60 % (ref 55–100)
ECHO LV ESV A2C: 37 ML
ECHO LV ESV A4C: 42 ML
ECHO LV ESV BP: 41 ML (ref 22–58)
ECHO LV ESV INDEX A2C: 17 ML/M2
ECHO LV ESV INDEX A4C: 19 ML/M2
ECHO LV ESV INDEX BP: 18 ML/M2
ECHO LV FRACTIONAL SHORTENING: 32 % (ref 28–44)
ECHO LV INTERNAL DIMENSION DIASTOLE INDEX: 2.25 CM/M2
ECHO LV INTERNAL DIMENSION DIASTOLIC: 5 CM (ref 4.2–5.9)
ECHO LV INTERNAL DIMENSION SYSTOLIC INDEX: 1.53 CM/M2
ECHO LV INTERNAL DIMENSION SYSTOLIC: 3.4 CM
ECHO LV IVSD: 1.3 CM (ref 0.6–1)
ECHO LV MASS 2D: 247.6 G (ref 88–224)
ECHO LV MASS INDEX 2D: 111.5 G/M2 (ref 49–115)
ECHO LV POSTERIOR WALL DIASTOLIC: 1.2 CM (ref 0.6–1)
ECHO LV RELATIVE WALL THICKNESS RATIO: 0.48
ECHO LVOT AREA: 4.2 CM2
ECHO LVOT DIAM: 2.3 CM
ECHO LVOT PEAK GRADIENT: 7 MMHG
ECHO LVOT PEAK VELOCITY: 1.4 M/S
ECHO MV A VELOCITY: 0.78 M/S
ECHO MV E DECELERATION TIME (DT): 221.6 MS
ECHO MV E VELOCITY: 0.56 M/S
ECHO MV E/A RATIO: 0.72
ECHO MV E/E' LATERAL: 9.33
ECHO MV E/E' RATIO (AVERAGED): 10.27
ECHO MV E/E' SEPTAL: 11.2
ECHO RA VOLUME: 40 ML
ECHO RA VOLUME: 46 ML
ECHO RV TAPSE: 2.2 CM (ref 1.7–?)
ECHO TV REGURGITANT MAX VELOCITY: 2.09 M/S
ECHO TV REGURGITANT PEAK GRADIENT: 18 MMHG

## 2023-05-31 LAB
HBA1C MFR BLD HPLC: 5.9 %
LDL CHOLESTEROL, EXTERNAL: 27

## 2023-06-01 ENCOUNTER — HOSPITAL ENCOUNTER (OUTPATIENT)
Facility: HOSPITAL | Age: 73
Setting detail: RECURRING SERIES
Discharge: HOME | End: 2023-06-04
Payer: MEDICARE

## 2023-06-01 VITALS — WEIGHT: 226 LBS | HEIGHT: 70 IN | BODY MASS INDEX: 32.35 KG/M2 | OXYGEN SATURATION: 97 %

## 2023-06-01 PROCEDURE — 93798 PHYS/QHP OP CAR RHAB W/ECG: CPT

## 2023-06-01 PROCEDURE — 93797 PHYS/QHP OP CAR RHAB WO ECG: CPT

## 2023-06-01 RX ORDER — BUDESONIDE AND FORMOTEROL FUMARATE DIHYDRATE 160; 4.5 UG/1; UG/1
2 AEROSOL RESPIRATORY (INHALATION) 2 TIMES DAILY
COMMUNITY

## 2023-06-01 RX ORDER — AMPICILLIN TRIHYDRATE 250 MG
200 CAPSULE ORAL
COMMUNITY

## 2023-06-01 RX ORDER — ZINC GLUCONATE 50 MG
50 TABLET ORAL DAILY
COMMUNITY

## 2023-06-01 RX ORDER — NABUMETONE 500 MG/1
500 TABLET, FILM COATED ORAL 2 TIMES DAILY
COMMUNITY

## 2023-06-01 RX ORDER — EVOLOCUMAB 140 MG/ML
INJECTION, SOLUTION SUBCUTANEOUS
COMMUNITY

## 2023-06-01 ASSESSMENT — PATIENT HEALTH QUESTIONNAIRE - PHQ9
SUM OF ALL RESPONSES TO PHQ QUESTIONS 1-9: 2
6. FEELING BAD ABOUT YOURSELF - OR THAT YOU ARE A FAILURE OR HAVE LET YOURSELF OR YOUR FAMILY DOWN: 0
SUM OF ALL RESPONSES TO PHQ9 QUESTIONS 1 & 2: 0
4. FEELING TIRED OR HAVING LITTLE ENERGY: 0
7. TROUBLE CONCENTRATING ON THINGS, SUCH AS READING THE NEWSPAPER OR WATCHING TELEVISION: 0
2. FEELING DOWN, DEPRESSED OR HOPELESS: 0
5. POOR APPETITE OR OVEREATING: 1
8. MOVING OR SPEAKING SO SLOWLY THAT OTHER PEOPLE COULD HAVE NOTICED. OR THE OPPOSITE, BEING SO FIGETY OR RESTLESS THAT YOU HAVE BEEN MOVING AROUND A LOT MORE THAN USUAL: 0
9. THOUGHTS THAT YOU WOULD BE BETTER OFF DEAD, OR OF HURTING YOURSELF: 0
3. TROUBLE FALLING OR STAYING ASLEEP: 1
SUM OF ALL RESPONSES TO PHQ QUESTIONS 1-9: 2
10. IF YOU CHECKED OFF ANY PROBLEMS, HOW DIFFICULT HAVE THESE PROBLEMS MADE IT FOR YOU TO DO YOUR WORK, TAKE CARE OF THINGS AT HOME, OR GET ALONG WITH OTHER PEOPLE: 0
1. LITTLE INTEREST OR PLEASURE IN DOING THINGS: 0
SUM OF ALL RESPONSES TO PHQ QUESTIONS 1-9: 2
SUM OF ALL RESPONSES TO PHQ QUESTIONS 1-9: 2

## 2023-06-01 ASSESSMENT — EXERCISE STRESS TEST
PEAK_HR: 80
PEAK_BP: 160/91
PEAK_METS: 2.9
PEAK_HR: 80
PEAK_RPE: 12
PEAK_BP: 160/91
PEAK_RPE: 12
PEAK_BP: 160/91

## 2023-06-01 ASSESSMENT — EJECTION FRACTION
EF_VALUE: 60
EF_VALUE: 60

## 2023-06-05 ENCOUNTER — HOSPITAL ENCOUNTER (OUTPATIENT)
Facility: HOSPITAL | Age: 73
Setting detail: RECURRING SERIES
Discharge: HOME | End: 2023-06-08
Payer: MEDICARE

## 2023-06-05 VITALS — WEIGHT: 226.6 LBS | BODY MASS INDEX: 32.51 KG/M2

## 2023-06-05 PROCEDURE — 93798 PHYS/QHP OP CAR RHAB W/ECG: CPT

## 2023-06-05 ASSESSMENT — EXERCISE STRESS TEST
PEAK_RPE: 12
PEAK_HR: 87
PEAK_METS: 2.9

## 2023-06-07 ENCOUNTER — HOSPITAL ENCOUNTER (OUTPATIENT)
Facility: HOSPITAL | Age: 73
Setting detail: RECURRING SERIES
Discharge: HOME | End: 2023-06-10
Payer: MEDICARE

## 2023-06-07 VITALS — WEIGHT: 225.8 LBS | BODY MASS INDEX: 32.4 KG/M2

## 2023-06-07 PROCEDURE — 93798 PHYS/QHP OP CAR RHAB W/ECG: CPT

## 2023-06-07 ASSESSMENT — EXERCISE STRESS TEST
PEAK_RPE: 13
PEAK_HR: 80
PEAK_METS: 2.8

## 2023-06-19 ENCOUNTER — HOSPITAL ENCOUNTER (OUTPATIENT)
Facility: HOSPITAL | Age: 73
Setting detail: RECURRING SERIES
Discharge: HOME | End: 2023-06-22
Payer: MEDICARE

## 2023-06-19 VITALS — BODY MASS INDEX: 32.6 KG/M2 | WEIGHT: 227.2 LBS

## 2023-06-19 PROCEDURE — 93798 PHYS/QHP OP CAR RHAB W/ECG: CPT

## 2023-06-19 ASSESSMENT — EXERCISE STRESS TEST
PEAK_METS: 3.4
PEAK_RPE: 13
PEAK_HR: 78

## 2023-06-21 ENCOUNTER — HOSPITAL ENCOUNTER (OUTPATIENT)
Facility: HOSPITAL | Age: 73
Setting detail: RECURRING SERIES
Discharge: HOME | End: 2023-06-24
Payer: MEDICARE

## 2023-06-21 VITALS — BODY MASS INDEX: 32.6 KG/M2 | WEIGHT: 227.2 LBS

## 2023-06-21 PROCEDURE — 93798 PHYS/QHP OP CAR RHAB W/ECG: CPT

## 2023-06-21 ASSESSMENT — EXERCISE STRESS TEST
PEAK_METS: 3.4
PEAK_HR: 45
PEAK_RPE: 13

## 2023-06-26 ENCOUNTER — HOSPITAL ENCOUNTER (OUTPATIENT)
Facility: HOSPITAL | Age: 73
Setting detail: RECURRING SERIES
Discharge: HOME | End: 2023-06-29
Payer: MEDICARE

## 2023-06-26 VITALS — WEIGHT: 228.4 LBS | BODY MASS INDEX: 32.77 KG/M2

## 2023-06-26 PROCEDURE — 93798 PHYS/QHP OP CAR RHAB W/ECG: CPT

## 2023-06-26 ASSESSMENT — EXERCISE STRESS TEST
PEAK_RPE: 12
PEAK_METS: 3.4
PEAK_HR: 97

## 2023-06-26 ASSESSMENT — EJECTION FRACTION: EF_VALUE: 60

## 2023-06-28 ENCOUNTER — HOSPITAL ENCOUNTER (OUTPATIENT)
Facility: HOSPITAL | Age: 73
Setting detail: RECURRING SERIES
Discharge: HOME | End: 2023-07-01
Payer: MEDICARE

## 2023-06-28 VITALS — BODY MASS INDEX: 32.77 KG/M2 | WEIGHT: 228.4 LBS

## 2023-06-28 PROCEDURE — 93798 PHYS/QHP OP CAR RHAB W/ECG: CPT

## 2023-06-28 ASSESSMENT — EXERCISE STRESS TEST
PEAK_RPE: 12
PEAK_HR: 115
PEAK_METS: 3.4

## 2023-07-03 ENCOUNTER — HOSPITAL ENCOUNTER (OUTPATIENT)
Facility: HOSPITAL | Age: 73
Setting detail: RECURRING SERIES
Discharge: HOME | End: 2023-07-06
Payer: MEDICARE

## 2023-07-03 VITALS — BODY MASS INDEX: 33.02 KG/M2 | WEIGHT: 230.1 LBS

## 2023-07-03 PROCEDURE — 93798 PHYS/QHP OP CAR RHAB W/ECG: CPT

## 2023-07-03 ASSESSMENT — EXERCISE STRESS TEST
PEAK_METS: 3.4
PEAK_RPE: 12
PEAK_HR: 78

## 2023-07-05 ENCOUNTER — HOSPITAL ENCOUNTER (OUTPATIENT)
Facility: HOSPITAL | Age: 73
Setting detail: RECURRING SERIES
Discharge: HOME | End: 2023-07-08
Payer: MEDICARE

## 2023-07-05 VITALS — WEIGHT: 228.6 LBS | BODY MASS INDEX: 32.8 KG/M2

## 2023-07-05 PROCEDURE — 93798 PHYS/QHP OP CAR RHAB W/ECG: CPT

## 2023-07-05 PROCEDURE — 93797 PHYS/QHP OP CAR RHAB WO ECG: CPT

## 2023-07-05 ASSESSMENT — EXERCISE STRESS TEST
PEAK_HR: 85
PEAK_METS: 3.4
PEAK_RPE: 13

## 2023-07-05 NOTE — CARDIO/PULMONARY
Cardiac Rehab Nutrition Assessment- 1:1 Evaluation  2023      NAME: Duke Dunn : 1950 AGE: 72 y.o. GENDER: male  CARDIAC REHAB ADMITTING DIAGNOSIS: ST elevation myocardial infarction (STEMI), unspecified artery ([address]) [I21.3]    PROBLEM LIST:  Patient Active Problem List   Diagnosis    Acute bronchitis    Diabetes ([address])    Norris's esophagus    Chronic back pain    Edema    Asthma    Dyslipidemia    ASHD (arteriosclerotic heart disease)    Seasonal allergic rhinitis due to pollen    Diaphoresis    Chest pain    Annual physical exam    Elevated LFTs    Hyperplasia of prostate    Status post total knee replacement    Actinic keratosis    Fatigue    Generalized abdominal pain    Obesity    Diabetes mellitus screening    SARAVIA (dyspnea on exertion)    Sinusitis    Allergic conjunctivitis of both eyes    Essential hypertension    Cough    Other long term (current) drug therapy    SK (seborrheic keratosis)         LABS:   No results found for: HBA1C, [id number]  Lab Results   Component Value Date/Time    CHOL 196 09/10/2021 11:03 AM    HDL 50 09/10/2021 11:03 AM    VLDL 27 2020 11:07 AM         MEDICATIONS/SUPPLEMENTS:   Scheduled Meds:  Continuous Infusions:  PRN Meds:. Prior to Admission medications    Medication Sig Start Date End Date Taking?  Authorizing Provider   nabumetone (RELAFEN) 500 MG tablet Take 1 tablet by mouth 2 times daily    Historical Provider, MD   budesonide-formoterol (SYMBICORT) 160-4.5 MCG/ACT AERO Inhale 2 puffs into the lungs 2 times daily    Historical Provider, MD   Coenzyme Q10 (COQ10) 200 MG CAPS Take 200 mg by mouth    Historical Provider, MD   Multiple Vitamins-Minerals (MULTIVITAMIN ADULT EXTRA C PO) Take by mouth    Historical Provider, MD   Evolocumab (REPATHA) SOSY syringe Inject into the skin Every 2 weeks    Historical Provider, MD   zinc gluconate 50 MG tablet Take 1 tablet by mouth daily    Historical Provider, MD   ticagrelor (BRILINTA) 90 MG TABS
not applicable

## 2023-07-10 ENCOUNTER — HOSPITAL ENCOUNTER (OUTPATIENT)
Facility: HOSPITAL | Age: 73
Setting detail: RECURRING SERIES
Discharge: HOME | End: 2023-07-13
Payer: MEDICARE

## 2023-07-10 VITALS — WEIGHT: 229 LBS | BODY MASS INDEX: 32.86 KG/M2

## 2023-07-10 PROCEDURE — 93798 PHYS/QHP OP CAR RHAB W/ECG: CPT

## 2023-07-10 ASSESSMENT — EXERCISE STRESS TEST
PEAK_METS: 3.4
PEAK_HR: 86
PEAK_RPE: 13

## 2023-07-12 ENCOUNTER — HOSPITAL ENCOUNTER (OUTPATIENT)
Facility: HOSPITAL | Age: 73
Setting detail: RECURRING SERIES
Discharge: HOME | End: 2023-07-15
Payer: MEDICARE

## 2023-07-12 VITALS — BODY MASS INDEX: 32.71 KG/M2 | WEIGHT: 228 LBS

## 2023-07-12 PROCEDURE — 93798 PHYS/QHP OP CAR RHAB W/ECG: CPT

## 2023-07-12 ASSESSMENT — EXERCISE STRESS TEST
PEAK_HR: 84
PEAK_METS: 3.8
PEAK_RPE: 13

## 2023-07-17 ENCOUNTER — HOSPITAL ENCOUNTER (OUTPATIENT)
Facility: HOSPITAL | Age: 73
Setting detail: RECURRING SERIES
Discharge: HOME | End: 2023-07-20
Payer: MEDICARE

## 2023-07-17 VITALS — BODY MASS INDEX: 32.87 KG/M2 | WEIGHT: 229.1 LBS

## 2023-07-17 PROCEDURE — 93798 PHYS/QHP OP CAR RHAB W/ECG: CPT

## 2023-07-17 ASSESSMENT — EXERCISE STRESS TEST
PEAK_HR: 84
PEAK_METS: 3.8
PEAK_RPE: 13

## 2023-07-19 ENCOUNTER — APPOINTMENT (OUTPATIENT)
Facility: HOSPITAL | Age: 73
End: 2023-07-19
Payer: MEDICARE

## 2023-07-24 ENCOUNTER — HOSPITAL ENCOUNTER (OUTPATIENT)
Facility: HOSPITAL | Age: 73
Setting detail: RECURRING SERIES
Discharge: HOME | End: 2023-07-27
Payer: MEDICARE

## 2023-07-24 VITALS — WEIGHT: 226 LBS | BODY MASS INDEX: 32.43 KG/M2

## 2023-07-24 PROCEDURE — 93798 PHYS/QHP OP CAR RHAB W/ECG: CPT

## 2023-07-24 ASSESSMENT — EXERCISE STRESS TEST
PEAK_METS: 3.8
PEAK_HR: 84
PEAK_RPE: 13

## 2023-07-26 ENCOUNTER — HOSPITAL ENCOUNTER (OUTPATIENT)
Facility: HOSPITAL | Age: 73
Setting detail: RECURRING SERIES
Discharge: HOME | End: 2023-07-29
Payer: MEDICARE

## 2023-07-26 VITALS — BODY MASS INDEX: 32.14 KG/M2 | WEIGHT: 224 LBS

## 2023-07-26 PROCEDURE — 93798 PHYS/QHP OP CAR RHAB W/ECG: CPT

## 2023-07-26 ASSESSMENT — EJECTION FRACTION: EF_VALUE: 60

## 2023-07-26 ASSESSMENT — EXERCISE STRESS TEST
PEAK_HR: 68
PEAK_RPE: 14
PEAK_METS: 3.8

## 2023-07-31 ENCOUNTER — HOSPITAL ENCOUNTER (OUTPATIENT)
Facility: HOSPITAL | Age: 73
Setting detail: RECURRING SERIES
Discharge: HOME | End: 2023-08-03
Payer: MEDICARE

## 2023-07-31 VITALS — BODY MASS INDEX: 32 KG/M2 | WEIGHT: 223 LBS

## 2023-07-31 PROCEDURE — 93798 PHYS/QHP OP CAR RHAB W/ECG: CPT

## 2023-07-31 ASSESSMENT — EXERCISE STRESS TEST
PEAK_HR: 70
PEAK_RPE: 14
PEAK_METS: 3.8

## 2023-08-02 ENCOUNTER — HOSPITAL ENCOUNTER (OUTPATIENT)
Facility: HOSPITAL | Age: 73
Setting detail: RECURRING SERIES
Discharge: HOME | End: 2023-08-05
Payer: MEDICARE

## 2023-08-02 VITALS — WEIGHT: 228.6 LBS | BODY MASS INDEX: 32.8 KG/M2

## 2023-08-02 PROCEDURE — 93798 PHYS/QHP OP CAR RHAB W/ECG: CPT

## 2023-08-02 ASSESSMENT — EXERCISE STRESS TEST
PEAK_HR: 76
PEAK_METS: 3.8
PEAK_RPE: 14

## 2023-08-07 ENCOUNTER — HOSPITAL ENCOUNTER (OUTPATIENT)
Facility: HOSPITAL | Age: 73
Setting detail: RECURRING SERIES
Discharge: HOME | End: 2023-08-10
Payer: MEDICARE

## 2023-08-07 VITALS — WEIGHT: 225 LBS | BODY MASS INDEX: 32.28 KG/M2

## 2023-08-07 PROCEDURE — 93798 PHYS/QHP OP CAR RHAB W/ECG: CPT

## 2023-08-07 ASSESSMENT — EXERCISE STRESS TEST
PEAK_HR: 66
PEAK_METS: 3.8
PEAK_RPE: 14

## 2023-08-09 ENCOUNTER — HOSPITAL ENCOUNTER (OUTPATIENT)
Facility: HOSPITAL | Age: 73
Setting detail: RECURRING SERIES
Discharge: HOME | End: 2023-08-12
Payer: MEDICARE

## 2023-08-09 VITALS — WEIGHT: 224.2 LBS | BODY MASS INDEX: 32.17 KG/M2

## 2023-08-09 PROCEDURE — 93798 PHYS/QHP OP CAR RHAB W/ECG: CPT

## 2023-08-09 ASSESSMENT — EXERCISE STRESS TEST
PEAK_METS: 3.8
PEAK_HR: 71
PEAK_RPE: 14

## 2023-08-14 ENCOUNTER — HOSPITAL ENCOUNTER (OUTPATIENT)
Facility: HOSPITAL | Age: 73
Setting detail: RECURRING SERIES
Discharge: HOME | End: 2023-08-17
Payer: MEDICARE

## 2023-08-14 VITALS — BODY MASS INDEX: 32.71 KG/M2 | WEIGHT: 228 LBS

## 2023-08-14 PROCEDURE — 93798 PHYS/QHP OP CAR RHAB W/ECG: CPT

## 2023-08-14 ASSESSMENT — EXERCISE STRESS TEST
PEAK_RPE: 14
PEAK_METS: 3.8
PEAK_HR: 77

## 2023-08-16 ENCOUNTER — HOSPITAL ENCOUNTER (OUTPATIENT)
Facility: HOSPITAL | Age: 73
Setting detail: RECURRING SERIES
Discharge: HOME | End: 2023-08-19
Payer: MEDICARE

## 2023-08-16 VITALS — BODY MASS INDEX: 32.43 KG/M2 | WEIGHT: 226 LBS

## 2023-08-16 PROCEDURE — 93798 PHYS/QHP OP CAR RHAB W/ECG: CPT

## 2023-08-16 ASSESSMENT — EXERCISE STRESS TEST
PEAK_HR: 71
PEAK_METS: 3.8
PEAK_RPE: 14

## 2023-08-21 ENCOUNTER — HOSPITAL ENCOUNTER (OUTPATIENT)
Facility: HOSPITAL | Age: 73
Setting detail: RECURRING SERIES
Discharge: HOME | End: 2023-08-24
Payer: MEDICARE

## 2023-08-21 VITALS — BODY MASS INDEX: 32.43 KG/M2 | WEIGHT: 226 LBS

## 2023-08-21 PROCEDURE — 93798 PHYS/QHP OP CAR RHAB W/ECG: CPT

## 2023-08-21 ASSESSMENT — EJECTION FRACTION: EF_VALUE: 60

## 2023-08-21 ASSESSMENT — EXERCISE STRESS TEST
PEAK_RPE: 13
PEAK_HR: 80
PEAK_METS: 3.8

## 2023-08-23 ENCOUNTER — HOSPITAL ENCOUNTER (OUTPATIENT)
Facility: HOSPITAL | Age: 73
Setting detail: RECURRING SERIES
Discharge: HOME | End: 2023-08-26
Payer: MEDICARE

## 2023-08-23 VITALS — BODY MASS INDEX: 32.46 KG/M2 | WEIGHT: 226.2 LBS

## 2023-08-23 PROCEDURE — 93798 PHYS/QHP OP CAR RHAB W/ECG: CPT

## 2023-08-23 ASSESSMENT — EXERCISE STRESS TEST
PEAK_METS: 3.8
PEAK_HR: 89
PEAK_RPE: 13

## 2023-08-28 ENCOUNTER — HOSPITAL ENCOUNTER (OUTPATIENT)
Facility: HOSPITAL | Age: 73
Setting detail: RECURRING SERIES
Discharge: HOME | End: 2023-08-31
Payer: MEDICARE

## 2023-08-28 VITALS — WEIGHT: 230 LBS | BODY MASS INDEX: 33 KG/M2

## 2023-08-28 PROCEDURE — 93798 PHYS/QHP OP CAR RHAB W/ECG: CPT

## 2023-08-28 ASSESSMENT — EXERCISE STRESS TEST
PEAK_RPE: 13
PEAK_HR: 92
PEAK_METS: 3.8

## 2023-08-30 ENCOUNTER — HOSPITAL ENCOUNTER (OUTPATIENT)
Facility: HOSPITAL | Age: 73
Setting detail: RECURRING SERIES
Discharge: HOME | End: 2023-09-02
Payer: MEDICARE

## 2023-08-30 VITALS — WEIGHT: 227 LBS | BODY MASS INDEX: 32.57 KG/M2

## 2023-08-30 PROCEDURE — 93798 PHYS/QHP OP CAR RHAB W/ECG: CPT

## 2023-08-30 ASSESSMENT — EXERCISE STRESS TEST
PEAK_RPE: 13
PEAK_METS: 3.8
PEAK_HR: 90

## 2023-09-06 ENCOUNTER — HOSPITAL ENCOUNTER (OUTPATIENT)
Facility: HOSPITAL | Age: 73
Setting detail: RECURRING SERIES
Discharge: HOME | End: 2023-09-09
Payer: MEDICARE

## 2023-09-06 VITALS — WEIGHT: 226 LBS | BODY MASS INDEX: 32.43 KG/M2

## 2023-09-06 PROCEDURE — 93798 PHYS/QHP OP CAR RHAB W/ECG: CPT

## 2023-09-06 ASSESSMENT — EXERCISE STRESS TEST
PEAK_RPE: 13
PEAK_HR: 98
PEAK_METS: 3.8

## 2023-09-11 ENCOUNTER — HOSPITAL ENCOUNTER (OUTPATIENT)
Facility: HOSPITAL | Age: 73
Setting detail: RECURRING SERIES
Discharge: HOME | End: 2023-09-14
Payer: MEDICARE

## 2023-09-11 VITALS — BODY MASS INDEX: 32.46 KG/M2 | WEIGHT: 226.2 LBS

## 2023-09-11 PROCEDURE — 93798 PHYS/QHP OP CAR RHAB W/ECG: CPT

## 2023-09-11 ASSESSMENT — EXERCISE STRESS TEST
PEAK_HR: 88
PEAK_METS: 3.8
PEAK_RPE: 13

## 2023-09-12 RX ORDER — NABUMETONE 500 MG/1
500 TABLET, FILM COATED ORAL 2 TIMES DAILY
Qty: 180 TABLET | Refills: 0 | Status: SHIPPED | OUTPATIENT
Start: 2023-09-12

## 2023-09-12 NOTE — TELEPHONE ENCOUNTER
Last Refill: 12-6-22  Last Visit: 4/4/2023   Next Visit: 10/9/2023     Requested Prescriptions     Pending Prescriptions Disp Refills    nabumetone (RELAFEN) 500 MG tablet [Pharmacy Med Name: NABUMETONE 500 MG TABLET] 180 tablet 0     Sig: TAKE ONE TABLET BY MOUTH TWICE A DAY

## 2023-09-13 ENCOUNTER — HOSPITAL ENCOUNTER (OUTPATIENT)
Facility: HOSPITAL | Age: 73
Setting detail: RECURRING SERIES
Discharge: HOME | End: 2023-09-16
Payer: MEDICARE

## 2023-09-13 VITALS — WEIGHT: 224.2 LBS | BODY MASS INDEX: 32.17 KG/M2

## 2023-09-13 PROCEDURE — 93798 PHYS/QHP OP CAR RHAB W/ECG: CPT

## 2023-09-13 ASSESSMENT — EXERCISE STRESS TEST
PEAK_RPE: 13
PEAK_METS: 3.8
PEAK_HR: 91

## 2023-09-15 ASSESSMENT — EJECTION FRACTION: EF_VALUE: 60

## 2023-09-18 ENCOUNTER — HOSPITAL ENCOUNTER (OUTPATIENT)
Facility: HOSPITAL | Age: 73
Setting detail: RECURRING SERIES
Discharge: HOME | End: 2023-09-21
Payer: MEDICARE

## 2023-09-18 VITALS — BODY MASS INDEX: 32.57 KG/M2 | WEIGHT: 227 LBS

## 2023-09-18 PROCEDURE — 93798 PHYS/QHP OP CAR RHAB W/ECG: CPT

## 2023-09-18 ASSESSMENT — EXERCISE STRESS TEST
PEAK_HR: 80
PEAK_RPE: 12
PEAK_METS: 3.8

## 2023-09-18 ASSESSMENT — EJECTION FRACTION: EF_VALUE: 60

## 2023-09-20 ENCOUNTER — APPOINTMENT (OUTPATIENT)
Facility: HOSPITAL | Age: 73
End: 2023-09-20
Payer: MEDICARE

## 2023-09-21 ENCOUNTER — TRANSCRIBE ORDERS (OUTPATIENT)
Facility: HOSPITAL | Age: 73
End: 2023-09-21

## 2023-09-21 DIAGNOSIS — M54.50 LUMBAR PAIN: Primary | ICD-10-CM

## 2023-09-21 DIAGNOSIS — M54.16 LUMBAR RADICULOPATHY: ICD-10-CM

## 2023-09-22 ENCOUNTER — HOSPITAL ENCOUNTER (OUTPATIENT)
Age: 73
End: 2023-09-22
Payer: MEDICARE

## 2023-09-22 DIAGNOSIS — M54.50 LUMBAR PAIN: ICD-10-CM

## 2023-09-22 DIAGNOSIS — M54.16 LUMBAR RADICULOPATHY: ICD-10-CM

## 2023-09-22 PROCEDURE — 72148 MRI LUMBAR SPINE W/O DYE: CPT

## 2023-09-25 ENCOUNTER — APPOINTMENT (OUTPATIENT)
Facility: HOSPITAL | Age: 73
End: 2023-09-25
Payer: MEDICARE

## 2023-09-26 LAB — HBA1C MFR BLD HPLC: 6 %

## 2023-09-27 ENCOUNTER — APPOINTMENT (OUTPATIENT)
Facility: HOSPITAL | Age: 73
End: 2023-09-27
Payer: MEDICARE

## 2023-10-06 ASSESSMENT — LIFESTYLE VARIABLES: HOW MANY STANDARD DRINKS CONTAINING ALCOHOL DO YOU HAVE ON A TYPICAL DAY: 0

## 2023-10-09 ENCOUNTER — OFFICE VISIT (OUTPATIENT)
Facility: CLINIC | Age: 73
End: 2023-10-09

## 2023-10-09 VITALS
WEIGHT: 219 LBS | RESPIRATION RATE: 18 BRPM | OXYGEN SATURATION: 94 % | DIASTOLIC BLOOD PRESSURE: 70 MMHG | TEMPERATURE: 98.3 F | SYSTOLIC BLOOD PRESSURE: 118 MMHG | BODY MASS INDEX: 31.35 KG/M2 | HEART RATE: 50 BPM | HEIGHT: 70 IN

## 2023-10-09 DIAGNOSIS — Z00.00 MEDICARE ANNUAL WELLNESS VISIT, SUBSEQUENT: ICD-10-CM

## 2023-10-09 DIAGNOSIS — E11.59 TYPE 2 DIABETES MELLITUS WITH OTHER CIRCULATORY COMPLICATION, WITHOUT LONG-TERM CURRENT USE OF INSULIN (HCC): ICD-10-CM

## 2023-10-09 DIAGNOSIS — J45.20 MILD INTERMITTENT ASTHMA, UNCOMPLICATED: ICD-10-CM

## 2023-10-09 DIAGNOSIS — Z23 NEEDS FLU SHOT: ICD-10-CM

## 2023-10-09 DIAGNOSIS — I25.10 ASHD (ARTERIOSCLEROTIC HEART DISEASE): ICD-10-CM

## 2023-10-09 DIAGNOSIS — K22.70 BARRETT'S ESOPHAGUS WITHOUT DYSPLASIA: ICD-10-CM

## 2023-10-09 DIAGNOSIS — E78.5 DYSLIPIDEMIA: ICD-10-CM

## 2023-10-09 DIAGNOSIS — Z12.5 ENCOUNTER FOR SCREENING FOR MALIGNANT NEOPLASM OF PROSTATE: ICD-10-CM

## 2023-10-09 DIAGNOSIS — I10 ESSENTIAL HYPERTENSION: Primary | ICD-10-CM

## 2023-10-09 RX ORDER — PREGABALIN 75 MG/1
75 CAPSULE ORAL 3 TIMES DAILY
COMMUNITY
Start: 2023-09-25

## 2023-10-09 RX ORDER — CLOPIDOGREL BISULFATE 75 MG/1
75 TABLET ORAL DAILY
COMMUNITY
Start: 2023-08-04

## 2023-10-09 RX ORDER — ALIROCUMAB 75 MG/ML
75 INJECTION, SOLUTION SUBCUTANEOUS
COMMUNITY
Start: 2023-10-03

## 2023-10-09 RX ORDER — DILTIAZEM HYDROCHLORIDE 240 MG/1
240 CAPSULE, COATED, EXTENDED RELEASE ORAL DAILY
COMMUNITY
Start: 2023-08-24

## 2023-11-02 ENCOUNTER — TELEPHONE (OUTPATIENT)
Facility: HOSPITAL | Age: 73
End: 2023-11-02

## 2023-12-11 ENCOUNTER — HOSPITAL ENCOUNTER (OUTPATIENT)
Facility: HOSPITAL | Age: 73
Setting detail: RECURRING SERIES
Discharge: HOME | End: 2023-12-14
Payer: MEDICARE

## 2023-12-11 VITALS — WEIGHT: 226 LBS | BODY MASS INDEX: 32.43 KG/M2

## 2023-12-11 PROCEDURE — 93798 PHYS/QHP OP CAR RHAB W/ECG: CPT

## 2023-12-11 ASSESSMENT — PATIENT HEALTH QUESTIONNAIRE - PHQ9
9. THOUGHTS THAT YOU WOULD BE BETTER OFF DEAD, OR OF HURTING YOURSELF: 0
3. TROUBLE FALLING OR STAYING ASLEEP: 0
SUM OF ALL RESPONSES TO PHQ QUESTIONS 1-9: 2
2. FEELING DOWN, DEPRESSED OR HOPELESS: 0
6. FEELING BAD ABOUT YOURSELF - OR THAT YOU ARE A FAILURE OR HAVE LET YOURSELF OR YOUR FAMILY DOWN: 0
8. MOVING OR SPEAKING SO SLOWLY THAT OTHER PEOPLE COULD HAVE NOTICED. OR THE OPPOSITE, BEING SO FIGETY OR RESTLESS THAT YOU HAVE BEEN MOVING AROUND A LOT MORE THAN USUAL: 0
10. IF YOU CHECKED OFF ANY PROBLEMS, HOW DIFFICULT HAVE THESE PROBLEMS MADE IT FOR YOU TO DO YOUR WORK, TAKE CARE OF THINGS AT HOME, OR GET ALONG WITH OTHER PEOPLE: 0
1. LITTLE INTEREST OR PLEASURE IN DOING THINGS: 0
SUM OF ALL RESPONSES TO PHQ9 QUESTIONS 1 & 2: 0
SUM OF ALL RESPONSES TO PHQ QUESTIONS 1-9: 2
SUM OF ALL RESPONSES TO PHQ QUESTIONS 1-9: 2
7. TROUBLE CONCENTRATING ON THINGS, SUCH AS READING THE NEWSPAPER OR WATCHING TELEVISION: 0
5. POOR APPETITE OR OVEREATING: 1
SUM OF ALL RESPONSES TO PHQ QUESTIONS 1-9: 2
4. FEELING TIRED OR HAVING LITTLE ENERGY: 1

## 2023-12-11 ASSESSMENT — EJECTION FRACTION: EF_VALUE: 60

## 2023-12-11 ASSESSMENT — EXERCISE STRESS TEST
PEAK_METS: 3.8
PEAK_HR: 77
PEAK_RPE: 13

## 2023-12-13 ENCOUNTER — HOSPITAL ENCOUNTER (OUTPATIENT)
Facility: HOSPITAL | Age: 73
Setting detail: RECURRING SERIES
Discharge: HOME | End: 2023-12-16
Payer: MEDICARE

## 2023-12-13 VITALS — BODY MASS INDEX: 32.23 KG/M2 | WEIGHT: 224.6 LBS

## 2023-12-13 PROCEDURE — 93798 PHYS/QHP OP CAR RHAB W/ECG: CPT

## 2023-12-13 ASSESSMENT — EXERCISE STRESS TEST
PEAK_RPE: 13
PEAK_HR: 84
PEAK_METS: 3.8

## 2023-12-22 LAB — HBA1C MFR BLD HPLC: 5.7 %

## 2024-02-20 ENCOUNTER — OFFICE VISIT (OUTPATIENT)
Age: 74
End: 2024-02-20

## 2024-02-20 VITALS
BODY MASS INDEX: 32.71 KG/M2 | TEMPERATURE: 98 F | HEART RATE: 90 BPM | DIASTOLIC BLOOD PRESSURE: 79 MMHG | OXYGEN SATURATION: 98 % | SYSTOLIC BLOOD PRESSURE: 138 MMHG | RESPIRATION RATE: 18 BRPM | WEIGHT: 228 LBS

## 2024-02-20 DIAGNOSIS — K21.9 GASTROESOPHAGEAL REFLUX DISEASE, UNSPECIFIED WHETHER ESOPHAGITIS PRESENT: Primary | ICD-10-CM

## 2024-02-20 DIAGNOSIS — R10.13 EPIGASTRIC PAIN: ICD-10-CM

## 2024-02-20 DIAGNOSIS — K59.00 CONSTIPATION, UNSPECIFIED CONSTIPATION TYPE: ICD-10-CM

## 2024-02-20 RX ORDER — PANTOPRAZOLE SODIUM 40 MG/1
40 TABLET, DELAYED RELEASE ORAL
Qty: 30 TABLET | Refills: 0 | Status: SHIPPED | OUTPATIENT
Start: 2024-02-20

## 2024-02-20 RX ORDER — METOCLOPRAMIDE 5 MG/1
5 TABLET ORAL 3 TIMES DAILY
Qty: 15 TABLET | Refills: 0 | Status: SHIPPED | OUTPATIENT
Start: 2024-02-20

## 2024-02-20 ASSESSMENT — ENCOUNTER SYMPTOMS
BELCHING: 1
HEARTBURN: 1
NAUSEA: 1
COUGH: 1
ABDOMINAL PAIN: 1
CHEST TIGHTNESS: 1
HOARSE VOICE: 1

## 2024-02-20 NOTE — PATIENT INSTRUCTIONS
Miralax powder 1 scoop daily   Colace 100 mg 3 x day     If Protonix not covered take Omeprazole twice a day

## 2024-02-20 NOTE — PROGRESS NOTES
Chief Complaint   Patient presents with    Heartburn     C/o heartburn, cough and vomiting. Sx started last night.         Heartburn  He complains of abdominal pain, belching, coughing, heartburn, a hoarse voice and nausea. This is a chronic problem. Episode onset: long standing -worsen yesterday. The problem occurs constantly. Constipation - no good BM x 3 days . Risk factors include obesity and Norris's esophagus. Treatments tried: on omeprazole - ? mg once  a day. Past procedures include an EGD.       Past Medical History:   Diagnosis Date    Actinic keratosis 09/15/2017    Annual physical exam 09/15/2017    Arthritis     ASHD (arteriosclerotic heart disease) 09/15/2017    Asthma     Norris's esophagus     CAD (coronary artery disease)     Chest pain 09/15/2017    Chronic back pain 09/15/2017    Chronic obstructive pulmonary disease (HCC)     Chronic pain     Cough 09/15/2017    Diabetes (HCC)     hypoglycemia hx    Diabetes mellitus screening 09/15/2017    Diaphoresis 09/15/2017    SARAVIA (dyspnea on exertion)     Dyslipidemia 09/15/2017    Edema 09/15/2017    Elevated LFTs 09/15/2017    Essential hypertension 06/07/2019    Fatigue 09/15/2017    GERD (gastroesophageal reflux disease)     Norris's esophagus    Hypercholesterolemia     Hyperplasia of prostate 09/15/2017    Impaired glucose tolerance 01/17/2020    Obesity 09/15/2017    Other long term (current) drug therapy 09/15/2017    Seasonal allergic rhinitis due to pollen 03/18/2021    Sinusitis 09/15/2017    SK (seborrheic keratosis) 09/15/2017    Sleep apnea     CPAP compliant per pt, 01/27/2023       Past Surgical History:   Procedure Laterality Date    APPENDECTOMY      CARDIAC CATHETERIZATION      2008    CARDIAC PROCEDURE N/A 05/07/2023    Left heart cath / coronary angiography performed by Rashid Sigala MD at [facility] CARDIAC CATH LAB    CARDIAC PROCEDURE N/A 05/07/2023    Percutaneous coronary intervention performed by Rashid Sigala MD at [facility]

## 2024-03-19 NOTE — TELEPHONE ENCOUNTER
Last Refill: 2-27-23  Last Visit: 10/9/2023   Next Visit: 4/10/2024     Requested Prescriptions     Pending Prescriptions Disp Refills    levocetirizine (XYZAL) 5 MG tablet [Pharmacy Med Name: LEVOCETIRIZINE 5 MG TABLET] 90 tablet 1     Sig: TAKE ONE TABLET BY MOUTH DAILY

## 2024-03-20 RX ORDER — LEVOCETIRIZINE DIHYDROCHLORIDE 5 MG/1
TABLET, FILM COATED ORAL
Qty: 90 TABLET | Refills: 1 | Status: SHIPPED | OUTPATIENT
Start: 2024-03-20

## 2024-03-21 RX ORDER — NABUMETONE 500 MG/1
500 TABLET, FILM COATED ORAL 2 TIMES DAILY
Qty: 180 TABLET | Refills: 0 | Status: SHIPPED | OUTPATIENT
Start: 2024-03-21

## 2024-03-21 NOTE — TELEPHONE ENCOUNTER
Last Refill: 12-18-23  Last Visit: 10/9/2023   Next Visit: 4/10/2024     Requested Prescriptions     Pending Prescriptions Disp Refills    nabumetone (RELAFEN) 500 MG tablet [Pharmacy Med Name: NABUMETONE 500 MG TABLET] 180 tablet 0     Sig: TAKE 1 TABLET BY MOUTH TWICE A DAY

## 2024-04-03 NOTE — TELEPHONE ENCOUNTER
PCP: LINDA Joy MD    Last appt: 10/9/2023    Future Appointments   Date Time Provider Department Center   4/10/2024  8:00 AM LINDA Joy MD [facility] AMB       Requested Prescriptions     Pending Prescriptions Disp Refills    omeprazole (PRILOSEC) 40 MG delayed release capsule 90 capsule 1     Sig: Take 1 capsule by mouth daily

## 2024-04-03 NOTE — TELEPHONE ENCOUNTER
Patient is calling and states that he needs a refill on his omeprazole.  He states that his [facility] usually fills it but he has attempted to call them but cannot reach anyone and is out of medication.

## 2024-04-04 RX ORDER — OMEPRAZOLE 40 MG/1
40 CAPSULE, DELAYED RELEASE ORAL DAILY
Qty: 90 CAPSULE | Refills: 1 | Status: SHIPPED | OUTPATIENT
Start: 2024-04-04

## 2024-04-09 LAB — HBA1C MFR BLD HPLC: 5.9 %

## 2024-04-10 ENCOUNTER — OFFICE VISIT (OUTPATIENT)
Facility: CLINIC | Age: 74
End: 2024-04-10
Payer: MEDICARE

## 2024-04-10 VITALS
SYSTOLIC BLOOD PRESSURE: 112 MMHG | TEMPERATURE: 98.3 F | HEIGHT: 70 IN | WEIGHT: 230 LBS | OXYGEN SATURATION: 94 % | BODY MASS INDEX: 32.93 KG/M2 | RESPIRATION RATE: 18 BRPM | DIASTOLIC BLOOD PRESSURE: 66 MMHG | HEART RATE: 53 BPM

## 2024-04-10 DIAGNOSIS — Z12.5 ENCOUNTER FOR SCREENING FOR MALIGNANT NEOPLASM OF PROSTATE: ICD-10-CM

## 2024-04-10 DIAGNOSIS — K22.70 BARRETT'S ESOPHAGUS WITHOUT DYSPLASIA: ICD-10-CM

## 2024-04-10 DIAGNOSIS — E11.59 TYPE 2 DIABETES MELLITUS WITH OTHER CIRCULATORY COMPLICATION, WITHOUT LONG-TERM CURRENT USE OF INSULIN (HCC): ICD-10-CM

## 2024-04-10 DIAGNOSIS — N40.0 HYPERPLASIA OF PROSTATE: ICD-10-CM

## 2024-04-10 DIAGNOSIS — Z51.81 ENCOUNTER FOR MONITORING ANTIPLATELET THERAPY: ICD-10-CM

## 2024-04-10 DIAGNOSIS — I25.10 ASHD (ARTERIOSCLEROTIC HEART DISEASE): Primary | ICD-10-CM

## 2024-04-10 DIAGNOSIS — Z79.02 ENCOUNTER FOR MONITORING ANTIPLATELET THERAPY: ICD-10-CM

## 2024-04-10 DIAGNOSIS — I10 ESSENTIAL HYPERTENSION: ICD-10-CM

## 2024-04-10 DIAGNOSIS — E78.5 DYSLIPIDEMIA: ICD-10-CM

## 2024-04-10 PROCEDURE — 1123F ACP DISCUSS/DSCN MKR DOCD: CPT | Performed by: INTERNAL MEDICINE

## 2024-04-10 PROCEDURE — 3078F DIAST BP <80 MM HG: CPT | Performed by: INTERNAL MEDICINE

## 2024-04-10 PROCEDURE — 3074F SYST BP LT 130 MM HG: CPT | Performed by: INTERNAL MEDICINE

## 2024-04-10 PROCEDURE — G8428 CUR MEDS NOT DOCUMENT: HCPCS | Performed by: INTERNAL MEDICINE

## 2024-04-10 PROCEDURE — 2022F DILAT RTA XM EVC RTNOPTHY: CPT | Performed by: INTERNAL MEDICINE

## 2024-04-10 PROCEDURE — 1036F TOBACCO NON-USER: CPT | Performed by: INTERNAL MEDICINE

## 2024-04-10 PROCEDURE — 3017F COLORECTAL CA SCREEN DOC REV: CPT | Performed by: INTERNAL MEDICINE

## 2024-04-10 PROCEDURE — 3046F HEMOGLOBIN A1C LEVEL >9.0%: CPT | Performed by: INTERNAL MEDICINE

## 2024-04-10 PROCEDURE — G8417 CALC BMI ABV UP PARAM F/U: HCPCS | Performed by: INTERNAL MEDICINE

## 2024-04-10 PROCEDURE — 99214 OFFICE O/P EST MOD 30 MIN: CPT | Performed by: INTERNAL MEDICINE

## 2024-04-10 RX ORDER — EVOLOCUMAB 140 MG/ML
INJECTION, SOLUTION SUBCUTANEOUS
COMMUNITY
Start: 2024-03-21

## 2024-04-10 SDOH — ECONOMIC STABILITY: FOOD INSECURITY: WITHIN THE PAST 12 MONTHS, THE FOOD YOU BOUGHT JUST DIDN'T LAST AND YOU DIDN'T HAVE MONEY TO GET MORE.: NEVER TRUE

## 2024-04-10 SDOH — ECONOMIC STABILITY: HOUSING INSECURITY
IN THE LAST 12 MONTHS, WAS THERE A TIME WHEN YOU DID NOT HAVE A STEADY PLACE TO SLEEP OR SLEPT IN A SHELTER (INCLUDING NOW)?: NO

## 2024-04-10 SDOH — ECONOMIC STABILITY: FOOD INSECURITY: WITHIN THE PAST 12 MONTHS, YOU WORRIED THAT YOUR FOOD WOULD RUN OUT BEFORE YOU GOT MONEY TO BUY MORE.: NEVER TRUE

## 2024-04-10 ASSESSMENT — PATIENT HEALTH QUESTIONNAIRE - PHQ9
1. LITTLE INTEREST OR PLEASURE IN DOING THINGS: NOT AT ALL
SUM OF ALL RESPONSES TO PHQ QUESTIONS 1-9: 0
SUM OF ALL RESPONSES TO PHQ QUESTIONS 1-9: 0
2. FEELING DOWN, DEPRESSED OR HOPELESS: NOT AT ALL
SUM OF ALL RESPONSES TO PHQ QUESTIONS 1-9: 0
SUM OF ALL RESPONSES TO PHQ QUESTIONS 1-9: 0
SUM OF ALL RESPONSES TO PHQ9 QUESTIONS 1 & 2: 0

## 2024-04-10 NOTE — PROGRESS NOTES
Chief Complaint   Patient presents with    Hypertension     6 month follow-up          Health Maintenance Due   Topic Date Due    Diabetic foot exam  Never done    Diabetic retinal exam  Never done    Hepatitis C screen  Never done    DTaP/Tdap/Td vaccine (1 - Tdap) Never done    Shingles vaccine (1 of 2) Never done    Respiratory Syncytial Virus (RSV) Pregnant or age 60 yrs+ (1 - 1-dose 60+ series) Never done    Pneumococcal 65+ years Vaccine (2 of 2 - PPSV23 or PCV20) 11/07/2019    Diabetic Alb to Cr ratio (uACR) test  03/14/2023    COVID-19 Vaccine (3 - 2023-24 season) 09/01/2023    GFR test (Diabetes, CKD 3-4, OR last GFR 15-59)  05/08/2024         \"Have you been to the ER, urgent care clinic since your last visit?  Hospitalized since your last visit?\"    Yes, on file     “Have you seen or consulted any other health care providers outside of [facility] since your last visit?”    Yes, on file             
PSA on return.  We will obtain results of labs done by his endocrinologist recently for review.  He continues omeprazole for Norris's esophagus.  He has had some intermittent hoarseness and if this persist or progresses we will follow-up for further evaluation.        I have reviewed with the patient details of the assessment and plan and all questions were answered. Relevent patient education was performed. Verbal and/or written instructions (see AVS) provided. The most recent lab findings were reviewed with the patient.  Plan was discussed with patient who verbally expressed understanding.    An After Visit Summary was printed and given to the patient.    Jhon Joy MD

## 2024-06-27 ENCOUNTER — OFFICE VISIT (OUTPATIENT)
Age: 74
End: 2024-06-27

## 2024-06-27 VITALS
TEMPERATURE: 98.2 F | OXYGEN SATURATION: 98 % | HEART RATE: 57 BPM | WEIGHT: 226.2 LBS | DIASTOLIC BLOOD PRESSURE: 62 MMHG | SYSTOLIC BLOOD PRESSURE: 133 MMHG | RESPIRATION RATE: 18 BRPM | BODY MASS INDEX: 32.46 KG/M2

## 2024-06-27 DIAGNOSIS — J01.90 ACUTE BACTERIAL SINUSITIS: Primary | ICD-10-CM

## 2024-06-27 DIAGNOSIS — R05.1 ACUTE COUGH: ICD-10-CM

## 2024-06-27 DIAGNOSIS — B96.89 ACUTE BACTERIAL SINUSITIS: Primary | ICD-10-CM

## 2024-06-27 RX ORDER — AMOXICILLIN AND CLAVULANATE POTASSIUM 875; 125 MG/1; MG/1
1 TABLET, FILM COATED ORAL 2 TIMES DAILY
Qty: 14 TABLET | Refills: 0 | Status: SHIPPED | OUTPATIENT
Start: 2024-06-27 | End: 2024-07-04

## 2024-06-27 RX ORDER — NABUMETONE 500 MG/1
500 TABLET, FILM COATED ORAL 2 TIMES DAILY
Qty: 180 TABLET | Refills: 1 | Status: SHIPPED | OUTPATIENT
Start: 2024-06-27

## 2024-06-27 RX ORDER — LANOLIN ALCOHOL/MO/W.PET/CERES
1000 CREAM (GRAM) TOPICAL DAILY
COMMUNITY

## 2024-06-27 RX ORDER — BENZONATATE 100 MG/1
100 CAPSULE ORAL 3 TIMES DAILY PRN
Qty: 30 CAPSULE | Refills: 0 | Status: SHIPPED | OUTPATIENT
Start: 2024-06-27 | End: 2024-07-07

## 2024-06-27 ASSESSMENT — ENCOUNTER SYMPTOMS: COUGH: 1

## 2024-06-27 NOTE — TELEPHONE ENCOUNTER
PCP: LINDA Joy MD    Last appt: 4/10/2024    Future Appointments   Date Time Provider Department Center   10/14/2024  9:30 AM LINDA Joy MD [facility] AM[facility]       Requested Prescriptions     Pending Prescriptions Disp Refills    nabumetone (RELAFEN) 500 MG tablet [Pharmacy Med Name: NABUMETONE 500 MG TABLET] 180 tablet 1     Sig: TAKE 1 TABLET BY MOUTH 2 TIMES A DAY

## 2024-06-27 NOTE — PATIENT INSTRUCTIONS
Symptoms consistent with acute bacterial sinusitis  Vital signs are stable, lung sounds clear, low suspicion for pneumonia  Augmentin as ordered, 2x per day for 7 days  Benzonatate up to 3x daily for cough  Plain Mucinex OTC to help thin secretions  Saline sinus rinses, hot tea with honey, throat lozenges  Lots of fluids, plenty of rest  Return if symptoms persist or worsen  ED with any severe shortness of breath, chest pain, or if unable to tolerate food or fluids

## 2024-06-27 NOTE — PROGRESS NOTES
Pradeep Camarillo (:  1950) is a 73 y.o. male,Established patient, here for evaluation of the following chief complaint(s):  Cough (Sx x2-3 weeks; cough, Bilateral ear fullness, sore throat, sweats, mild SOB)      Assessment & Plan :  Visit Diagnoses and Associated Orders       Acute bacterial sinusitis    -  Primary    amoxicillin-clavulanate (AUGMENTIN) 875-125 MG per tablet [73159]           Acute cough        benzonatate (TESSALON) 100 MG capsule [[id number]]           ORDERS WITHOUT AN ASSOCIATED DIAGNOSIS    vitamin D (CHOLECALCIFEROL) 25 MCG (1000 UT) TABS tablet [41631]      vitamin B-12 (CYANOCOBALAMIN) 1000 MCG tablet [[id number]]              Symptoms consistent with acute bacterial sinusitis  Vital signs are stable, lung sounds clear, low suspicion for pneumonia  Augmentin as ordered, 2x per day for 7 days  Benzonatate up to 3x daily for cough  Plain Mucinex OTC to help thin secretions  Saline sinus rinses, hot tea with honey, throat lozenges  Lots of fluids, plenty of rest  Return if symptoms persist or worsen  ED with any severe shortness of breath, chest pain, or if unable to tolerate food or fluids       Subjective :    Cough         73 y.o. male presents with symptoms of sinus pressure, nasal congestion, ear fullness, postnasal drip and cough.  Symptoms are progressively worsening over the past several weeks.  Reports intense pain and pressure in his sinuses when bending or laying down.  He denies any fevers, chills, body aches or fatigue.  Does note some occasional sweats but thinks this is related to the warm weather recently.  Denies sore throat.  Denies wheezing or shortness of breath.  Does report history of asthma, denies history of COPD or smoking.  No chest or abdominal pain, no nausea, vomiting or diarrhea.  He is not currently taking anything for his symptoms.  Does report a history of sinus infections which she will get periodically.  Reports bright yellow mucus from his sinuses.  Over the past

## 2024-07-06 ENCOUNTER — OFFICE VISIT (OUTPATIENT)
Age: 74
End: 2024-07-06

## 2024-07-06 VITALS
HEIGHT: 70 IN | TEMPERATURE: 98.3 F | RESPIRATION RATE: 18 BRPM | SYSTOLIC BLOOD PRESSURE: 157 MMHG | BODY MASS INDEX: 33.27 KG/M2 | HEART RATE: 53 BPM | DIASTOLIC BLOOD PRESSURE: 78 MMHG | OXYGEN SATURATION: 98 % | WEIGHT: 232.4 LBS

## 2024-07-06 DIAGNOSIS — J98.8 BACTERIAL RESPIRATORY INFECTION: Primary | ICD-10-CM

## 2024-07-06 DIAGNOSIS — R05.1 ACUTE COUGH: ICD-10-CM

## 2024-07-06 DIAGNOSIS — B96.89 BACTERIAL RESPIRATORY INFECTION: Primary | ICD-10-CM

## 2024-07-06 LAB
Lab: NORMAL
QC PASS/FAIL: NORMAL
SARS-COV-2 RDRP RESP QL NAA+PROBE: NEGATIVE

## 2024-07-06 RX ORDER — DOXYCYCLINE HYCLATE 100 MG
100 TABLET ORAL 2 TIMES DAILY
Qty: 14 TABLET | Refills: 0 | Status: SHIPPED | OUTPATIENT
Start: 2024-07-06 | End: 2024-07-13

## 2024-07-06 RX ORDER — BENZONATATE 100 MG/1
100 CAPSULE ORAL 3 TIMES DAILY PRN
Qty: 30 CAPSULE | Refills: 0 | Status: SHIPPED | OUTPATIENT
Start: 2024-07-06 | End: 2024-07-16

## 2024-07-06 RX ORDER — ALBUTEROL SULFATE 90 UG/1
2 AEROSOL, METERED RESPIRATORY (INHALATION) 4 TIMES DAILY PRN
Qty: 18 G | Refills: 0 | Status: SHIPPED | OUTPATIENT
Start: 2024-07-06

## 2024-07-06 ASSESSMENT — ENCOUNTER SYMPTOMS
COUGH: 1
WHEEZING: 1
SHORTNESS OF BREATH: 0

## 2024-07-06 NOTE — PROGRESS NOTES
Subjective     Chief Complaint   Patient presents with    Cough    Wheezing     Onset of symptoms x 1 1/2 weeks ago, was treated with Augmentin this week. With little improvement.        Patient is 74 year old male presenting with productive cough, body aches, feeling feverish for one and half weeks ago.  He was seen at  and placed on antibiotics with little improvement.  Symptoms have returned.  He has been taking Mucinex.          Cough  Associated symptoms include a fever, myalgias and wheezing. Pertinent negatives include no chills or shortness of breath.   Wheezing   Associated symptoms include coughing and a fever. Pertinent negatives include no chills or shortness of breath.       Past Medical History:   Diagnosis Date    Actinic keratosis 09/15/2017    Annual physical exam 09/15/2017    Arthritis     ASHD (arteriosclerotic heart disease) 09/15/2017    Asthma     Norris's esophagus     CAD (coronary artery disease)     Chest pain 09/15/2017    Chronic back pain 09/15/2017    Chronic obstructive pulmonary disease (HCC)     Chronic pain     Cough 09/15/2017    Diabetes (HCC)     hypoglycemia hx    Diabetes mellitus screening 09/15/2017    Diaphoresis 09/15/2017    SARAVIA (dyspnea on exertion)     Dyslipidemia 09/15/2017    Edema 09/15/2017    Elevated LFTs 09/15/2017    Essential hypertension 06/07/2019    Fatigue 09/15/2017    GERD (gastroesophageal reflux disease)     Norris's esophagus    Hypercholesterolemia     Hyperplasia of prostate 09/15/2017    Impaired glucose tolerance 01/17/2020    Myocardial infarction (HCC) 05/01/2023    Obesity 09/15/2017    Other long term (current) drug therapy 09/15/2017    Seasonal allergic rhinitis due to pollen 03/18/2021    Sinusitis 09/15/2017    SK (seborrheic keratosis) 09/15/2017    Sleep apnea     CPAP compliant per pt, 01/27/2023       Past Surgical History:   Procedure Laterality Date    APPENDECTOMY      CARDIAC CATHETERIZATION      2008    CARDIAC PROCEDURE N/A  Jose Last

## 2024-07-06 NOTE — PATIENT INSTRUCTIONS
Thank you for visiting [facility] today    Nasal Congestion:  Flonase or Nasonex (over the counter) nasal spray, once a day  Saline irrigation kits help wash out sinuses 1-2 times a day  Normal saline nasal spray  Afrin nasal spray no longer than three days  Cough:  Throat lozenges, hot tea, and honey may help  Vicks VapoRub at night to help with cough and relieve muscles aches and pain  If not prescribed a cough medication, Robitussin DM is an option.  It is an over the counter cough medication containing dextromethorphan to help suppress cough at night   *Please only take when absolutely needed, as this is a controlled substance that can cause addiction   *Please only take cough syrup at nighttime as it causes drowsiness   *Do not drive or operate any machinery while taking this medication  If you have high blood pressure, take Coricidin HBP (or the generic form) instead.  Follow instructions on the box.  Congestion:  For thick mucus, take Mucinex (with Guafenesin only) to help thin the mucus.  Follow instructions on the box.  You will need to drink plenty of water with this medication.  Sore Throat:  Lozenges, as needed. Cepacol lozenges will help numb the throat  Chloraseptic spray also helps to numb throat pain  Salt water gargles to soothe throat pain  Sinus pain/pressure:  Warm, wet towel on face to help with facial sinus pain/pressure  Headache/Pain Fever/Body Aches:  If you can take NSAIDs, take Ibuprofen 400-800mg every 8 hours as needed  If you cannot take NSAIDs, take Tylenol 325-500mg every 6 hours as needed  Miscellanous:  Zyrtec/Xyzal/Allegra/Claritin during the day or Benadryl at night may help with allergies.  You  may also use the decongestant version of these medications.   Simple foods like chicken noodle soup, smoothies, hot tea with lemon and honey may also help  Avoid smoking  Minimize exposure to irritants    Please follow up with your primary care provider within 2-5 days if

## 2024-07-30 LAB — HBA1C MFR BLD HPLC: 5.6 %

## 2024-09-12 ENCOUNTER — APPOINTMENT (OUTPATIENT)
Facility: HOSPITAL | Age: 74
End: 2024-09-12
Payer: MEDICARE

## 2024-09-12 ENCOUNTER — HOSPITAL ENCOUNTER (EMERGENCY)
Facility: HOSPITAL | Age: 74
Discharge: HOME | End: 2024-09-12
Payer: MEDICARE

## 2024-09-12 VITALS
RESPIRATION RATE: 22 BRPM | TEMPERATURE: 98.6 F | HEART RATE: 57 BPM | OXYGEN SATURATION: 97 % | SYSTOLIC BLOOD PRESSURE: 141 MMHG | DIASTOLIC BLOOD PRESSURE: 73 MMHG

## 2024-09-12 DIAGNOSIS — R07.9 CHEST PAIN, UNSPECIFIED TYPE: Primary | ICD-10-CM

## 2024-09-12 DIAGNOSIS — R74.8 ELEVATED LIVER ENZYMES: ICD-10-CM

## 2024-09-12 DIAGNOSIS — Z53.29 LEFT AGAINST MEDICAL ADVICE: ICD-10-CM

## 2024-09-12 DIAGNOSIS — R93.89 ABNORMAL CT SCAN: ICD-10-CM

## 2024-09-12 DIAGNOSIS — R10.13 ABDOMINAL PAIN, EPIGASTRIC: ICD-10-CM

## 2024-09-12 DIAGNOSIS — K80.20 GALLSTONES: ICD-10-CM

## 2024-09-12 LAB
ALBUMIN SERPL-MCNC: 3.6 G/DL (ref 3.5–5)
ALBUMIN/GLOB SERPL: 1 (ref 1.1–2.2)
ALP SERPL-CCNC: 78 U/L (ref 45–117)
ALT SERPL-CCNC: 145 U/L (ref 12–78)
ANION GAP SERPL CALC-SCNC: 7 MMOL/L (ref 2–12)
AST SERPL-CCNC: 246 U/L (ref 15–37)
BASOPHILS # BLD: 0.1 K/UL (ref 0–0.1)
BASOPHILS NFR BLD: 1 % (ref 0–1)
BILIRUB SERPL-MCNC: 1.4 MG/DL (ref 0.2–1)
BUN SERPL-MCNC: 13 MG/DL (ref 6–20)
BUN/CREAT SERPL: 15 (ref 12–20)
CALCIUM SERPL-MCNC: 9 MG/DL (ref 8.5–10.1)
CHLORIDE SERPL-SCNC: 106 MMOL/L (ref 97–108)
CO2 SERPL-SCNC: 27 MMOL/L (ref 21–32)
COMMENT:: NORMAL
CREAT SERPL-MCNC: 0.88 MG/DL (ref 0.7–1.3)
DIFFERENTIAL METHOD BLD: ABNORMAL
EOSINOPHIL # BLD: 0.1 K/UL (ref 0–0.4)
EOSINOPHIL NFR BLD: 1 % (ref 0–7)
ERYTHROCYTE [DISTWIDTH] IN BLOOD BY AUTOMATED COUNT: 12.3 % (ref 11.5–14.5)
GLOBULIN SER CALC-MCNC: 3.5 G/DL (ref 2–4)
GLUCOSE SERPL-MCNC: 113 MG/DL (ref 65–100)
HCT VFR BLD AUTO: 39.9 % (ref 36.6–50.3)
HGB BLD-MCNC: 13.9 G/DL (ref 12.1–17)
IMM GRANULOCYTES # BLD AUTO: 0 K/UL (ref 0–0.04)
IMM GRANULOCYTES NFR BLD AUTO: 0 % (ref 0–0.5)
LIPASE SERPL-CCNC: 45 U/L (ref 13–75)
LYMPHOCYTES # BLD: 1.2 K/UL (ref 0.8–3.5)
LYMPHOCYTES NFR BLD: 13 % (ref 12–49)
MCH RBC QN AUTO: 32 PG (ref 26–34)
MCHC RBC AUTO-ENTMCNC: 34.8 G/DL (ref 30–36.5)
MCV RBC AUTO: 91.9 FL (ref 80–99)
MONOCYTES # BLD: 0.7 K/UL (ref 0–1)
MONOCYTES NFR BLD: 8 % (ref 5–13)
NEUTS SEG # BLD: 7.1 K/UL (ref 1.8–8)
NEUTS SEG NFR BLD: 77 % (ref 32–75)
NRBC # BLD: 0 K/UL (ref 0–0.01)
NRBC BLD-RTO: 0 PER 100 WBC
NT PRO BNP: 194 PG/ML
PLATELET # BLD AUTO: 197 K/UL (ref 150–400)
PMV BLD AUTO: 9.3 FL (ref 8.9–12.9)
POTASSIUM SERPL-SCNC: 3.8 MMOL/L (ref 3.5–5.1)
PROT SERPL-MCNC: 7.1 G/DL (ref 6.4–8.2)
RBC # BLD AUTO: 4.34 M/UL (ref 4.1–5.7)
SODIUM SERPL-SCNC: 140 MMOL/L (ref 136–145)
SPECIMEN HOLD: NORMAL
TROPONIN I SERPL HS-MCNC: 10 NG/L (ref 0–76)
TROPONIN I SERPL HS-MCNC: 9 NG/L (ref 0–76)
WBC # BLD AUTO: 9.1 K/UL (ref 4.1–11.1)

## 2024-09-12 PROCEDURE — 99285 EMERGENCY DEPT VISIT HI MDM: CPT

## 2024-09-12 PROCEDURE — 36415 COLL VENOUS BLD VENIPUNCTURE: CPT

## 2024-09-12 PROCEDURE — 80053 COMPREHEN METABOLIC PANEL: CPT

## 2024-09-12 PROCEDURE — 74176 CT ABD & PELVIS W/O CONTRAST: CPT

## 2024-09-12 PROCEDURE — 85025 COMPLETE CBC W/AUTO DIFF WBC: CPT

## 2024-09-12 PROCEDURE — 84484 ASSAY OF TROPONIN QUANT: CPT

## 2024-09-12 PROCEDURE — 76705 ECHO EXAM OF ABDOMEN: CPT

## 2024-09-12 PROCEDURE — 71045 X-RAY EXAM CHEST 1 VIEW: CPT

## 2024-09-12 PROCEDURE — 83690 ASSAY OF LIPASE: CPT

## 2024-09-12 PROCEDURE — 93005 ELECTROCARDIOGRAM TRACING: CPT | Performed by: STUDENT IN AN ORGANIZED HEALTH CARE EDUCATION/TRAINING PROGRAM

## 2024-09-12 PROCEDURE — 83880 ASSAY OF NATRIURETIC PEPTIDE: CPT

## 2024-09-12 RX ORDER — MORPHINE SULFATE 2 MG/ML
2 INJECTION, SOLUTION INTRAMUSCULAR; INTRAVENOUS
Status: DISCONTINUED | OUTPATIENT
Start: 2024-09-12 | End: 2024-09-13 | Stop reason: HOSPADM

## 2024-09-12 ASSESSMENT — HEART SCORE: ECG: NON-SPECIFC REPOLARIZATION DISTURBANCE/LBTB/PM

## 2024-09-12 ASSESSMENT — PAIN - FUNCTIONAL ASSESSMENT: PAIN_FUNCTIONAL_ASSESSMENT: ACTIVITIES ARE NOT PREVENTED

## 2024-09-12 ASSESSMENT — LIFESTYLE VARIABLES
HOW OFTEN DO YOU HAVE A DRINK CONTAINING ALCOHOL: NEVER
HOW MANY STANDARD DRINKS CONTAINING ALCOHOL DO YOU HAVE ON A TYPICAL DAY: PATIENT DOES NOT DRINK

## 2024-09-12 ASSESSMENT — PAIN DESCRIPTION - LOCATION: LOCATION: CHEST

## 2024-09-12 ASSESSMENT — PAIN SCALES - GENERAL
PAINLEVEL_OUTOF10: 0
PAINLEVEL_OUTOF10: 5

## 2024-09-12 ASSESSMENT — PAIN DESCRIPTION - ONSET: ONSET: SUDDEN

## 2024-09-12 ASSESSMENT — PAIN DESCRIPTION - PAIN TYPE: TYPE: ACUTE PAIN

## 2024-09-12 ASSESSMENT — PAIN DESCRIPTION - FREQUENCY: FREQUENCY: CONTINUOUS

## 2024-09-12 ASSESSMENT — PAIN DESCRIPTION - DESCRIPTORS: DESCRIPTORS: PRESSURE

## 2024-09-12 ASSESSMENT — PAIN DESCRIPTION - ORIENTATION: ORIENTATION: MID

## 2024-09-13 ENCOUNTER — OFFICE VISIT (OUTPATIENT)
Age: 74
End: 2024-09-13
Payer: MEDICARE

## 2024-09-13 ENCOUNTER — PREP FOR PROCEDURE (OUTPATIENT)
Age: 74
End: 2024-09-13

## 2024-09-13 VITALS
TEMPERATURE: 98.1 F | RESPIRATION RATE: 20 BRPM | OXYGEN SATURATION: 93 % | BODY MASS INDEX: 32.93 KG/M2 | DIASTOLIC BLOOD PRESSURE: 60 MMHG | WEIGHT: 230 LBS | SYSTOLIC BLOOD PRESSURE: 112 MMHG | HEART RATE: 57 BPM | HEIGHT: 70 IN

## 2024-09-13 DIAGNOSIS — K80.20 SYMPTOMATIC CHOLELITHIASIS: Primary | ICD-10-CM

## 2024-09-13 LAB
EKG ATRIAL RATE: 58 BPM
EKG DIAGNOSIS: NORMAL
EKG P AXIS: 36 DEGREES
EKG P-R INTERVAL: 202 MS
EKG Q-T INTERVAL: 408 MS
EKG QRS DURATION: 96 MS
EKG QTC CALCULATION (BAZETT): 400 MS
EKG R AXIS: -30 DEGREES
EKG T AXIS: 26 DEGREES
EKG VENTRICULAR RATE: 58 BPM

## 2024-09-13 PROCEDURE — G8417 CALC BMI ABV UP PARAM F/U: HCPCS | Performed by: SURGERY

## 2024-09-13 PROCEDURE — 3017F COLORECTAL CA SCREEN DOC REV: CPT | Performed by: SURGERY

## 2024-09-13 PROCEDURE — 3074F SYST BP LT 130 MM HG: CPT | Performed by: SURGERY

## 2024-09-13 PROCEDURE — G8427 DOCREV CUR MEDS BY ELIG CLIN: HCPCS | Performed by: SURGERY

## 2024-09-13 PROCEDURE — 99204 OFFICE O/P NEW MOD 45 MIN: CPT | Performed by: SURGERY

## 2024-09-13 PROCEDURE — 1036F TOBACCO NON-USER: CPT | Performed by: SURGERY

## 2024-09-13 PROCEDURE — 1123F ACP DISCUSS/DSCN MKR DOCD: CPT | Performed by: SURGERY

## 2024-09-13 PROCEDURE — 3078F DIAST BP <80 MM HG: CPT | Performed by: SURGERY

## 2024-09-13 RX ORDER — FAMOTIDINE 40 MG/1
40 TABLET, FILM COATED ORAL NIGHTLY
COMMUNITY
Start: 2024-09-07

## 2024-09-13 ASSESSMENT — PATIENT HEALTH QUESTIONNAIRE - PHQ9
SUM OF ALL RESPONSES TO PHQ QUESTIONS 1-9: 0
SUM OF ALL RESPONSES TO PHQ QUESTIONS 1-9: 0
SUM OF ALL RESPONSES TO PHQ9 QUESTIONS 1 & 2: 0
1. LITTLE INTEREST OR PLEASURE IN DOING THINGS: NOT AT ALL
SUM OF ALL RESPONSES TO PHQ QUESTIONS 1-9: 0
SUM OF ALL RESPONSES TO PHQ QUESTIONS 1-9: 0
2. FEELING DOWN, DEPRESSED OR HOPELESS: NOT AT ALL

## 2024-09-13 ASSESSMENT — ENCOUNTER SYMPTOMS
VOMITING: 0
EYE PAIN: 0
SHORTNESS OF BREATH: 0
CONSTIPATION: 0
WHEEZING: 1
APNEA: 1
BLOOD IN STOOL: 0
SORE THROAT: 0
STRIDOR: 0
DIARRHEA: 0
BACK PAIN: 1
NAUSEA: 0
COUGH: 0
ABDOMINAL PAIN: 1

## 2024-09-13 ASSESSMENT — HEART SCORE: ECG: NON-SPECIFC REPOLARIZATION DISTURBANCE/LBTB/PM

## 2024-09-23 DIAGNOSIS — J45.20 MILD INTERMITTENT ASTHMA, UNCOMPLICATED: Primary | ICD-10-CM

## 2024-09-23 DIAGNOSIS — J30.1 SEASONAL ALLERGIC RHINITIS DUE TO POLLEN: ICD-10-CM

## 2024-09-24 RX ORDER — LEVOCETIRIZINE DIHYDROCHLORIDE 5 MG/1
5 TABLET, FILM COATED ORAL DAILY
Qty: 90 TABLET | Refills: 3 | Status: SHIPPED | OUTPATIENT
Start: 2024-09-24

## 2024-09-27 RX ORDER — OMEPRAZOLE 40 MG/1
40 CAPSULE, DELAYED RELEASE ORAL DAILY
Qty: 90 CAPSULE | Refills: 1 | Status: SHIPPED | OUTPATIENT
Start: 2024-09-27

## 2024-10-03 ENCOUNTER — HOSPITAL ENCOUNTER (OUTPATIENT)
Facility: HOSPITAL | Age: 74
Discharge: HOME | End: 2024-10-06
Payer: MEDICARE

## 2024-10-03 VITALS
HEIGHT: 68 IN | TEMPERATURE: 97.8 F | RESPIRATION RATE: 18 BRPM | HEART RATE: 71 BPM | DIASTOLIC BLOOD PRESSURE: 71 MMHG | WEIGHT: 229.5 LBS | BODY MASS INDEX: 34.78 KG/M2 | SYSTOLIC BLOOD PRESSURE: 124 MMHG | OXYGEN SATURATION: 97 %

## 2024-10-03 LAB
ALBUMIN SERPL-MCNC: 3.6 G/DL (ref 3.5–5)
ALBUMIN/GLOB SERPL: 1.2 (ref 1.1–2.2)
ALP SERPL-CCNC: 51 U/L (ref 45–117)
ALT SERPL-CCNC: 22 U/L (ref 12–78)
ANION GAP SERPL CALC-SCNC: 5 MMOL/L (ref 2–12)
AST SERPL-CCNC: 16 U/L (ref 15–37)
BILIRUB SERPL-MCNC: 0.6 MG/DL (ref 0.2–1)
BUN SERPL-MCNC: 18 MG/DL (ref 6–20)
BUN/CREAT SERPL: 19 (ref 12–20)
CALCIUM SERPL-MCNC: 9 MG/DL (ref 8.5–10.1)
CHLORIDE SERPL-SCNC: 106 MMOL/L (ref 97–108)
CO2 SERPL-SCNC: 27 MMOL/L (ref 21–32)
CREAT SERPL-MCNC: 0.96 MG/DL (ref 0.7–1.3)
ERYTHROCYTE [DISTWIDTH] IN BLOOD BY AUTOMATED COUNT: 12.2 % (ref 11.5–14.5)
GLOBULIN SER CALC-MCNC: 3.1 G/DL (ref 2–4)
GLUCOSE SERPL-MCNC: 132 MG/DL (ref 65–100)
HCT VFR BLD AUTO: 40.1 % (ref 36.6–50.3)
HGB BLD-MCNC: 13.6 G/DL (ref 12.1–17)
MCH RBC QN AUTO: 32.1 PG (ref 26–34)
MCHC RBC AUTO-ENTMCNC: 33.9 G/DL (ref 30–36.5)
MCV RBC AUTO: 94.6 FL (ref 80–99)
NRBC # BLD: 0 K/UL (ref 0–0.01)
NRBC BLD-RTO: 0 PER 100 WBC
PLATELET # BLD AUTO: 200 K/UL (ref 150–400)
PMV BLD AUTO: 9.3 FL (ref 8.9–12.9)
POTASSIUM SERPL-SCNC: 4.4 MMOL/L (ref 3.5–5.1)
PROT SERPL-MCNC: 6.7 G/DL (ref 6.4–8.2)
RBC # BLD AUTO: 4.24 M/UL (ref 4.1–5.7)
SODIUM SERPL-SCNC: 138 MMOL/L (ref 136–145)
WBC # BLD AUTO: 6.7 K/UL (ref 4.1–11.1)

## 2024-10-03 PROCEDURE — 80053 COMPREHEN METABOLIC PANEL: CPT

## 2024-10-03 PROCEDURE — 36415 COLL VENOUS BLD VENIPUNCTURE: CPT

## 2024-10-03 PROCEDURE — 85027 COMPLETE CBC AUTOMATED: CPT

## 2024-10-03 RX ORDER — FLUTICASONE PROPIONATE 50 MCG
1 SPRAY, SUSPENSION (ML) NASAL DAILY
COMMUNITY

## 2024-10-03 RX ORDER — SODIUM CHLORIDE, SODIUM LACTATE, POTASSIUM CHLORIDE, CALCIUM CHLORIDE 600; 310; 30; 20 MG/100ML; MG/100ML; MG/100ML; MG/100ML
INJECTION, SOLUTION INTRAVENOUS CONTINUOUS
Status: CANCELLED | OUTPATIENT
Start: 2024-10-10

## 2024-10-03 ASSESSMENT — PAIN DESCRIPTION - ORIENTATION: ORIENTATION: MID

## 2024-10-03 ASSESSMENT — PAIN SCALES - GENERAL: PAINLEVEL_OUTOF10: 4

## 2024-10-03 ASSESSMENT — PAIN DESCRIPTION - DESCRIPTORS: DESCRIPTORS: ACHING

## 2024-10-03 ASSESSMENT — PAIN DESCRIPTION - LOCATION: LOCATION: ABDOMEN;CHEST

## 2024-10-03 NOTE — PROGRESS NOTES
[facility]  Preoperative Instructions        Surgery Date 10/10/2024          Time of Arrival to be called [phone]     On the day of your surgery, please report to Surgical Services Registration Desk and sign in at your designated time.  The Surgery Center is located to the right of the Emergency Room.     2. You must have someone with you to drive you home. You should not drive a car for 24 hours following surgery. Please make arrangements for a friend or family member to stay with you for the first 24 hours after your surgery.    3. Do not have anything to eat or drink (including water, gum, mints, coffee, juice) after midnight ??      .?This may not apply to medications prescribed by your physician. ?(Please note below the special instructions with medications to take the morning of your procedure.)    4. We recommend you do not drink any alcoholic beverages for 24 hours before and after your surgery.    5. Contact your surgeon's office for instructions on the following medications: non-steroidal anti-inflammatory drugs (i.e. Advil, Aleve), vitamins, and supplements. (Some surgeon's will want you to stop these medications prior to surgery and others may allow you to take them)  **If you are currently taking Plavix, Coumadin, Aspirin and/or other blood-thinning agents, contact your surgeon for instructions.** Your surgeon will partner with the physician prescribing these medications to determine if it is safe to stop or if you need to continue taking.  Please do not stop taking these medications without instructions from your surgeon    6. Wear comfortable clothes.  Wear glasses instead of contacts.  Do not bring any money or jewelry. Please bring picture ID, insurance card, and any prearranged co-payment or hospital payment.  Do not wear make-up, particularly mascara the morning of your surgery.  Do not wear nail polish, particularly if you are having foot /hand surgery.  Wear your hair

## 2024-10-10 ENCOUNTER — HOSPITAL ENCOUNTER (OUTPATIENT)
Facility: HOSPITAL | Age: 74
Setting detail: OUTPATIENT SURGERY
Discharge: HOME | End: 2024-10-10
Attending: SURGERY | Admitting: SURGERY
Payer: MEDICARE

## 2024-10-10 ENCOUNTER — APPOINTMENT (OUTPATIENT)
Facility: HOSPITAL | Age: 74
End: 2024-10-10
Attending: SURGERY
Payer: MEDICARE

## 2024-10-10 ENCOUNTER — ANESTHESIA EVENT (OUTPATIENT)
Facility: HOSPITAL | Age: 74
End: 2024-10-10
Payer: MEDICARE

## 2024-10-10 ENCOUNTER — ANESTHESIA (OUTPATIENT)
Facility: HOSPITAL | Age: 74
End: 2024-10-10
Payer: MEDICARE

## 2024-10-10 VITALS
OXYGEN SATURATION: 93 % | BODY MASS INDEX: 32.38 KG/M2 | SYSTOLIC BLOOD PRESSURE: 137 MMHG | HEIGHT: 70 IN | RESPIRATION RATE: 15 BRPM | DIASTOLIC BLOOD PRESSURE: 74 MMHG | HEART RATE: 51 BPM | WEIGHT: 226.19 LBS | TEMPERATURE: 97.8 F

## 2024-10-10 DIAGNOSIS — K80.20 SYMPTOMATIC CHOLELITHIASIS: Primary | ICD-10-CM

## 2024-10-10 LAB
GLUCOSE BLD STRIP.AUTO-MCNC: 118 MG/DL (ref 65–117)
GLUCOSE BLD STRIP.AUTO-MCNC: 143 MG/DL (ref 65–117)
SERVICE CMNT-IMP: ABNORMAL
SERVICE CMNT-IMP: ABNORMAL

## 2024-10-10 PROCEDURE — 6360000002 HC RX W HCPCS

## 2024-10-10 PROCEDURE — 7100000001 HC PACU RECOVERY - ADDTL 15 MIN: Performed by: SURGERY

## 2024-10-10 PROCEDURE — 6360000004 HC RX CONTRAST MEDICATION: Performed by: SURGERY

## 2024-10-10 PROCEDURE — 2580000003 HC RX 258: Performed by: SURGERY

## 2024-10-10 PROCEDURE — 2720000010 HC SURG SUPPLY STERILE: Performed by: SURGERY

## 2024-10-10 PROCEDURE — 7100000000 HC PACU RECOVERY - FIRST 15 MIN: Performed by: SURGERY

## 2024-10-10 PROCEDURE — 3700000001 HC ADD 15 MINUTES (ANESTHESIA): Performed by: SURGERY

## 2024-10-10 PROCEDURE — 82962 GLUCOSE BLOOD TEST: CPT

## 2024-10-10 PROCEDURE — A9577 INJ MULTIHANCE: HCPCS | Performed by: SURGERY

## 2024-10-10 PROCEDURE — 3600000004 HC SURGERY LEVEL 4 BASE: Performed by: SURGERY

## 2024-10-10 PROCEDURE — 2709999900 HC NON-CHARGEABLE SUPPLY: Performed by: SURGERY

## 2024-10-10 PROCEDURE — 6370000000 HC RX 637 (ALT 250 FOR IP): Performed by: SURGERY

## 2024-10-10 PROCEDURE — 6360000002 HC RX W HCPCS: Performed by: SURGERY

## 2024-10-10 PROCEDURE — 2500000003 HC RX 250 WO HCPCS

## 2024-10-10 PROCEDURE — 2580000003 HC RX 258: Performed by: STUDENT IN AN ORGANIZED HEALTH CARE EDUCATION/TRAINING PROGRAM

## 2024-10-10 PROCEDURE — 88304 TISSUE EXAM BY PATHOLOGIST: CPT

## 2024-10-10 PROCEDURE — 3600000014 HC SURGERY LEVEL 4 ADDTL 15MIN: Performed by: SURGERY

## 2024-10-10 PROCEDURE — 3700000000 HC ANESTHESIA ATTENDED CARE: Performed by: SURGERY

## 2024-10-10 RX ORDER — DEXMEDETOMIDINE HYDROCHLORIDE 100 UG/ML
INJECTION, SOLUTION INTRAVENOUS
Status: DISCONTINUED | OUTPATIENT
Start: 2024-10-10 | End: 2024-10-10

## 2024-10-10 RX ORDER — HYDROMORPHONE HYDROCHLORIDE 1 MG/ML
0.5 INJECTION, SOLUTION INTRAMUSCULAR; INTRAVENOUS; SUBCUTANEOUS EVERY 5 MIN PRN
Status: DISCONTINUED | OUTPATIENT
Start: 2024-10-10 | End: 2024-10-10 | Stop reason: HOSPADM

## 2024-10-10 RX ORDER — SODIUM CHLORIDE 9 MG/ML
INJECTION, SOLUTION INTRAVENOUS PRN
Status: DISCONTINUED | OUTPATIENT
Start: 2024-10-10 | End: 2024-10-10 | Stop reason: HOSPADM

## 2024-10-10 RX ORDER — BUPIVACAINE HYDROCHLORIDE 5 MG/ML
INJECTION, SOLUTION EPIDURAL; INTRACAUDAL PRN
Status: DISCONTINUED | OUTPATIENT
Start: 2024-10-10 | End: 2024-10-10 | Stop reason: ALTCHOICE

## 2024-10-10 RX ORDER — GLYCOPYRROLATE 0.2 MG/ML
INJECTION INTRAMUSCULAR; INTRAVENOUS
Status: DISCONTINUED | OUTPATIENT
Start: 2024-10-10 | End: 2024-10-10 | Stop reason: SDUPTHER

## 2024-10-10 RX ORDER — EPHEDRINE SULFATE/0.9% NACL/PF 25 MG/5 ML
SYRINGE (ML) INTRAVENOUS
Status: DISCONTINUED | OUTPATIENT
Start: 2024-10-10 | End: 2024-10-10 | Stop reason: SDUPTHER

## 2024-10-10 RX ORDER — ATROPINE SULFATE 0.1 MG/ML
0.5 INJECTION INTRAVENOUS ONCE
Status: COMPLETED | OUTPATIENT
Start: 2024-10-10 | End: 2024-10-10

## 2024-10-10 RX ORDER — OXYCODONE HYDROCHLORIDE 5 MG/1
5 TABLET ORAL EVERY 6 HOURS PRN
Qty: 12 TABLET | Refills: 0 | Status: SHIPPED | OUTPATIENT
Start: 2024-10-10 | End: 2024-10-13

## 2024-10-10 RX ORDER — ONDANSETRON 2 MG/ML
INJECTION INTRAMUSCULAR; INTRAVENOUS
Status: DISCONTINUED | OUTPATIENT
Start: 2024-10-10 | End: 2024-10-10 | Stop reason: SDUPTHER

## 2024-10-10 RX ORDER — LIDOCAINE HYDROCHLORIDE 20 MG/ML
INJECTION, SOLUTION EPIDURAL; INFILTRATION; INTRACAUDAL; PERINEURAL
Status: DISCONTINUED | OUTPATIENT
Start: 2024-10-10 | End: 2024-10-10 | Stop reason: SDUPTHER

## 2024-10-10 RX ORDER — MEPERIDINE HYDROCHLORIDE 25 MG/ML
12.5 INJECTION INTRAMUSCULAR; INTRAVENOUS; SUBCUTANEOUS EVERY 5 MIN PRN
Status: DISCONTINUED | OUTPATIENT
Start: 2024-10-10 | End: 2024-10-10 | Stop reason: HOSPADM

## 2024-10-10 RX ORDER — SUCCINYLCHOLINE/SOD CL,ISO/PF 200MG/10ML
SYRINGE (ML) INTRAVENOUS
Status: DISCONTINUED | OUTPATIENT
Start: 2024-10-10 | End: 2024-10-10 | Stop reason: SDUPTHER

## 2024-10-10 RX ORDER — SODIUM CHLORIDE, SODIUM LACTATE, POTASSIUM CHLORIDE, CALCIUM CHLORIDE 600; 310; 30; 20 MG/100ML; MG/100ML; MG/100ML; MG/100ML
INJECTION, SOLUTION INTRAVENOUS CONTINUOUS
Status: DISCONTINUED | OUTPATIENT
Start: 2024-10-10 | End: 2024-10-10 | Stop reason: HOSPADM

## 2024-10-10 RX ORDER — NEOSTIGMINE METHYLSULFATE 1 MG/ML
INJECTION, SOLUTION INTRAVENOUS
Status: DISCONTINUED | OUTPATIENT
Start: 2024-10-10 | End: 2024-10-10 | Stop reason: SDUPTHER

## 2024-10-10 RX ORDER — NALOXONE HYDROCHLORIDE 0.4 MG/ML
INJECTION, SOLUTION INTRAMUSCULAR; INTRAVENOUS; SUBCUTANEOUS PRN
Status: DISCONTINUED | OUTPATIENT
Start: 2024-10-10 | End: 2024-10-10 | Stop reason: HOSPADM

## 2024-10-10 RX ORDER — ONDANSETRON 2 MG/ML
4 INJECTION INTRAMUSCULAR; INTRAVENOUS
Status: DISCONTINUED | OUTPATIENT
Start: 2024-10-10 | End: 2024-10-10 | Stop reason: HOSPADM

## 2024-10-10 RX ORDER — SODIUM CHLORIDE 0.9 % (FLUSH) 0.9 %
5-40 SYRINGE (ML) INJECTION PRN
Status: DISCONTINUED | OUTPATIENT
Start: 2024-10-10 | End: 2024-10-10 | Stop reason: HOSPADM

## 2024-10-10 RX ORDER — DEXAMETHASONE SODIUM PHOSPHATE 4 MG/ML
INJECTION, SOLUTION INTRA-ARTICULAR; INTRALESIONAL; INTRAMUSCULAR; INTRAVENOUS; SOFT TISSUE
Status: DISCONTINUED | OUTPATIENT
Start: 2024-10-10 | End: 2024-10-10 | Stop reason: SDUPTHER

## 2024-10-10 RX ORDER — ROCURONIUM BROMIDE 10 MG/ML
INJECTION, SOLUTION INTRAVENOUS
Status: DISCONTINUED | OUTPATIENT
Start: 2024-10-10 | End: 2024-10-10 | Stop reason: SDUPTHER

## 2024-10-10 RX ORDER — DEXMEDETOMIDINE HYDROCHLORIDE 100 UG/ML
INJECTION, SOLUTION INTRAVENOUS
Status: DISCONTINUED | OUTPATIENT
Start: 2024-10-10 | End: 2024-10-10 | Stop reason: SDUPTHER

## 2024-10-10 RX ORDER — OXYCODONE HYDROCHLORIDE 5 MG/1
5 TABLET ORAL
Status: COMPLETED | OUTPATIENT
Start: 2024-10-10 | End: 2024-10-10

## 2024-10-10 RX ORDER — FENTANYL CITRATE 50 UG/ML
INJECTION, SOLUTION INTRAMUSCULAR; INTRAVENOUS
Status: DISCONTINUED | OUTPATIENT
Start: 2024-10-10 | End: 2024-10-10 | Stop reason: SDUPTHER

## 2024-10-10 RX ORDER — PROCHLORPERAZINE EDISYLATE 5 MG/ML
5 INJECTION INTRAMUSCULAR; INTRAVENOUS
Status: DISCONTINUED | OUTPATIENT
Start: 2024-10-10 | End: 2024-10-10 | Stop reason: HOSPADM

## 2024-10-10 RX ORDER — FENTANYL CITRATE 50 UG/ML
50 INJECTION, SOLUTION INTRAMUSCULAR; INTRAVENOUS EVERY 5 MIN PRN
Status: DISCONTINUED | OUTPATIENT
Start: 2024-10-10 | End: 2024-10-10 | Stop reason: HOSPADM

## 2024-10-10 RX ORDER — PROPOFOL 10 MG/ML
INJECTION, EMULSION INTRAVENOUS
Status: DISCONTINUED | OUTPATIENT
Start: 2024-10-10 | End: 2024-10-10 | Stop reason: SDUPTHER

## 2024-10-10 RX ORDER — ATROPINE SULFATE 0.1 MG/ML
INJECTION INTRAVENOUS
Status: COMPLETED
Start: 2024-10-10 | End: 2024-10-10

## 2024-10-10 RX ORDER — SODIUM CHLORIDE 0.9 % (FLUSH) 0.9 %
5-40 SYRINGE (ML) INJECTION EVERY 12 HOURS SCHEDULED
Status: DISCONTINUED | OUTPATIENT
Start: 2024-10-10 | End: 2024-10-10 | Stop reason: HOSPADM

## 2024-10-10 RX ADMIN — EPHEDRINE SULFATE 5 MG: 5 INJECTION INTRAVENOUS at 11:56

## 2024-10-10 RX ADMIN — WATER 2000 MG: 1 INJECTION INTRAMUSCULAR; INTRAVENOUS; SUBCUTANEOUS at 11:48

## 2024-10-10 RX ADMIN — FENTANYL CITRATE 50 MCG: 50 INJECTION, SOLUTION INTRAMUSCULAR; INTRAVENOUS at 11:32

## 2024-10-10 RX ADMIN — GLYCOPYRROLATE 0.2 MG: 0.2 INJECTION INTRAMUSCULAR; INTRAVENOUS at 11:56

## 2024-10-10 RX ADMIN — ROCURONIUM BROMIDE 45 MG: 10 INJECTION INTRAVENOUS at 11:40

## 2024-10-10 RX ADMIN — OXYCODONE 5 MG: 5 TABLET ORAL at 13:36

## 2024-10-10 RX ADMIN — SODIUM CHLORIDE, POTASSIUM CHLORIDE, SODIUM LACTATE AND CALCIUM CHLORIDE: 600; 310; 30; 20 INJECTION, SOLUTION INTRAVENOUS at 11:22

## 2024-10-10 RX ADMIN — HYDROMORPHONE HYDROCHLORIDE 0.25 MG: 1 INJECTION, SOLUTION INTRAMUSCULAR; INTRAVENOUS; SUBCUTANEOUS at 12:46

## 2024-10-10 RX ADMIN — HYDROMORPHONE HYDROCHLORIDE 0.25 MG: 1 INJECTION, SOLUTION INTRAMUSCULAR; INTRAVENOUS; SUBCUTANEOUS at 12:32

## 2024-10-10 RX ADMIN — Medication 4 MG: at 12:25

## 2024-10-10 RX ADMIN — ROCURONIUM BROMIDE 5 MG: 10 INJECTION INTRAVENOUS at 11:33

## 2024-10-10 RX ADMIN — FENTANYL CITRATE 50 MCG: 50 INJECTION, SOLUTION INTRAMUSCULAR; INTRAVENOUS at 12:06

## 2024-10-10 RX ADMIN — ATROPINE SULFATE INJECTION 0.5 MG: 0.1 INJECTION, SOLUTION INTRAVENOUS at 13:20

## 2024-10-10 RX ADMIN — SUGAMMADEX 200 MG: 100 INJECTION, SOLUTION INTRAVENOUS at 12:32

## 2024-10-10 RX ADMIN — ONDANSETRON 4 MG: 2 INJECTION INTRAMUSCULAR; INTRAVENOUS at 12:24

## 2024-10-10 RX ADMIN — LIDOCAINE HYDROCHLORIDE 60 MG: 20 INJECTION, SOLUTION EPIDURAL; INFILTRATION; INTRACAUDAL; PERINEURAL at 11:32

## 2024-10-10 RX ADMIN — GLYCOPYRROLATE 0.4 MG: 0.2 INJECTION INTRAMUSCULAR; INTRAVENOUS at 12:25

## 2024-10-10 RX ADMIN — ROCURONIUM BROMIDE 30 MG: 10 INJECTION INTRAVENOUS at 11:48

## 2024-10-10 RX ADMIN — ATROPINE SULFATE 0.5 MG: 0.1 INJECTION INTRAVENOUS at 13:20

## 2024-10-10 RX ADMIN — PROPOFOL 170 MG: 10 INJECTION, EMULSION INTRAVENOUS at 11:32

## 2024-10-10 RX ADMIN — ROCURONIUM BROMIDE 20 MG: 10 INJECTION INTRAVENOUS at 11:53

## 2024-10-10 RX ADMIN — DEXMEDETOMIDINE 10 MCG: 100 INJECTION, SOLUTION INTRAVENOUS at 12:22

## 2024-10-10 RX ADMIN — GLYCOPYRROLATE 0.2 MG: 0.2 INJECTION INTRAMUSCULAR; INTRAVENOUS at 11:45

## 2024-10-10 RX ADMIN — DEXAMETHASONE SODIUM PHOSPHATE 4 MG: 4 INJECTION INTRA-ARTICULAR; INTRALESIONAL; INTRAMUSCULAR; INTRAVENOUS; SOFT TISSUE at 11:48

## 2024-10-10 RX ADMIN — Medication 140 MG: at 11:33

## 2024-10-10 ASSESSMENT — PAIN DESCRIPTION - LOCATION: LOCATION: ABDOMEN;SHOULDER

## 2024-10-10 ASSESSMENT — PAIN SCALES - GENERAL: PAINLEVEL_OUTOF10: 4

## 2024-10-10 ASSESSMENT — PAIN - FUNCTIONAL ASSESSMENT
PAIN_FUNCTIONAL_ASSESSMENT: 0-10
PAIN_FUNCTIONAL_ASSESSMENT: PREVENTS OR INTERFERES WITH MANY ACTIVE NOT PASSIVE ACTIVITIES

## 2024-10-10 ASSESSMENT — PAIN DESCRIPTION - ORIENTATION: ORIENTATION: RIGHT

## 2024-10-10 ASSESSMENT — ENCOUNTER SYMPTOMS: SHORTNESS OF BREATH: 1

## 2024-10-10 ASSESSMENT — PAIN DESCRIPTION - DESCRIPTORS
DESCRIPTORS: ACHING
DESCRIPTORS: ACHING

## 2024-10-10 NOTE — FLOWSHEET NOTE
10/10/24 1245   Handoff   Communication Given Periop Handoff/Relief   Handoff phase Phase I receiving   Handoff Given To Sonja ALMANZA   Handoff Received From Benji ALMANZA & Debbie LANE   Handoff Communication Face to Face         1303 Dr. Newby notified HR 41 /77, axo x3 no c/o dizziness, chest pain. Patient took Metoprolol this am, no new orders received.    1318 A&O X3  HR 38 /57 Dr. Newby at bedside, received order for atropine 0.5 mg IV see mar

## 2024-10-10 NOTE — ANESTHESIA PRE PROCEDURE
08:11 AM       POC Tests:   Recent Labs     10/10/24  1022   POCGLU 118*       Coags:   Lab Results   Component Value Date/Time    PROTIME 10.1 05/07/2023 10:10 AM    INR 1.0 05/07/2023 10:10 AM       HCG (If Applicable): No results found for: \"PREGTESTUR\", \"PREGSERUM\", \"HCG\", \"HCGQUANT\"     ABGs: No results found for: \"PHART\", \"PO2ART\", \"OPW7NBT\", \"QHL6WTP\", \"BEART\", \"N5SZFBAK\"     Type & Screen (If Applicable):  Lab Results   Component Value Date    ABORH A POSITIVE 05/07/2023    LABANTI NEG 05/07/2023       Drug/Infectious Status (If Applicable):  No results found for: \"HIV\", \"HEPCAB\"    COVID-19 Screening (If Applicable):   Lab Results   Component Value Date/Time    COVID19 Negative 07/06/2024 08:54 AM    COVID19 Not Detected 01/19/2022 09:10 AM    COVID19 Performed 01/19/2022 09:10 AM           Anesthesia Evaluation  Patient summary reviewed  Airway: Mallampati: II       Mouth opening: > = 3 FB   Dental: normal exam         Pulmonary:normal exam  breath sounds clear to auscultation  (+)  COPD:  shortness of breath:   sleep apnea: on CPAP,       asthma:                            Cardiovascular:    (+) hypertension:, past MI:, CAD:, SARAVIA:        Rhythm: regular  Rate: normal                    Neuro/Psych:               GI/Hepatic/Renal:   (+) GERD:          Endo/Other:    (+) DiabetesType II DM.                 Abdominal:             Vascular:          Other Findings:       Anesthesia Plan      general     ASA 3           MIPS: Postoperative opioids intended and Prophylactic antiemetics administered.  Anesthetic plan and risks discussed with patient.                    Esdras Troy MD   10/10/2024

## 2024-10-10 NOTE — DISCHARGE INSTRUCTIONS
surgery.    TO PREVENT AN INFECTION      WASH YOUR HANDS    To prevent infection, good handwashing is the most important thing you or your caregiver can do.      Wash your hands with soap and water or use the hand  we gave you before you touch any wounds.    SHOWER    Use the antibacterial soap we gave you when you take a shower.     Shower with this soap until your wounds are healed.      To reach all areas of your body, you may need someone to help you.     Don’t forget to clean your belly button with every shower.     USE CLEAN SHEETS    Use freshly cleaned sheets on your bed after surgery.     To keep the surgery site clean, do not allow pets to sleep with you while your wound is still healing.     STOP SMOKING    Stop smoking, or at least cut back on smoking    Smoking slows your healing.      CONTROL YOUR BLOOD SUGAR    High blood sugars slow wound healing.    If you are diabetic, control your blood sugar levels before and after your surgery.    Please take time to review all of your Home Care Instructions and Medication Information sheets provided in your discharge packet. If you have any questions, please contact your surgeon's office. Thank you.    The discharge information has been reviewed with the patient and instruction recipient.  The patient and instruction recipient verbalized understanding.  Discharge medications reviewed with the patient and instruction recipient and appropriate educational materials and side effects teaching were provided.      Please provide this summary of care documentation to your next provider.

## 2024-10-10 NOTE — ANESTHESIA POSTPROCEDURE EVALUATION
Department of Anesthesiology  Postprocedure Note    Patient: Pradeep Camarillo  MRN: [id number]  YOB: 1950  Date of evaluation: 10/10/2024    Procedure Summary       Date: 10/10/24 Room / Location: [facility] MAIN OR  / [facility] MAIN OR    Anesthesia Start: 1122 Anesthesia Stop: 1249    Procedure: LAPAROSCOPIC, CHOLECYSTECTOMY WITH CHOLANGIOGRAM (Abdomen) Diagnosis:       Symptomatic cholelithiasis      (Symptomatic cholelithiasis [K80.20])    Providers: Dirk Pozo MD Responsible Provider: Hua Newby DO    Anesthesia Type: General ASA Status: 3            Anesthesia Type: General    Madi Phase I: Madi Score: 10    Madi Phase II:      Anesthesia Post Evaluation    Patient location during evaluation: PACU  Patient participation: complete - patient participated  Level of consciousness: awake  Pain score: 0  Airway patency: patent  Nausea & Vomiting: no nausea  Cardiovascular status: hemodynamically stable  Respiratory status: acceptable  Hydration status: euvolemic  Comments: Seen, no complaints   Pain management: adequate    No notable events documented.

## 2024-10-10 NOTE — H&P
Subjective:      Patient ID: Pradeep Camarillo is a 74 y.o. 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   It was associated with nausea, SOB, and bloating.  It developed after eating.  He denies vomiting, diarrhea, constipation, melena, hematochezia, dysuria or hematuria.   He went to the ER and symptoms abated.  Work up with CT chest/abd/pelvis, EKG, labs and US suggested cholelithiasis and elevated t bili and transaminases.   He saw Dr Gill this morning who felt it was non cardiac.     Past Medical History        Past Medical History:   Diagnosis Date    Actinic keratosis 09/15/2017    Annual physical exam 09/15/2017    Arthritis      ASHD (arteriosclerotic heart disease) 09/15/2017    Asthma      Norris's esophagus      CAD (coronary artery disease)      Chest pain 09/15/2017    Chronic back pain 09/15/2017    Chronic obstructive pulmonary disease (HCC)      Chronic pain      Cough 09/15/2017    Diabetes (HCC)       hypoglycemia hx    Diabetes mellitus screening 09/15/2017    Diaphoresis 09/15/2017    SARAVIA (dyspnea on exertion)      Dyslipidemia 09/15/2017    Edema 09/15/2017    Elevated LFTs 09/15/2017    Essential hypertension 06/07/2019    Fatigue 09/15/2017    GERD (gastroesophageal reflux disease)       Norris's esophagus    Hypercholesterolemia      Hyperplasia of prostate 09/15/2017    Impaired glucose tolerance 01/17/2020    Myocardial infarction (HCC) 05/01/2023    Obesity 09/15/2017    Other long term (current) drug therapy 09/15/2017    Seasonal allergic rhinitis due to pollen 03/18/2021    Sinusitis 09/15/2017    SK (seborrheic keratosis) 09/15/2017    Sleep apnea       CPAP compliant per pt, 01/27/2023         Past

## 2024-10-10 NOTE — OP NOTE
Operative Note/Laparoscopic Cholecystectomy      Patient ID:   Name: Pradeep Camarillo   Medical Record Number: [id number]   YOB: 1950            OPERATIVE REPORT      PREOPERATIVE DIAGNOSIS:   1.  Symptomatic cholelithiasis    POSTOPERATIVE DIAGNOSIS  1. Symptomatic cholelithiasis    OPERATIVE PROCEDURE:   1.  Laparoscopic cholecystectomy with intraoperative cholangiogram  2.  Supervision and interpretation of radiology    SURGEON: Dirk Pozo MD    ASSISTANT:  G Liddle    ANESTHESIA: General.    IMPLANTS:  none    COMPLICATIONS:   None    SPECIMENS:  1.  Gallbladder    FINDINGS:  1.  moderately diseased gallbladder  2.  fatty liver  3.   Normal Intraoperative cholangiogram  4.  Moderate adhesions around the gallbladder    ESTIMATED BLOOD LOSS: 25 mL.    BRIEF HISTORY: The patient is a 74 y.o. yo male with symptomatic cholelithiasis for cholecystectomy.  The patient understood the risks and benefits  of laparoscopic cholecystectomy with possible cholangiogram including bleeding,  infection, biliary injury, bowel injury, post cholecystectomy diarrhea, and  residual stones, post operative respiratory and cardiac complications, DVT, and wishes to proceed.    PROCEDURE: The patient was taken to the operating room, placed on  the operating table in the supine position and underwent general anesthesia.   Afterward, the abdomen was prepped and  draped in the usual sterile fashion. After appropriate time-out 0.5%  Marcaine with epinephrine was infiltrated in the skin and subcutaneous  tissues in the periumbilical region. A curvilinear incision was made above  the umbilicus, and subcutaneous tissue dissected off bluntly.  Electrocautery was used to go through midline of the fascia, and a 0 Vicryl  stay suture was placed on either side of the midline. The peritoneal cavity  was cautiously entered, and a blunt 12-mm Mani trocar was inserted in, CO2  insufflation begun, and 15 mmHg pressure gave good

## 2024-10-10 NOTE — PERIOP NOTE
No  recent  covid  exposure    mepilex  sacrum  border   preventatively applied skin intact.   Pt has  numerous   bruises  to  both   arms   and  hands   and       legs    scabs  to   arms.      Voided   x1  in holding   area.  Glucose    118.

## 2024-10-10 NOTE — FLOWSHEET NOTE
10/10/24 1335   Discharge Checklist   Ride and Caregiver Arranged Yes   Ride Caregiver Provider Desirae (daughter)   Phone Number for Ride/Caregiver [phone]   Responsible party will drive the patient home Yes   Mobility at Departure Wheelchair   Discharged with Documented Belongings Yes   Departure Mode With family   AVS Reviewed   AVS & discharge instructions reviewed with patient and/or representative? Yes   Reviewed instructions with Patient;Other (name and relationship in comment)  (Desirae (daughter) [phone])   Level of Understanding Questions answered;Verbalized understanding

## 2024-10-14 ENCOUNTER — OFFICE VISIT (OUTPATIENT)
Facility: CLINIC | Age: 74
End: 2024-10-14
Payer: MEDICARE

## 2024-10-14 VITALS
BODY MASS INDEX: 32.63 KG/M2 | SYSTOLIC BLOOD PRESSURE: 130 MMHG | WEIGHT: 227.4 LBS | DIASTOLIC BLOOD PRESSURE: 72 MMHG | RESPIRATION RATE: 16 BRPM | TEMPERATURE: 98.2 F | HEART RATE: 50 BPM | OXYGEN SATURATION: 95 %

## 2024-10-14 DIAGNOSIS — J45.40 MODERATE PERSISTENT ASTHMA WITHOUT COMPLICATION: ICD-10-CM

## 2024-10-14 DIAGNOSIS — Z12.5 ENCOUNTER FOR SCREENING FOR MALIGNANT NEOPLASM OF PROSTATE: ICD-10-CM

## 2024-10-14 DIAGNOSIS — I25.10 ASHD (ARTERIOSCLEROTIC HEART DISEASE): ICD-10-CM

## 2024-10-14 DIAGNOSIS — K22.70 BARRETT'S ESOPHAGUS WITHOUT DYSPLASIA: ICD-10-CM

## 2024-10-14 DIAGNOSIS — Z00.00 MEDICARE ANNUAL WELLNESS VISIT, SUBSEQUENT: ICD-10-CM

## 2024-10-14 DIAGNOSIS — N40.0 HYPERPLASIA OF PROSTATE: ICD-10-CM

## 2024-10-14 DIAGNOSIS — E11.59 TYPE 2 DIABETES MELLITUS WITH OTHER CIRCULATORY COMPLICATION, WITHOUT LONG-TERM CURRENT USE OF INSULIN (HCC): Primary | ICD-10-CM

## 2024-10-14 DIAGNOSIS — E78.5 DYSLIPIDEMIA: ICD-10-CM

## 2024-10-14 DIAGNOSIS — Z79.899 OTHER LONG TERM (CURRENT) DRUG THERAPY: ICD-10-CM

## 2024-10-14 DIAGNOSIS — I10 ESSENTIAL HYPERTENSION: ICD-10-CM

## 2024-10-14 LAB — PSA SERPL-MCNC: 0.4 NG/ML (ref 0.01–4)

## 2024-10-14 PROCEDURE — 3017F COLORECTAL CA SCREEN DOC REV: CPT | Performed by: INTERNAL MEDICINE

## 2024-10-14 PROCEDURE — 3046F HEMOGLOBIN A1C LEVEL >9.0%: CPT | Performed by: INTERNAL MEDICINE

## 2024-10-14 PROCEDURE — 1123F ACP DISCUSS/DSCN MKR DOCD: CPT | Performed by: INTERNAL MEDICINE

## 2024-10-14 PROCEDURE — G8484 FLU IMMUNIZE NO ADMIN: HCPCS | Performed by: INTERNAL MEDICINE

## 2024-10-14 PROCEDURE — G0439 PPPS, SUBSEQ VISIT: HCPCS | Performed by: INTERNAL MEDICINE

## 2024-10-14 PROCEDURE — 3078F DIAST BP <80 MM HG: CPT | Performed by: INTERNAL MEDICINE

## 2024-10-14 PROCEDURE — 3075F SYST BP GE 130 - 139MM HG: CPT | Performed by: INTERNAL MEDICINE

## 2024-10-14 ASSESSMENT — PATIENT HEALTH QUESTIONNAIRE - PHQ9
SUM OF ALL RESPONSES TO PHQ QUESTIONS 1-9: 0
SUM OF ALL RESPONSES TO PHQ QUESTIONS 1-9: 0
1. LITTLE INTEREST OR PLEASURE IN DOING THINGS: NOT AT ALL
2. FEELING DOWN, DEPRESSED OR HOPELESS: NOT AT ALL
SUM OF ALL RESPONSES TO PHQ QUESTIONS 1-9: 0
SUM OF ALL RESPONSES TO PHQ9 QUESTIONS 1 & 2: 0
SUM OF ALL RESPONSES TO PHQ QUESTIONS 1-9: 0

## 2024-10-14 NOTE — PROGRESS NOTES
Chief Complaint   Patient presents with    Medicare AWV     MWV    1. Have you been to the ER, urgent care clinic since your last visit?  Hospitalized since your last visit?no    2. Have you seen or consulted any other health care providers outside of the [facility] since your last visit?  Include any pap smears or colon screening. no

## 2024-10-14 NOTE — PATIENT INSTRUCTIONS
Sweating.     Shortness of breath.     Pain, pressure, or a strange feeling in the back, neck, jaw, or upper belly or in one or both shoulders or arms.     Lightheadedness or sudden weakness.     A fast or irregular heartbeat.   After you call 911, the  may tell you to chew 1 adult-strength or 2 to 4 low-dose aspirin. Wait for an ambulance. Do not try to drive yourself.  Watch closely for changes in your health, and be sure to contact your doctor if you have any problems.  Where can you learn more?  Go to https://www.[employer].net/patientEd and enter F075 to learn more about \"A Healthy Heart: Care Instructions.\"  Current as of: June 24, 2023  Content Version: 14.2  © 2024 [employer].   Care instructions adapted under license by [employer]. If you have questions about a medical condition or this instruction, always ask your healthcare professional. [employer], [employer] disclaims any warranty or liability for your use of this information.      Personalized Preventive Plan for Pradeep Camarillo - 10/14/2024  Medicare offers a range of preventive health benefits. Some of the tests and screenings are paid in full while other may be subject to a deductible, co-insurance, and/or copay.    Some of these benefits include a comprehensive review of your medical history including lifestyle, illnesses that may run in your family, and various assessments and screenings as appropriate.    After reviewing your medical record and screening and assessments performed today your provider may have ordered immunizations, labs, imaging, and/or referrals for you.  A list of these orders (if applicable) as well as your Preventive Care list are included within your After Visit Summary for your review.    Other Preventive Recommendations:    A preventive eye exam performed by an eye specialist is recommended every 1-2 years to screen for glaucoma; cataracts, macular degeneration, and other eye disorders.  A preventive

## 2024-10-21 ENCOUNTER — ANESTHESIA EVENT (OUTPATIENT)
Facility: HOSPITAL | Age: 74
End: 2024-10-21
Payer: MEDICARE

## 2024-10-21 RX ORDER — SODIUM CHLORIDE 0.9 % (FLUSH) 0.9 %
5-40 SYRINGE (ML) INJECTION PRN
Status: CANCELLED | OUTPATIENT
Start: 2024-10-21

## 2024-10-21 RX ORDER — SODIUM CHLORIDE 9 MG/ML
INJECTION, SOLUTION INTRAVENOUS PRN
Status: CANCELLED | OUTPATIENT
Start: 2024-10-21

## 2024-10-21 RX ORDER — SODIUM CHLORIDE 0.9 % (FLUSH) 0.9 %
5-40 SYRINGE (ML) INJECTION EVERY 12 HOURS SCHEDULED
Status: CANCELLED | OUTPATIENT
Start: 2024-10-21

## 2024-10-21 ASSESSMENT — ENCOUNTER SYMPTOMS: SHORTNESS OF BREATH: 1

## 2024-10-21 NOTE — ANESTHESIA PRE PROCEDURE
of 07-MAY-2023 21:17,   Borderline criteria for Lateral infarct are no longer present   Nonspecific T wave abnormality no longer evident in Lateral leads     TTE (5/8/23):  ·  Left Ventricle: Normal left ventricular systolic function with a visually estimated EF of 55 - 60%.  ·  Aortic Valve: Mild sclerosis of the aortic valve cusp.  ·  Contrast used: Definity.    Cath (5/7/23):  1. Thrombotic occlusion of OM3 at ostium   2. Severe mid RCA disease with 80-90% stenosis   3. Severe OM2 disease with 90% stenosis   4. Moderate Prox Lcx disease, 50-60% stenosis   5. Patent LAD stent   6. Elevated left sided filling pressures   7. Successful PCI of Lcx to OM3 with LUIS FERNANDO x1  8. Successful PCI of OM2 with LUIS FERNANDO x1  9. Successful PCI of mid RCA with LUIS FERNANDO x1       Neuro/Psych:   Negative Neuro/Psych ROS              GI/Hepatic/Renal:   (+) GERD:         ROS comment: Norris's esophagus with dysplasia     Hx Symptomatic cholelithiasis s/p cholecystectomy (10/10/24)    Obesity.   Endo/Other:    (+) DiabetesType II DM.                 Abdominal:   (+) obese          Vascular: negative vascular ROS.         Other Findings:       Anesthesia Plan      MAC     ASA 3       Induction: intravenous.      Anesthetic plan and risks discussed with patient.      Plan discussed with CRNA.                Abhay Brink MD   10/21/2024

## 2024-10-22 ENCOUNTER — ANESTHESIA (OUTPATIENT)
Facility: HOSPITAL | Age: 74
End: 2024-10-22
Payer: MEDICARE

## 2024-10-22 ENCOUNTER — HOSPITAL ENCOUNTER (OUTPATIENT)
Facility: HOSPITAL | Age: 74
Setting detail: OUTPATIENT SURGERY
Discharge: HOME | End: 2024-10-22
Attending: INTERNAL MEDICINE | Admitting: INTERNAL MEDICINE
Payer: MEDICARE

## 2024-10-22 VITALS
DIASTOLIC BLOOD PRESSURE: 68 MMHG | BODY MASS INDEX: 32.35 KG/M2 | RESPIRATION RATE: 16 BRPM | WEIGHT: 226 LBS | TEMPERATURE: 97.5 F | SYSTOLIC BLOOD PRESSURE: 134 MMHG | OXYGEN SATURATION: 95 % | HEIGHT: 70 IN | HEART RATE: 43 BPM

## 2024-10-22 PROCEDURE — 3700000001 HC ADD 15 MINUTES (ANESTHESIA): Performed by: INTERNAL MEDICINE

## 2024-10-22 PROCEDURE — 88305 TISSUE EXAM BY PATHOLOGIST: CPT

## 2024-10-22 PROCEDURE — 3600007512: Performed by: INTERNAL MEDICINE

## 2024-10-22 PROCEDURE — 2709999900 HC NON-CHARGEABLE SUPPLY: Performed by: INTERNAL MEDICINE

## 2024-10-22 PROCEDURE — 7100000010 HC PHASE II RECOVERY - FIRST 15 MIN: Performed by: INTERNAL MEDICINE

## 2024-10-22 PROCEDURE — 3700000000 HC ANESTHESIA ATTENDED CARE: Performed by: INTERNAL MEDICINE

## 2024-10-22 PROCEDURE — 6360000002 HC RX W HCPCS: Performed by: NURSE ANESTHETIST, CERTIFIED REGISTERED

## 2024-10-22 PROCEDURE — 3600007502: Performed by: INTERNAL MEDICINE

## 2024-10-22 PROCEDURE — 7100000011 HC PHASE II RECOVERY - ADDTL 15 MIN: Performed by: INTERNAL MEDICINE

## 2024-10-22 PROCEDURE — 2500000003 HC RX 250 WO HCPCS: Performed by: NURSE ANESTHETIST, CERTIFIED REGISTERED

## 2024-10-22 RX ORDER — GLYCOPYRROLATE 0.2 MG/ML
INJECTION INTRAMUSCULAR; INTRAVENOUS
Status: DISCONTINUED | OUTPATIENT
Start: 2024-10-22 | End: 2024-10-22 | Stop reason: SDUPTHER

## 2024-10-22 RX ORDER — FENTANYL CITRATE 50 UG/ML
INJECTION, SOLUTION INTRAMUSCULAR; INTRAVENOUS
Status: DISCONTINUED | OUTPATIENT
Start: 2024-10-22 | End: 2024-10-22 | Stop reason: SDUPTHER

## 2024-10-22 RX ADMIN — FENTANYL CITRATE 50 MCG: 50 INJECTION, SOLUTION INTRAMUSCULAR; INTRAVENOUS at 10:04

## 2024-10-22 RX ADMIN — GLYCOPYRROLATE 0.2 MG: 0.2 INJECTION, SOLUTION INTRAMUSCULAR; INTRAVENOUS at 09:59

## 2024-10-22 RX ADMIN — PROPOFOL 50 MG: 10 INJECTION, EMULSION INTRAVENOUS at 10:04

## 2024-10-22 RX ADMIN — LIDOCAINE HYDROCHLORIDE 50 MG: 20 INJECTION, SOLUTION EPIDURAL; INFILTRATION; INTRACAUDAL; PERINEURAL at 10:04

## 2024-10-22 RX ADMIN — PROPOFOL 50 MG: 10 INJECTION, EMULSION INTRAVENOUS at 10:06

## 2024-10-22 ASSESSMENT — PAIN - FUNCTIONAL ASSESSMENT: PAIN_FUNCTIONAL_ASSESSMENT: 0-10

## 2024-10-22 NOTE — PROGRESS NOTES
Endoscopy recovery  Patient returned to baseline, vital signs stable (see vital sign flowsheet). Patient offered liquids and tolerated well. Respiratory status within defined limits. Abdomen soft not tender. Skin with in defined limits. Responsible party driving patient home was given the opportunity to ask questions. Patient discharged with documented belongings.

## 2024-10-22 NOTE — OP NOTE
NAME:  Pradeep Camarillo   :   1950   MRN:   [id number]     Date/Time:  10/22/2024 10:14 AM    Esophagogastroduodenoscopy (EGD) Procedure Note    Procedure: Esophagogastroduodenoscopy with biopsy    Indication:      Norris's esophagus  Pre-operative Diagnosis: see indication above  Post-operative Diagnosis: see findings below  :  Dave Flowers MD  Referring Provider:   -LINDA Joy MD    Exam:  Airway: clear, no airway problems anticipated  Heart: RRR, without gallops or rubs  Lungs: clear bilaterally without wheezes, crackles, or rhonchi  Abdomen: soft, nontender, nondistended, bowel sounds present  Mental Status: awake, alert and oriented to person, place and time     Anethesia/Sedation:  MAC anesthesia Propofol 100mg IV and Fentanyl 50mcg IV  Procedure Details   After informed consent was obtained for the procedure, with all risks and benefits of procedure explained the patient was taken to the endoscopy suite and placed in the left lateral decubitus position.  Following sequential administration of sedation  as per above, the [id number] gastroscope was inserted into the mouth and advanced under direct vision to second portion of the duodenum.  A careful inspection was made as the gastroscope was withdrawn, including a retroflexed view of the proximal stomach;  findings and interventions are described below.                                                                                                                                                                           Findings:    -Mild esophageal dysmotility  -Minimal 1cm smooth continuous irregularity at gastroesophageal junction; biopsied  -Small 2cm hiatal hernia from 41-43cm  -Normal stomach mucosa  -Normal duodenal mucosa     Therapies:  biopsy of esophagus  Specimens: #1 distal esophagus  EBL:  None.         Complications:   None; patient tolerated the procedure well.           Impression:    -Mild esophageal dysmotility  -Minimal

## 2024-10-22 NOTE — PERIOP NOTE
The risks and benefits of the bite block have been explained to patient.  Patient verbalizes understanding.     ARRIVAL INFORMATION:  Verified patient name and date of birth, scheduled procedure, and informed consent.     : Bethany Wife contact number: [phone]  Physician and staff can share information with the .     Receive texts: no    Belongings with patient include:  Clothing,None    GI FOCUSED ASSESSMENT:  Neuro: Awake, alert, oriented x4  Respiratory: even and unlabored   GI: soft and non-distended  EKG Rhythm: sinus bradycardia    Education:Reviewed general discharge instructions and  information.     Scope pre cleaned by Shelby MILIAN

## 2024-10-22 NOTE — ANESTHESIA POSTPROCEDURE EVALUATION
Department of Anesthesiology  Postprocedure Note    Patient: Pradeep Camarillo  MRN: [id number]  YOB: 1950  Date of evaluation: 10/22/2024    Procedure Summary       Date: 10/22/24 Room / Location: [facility] 04 / [facility] ENDOSCOPY    Anesthesia Start: 0959 Anesthesia Stop: 1011    Procedure: ESOPHAGOGASTRODUODENOSCOPY (Upper GI Region) Diagnosis:       Norris's esophagus with dysplasia      Hiatal hernia      Esophageal dysmotility      (Norris's esophagus with dysplasia [K22.719])    Surgeons: Dave Flowers MD Responsible Provider: Abhay Brink MD    Anesthesia Type: MAC ASA Status: 3            Anesthesia Type: No value filed.    Madi Phase I: Madi Score: 10    Madi Phase II: Madi Score: 9    Anesthesia Post Evaluation    Patient location during evaluation: PACU  Patient participation: complete - patient participated  Level of consciousness: sleepy but conscious and responsive to verbal stimuli  Pain score: 0  Airway patency: patent  Nausea & Vomiting: no vomiting and no nausea  Cardiovascular status: blood pressure returned to baseline and hemodynamically stable  Respiratory status: acceptable  Hydration status: stable    No notable events documented.

## 2024-10-22 NOTE — H&P
fluticasone (FLONASE) 50 MCG/ACT nasal spray 1 spray by Each Nostril route daily as needed for Allergies or Rhinitis    Dory Ferrell MD   dextromethorphan-guaiFENesin (MUCINEX DM)  MG per extended release tablet Take 1 tablet by mouth every 12 hours as needed for Congestion or Cough    Dory Ferrell MD   Polyvinyl Alcohol-Povidone PF (REFRESH) 1.4-0.6 % SOLN ophthalmic solution Place 1 drop into both eyes as needed    Dory Ferrell MD REPATHA SURECLICK 140 MG/ML SOAJ Inject 140 mg into the muscle every 14 days 3/21/24   Dory Ferrell MD   albuterol sulfate HFA (PROVENTIL;VENTOLIN;PROAIR) 108 (90 Base) MCG/ACT inhaler Inhale 2 puffs into the lungs every 4 hours as needed for Shortness of Breath or Wheezing 5/24/22   Automatic Reconciliation, Ar   finasteride (PROSCAR) 5 MG tablet Take 1 tablet by mouth three times a week Take three times a week 3/25/16   Automatic Reconciliation, Ar     Physical Exam:   Vital Signs: Blood pressure 132/70, pulse (!) 43, temperature 98.1 °F (36.7 °C), temperature source Temporal, resp. rate 20, height 1.778 m (5' 10\"), weight 102.5 kg (226 lb), SpO2 98%.  General: well developed, well nourished   HEENT: unremarkable   Heart: regular rhythm no mumur    Lungs: clear   Abdominal:  benign   Neurological: unremarkable   Extremities: no edema     Findings/Diagnosis: Norris's esophagus  Plan of Care/Planned Procedure: EGD with conscious/deep sedation    Signed:  Dave Flowers MD 10/22/2024

## 2024-10-22 NOTE — DISCHARGE INSTRUCTIONS
Pradeep Camarillo  [id number]  1950    EGD DISCHARGE INSTRUCTIONS  Discomfort:  Sore throat- throat lozenges or warm salt water gargle  redness at IV site- apply warm compress to area; if redness or soreness persist- contact your physician  Gaseous discomfort- walking, belching will help relieve any discomfort  You may not operate a vehicle for 12 hours  You may not engage in an occupation involving machinery or appliances for rest of today  You may not drink alcoholic beverages for at least 12 hours  Avoid making any critical decisions for at least 24 hour  DIET  You may have minimal sips at this time-- do not eat or drink for two hours.  You may eat and drink after 1045am today  You may resume your regular diet - however -  remember your colon is empty and a heavy meal will produce gas.   Avoid these foods:  vegetables, fried / greasy foods, carbonated drinks    MEDICATIONS:  [unfilled]    ACTIVITY  You may resume your normal daily activities until tomorrow AM;  Spend the remainder of the day resting -  avoid any strenuous activity.  CALL M.D.  ANY SIGN OF   Increasing pain, nausea, vomiting  Abdominal distension (swelling)  New increased bleeding (oral or rectal)  Fever (chills)  Pain in chest area  Bloody discharge from nose or mouth  Shortness of breath    IMPRESSION:  -Mild esophageal dysmotility  -Minimal 1cm smooth continuous irregularity at gastroesophageal junction; biopsied  -Small 2cm hiatal hernia from 41-43cm  -Normal stomach mucosa  -Normal duodenal mucosa    Follow-up Instructions:   Call Dr. Flowers for the results of procedure / biopsy in 7-10 days   Telephone # [phone]  Continue acid suppression as omeprazole every morning and famotidine every night.  Repeat upper endoscopy in 5 years    Dave Flowers MD    Patient Education on Sedation / Analgesia Administered for Procedure      For 24 hours after general anesthesia or intravenous analgesia / sedation:  Have someone responsible help you with

## 2024-10-25 ENCOUNTER — OFFICE VISIT (OUTPATIENT)
Age: 74
End: 2024-10-25

## 2024-10-25 VITALS
DIASTOLIC BLOOD PRESSURE: 72 MMHG | RESPIRATION RATE: 18 BRPM | BODY MASS INDEX: 33.33 KG/M2 | WEIGHT: 232.8 LBS | OXYGEN SATURATION: 95 % | HEIGHT: 70 IN | TEMPERATURE: 97.5 F | HEART RATE: 58 BPM | SYSTOLIC BLOOD PRESSURE: 132 MMHG

## 2024-10-25 DIAGNOSIS — Z48.89 ENCOUNTER FOR POSTOPERATIVE CARE: Primary | ICD-10-CM

## 2024-10-25 PROCEDURE — 99024 POSTOP FOLLOW-UP VISIT: CPT | Performed by: SURGERY

## 2024-10-25 ASSESSMENT — PATIENT HEALTH QUESTIONNAIRE - PHQ9
1. LITTLE INTEREST OR PLEASURE IN DOING THINGS: NOT AT ALL
SUM OF ALL RESPONSES TO PHQ QUESTIONS 1-9: 0
SUM OF ALL RESPONSES TO PHQ QUESTIONS 1-9: 0
SUM OF ALL RESPONSES TO PHQ9 QUESTIONS 1 & 2: 0
SUM OF ALL RESPONSES TO PHQ QUESTIONS 1-9: 0
2. FEELING DOWN, DEPRESSED OR HOPELESS: NOT AT ALL
SUM OF ALL RESPONSES TO PHQ QUESTIONS 1-9: 0

## 2024-10-25 NOTE — PROGRESS NOTES
Identified pt with two pt identifiers (name and ). Reviewed chart in preparation for visit and have obtained necessary documentation.    Pradeep Camarillo is a 74 y.o. male Post-Op Check (S/p 1. Laparoscopic cholecystectomy with intraoperative cholangiogram 2. Supervision and interpretation of radiology on 10/10/24)  .    Vitals:    10/25/24 0930   BP: 132/72   Site: Left Upper Arm   Position: Sitting   Pulse: 58   Resp: 18   Temp: 97.5 °F (36.4 °C)   TempSrc: Oral   SpO2: 95%   Weight: 105.6 kg (232 lb 12.8 oz)   Height: 1.778 m (5' 10\")          1. Have you been to the ER, urgent care clinic since your last visit?  Hospitalized since your last visit?  no     2. Have you seen or consulted any other health care providers outside of the [facility] since your last visit?  Include any pap smears or colon screening.  yes - EGD 10/22/24

## 2024-10-25 NOTE — PROGRESS NOTES
Surgery  Follow up    Procedure: Laparoscopic cholecystectomy with intraoperative cholangiogram   OR date:  10/10/2024  Path:    FINAL PATHOLOGIC DIAGNOSIS          Gallbladder, cholecystectomy:        Mild chronic calculus cholecystitis.     S I feel fine, no pain, diarrhea.  Some belching but had recent EGD with [facility]retts    /72 (Site: Left Upper Arm, Position: Sitting)   Pulse 58   Temp 97.5 °F (36.4 °C) (Oral)   Resp 18   Ht 1.778 m (5' 10\")   Wt 105.6 kg (232 lb 12.8 oz)   SpO2 95%   BMI 33.40 kg/m²     O Incisions healing well without infection   No signs of hernia    A/P Doing well   No heavy lifting for another 3-4 weeks      RTC prn    Dirk Pozo MD FACS

## 2024-12-17 ENCOUNTER — HOSPITAL ENCOUNTER (OUTPATIENT)
Age: 74
Discharge: HOME | End: 2024-12-20

## 2024-12-17 DIAGNOSIS — S86.811D RUPTURE OF PATELLAR TENDON, RIGHT, SUBSEQUENT ENCOUNTER: ICD-10-CM

## 2024-12-18 ENCOUNTER — OFFICE VISIT (OUTPATIENT)
Age: 74
End: 2024-12-18

## 2024-12-18 VITALS
HEART RATE: 56 BPM | DIASTOLIC BLOOD PRESSURE: 74 MMHG | RESPIRATION RATE: 16 BRPM | SYSTOLIC BLOOD PRESSURE: 120 MMHG | WEIGHT: 234 LBS | BODY MASS INDEX: 33.58 KG/M2 | TEMPERATURE: 98.6 F | OXYGEN SATURATION: 96 %

## 2024-12-18 DIAGNOSIS — J01.90 ACUTE BACTERIAL SINUSITIS: Primary | ICD-10-CM

## 2024-12-18 DIAGNOSIS — B96.89 ACUTE BACTERIAL SINUSITIS: Primary | ICD-10-CM

## 2024-12-18 DIAGNOSIS — J45.909 MODERATE ASTHMA, UNSPECIFIED WHETHER COMPLICATED, UNSPECIFIED WHETHER PERSISTENT: ICD-10-CM

## 2024-12-18 DIAGNOSIS — H66.92 LEFT OTITIS MEDIA, UNSPECIFIED OTITIS MEDIA TYPE: ICD-10-CM

## 2024-12-18 RX ORDER — LORAZEPAM 0.5 MG/1
0.5 TABLET ORAL SEE ADMIN INSTRUCTIONS
COMMUNITY
Start: 2024-12-06 | End: 2024-12-18

## 2024-12-18 RX ORDER — PREDNISONE 20 MG/1
TABLET ORAL
Qty: 10 TABLET | Refills: 0 | Status: SHIPPED | OUTPATIENT
Start: 2024-12-18

## 2024-12-18 NOTE — PATIENT INSTRUCTIONS
Discussed with patient we will start Augmentin twice a day for the next 7 days for otitis media.  Make sure to take medication with food this will also treat for bacterial sinusitis discussed with patient definitely want to follow-up with ENT to make sure this is not a resistant bacterial sinusitis due to previous use of Augmentin, last diagnosis and treatment was in June about 6 months ago.  Also discussed with patient can use warm compresses on ears to help relieve pain or pressure.  Discussed with patient to also use albuterol has needed secondary to coughing we will also give patient a 5-day course of steroid to help with short of breath wheezing second thumb secondary to coughing.  If any worsening symptoms or no improvement please return to urgent care PCP or emergency department otherwise follow-up with ENT within the next few weeks

## 2024-12-18 NOTE — PROGRESS NOTES
UPPER GI ENDOSCOPY,BIOPSY  10/14/2021            Prior to Admission medications    Medication Sig Start Date End Date Taking? Authorizing Provider   TURMERIC PO Take 1 tablet by mouth daily   Yes Dory Ferrell MD   fluticasone (FLONASE) 50 MCG/ACT nasal spray 1 spray by Each Nostril route daily as needed for Allergies or Rhinitis   Yes Dory Ferrell MD   omeprazole (PRILOSEC) 40 MG delayed release capsule TAKE 1 CAPSULE BY MOUTH DAILY 9/27/24  Yes LINDA Joy MD   levocetirizine (XYZAL) 5 MG tablet TAKE 1 TABLET BY MOUTH DAILY  Patient taking differently: Take 1 tablet by mouth nightly 9/24/24  Yes LINDA Joy MD   famotidine (PEPCID) 40 MG tablet Take 1 tablet by mouth at bedtime 9/7/24  Yes Dory Ferrell MD   Polyvinyl Alcohol-Povidone PF (REFRESH) 1.4-0.6 % SOLN ophthalmic solution Place 1 drop into both eyes as needed   Yes Dory Ferrell MD   nabumetone (RELAFEN) 500 MG tablet TAKE 1 TABLET BY MOUTH 2 TIMES A DAY 6/27/24  Yes LINDA Joy MD   vitamin D (CHOLECALCIFEROL) 25 MCG (1000 UT) TABS tablet Take 1 tablet by mouth daily   Yes Dory Ferrell MD   vitamin B-12 (CYANOCOBALAMIN) 1000 MCG tablet Take 1 tablet by mouth daily   Yes Dory Ferrell MD REPATHA SURECLICK 140 MG/ML SOAJ Inject 140 mg into the muscle every 14 days 3/21/24  Yes Dory Ferrell MD   dilTIAZem (CARDIZEM CD) 240 MG extended release capsule Take 1 capsule by mouth daily 8/24/23  Yes Dory Ferrell MD   pregabalin (LYRICA) 75 MG capsule Take 1 capsule by mouth 3 times daily. Usually just takes 2 times daily 9/25/23  Yes Dory Ferrell MD   budesonide-formoterol (SYMBICORT) 160-4.5 MCG/ACT AERO Inhale 2 puffs into the lungs 2 times daily   Yes Dory Ferrell MD   Coenzyme Q10 (COQ10) 200 MG CAPS Take 200 mg by mouth daily   Yes Dory Ferrell MD   Multiple Vitamins-Minerals (MULTIVITAMIN ADULT EXTRA C PO) Take 1 tablet by mouth daily

## 2024-12-24 NOTE — TELEPHONE ENCOUNTER
PCP: LINDA Joy MD    Last appt: 10/14/2024  Future Appointments   Date Time Provider Department Center   4/14/2025  8:15 AM LINDA Joy MD [facility] DEP       Requested Prescriptions     Pending Prescriptions Disp Refills    nabumetone (RELAFEN) 500 MG tablet [Pharmacy Med Name: NABUMETONE 500 MG TABLET] 180 tablet 1     Sig: TAKE 1 TABLET BY MOUTH 2 TIMES A DAY       Prior labs and Blood pressures:  BP Readings from Last 3 Encounters:   12/18/24 120/74   10/25/24 132/72   10/22/24 134/68     Lab Results   Component Value Date/Time     10/03/2024 08:11 AM    K 4.4 10/03/2024 08:11 AM     10/03/2024 08:11 AM    CO2 27 10/03/2024 08:11 AM    BUN 18 10/03/2024 08:11 AM    GFRAA >60 09/10/2021 11:03 AM     No results found for: \"HBA1C\", \"OEJ5XYDC\"  Lab Results   Component Value Date/Time    CHOL 196 09/10/2021 11:03 AM    HDL 50 09/10/2021 11:03 AM    .8 09/10/2021 11:03 AM    VLDL 43.2 09/10/2021 11:03 AM    VLDL 27 03/16/2020 11:07 AM     No results found for: \"VITD3\"        No results found for: \"TSH\", \"TSH2\", \"TSH3\"

## 2024-12-26 ENCOUNTER — OFFICE VISIT (OUTPATIENT)
Age: 74
End: 2024-12-26

## 2024-12-26 VITALS
WEIGHT: 237 LBS | DIASTOLIC BLOOD PRESSURE: 70 MMHG | TEMPERATURE: 97.8 F | RESPIRATION RATE: 16 BRPM | BODY MASS INDEX: 34.01 KG/M2 | OXYGEN SATURATION: 98 % | HEART RATE: 53 BPM | SYSTOLIC BLOOD PRESSURE: 114 MMHG

## 2024-12-26 DIAGNOSIS — J40 BRONCHITIS: Primary | ICD-10-CM

## 2024-12-26 DIAGNOSIS — J01.01 ACUTE RECURRENT MAXILLARY SINUSITIS: ICD-10-CM

## 2024-12-26 RX ORDER — NABUMETONE 500 MG/1
500 TABLET, FILM COATED ORAL 2 TIMES DAILY
Qty: 180 TABLET | Refills: 1 | Status: SHIPPED | OUTPATIENT
Start: 2024-12-26

## 2024-12-26 RX ORDER — BENZONATATE 200 MG/1
200 CAPSULE ORAL 3 TIMES DAILY PRN
Qty: 30 CAPSULE | Refills: 0 | Status: SHIPPED | OUTPATIENT
Start: 2024-12-26 | End: 2025-01-05

## 2024-12-26 NOTE — PROGRESS NOTES
Patient Name: Pradeep Camarillo   YOB: 1950   Patient Status: Established patient,   Chief Complaint: Cough (Pt present with coughing and sore throat with swollen glands, x 2 weeks )      ____________________________________________________________________________________________    External Records Reviewed: None    Limitation to History: None    Outside Historian: None    SUBJECTIVE/OBJECTIVE:  Pradeep Camarillo is a 74 y.o. male presents with complaint of cough, sore throat, sinus pain / pressure and congestion.  Symptoms began 2 week(s) ago and show mild improvement since onset.  Patient reports he was seen here last week with diagnosis of URI and sinusitis with treatment of 7 days Augmentin and prednisone. The patient denies fever, shortness of breath, and chest pain .  Patient reports history of asthma. No other acute symptoms reported at this time.          PAST MEDICAL HISTORY:   Medical: Pt  has a past medical history of Actinic keratosis (09/15/2017), Annual physical exam (09/15/2017), Arthritis, ASHD (arteriosclerotic heart disease) (09/15/2017), Asthma, Norris's esophagus, CAD (coronary artery disease), Chest pain (09/15/2017), Chronic back pain (09/15/2017), Chronic obstructive pulmonary disease (HCC), Chronic pain, Cough (09/15/2017), Diabetes ([facility]), Diabetes mellitus screening (09/15/2017), Diaphoresis (09/15/2017), Difficult intubation, SARAVIA (dyspnea on exertion), Dyslipidemia (09/15/2017), Edema (09/15/2017), Elevated LFTs (09/15/2017), Essential hypertension (06/07/2019), Fatigue (09/15/2017), GERD (gastroesophageal reflux disease), Hypercholesterolemia, Hyperplasia of prostate (09/15/2017), Impaired glucose tolerance (01/17/2020), Myocardial infarction (HCC) (05/01/2023), Obesity (09/15/2017), Other long term (current) drug therapy (09/15/2017), Seasonal allergic rhinitis due to pollen (03/18/2021), Sinusitis (09/15/2017), SK (seborrheic keratosis) (09/15/2017), and Sleep apnea.  Surgical:

## 2024-12-26 NOTE — PATIENT INSTRUCTIONS
If the doctor prescribed antibiotics, take them as directed. Do not stop taking them just because you feel better. You need to take the full course of antibiotics.  Sinus infections are usually self-limiting and do not require antibiotic therapy.   Use saline (saltwater) nasal washes. This can help keep your nasal passages open and wash out mucus and allergens.  You can buy saline nose washes at a grocery store or drugstore. Follow the instructions on the package.  Try a decongestant nasal spray like oxymetazoline (Afrin) Do not use it for more than 3 days in a row. Using it for more than 3 days can make your congestion worse.  If needed, take an over-the-counter pain medicine, such as acetaminophen (Tylenol), ibuprofen (Advil, Motrin), or naproxen (Aleve). Read and follow all instructions on the label.  Be careful when taking over-the-counter cold or influenza (flu) medicines and Tylenol at the same time. Many of these medicines have acetaminophen, which is Tylenol. Read the labels to make sure that you are not taking more than the recommended dose. Too much acetaminophen (Tylenol) can be harmful.  Try a steroid nasal spray daily such as Flonase as well as antihistamine such as Claritin or Zyrtec.   Breathe warm, moist air. You can use a steamy shower, a hot bath, or a sink filled with hot water. Avoid cold, dry air. Using a humidifier in your home may help. Follow the directions for cleaning the machine.  Do not smoke or allow others to smoke around you. If you need help quitting, talk to your doctor about stop-smoking programs and medicines. These can increase your chances of quitting for good.  Return to Clinic if mild worsening or changes in symptoms.  Report to ER if high or persistent fever, dehydration, new or severe worsening of symptoms.  Please follow up with PCP for persistent or recurrent symptoms.

## 2025-01-06 ENCOUNTER — OFFICE VISIT (OUTPATIENT)
Facility: CLINIC | Age: 75
End: 2025-01-06
Payer: MEDICARE

## 2025-01-06 VITALS
SYSTOLIC BLOOD PRESSURE: 135 MMHG | DIASTOLIC BLOOD PRESSURE: 73 MMHG | BODY MASS INDEX: 34.72 KG/M2 | RESPIRATION RATE: 20 BRPM | OXYGEN SATURATION: 95 % | HEART RATE: 60 BPM | WEIGHT: 242 LBS

## 2025-01-06 DIAGNOSIS — J45.41 MODERATE PERSISTENT ASTHMATIC BRONCHITIS WITH ACUTE EXACERBATION: Primary | ICD-10-CM

## 2025-01-06 PROCEDURE — 1036F TOBACCO NON-USER: CPT | Performed by: INTERNAL MEDICINE

## 2025-01-06 PROCEDURE — G8428 CUR MEDS NOT DOCUMENT: HCPCS | Performed by: INTERNAL MEDICINE

## 2025-01-06 PROCEDURE — 1123F ACP DISCUSS/DSCN MKR DOCD: CPT | Performed by: INTERNAL MEDICINE

## 2025-01-06 PROCEDURE — 99213 OFFICE O/P EST LOW 20 MIN: CPT | Performed by: INTERNAL MEDICINE

## 2025-01-06 PROCEDURE — 3017F COLORECTAL CA SCREEN DOC REV: CPT | Performed by: INTERNAL MEDICINE

## 2025-01-06 PROCEDURE — G8417 CALC BMI ABV UP PARAM F/U: HCPCS | Performed by: INTERNAL MEDICINE

## 2025-01-06 PROCEDURE — 3078F DIAST BP <80 MM HG: CPT | Performed by: INTERNAL MEDICINE

## 2025-01-06 PROCEDURE — 3075F SYST BP GE 130 - 139MM HG: CPT | Performed by: INTERNAL MEDICINE

## 2025-01-06 PROCEDURE — M1308 PR FLU IMMUNIZE NO ADMIN: HCPCS | Performed by: INTERNAL MEDICINE

## 2025-01-06 RX ORDER — AZITHROMYCIN 250 MG/1
TABLET, FILM COATED ORAL
Qty: 6 TABLET | Refills: 0 | Status: SHIPPED | OUTPATIENT
Start: 2025-01-06 | End: 2025-01-16

## 2025-01-06 RX ORDER — PREDNISONE 10 MG/1
TABLET ORAL
Qty: 48 EACH | Refills: 0 | Status: SHIPPED | OUTPATIENT
Start: 2025-01-06

## 2025-01-06 NOTE — PROGRESS NOTES
\"Have you been to the ER, urgent care clinic since your last visit?  Hospitalized since your last visit?\"    YES - When: approximately 2  weeks ago.  Where and Why: congestion/dizziness, [facility].    “Have you seen or consulted any other health care providers outside our system since your last visit?”    YES - When: approximately 5 days ago.  Where and Why: [facility] [phone].      “Have you had a diabetic eye exam?”    YES - Where: [facility] around 6 months ago    No diabetic eye exam on file           
Housing in the Last Year: No     Family History   Problem Relation Age of Onset    Cancer Father         prostate/liver    Heart Disease Mother         pacemaker    Lung Disease Mother         COPD       Review of Systems  Constitutional:  negative for fevers, chills, anorexia and weight loss  Eyes:    negative for visual disturbance and irritation  ENT:    negative for tinnitus,sore throat,nasal congestion,ear pains.hoarseness  Respiratory:     negative for hemoptysis, dyspnea,wheezing  CV:    negative for chest pain, palpitations, lower extremity edema  GI:    negative for nausea, vomiting, diarrhea, abdominal pain,melena  Endo:               negative for polyuria,polydipsia,polyphagia,heat intolerance  Genitourinary : negative for frequency, dysuria and hematuria  Integumentary: negative for rash and pruritus  Hematologic:   negative for easy bruising and gum/nose bleeding  Musculoskel:  negative for myalgias, arthralgias, back pain, muscle weakness  Neurological:   negative for headaches, dizziness, vertigo, memory problems and gait   Behavl/Psych:  negative for feelings of anxiety, depression, mood changes  ROS otherwise negative      Objective:  /73   Pulse 60   Resp 20   Wt 109.8 kg (242 lb)   SpO2 95%   BMI 34.72 kg/m²   Physical Exam:   General appearance - alert, well appearing, and in no distress  Mental status - alert, oriented to person, place, and time  EYE-NYDIA, EOMI, fundi normal, corneas normal, no foreign bodies  ENT-TMs with minimal effusion, no neck nodes or sinus tenderness  Nose - normal and patent, no erythema, discharge or polyps  Mouth - mucous membranes moist, oropharynx injected without exudate  Neck - supple, no significant adenopathy   Chest - clear to auscultation, few upper airway rhonchi  Heart - normal rate, regular rhythm, normal S1, S2, no murmurs, rubs, clicks or gallops   Abdomen - soft, nontender, nondistended, no masses or organomegaly  Lymph- no adenopathy

## 2025-02-19 ENCOUNTER — OFFICE VISIT (OUTPATIENT)
Facility: CLINIC | Age: 75
End: 2025-02-19
Payer: MEDICARE

## 2025-02-19 DIAGNOSIS — J45.41 MODERATE PERSISTENT ASTHMATIC BRONCHITIS WITH ACUTE EXACERBATION: Primary | ICD-10-CM

## 2025-02-19 DIAGNOSIS — E11.59 TYPE 2 DIABETES MELLITUS WITH OTHER CIRCULATORY COMPLICATION, WITHOUT LONG-TERM CURRENT USE OF INSULIN (HCC): ICD-10-CM

## 2025-02-19 PROCEDURE — 3046F HEMOGLOBIN A1C LEVEL >9.0%: CPT | Performed by: INTERNAL MEDICINE

## 2025-02-19 PROCEDURE — 3079F DIAST BP 80-89 MM HG: CPT | Performed by: INTERNAL MEDICINE

## 2025-02-19 PROCEDURE — 3017F COLORECTAL CA SCREEN DOC REV: CPT | Performed by: INTERNAL MEDICINE

## 2025-02-19 PROCEDURE — 1036F TOBACCO NON-USER: CPT | Performed by: INTERNAL MEDICINE

## 2025-02-19 PROCEDURE — 2022F DILAT RTA XM EVC RTNOPTHY: CPT | Performed by: INTERNAL MEDICINE

## 2025-02-19 PROCEDURE — 99213 OFFICE O/P EST LOW 20 MIN: CPT | Performed by: INTERNAL MEDICINE

## 2025-02-19 PROCEDURE — 3075F SYST BP GE 130 - 139MM HG: CPT | Performed by: INTERNAL MEDICINE

## 2025-02-19 PROCEDURE — G8428 CUR MEDS NOT DOCUMENT: HCPCS | Performed by: INTERNAL MEDICINE

## 2025-02-19 PROCEDURE — 1123F ACP DISCUSS/DSCN MKR DOCD: CPT | Performed by: INTERNAL MEDICINE

## 2025-02-19 PROCEDURE — G8417 CALC BMI ABV UP PARAM F/U: HCPCS | Performed by: INTERNAL MEDICINE

## 2025-02-19 RX ORDER — AZITHROMYCIN 250 MG/1
TABLET, FILM COATED ORAL
Qty: 6 TABLET | Refills: 0 | Status: SHIPPED | OUTPATIENT
Start: 2025-02-19 | End: 2025-03-01

## 2025-02-19 RX ORDER — PREDNISONE 10 MG/1
TABLET ORAL
Qty: 21 EACH | Refills: 0 | Status: SHIPPED | OUTPATIENT
Start: 2025-02-19

## 2025-02-20 VITALS
HEIGHT: 70 IN | RESPIRATION RATE: 12 BRPM | WEIGHT: 242 LBS | DIASTOLIC BLOOD PRESSURE: 82 MMHG | HEART RATE: 62 BPM | SYSTOLIC BLOOD PRESSURE: 134 MMHG | BODY MASS INDEX: 34.65 KG/M2

## 2025-02-20 NOTE — PROGRESS NOTES
cyanosis  Skin-Warm and dry. no hyperpigmentation, vitiligo, or suspicious lesions  Neuro -alert, oriented, normal speech, no focal findings or movement disorder noted      Assessment/Plan:  Pradeep was seen today for cough.    Diagnoses and all orders for this visit:    Moderate persistent asthmatic bronchitis with acute exacerbation  -     predniSONE 10 MG (21) TBPK; Take as directed (6-day Dosepak)  -     azithromycin (ZITHROMAX) 250 MG tablet; 500mg on day 1 followed by 250mg on days 2 - 5    Type 2 diabetes mellitus with other circulatory complication, without long-term current use of insulin ([facility])          ICD-10-CM    1. Moderate persistent asthmatic bronchitis with acute exacerbation  J45.41 predniSONE 10 MG (21) TBPK     azithromycin (ZITHROMAX) 250 MG tablet      2. Type 2 diabetes mellitus with other circulatory complication, without long-term current use of insulin ([facility])  E11.59         Plan:    Given his history of asthma he will be treated aggressively with a course of azithromycin and a Sterapred Dosepak.  He will continue his inhalers as prescribed.  If his symptoms progress or do not improve he will follow-up for further evaluation.    Follow-up and Dispositions    Return for as scheduled.         I have reviewed with the patient details of the assessment and plan and all questions were answered. Relevent patient education was performed. Verbal and/or written instructions (see AVS) provided. The most recent lab findings were reviewed with the patient.  Plan was discussed with patient who verbally expressed understanding.    An After Visit Summary was printed and given to the patient.    Jhon Joy MD

## 2025-02-27 ENCOUNTER — OFFICE VISIT (OUTPATIENT)
Facility: CLINIC | Age: 75
End: 2025-02-27
Payer: MEDICARE

## 2025-02-27 VITALS
WEIGHT: 246.6 LBS | RESPIRATION RATE: 18 BRPM | HEIGHT: 70 IN | BODY MASS INDEX: 35.3 KG/M2 | OXYGEN SATURATION: 96 % | TEMPERATURE: 98.4 F | DIASTOLIC BLOOD PRESSURE: 68 MMHG | HEART RATE: 60 BPM | SYSTOLIC BLOOD PRESSURE: 132 MMHG

## 2025-02-27 DIAGNOSIS — J01.00 ACUTE NON-RECURRENT MAXILLARY SINUSITIS: Primary | ICD-10-CM

## 2025-02-27 PROCEDURE — G8427 DOCREV CUR MEDS BY ELIG CLIN: HCPCS | Performed by: INTERNAL MEDICINE

## 2025-02-27 PROCEDURE — 3078F DIAST BP <80 MM HG: CPT | Performed by: INTERNAL MEDICINE

## 2025-02-27 PROCEDURE — 99213 OFFICE O/P EST LOW 20 MIN: CPT | Performed by: INTERNAL MEDICINE

## 2025-02-27 PROCEDURE — 3017F COLORECTAL CA SCREEN DOC REV: CPT | Performed by: INTERNAL MEDICINE

## 2025-02-27 PROCEDURE — G8417 CALC BMI ABV UP PARAM F/U: HCPCS | Performed by: INTERNAL MEDICINE

## 2025-02-27 PROCEDURE — 1126F AMNT PAIN NOTED NONE PRSNT: CPT | Performed by: INTERNAL MEDICINE

## 2025-02-27 PROCEDURE — 1123F ACP DISCUSS/DSCN MKR DOCD: CPT | Performed by: INTERNAL MEDICINE

## 2025-02-27 PROCEDURE — 1159F MED LIST DOCD IN RCRD: CPT | Performed by: INTERNAL MEDICINE

## 2025-02-27 PROCEDURE — 1036F TOBACCO NON-USER: CPT | Performed by: INTERNAL MEDICINE

## 2025-02-27 PROCEDURE — 3075F SYST BP GE 130 - 139MM HG: CPT | Performed by: INTERNAL MEDICINE

## 2025-02-27 RX ORDER — CEFDINIR 300 MG/1
300 CAPSULE ORAL 2 TIMES DAILY
Qty: 20 CAPSULE | Refills: 0 | Status: SHIPPED | OUTPATIENT
Start: 2025-02-27 | End: 2025-03-09

## 2025-02-27 SDOH — ECONOMIC STABILITY: FOOD INSECURITY: WITHIN THE PAST 12 MONTHS, YOU WORRIED THAT YOUR FOOD WOULD RUN OUT BEFORE YOU GOT MONEY TO BUY MORE.: NEVER TRUE

## 2025-02-27 SDOH — ECONOMIC STABILITY: FOOD INSECURITY: WITHIN THE PAST 12 MONTHS, THE FOOD YOU BOUGHT JUST DIDN'T LAST AND YOU DIDN'T HAVE MONEY TO GET MORE.: NEVER TRUE

## 2025-02-27 ASSESSMENT — PATIENT HEALTH QUESTIONNAIRE - PHQ9
SUM OF ALL RESPONSES TO PHQ9 QUESTIONS 1 & 2: 0
SUM OF ALL RESPONSES TO PHQ QUESTIONS 1-9: 0
SUM OF ALL RESPONSES TO PHQ QUESTIONS 1-9: 0
1. LITTLE INTEREST OR PLEASURE IN DOING THINGS: NOT AT ALL
SUM OF ALL RESPONSES TO PHQ QUESTIONS 1-9: 0
SUM OF ALL RESPONSES TO PHQ QUESTIONS 1-9: 0
2. FEELING DOWN, DEPRESSED OR HOPELESS: NOT AT ALL

## 2025-02-27 NOTE — PROGRESS NOTES
Pradeep Camarillo is a 74 y.o. male and presents with Still coughing, congestion, ear clogged  .    Subjective:  Mr. Camarillo presents today with continued cough congestion and drainage.  He has pressure and fullness in his left ear.  He completed a course of a Sterapred Dosepak and azithromycin but his symptoms have returned.  He denies any significant shortness of breath or wheezing.  He has had no fever or chills.  Drainage is discolored.    Past Medical History:   Diagnosis Date    Actinic keratosis 09/15/2017    Annual physical exam 09/15/2017    Arthritis     ASHD (arteriosclerotic heart disease) 09/15/2017    Asthma     Norris's esophagus     CAD (coronary artery disease)     Dr Lauro Gill- cardiologist    Chest pain 09/15/2017    Chronic back pain 09/15/2017    Chronic obstructive pulmonary disease (HCC)     follow Dr Shar Albright-pulmonary    Chronic pain     Cough 09/15/2017    Diabetes (HCC)     hypoglycemia hx- Dr Gena Angeles- endocrinologist    Diabetes mellitus screening 09/15/2017    Diaphoresis 09/15/2017    Difficult intubation     SARAVIA (dyspnea on exertion)     Dyslipidemia 09/15/2017    Edema 09/15/2017    Elevated LFTs 09/15/2017    Essential hypertension 06/07/2019    Fatigue 09/15/2017    GERD (gastroesophageal reflux disease)     Norris's esophagus    Hypercholesterolemia     Hyperplasia of prostate 09/15/2017    Impaired glucose tolerance 01/17/2020    Myocardial infarction (HCC) 05/01/2023    Obesity 09/15/2017    Other long term (current) drug therapy 09/15/2017    Seasonal allergic rhinitis due to pollen 03/18/2021    Sinusitis 09/15/2017    SK (seborrheic keratosis) 09/15/2017    Sleep apnea     CPAP compliant per pt, 01/27/2023     Past Surgical History:   Procedure Laterality Date    APPENDECTOMY      CARDIAC CATHETERIZATION      2008    CARDIAC PROCEDURE N/A 05/07/2023    Left heart cath / coronary angiography performed by Rashid Sigala MD at [facility] CARDIAC CATH LAB    CARDIAC

## 2025-02-27 NOTE — PROGRESS NOTES
Pradeep Camarillo is a 74 y.o. male     Chief Complaint   Patient presents with    Still coughing, congestion, ear clogged       /68 (Site: Left Upper Arm, Position: Sitting, Cuff Size: Large Adult)   Pulse 60   Temp 98.4 °F (36.9 °C) (Temporal)   Resp 18   Ht 1.778 m (5' 10\")   Wt 111.9 kg (246 lb 9.6 oz)   SpO2 96%   BMI 35.38 kg/m²     Health Maintenance Due   Topic Date Due    Diabetic foot exam  Never done    Diabetic retinal exam  Never done    Hepatitis C screen  Never done    DTaP/Tdap/Td vaccine (1 - Tdap) Never done    Shingles vaccine (1 of 2) Never done    Respiratory Syncytial Virus (RSV) Pregnant or age 60 yrs+ (1 - Risk 60-74 years 1-dose series) Never done    Pneumococcal 50+ years Vaccine (2 of 2 - PPSV23) 11/07/2019    Diabetic Alb to Cr ratio (uACR) test  03/14/2023    Flu vaccine (1) 08/01/2024    COVID-19 Vaccine (3 - 2024-25 season) 09/01/2024         \"Have you been to the ER, urgent care clinic since your last visit?  Hospitalized since your last visit?\"    NO    “Have you seen or consulted any other health care providers outside of [facility] since your last visit?”    NO

## 2025-03-03 RX ORDER — FINASTERIDE 5 MG/1
5 TABLET, FILM COATED ORAL
Qty: 90 TABLET | Refills: 3 | Status: SHIPPED | OUTPATIENT
Start: 2025-03-03

## 2025-03-03 NOTE — TELEPHONE ENCOUNTER
finasteride (PROSCAR) 5 MG tablet Take 1 tablet by mouth three times a week Take three times a week       Patient states [facility] Dr. Maier was filling this medication but he has not seen him and a year and a half.  Dr. Maier's office advised him to contact Dr. Joy's office to see if he will fill this medication.      Pharmacy [address]

## 2025-03-03 NOTE — TELEPHONE ENCOUNTER
PCP: LINDA Joy MD    Last appt: 2/27/2025    Future Appointments   Date Time Provider Department Center   4/14/2025  8:15 AM LINDA Joy MD [facility] DEP       Requested Prescriptions     Pending Prescriptions Disp Refills    finasteride (PROSCAR) 5 MG tablet 30 tablet 0     Sig: Take 1 tablet by mouth three times a week

## 2025-03-12 ENCOUNTER — OFFICE VISIT (OUTPATIENT)
Facility: CLINIC | Age: 75
End: 2025-03-12
Payer: MEDICARE

## 2025-03-12 VITALS
OXYGEN SATURATION: 96 % | BODY MASS INDEX: 35.53 KG/M2 | RESPIRATION RATE: 19 BRPM | TEMPERATURE: 98.5 F | HEART RATE: 62 BPM | SYSTOLIC BLOOD PRESSURE: 124 MMHG | DIASTOLIC BLOOD PRESSURE: 70 MMHG | HEIGHT: 70 IN | WEIGHT: 248.2 LBS

## 2025-03-12 DIAGNOSIS — H69.93 DYSFUNCTION OF BOTH EUSTACHIAN TUBES: Primary | ICD-10-CM

## 2025-03-12 PROCEDURE — 3017F COLORECTAL CA SCREEN DOC REV: CPT | Performed by: INTERNAL MEDICINE

## 2025-03-12 PROCEDURE — 99213 OFFICE O/P EST LOW 20 MIN: CPT | Performed by: INTERNAL MEDICINE

## 2025-03-12 PROCEDURE — 1126F AMNT PAIN NOTED NONE PRSNT: CPT | Performed by: INTERNAL MEDICINE

## 2025-03-12 PROCEDURE — G8427 DOCREV CUR MEDS BY ELIG CLIN: HCPCS | Performed by: INTERNAL MEDICINE

## 2025-03-12 PROCEDURE — 3074F SYST BP LT 130 MM HG: CPT | Performed by: INTERNAL MEDICINE

## 2025-03-12 PROCEDURE — G8417 CALC BMI ABV UP PARAM F/U: HCPCS | Performed by: INTERNAL MEDICINE

## 2025-03-12 PROCEDURE — 1036F TOBACCO NON-USER: CPT | Performed by: INTERNAL MEDICINE

## 2025-03-12 PROCEDURE — 3078F DIAST BP <80 MM HG: CPT | Performed by: INTERNAL MEDICINE

## 2025-03-12 PROCEDURE — 1159F MED LIST DOCD IN RCRD: CPT | Performed by: INTERNAL MEDICINE

## 2025-03-12 PROCEDURE — 1123F ACP DISCUSS/DSCN MKR DOCD: CPT | Performed by: INTERNAL MEDICINE

## 2025-03-12 RX ORDER — PREDNISONE 20 MG/1
20 TABLET ORAL DAILY
Qty: 10 TABLET | Refills: 0 | Status: SHIPPED | OUTPATIENT
Start: 2025-03-12 | End: 2025-03-22

## 2025-03-12 NOTE — PROGRESS NOTES
Pradeep Camarillo is a 74 y.o. male     Chief Complaint   Patient presents with    Both ears clogged,follow up on sinus issues       /70 (BP Site: Left Upper Arm, Patient Position: Sitting, BP Cuff Size: Large Adult)   Pulse 62   Temp 98.5 °F (36.9 °C) (Temporal)   Resp 19   Ht 1.778 m (5' 10\")   Wt 112.6 kg (248 lb 3.2 oz)   SpO2 96%   BMI 35.61 kg/m²     Health Maintenance Due   Topic Date Due    Diabetic foot exam  Never done    Diabetic retinal exam  Never done    Hepatitis C screen  Never done    DTaP/Tdap/Td vaccine (1 - Tdap) Never done    Shingles vaccine (1 of 2) Never done    Respiratory Syncytial Virus (RSV) Pregnant or age 60 yrs+ (1 - Risk 60-74 years 1-dose series) Never done    Pneumococcal 50+ years Vaccine (2 of 2 - PPSV23) 11/07/2019    Diabetic Alb to Cr ratio (uACR) test  03/14/2023    Flu vaccine (1) 08/01/2024    COVID-19 Vaccine (3 - 2024-25 season) 09/01/2024    A1C test (Diabetic or Prediabetic)  04/09/2025    Lipids  04/09/2025         \"Have you been to the ER, urgent care clinic since your last visit?  Hospitalized since your last visit?\"    NO    “Have you seen or consulted any other health care providers outside of [facility] since your last visit?”    NO                   
Moved in the Last Year: 0     Homeless in the Last Year: No     Family History   Problem Relation Age of Onset    Cancer Father         prostate/liver    Heart Disease Mother         pacemaker    Lung Disease Mother         COPD       Review of Systems  Constitutional:  negative for fevers, chills, anorexia and weight loss  Eyes:    negative for visual disturbance and irritation  ENT:    negative for tinnitus,sore throat,ear pains.hoarseness  Respiratory:     negative for  hemoptysis, dyspnea,wheezing  CV:    negative for chest pain, palpitations, lower extremity edema  GI:    negative for nausea, vomiting, diarrhea, abdominal pain,melena  Endo:               negative for polyuria,polydipsia,polyphagia,heat intolerance  Genitourinary : negative for frequency, dysuria and hematuria  Integumentary: negative for rash and pruritus  Hematologic:   negative for easy bruising and gum/nose bleeding  Musculoskel:  negative for myalgias, arthralgias, back pain, muscle weakness  Neurological:   negative for headaches, dizziness, vertigo, memory problems and gait   Behavl/Psych:  negative for feelings of anxiety, depression, mood changes  ROS otherwise negative      Objective:  /70 (BP Site: Left Upper Arm, Patient Position: Sitting, BP Cuff Size: Large Adult)   Pulse 62   Temp 98.5 °F (36.9 °C) (Temporal)   Resp 19   Ht 1.778 m (5' 10\")   Wt 112.6 kg (248 lb 3.2 oz)   SpO2 96%   BMI 35.61 kg/m²   Physical Exam:   General appearance - alert, well appearing, and in no distress  Mental status - alert, oriented to person, place, and time  EYE-NYDIA, EOMI, fundi normal, corneas normal, no foreign bodies  ENT-TMs opacified bilaterally, left TM bulging outward, no neck nodes or sinus tenderness  Nose -nares erythematous and boggy bilaterally  Mouth - mucous membranes moist, pharynx normal without lesions  Neck - supple, no significant adenopathy   Chest - clear to auscultation, no wheezes, rales or rhonchi, symmetric air

## 2025-03-26 NOTE — TELEPHONE ENCOUNTER
PCP: LINDA Joy MD    Last appt: 3/12/2025    Future Appointments   Date Time Provider Department Center   4/14/2025  8:15 AM LINDA Joy MD [facility] DEP       Requested Prescriptions     Pending Prescriptions Disp Refills    omeprazole (PRILOSEC) 40 MG delayed release capsule [Pharmacy Med Name: OMEPRAZOLE DR 40 MG CAPSULE] 90 capsule 1     Sig: TAKE 1 CAPSULE BY MOUTH DAILY

## 2025-03-27 RX ORDER — OMEPRAZOLE 40 MG/1
40 CAPSULE, DELAYED RELEASE ORAL DAILY
Qty: 90 CAPSULE | Refills: 1 | Status: SHIPPED | OUTPATIENT
Start: 2025-03-27

## 2025-03-31 ENCOUNTER — OFFICE VISIT (OUTPATIENT)
Facility: CLINIC | Age: 75
End: 2025-03-31
Payer: MEDICARE

## 2025-03-31 VITALS
BODY MASS INDEX: 36.05 KG/M2 | WEIGHT: 251.8 LBS | DIASTOLIC BLOOD PRESSURE: 60 MMHG | TEMPERATURE: 98 F | HEIGHT: 70 IN | SYSTOLIC BLOOD PRESSURE: 126 MMHG | OXYGEN SATURATION: 94 % | HEART RATE: 69 BPM | RESPIRATION RATE: 19 BRPM

## 2025-03-31 DIAGNOSIS — J01.00 ACUTE NON-RECURRENT MAXILLARY SINUSITIS: Primary | ICD-10-CM

## 2025-03-31 PROCEDURE — G8417 CALC BMI ABV UP PARAM F/U: HCPCS | Performed by: INTERNAL MEDICINE

## 2025-03-31 PROCEDURE — 1126F AMNT PAIN NOTED NONE PRSNT: CPT | Performed by: INTERNAL MEDICINE

## 2025-03-31 PROCEDURE — 1036F TOBACCO NON-USER: CPT | Performed by: INTERNAL MEDICINE

## 2025-03-31 PROCEDURE — 3078F DIAST BP <80 MM HG: CPT | Performed by: INTERNAL MEDICINE

## 2025-03-31 PROCEDURE — 3074F SYST BP LT 130 MM HG: CPT | Performed by: INTERNAL MEDICINE

## 2025-03-31 PROCEDURE — 1159F MED LIST DOCD IN RCRD: CPT | Performed by: INTERNAL MEDICINE

## 2025-03-31 PROCEDURE — 99213 OFFICE O/P EST LOW 20 MIN: CPT | Performed by: INTERNAL MEDICINE

## 2025-03-31 PROCEDURE — 3017F COLORECTAL CA SCREEN DOC REV: CPT | Performed by: INTERNAL MEDICINE

## 2025-03-31 PROCEDURE — 1123F ACP DISCUSS/DSCN MKR DOCD: CPT | Performed by: INTERNAL MEDICINE

## 2025-03-31 PROCEDURE — G8427 DOCREV CUR MEDS BY ELIG CLIN: HCPCS | Performed by: INTERNAL MEDICINE

## 2025-03-31 RX ORDER — PREDNISONE 20 MG/1
20 TABLET ORAL DAILY
Qty: 10 TABLET | Refills: 0 | Status: SHIPPED | OUTPATIENT
Start: 2025-03-31 | End: 2025-04-10

## 2025-03-31 RX ORDER — DOXYCYCLINE HYCLATE 100 MG
100 TABLET ORAL 2 TIMES DAILY
Qty: 20 TABLET | Refills: 0 | Status: SHIPPED | OUTPATIENT
Start: 2025-03-31 | End: 2025-04-10

## 2025-03-31 NOTE — PROGRESS NOTES
Pradeep Camarillo is a 74 y.o. male     Chief Complaint   Patient presents with    After finishing medication pt says he started coughing again    ear pressure       /60 (BP Site: Left Upper Arm, Patient Position: Sitting, BP Cuff Size: Large Adult)   Pulse 69   Temp 98 °F (36.7 °C) (Temporal)   Resp 19   Ht 1.778 m (5' 10\")   Wt 114.2 kg (251 lb 12.8 oz)   SpO2 94%   BMI 36.13 kg/m²     Health Maintenance Due   Topic Date Due    Diabetic foot exam  Never done    Diabetic retinal exam  Never done    Hepatitis C screen  Never done    DTaP/Tdap/Td vaccine (1 - Tdap) Never done    Shingles vaccine (1 of 2) Never done    Respiratory Syncytial Virus (RSV) Pregnant or age 60 yrs+ (1 - Risk 60-74 years 1-dose series) Never done    Pneumococcal 50+ years Vaccine (2 of 2 - PPSV23) 11/07/2019    Diabetic Alb to Cr ratio (uACR) test  03/14/2023    Flu vaccine (1) 08/01/2024    COVID-19 Vaccine (3 - 2024-25 season) 09/01/2024    Lipids  04/09/2025         \"Have you been to the ER, urgent care clinic since your last visit?  Hospitalized since your last visit?\"    NO    “Have you seen or consulted any other health care providers outside of [facility] since your last visit?”    NO                 
Homeless in the Last Year: No     Family History   Problem Relation Age of Onset    Cancer Father         prostate/liver    Heart Disease Mother         pacemaker    Lung Disease Mother         COPD       Review of Systems  Constitutional:  negative for fevers, chills, anorexia and weight loss  Eyes:    negative for visual disturbance and irritation  ENT:    negative for tinnitus,sore throat  Respiratory:     negative for  hemoptysis, dyspnea,wheezing  CV:    negative for chest pain, palpitations, lower extremity edema  GI:    negative for nausea, vomiting, diarrhea, abdominal pain,melena  Endo:               negative for polyuria,polydipsia,polyphagia,heat intolerance  Genitourinary : negative for frequency, dysuria and hematuria  Integumentary: negative for rash and pruritus  Hematologic:   negative for easy bruising and gum/nose bleeding  Musculoskel:  negative for myalgias, arthralgias, back pain, muscle weakness  Neurological:   negative for headaches, dizziness, vertigo, memory problems and gait   Behavl/Psych:  negative for feelings of anxiety, depression, mood changes  ROS otherwise negative      Objective:  /60 (BP Site: Left Upper Arm, Patient Position: Sitting, BP Cuff Size: Large Adult)   Pulse 69   Temp 98 °F (36.7 °C) (Temporal)   Resp 19   Ht 1.778 m (5' 10\")   Wt 114.2 kg (251 lb 12.8 oz)   SpO2 94%   BMI 36.13 kg/m²   Physical Exam:   General appearance - alert, well appearing, and in no distress  Mental status - alert, oriented to person, place, and time  EYE-NYDIA, EOMI, fundi normal, corneas normal, no foreign bodies  ENT TMs opacified bilaterally, maxillary sinuses with decreased transillumination to light bilaterally  Nose -erythematous and boggy bilateral  Mouth - mucous membranes moist, pharynx normal without lesions  Neck - supple, no significant adenopathy   Chest - clear to auscultation, no wheezes, rales or rhonchi, symmetric air entry   Heart - normal rate, regular rhythm,

## 2025-04-14 ENCOUNTER — OFFICE VISIT (OUTPATIENT)
Facility: CLINIC | Age: 75
End: 2025-04-14
Payer: MEDICARE

## 2025-04-14 VITALS
HEART RATE: 54 BPM | WEIGHT: 248.6 LBS | HEIGHT: 70 IN | RESPIRATION RATE: 19 BRPM | BODY MASS INDEX: 35.59 KG/M2 | DIASTOLIC BLOOD PRESSURE: 72 MMHG | SYSTOLIC BLOOD PRESSURE: 132 MMHG | TEMPERATURE: 97.1 F | OXYGEN SATURATION: 96 %

## 2025-04-14 DIAGNOSIS — K22.70 BARRETT'S ESOPHAGUS WITHOUT DYSPLASIA: ICD-10-CM

## 2025-04-14 DIAGNOSIS — E78.5 DYSLIPIDEMIA: ICD-10-CM

## 2025-04-14 DIAGNOSIS — I10 ESSENTIAL HYPERTENSION: ICD-10-CM

## 2025-04-14 DIAGNOSIS — M54.50 CHRONIC MIDLINE LOW BACK PAIN WITHOUT SCIATICA: ICD-10-CM

## 2025-04-14 DIAGNOSIS — E11.59 TYPE 2 DIABETES MELLITUS WITH OTHER CIRCULATORY COMPLICATION, WITHOUT LONG-TERM CURRENT USE OF INSULIN: Primary | ICD-10-CM

## 2025-04-14 DIAGNOSIS — G89.29 CHRONIC MIDLINE LOW BACK PAIN WITHOUT SCIATICA: ICD-10-CM

## 2025-04-14 DIAGNOSIS — N40.0 HYPERPLASIA OF PROSTATE: ICD-10-CM

## 2025-04-14 DIAGNOSIS — I25.10 ASHD (ARTERIOSCLEROTIC HEART DISEASE): ICD-10-CM

## 2025-04-14 DIAGNOSIS — J45.40 MODERATE PERSISTENT ASTHMA WITHOUT COMPLICATION: ICD-10-CM

## 2025-04-14 DIAGNOSIS — Z51.81 ENCOUNTER FOR MONITORING ANTIPLATELET THERAPY: ICD-10-CM

## 2025-04-14 DIAGNOSIS — Z79.02 ENCOUNTER FOR MONITORING ANTIPLATELET THERAPY: ICD-10-CM

## 2025-04-14 PROBLEM — R06.09 DOE (DYSPNEA ON EXERTION): Status: RESOLVED | Noted: 2019-10-28 | Resolved: 2025-04-14

## 2025-04-14 PROBLEM — R53.83 FATIGUE: Status: RESOLVED | Noted: 2017-09-15 | Resolved: 2025-04-14

## 2025-04-14 PROBLEM — R07.9 CHEST PAIN: Status: RESOLVED | Noted: 2017-09-15 | Resolved: 2025-04-14

## 2025-04-14 PROBLEM — R05.9 COUGH: Status: RESOLVED | Noted: 2017-09-15 | Resolved: 2025-04-14

## 2025-04-14 PROBLEM — R61 DIAPHORESIS: Status: RESOLVED | Noted: 2017-09-15 | Resolved: 2025-04-14

## 2025-04-14 PROBLEM — R10.84 GENERALIZED ABDOMINAL PAIN: Status: RESOLVED | Noted: 2020-08-13 | Resolved: 2025-04-14

## 2025-04-14 PROBLEM — R60.9 EDEMA: Status: RESOLVED | Noted: 2017-09-15 | Resolved: 2025-04-14

## 2025-04-14 PROBLEM — Z13.1 DIABETES MELLITUS SCREENING: Status: RESOLVED | Noted: 2017-09-15 | Resolved: 2025-04-14

## 2025-04-14 PROBLEM — H10.13 ALLERGIC CONJUNCTIVITIS OF BOTH EYES: Status: RESOLVED | Noted: 2020-08-13 | Resolved: 2025-04-14

## 2025-04-14 PROCEDURE — 1123F ACP DISCUSS/DSCN MKR DOCD: CPT | Performed by: INTERNAL MEDICINE

## 2025-04-14 PROCEDURE — 1036F TOBACCO NON-USER: CPT | Performed by: INTERNAL MEDICINE

## 2025-04-14 PROCEDURE — 3078F DIAST BP <80 MM HG: CPT | Performed by: INTERNAL MEDICINE

## 2025-04-14 PROCEDURE — 3075F SYST BP GE 130 - 139MM HG: CPT | Performed by: INTERNAL MEDICINE

## 2025-04-14 PROCEDURE — G8427 DOCREV CUR MEDS BY ELIG CLIN: HCPCS | Performed by: INTERNAL MEDICINE

## 2025-04-14 PROCEDURE — G8417 CALC BMI ABV UP PARAM F/U: HCPCS | Performed by: INTERNAL MEDICINE

## 2025-04-14 PROCEDURE — 3017F COLORECTAL CA SCREEN DOC REV: CPT | Performed by: INTERNAL MEDICINE

## 2025-04-14 PROCEDURE — 1159F MED LIST DOCD IN RCRD: CPT | Performed by: INTERNAL MEDICINE

## 2025-04-14 PROCEDURE — 1126F AMNT PAIN NOTED NONE PRSNT: CPT | Performed by: INTERNAL MEDICINE

## 2025-04-14 PROCEDURE — 99214 OFFICE O/P EST MOD 30 MIN: CPT | Performed by: INTERNAL MEDICINE

## 2025-04-14 PROCEDURE — 2022F DILAT RTA XM EVC RTNOPTHY: CPT | Performed by: INTERNAL MEDICINE

## 2025-04-14 PROCEDURE — 3046F HEMOGLOBIN A1C LEVEL >9.0%: CPT | Performed by: INTERNAL MEDICINE

## 2025-04-14 NOTE — PROGRESS NOTES
Pradeep Camarillo is a 74 y.o. male     Chief Complaint   Patient presents with    6 Month Follow-Up       /72 (BP Site: Left Upper Arm, Patient Position: Sitting, BP Cuff Size: Large Adult)   Pulse 54   Temp 97.1 °F (36.2 °C) (Temporal)   Resp 19   Ht 1.778 m (5' 10\")   Wt 112.8 kg (248 lb 9.6 oz)   SpO2 96%   BMI 35.67 kg/m²     Health Maintenance Due   Topic Date Due    Diabetic foot exam  Never done    Diabetic retinal exam  Never done    Hepatitis C screen  Never done    DTaP/Tdap/Td vaccine (1 - Tdap) Never done    Shingles vaccine (1 of 2) Never done    Respiratory Syncytial Virus (RSV) Pregnant or age 60 yrs+ (1 - Risk 60-74 years 1-dose series) Never done    Pneumococcal 50+ years Vaccine (2 of 2 - PPSV23) 11/07/2019    Diabetic Alb to Cr ratio (uACR) test  03/14/2023    COVID-19 Vaccine (3 - 2024-25 season) 09/01/2024    Lipids  04/09/2025         \"Have you been to the ER, urgent care clinic since your last visit?  Hospitalized since your last visit?\"    NO    “Have you seen or consulted any other health care providers outside of [facility] since your last visit?”    NO                 
therapy 09/15/2017    Seasonal allergic rhinitis due to pollen 03/18/2021    Sinusitis 09/15/2017    SK (seborrheic keratosis) 09/15/2017    Sleep apnea     CPAP compliant per pt, 01/27/2023     Past Surgical History:   Procedure Laterality Date    APPENDECTOMY      CARDIAC CATHETERIZATION      2008    CARDIAC PROCEDURE N/A 05/07/2023    Left heart cath / coronary angiography performed by Rashid Sigala MD at [facility] CARDIAC CATH LAB    CARDIAC PROCEDURE N/A 05/07/2023    Percutaneous coronary intervention performed by Rashid Sigala MD at [facility] CARDIAC CATH LAB    CARDIAC PROCEDURE N/A 05/07/2023    Angioplasty coronary performed by Rashid Sigala MD at [facility] CARDIAC CATH LAB    CARDIAC PROCEDURE N/A 05/07/2023    Insert stent rebeca coronary performed by Rashid Sigala MD at [facility] CARDIAC CATH LAB    CATARACT REMOVAL Bilateral 11/2020    CHOLECYSTECTOMY, LAPAROSCOPIC N/A 10/10/2024    1.  Laparoscopic cholecystectomy with intraoperative cholangiogram 2. Supervision and interpretation of radiology performed by Dirk Pozo MD at [facility] MAIN OR    COLONOSCOPY      COLONOSCOPY N/A 11/27/2019    FLEXIBLE SIGMOIDOSCOPY performed by Eleazar Novak MD at [facility] AMBULATORY OR    COLONOSCOPY N/A 01/15/2020    COLONOSCOPY performed by Eleazar Novak MD at [facility] ENDOSCOPY    KNEE ARTHROSCOPY Right 06/2019    RETINAL DETACHMENT SURGERY Left 2021    TOTAL KNEE ARTHROPLASTY Right     partial with Dr. De Leon    TOTAL KNEE ARTHROPLASTY Left     UPPER GASTROINTESTINAL ENDOSCOPY N/A 10/22/2024    ESOPHAGOGASTRODUODENOSCOPY performed by Dave Flowers MD at [facility] ENDOSCOPY    UPPER GI ENDOSCOPY,BIOPSY  10/14/2021          Allergies   Allergen Reactions    Claritin-D 12 Hour [Loratadine-Pseudoephedrine Er] Shortness Of Breath    Iodine Shortness Of Breath     Contrast   dye      Shellfish Allergy Shortness Of Breath    Levofloxacin Hives    Statins Other (See Comments)     \"liver problems\"    Sulfa Antibiotics Hives     Current

## 2025-06-26 LAB — HBA1C MFR BLD HPLC: 6 %

## 2025-07-08 RX ORDER — NABUMETONE 500 MG/1
500 TABLET, FILM COATED ORAL 2 TIMES DAILY
Qty: 180 TABLET | Refills: 1 | Status: SHIPPED | OUTPATIENT
Start: 2025-07-08

## 2025-07-08 NOTE — TELEPHONE ENCOUNTER
PCP: LINDA Joy MD    Last appt: 4/14/2025    Future Appointments   Date Time Provider Department Center   10/16/2025  8:30 AM LINDA Joy MD [facility] DEP       Requested Prescriptions     Pending Prescriptions Disp Refills    nabumetone (RELAFEN) 500 MG tablet [Pharmacy Med Name: NABUMETONE 500 MG TABLET] 180 tablet 1     Sig: TAKE 1 TABLET BY MOUTH 2 TIMES A DAY

## (undated) DEVICE — POLYP TRAP

## (undated) DEVICE — RADIFOCUS OPTITORQUE ANGIOGRAPHIC CATHETER: Brand: OPTITORQUE

## (undated) DEVICE — TRAP FLUID BUFFALO FLTR

## (undated) DEVICE — CATH BLLN ANGIO 3.25X12MM NC EUPHORIA RX

## (undated) DEVICE — NEEDLE HYPO 18GA L1.5IN PNK S STL HUB POLYPR SHLD REG BVL

## (undated) DEVICE — CATH IV AUTOGRD BC PNK 20GA 25 -- INSYTE

## (undated) DEVICE — FCPS BX HOT LG 2.2MMX240CM

## (undated) DEVICE — 1200 GUARD II KIT W/5MM TUBE W/O VAC TUBE: Brand: GUARDIAN

## (undated) DEVICE — GOWN,SIRUS,NONRNF,SETINSLV,XL,20/CS: Brand: MEDLINE

## (undated) DEVICE — TRANSFER SET 3": Brand: MEDLINE INDUSTRIES, INC.

## (undated) DEVICE — GLOVE SURG SZ 65 THK91MIL LTX FREE SYN POLYISOPRENE

## (undated) DEVICE — Device

## (undated) DEVICE — GLIDESHEATH SLENDER ACCESS KIT: Brand: GLIDESHEATH SLENDER

## (undated) DEVICE — SOLUTION LACTATED RINGERS INJECTION USP

## (undated) DEVICE — TRAP,MUCUS SPECIMEN, 80CC: Brand: MEDLINE

## (undated) DEVICE — 4-PORT MANIFOLD: Brand: NEPTUNE 2

## (undated) DEVICE — SUTURE VICRYL + SZ 0 L27IN ABSRB VLT L26MM UR-6 5/8 CIR VCP603H

## (undated) DEVICE — CATH BLLN ANGIO 2.50X15MM SC EUPHORA RX

## (undated) DEVICE — CONTAINER SPEC 20 ML LID NEUT BUFF FORMALIN 10 % POLYPR STS

## (undated) DEVICE — SET ADMIN 16ML TBNG L100IN 2 Y INJ SITE IV PIGGY BK DISP

## (undated) DEVICE — CATHETER IV 20GA L1.16IN OD1.0414-1.1176MM ID0.762-0.8382MM

## (undated) DEVICE — HAND-MRMCASU: Brand: MEDLINE INDUSTRIES, INC.

## (undated) DEVICE — SOLUTION IV 500ML 0.9% SOD CHL PH 5 INJ USP VIAFLX PLAS

## (undated) DEVICE — SYRINGE ANGIO 10 CC BRL STD PRNT POLYCARB LT BLU MEDALLION

## (undated) DEVICE — SUTURE VCRL SZ 1 L27IN ABSRB VLT L36MM CT-1 1/2 CIR J341H

## (undated) DEVICE — SUTURE VICRYL + SZ 4-0 L27IN ABSRB UD PS-2 3/8 CIR REV CUT VCP426H

## (undated) DEVICE — BANDAGE COMPR W2INXL5YD WHT BGE POLY COT M E WRP WV HK AND

## (undated) DEVICE — STERILE POLYISOPRENE POWDER-FREE SURGICAL GLOVES: Brand: PROTEXIS

## (undated) DEVICE — ZIMMER® STERILE DISPOSABLE TOURNIQUET CUFF WITH PROTECTIVE SLEEVE AND PLC, DUAL PORT, SINGLE BLADDER, 34 IN. (86 CM)

## (undated) DEVICE — ELECTRODE,RADIOTRANSLUCENT,FOAM,5PK: Brand: MEDLINE

## (undated) DEVICE — 3M™ TEGADERM™ TRANSPARENT FILM DRESSING FRAME STYLE, 1624W, 2-3/8 IN X 2-3/4 IN (6 CM X 7 CM), 100/CT 4CT/CASE: Brand: 3M™ TEGADERM™

## (undated) DEVICE — REM POLYHESIVE ADULT PATIENT RETURN ELECTRODE: Brand: VALLEYLAB

## (undated) DEVICE — DRAPE,REIN 53X77,STERILE: Brand: MEDLINE

## (undated) DEVICE — RUNTHROUGH NS EXTRA FLOPPY PTCA GUIDEWIRE: Brand: RUNTHROUGH

## (undated) DEVICE — TUBESET BNE PREP CO2 CARBOJET

## (undated) DEVICE — SOLUTION IRRIG 1000ML H2O STRL BLT

## (undated) DEVICE — SYR 3ML LL TIP 1/10ML GRAD --

## (undated) DEVICE — SOLIDIFIER MEDC 1200ML -- CONVERT TO 356117

## (undated) DEVICE — STERILE POLYISOPRENE POWDER-FREE SURGICAL GLOVES WITH EMOLLIENT COATING: Brand: PROTEXIS

## (undated) DEVICE — (D)PREP SKN CHLRAPRP APPL 26ML -- CONVERT TO ITEM 371833

## (undated) DEVICE — CATHETER BLLN 142CM  SPRINTER LEGEND RX 2MM X 12MM GW

## (undated) DEVICE — KIT,1200CC CANISTER,3/16"X6' TUBING: Brand: MEDLINE INDUSTRIES, INC.

## (undated) DEVICE — ZIMMER® STERILE DISPOSABLE TOURNIQUET CUFF WITH PLC, DUAL PORT, SINGLE BLADDER, 18 IN. (46 CM)

## (undated) DEVICE — SNARE ENDOSCP M L240CM W27MM SHTH DIA2.4MM CHN 2.8MM OVL

## (undated) DEVICE — BAG SPEC BIOHZRD 10 X 10 IN --

## (undated) DEVICE — GLOVE ORANGE PI 7 1/2   MSG9075

## (undated) DEVICE — IV START KIT: Brand: MEDLINE

## (undated) DEVICE — SPLINT WR VELC FOAM NEUT POS DISP FOR RAD ART ACC SFT STRP

## (undated) DEVICE — SOLUTION IV 1000ML 0.9% SOD CHL

## (undated) DEVICE — YANKAUER,TAPERED BULBOUS TIP,W/O VENT: Brand: MEDLINE

## (undated) DEVICE — ADHESIVE SKIN CLOSURE 4X22 CM PREMIERPRO EXOFINFUSION DISP

## (undated) DEVICE — TROCAR: Brand: KII FIOS FIRST ENTRY

## (undated) DEVICE — BLOCK BITE ENDOSCP AD 21 MM W/ DIL BLU LF DISP

## (undated) DEVICE — SYRINGE MED 10ML LUERLOCK TIP W/O SFTY DISP

## (undated) DEVICE — PIN EXT FIX SCHNZ 3 MM

## (undated) DEVICE — HYPODERMIC SAFETY NEEDLE: Brand: MONOJECT

## (undated) DEVICE — PACK PROCEDURE SURG HRT CATH

## (undated) DEVICE — SUTURE ETHLN SZ 3-0 L18IN NONABSORBABLE BLK PS-2 L19MM 3/8 1669H

## (undated) DEVICE — INTRALOCK 4-WAY TRANSPARENT STOPCOCK: Brand: ICU MEDICAL

## (undated) DEVICE — GLOVE ORANGE PI 7   MSG9070

## (undated) DEVICE — SYR 10ML LUER LOK 1/5ML GRAD --

## (undated) DEVICE — NON-REM POLYHESIVE PATIENT RETURN ELECTRODE: Brand: VALLEYLAB

## (undated) DEVICE — TOWEL 4 PLY TISS 19X30 SUE WHT

## (undated) DEVICE — GLOVE SURG SZ 7 L12IN FNGR THK79MIL GRN LTX FREE

## (undated) DEVICE — NEONATAL-ADULT SPO2 SENSOR: Brand: NELLCOR

## (undated) DEVICE — GENERAL LAPAROSCOPY-MRMC: Brand: MEDLINE INDUSTRIES, INC.

## (undated) DEVICE — SYR 20ML LL STRL LF --

## (undated) DEVICE — HANDLE LT SNAP ON ULT DURABLE LENS FOR TRUMPF ALC DISPOSABLE

## (undated) DEVICE — LIQUIBAND RAPID ADHESIVE 36/CS 0.8ML: Brand: MEDLINE

## (undated) DEVICE — BANDAGE COMPR M W6INXL10YD WHT BGE VELC E MTRX HK AND LOOP

## (undated) DEVICE — CATHETER GUID 6FR 0.071IN COR AMPLATZ L 0.75 MID

## (undated) DEVICE — BASIN EMSIS 16OZ GRAPHITE PLAS KID SHP MOLD GRAD FOR ORAL

## (undated) DEVICE — GARMENT,MEDLINE,DVT,INT,CALF,MED, GEN2: Brand: MEDLINE

## (undated) DEVICE — PACK SURG PROC KNEE USER GPS

## (undated) DEVICE — STRAP,POSITIONING,KNEE/BODY,FOAM,4X60": Brand: MEDLINE

## (undated) DEVICE — INFECTION CONTROL KIT SYS

## (undated) DEVICE — PREP KIT PEEL PTCH POVIDONE IOD

## (undated) DEVICE — SUTURE VCRL SZ 0 L27IN ABSRB UD L36MM CT-1 1/2 CIR J260H

## (undated) DEVICE — COVIDIEN KENDALL DL DISPOSABLE 3 LEAD SY: Brand: MEDLINE RENEWAL

## (undated) DEVICE — GOWN,SIRUS,NONRNF,SETINSLV,2XL,18/CS: Brand: MEDLINE

## (undated) DEVICE — Z DISCONTINUED PER MEDLINE LINE GAS SAMPLING O2/CO2 LNG AD 13 FT NSL W/ TBNG FILTERLINE

## (undated) DEVICE — MEDI-VAC YANK SUCT HNDL W/TPRD BULBOUS TIP & NON-CONDUCTIVE: Brand: CARDINAL HEALTH

## (undated) DEVICE — SOLIDIFIER FLD 2OZ 1500CC N DISINF IN BTL DISP SAFESORB

## (undated) DEVICE — PADDING CAST W6INXL4YD COT COHESIVE HND TEARABLE SPEC 100

## (undated) DEVICE — SYRINGE MED 30ML STD CLR PLAS LUERLOCK TIP N CTRL DISP

## (undated) DEVICE — STRAINER URIN CALC RNL MSH -- CONVERT TO ITEM 357634

## (undated) DEVICE — CONTINU-FLO SOLUTION SET, 2 INJECTION SITES, MALE LUER LOCK ADAPTER WITH RETRACTABLE COLLAR, LARGE BORE STOPCOCK WITH ROTATING MALE LUER LOCK EXTENSION SET, 2 INJECTION SITES, MALE LUER LOCK ADAPTER WITH RETRACTABLE COLLAR: Brand: INTERLINK/CONTINU-FLO

## (undated) DEVICE — CATHETER GUID 6FR L100CM DIA0.071IN NYL SHFT EBU3.5

## (undated) DEVICE — SMOKE EVACUATION PENCIL: Brand: VALLEYLAB

## (undated) DEVICE — CATHETER GUID 6FR DIA0.071IN SHFT NYL STD L JR 4 CRV ENH

## (undated) DEVICE — DEVICE TRNSF SPIK STL 2008S] MICROTEK MEDICAL INC]

## (undated) DEVICE — SOLUTION IRRIG 3000ML 0.9% SOD CHL FLX CONT 0797208] ICU MEDICAL INC]

## (undated) DEVICE — KENDALL DL ECG CABLE AND LEAD WIRE SYSTEM, 3-LEAD, SINGLE PATIENT USE: Brand: KENDALL

## (undated) DEVICE — BIPOLAR FORCEPS CORD: Brand: VALLEYLAB

## (undated) DEVICE — TR BAND RADIAL ARTERY COMPRESSION DEVICE: Brand: TR BAND

## (undated) DEVICE — Z DISCONTINUED USE 2717541 SUTURE STRATAFIX SZ 3-0 L30CM NONABSORBABLE UD L26MM FS 3/8

## (undated) DEVICE — SUTURE STRATAFIX SPRL SZ 1 L14IN ABSRB VLT L48CM CTX 1/2 SXPD2B405

## (undated) DEVICE — SUTURE ABSORBABLE MONOFILAMENT 2-0 WND CLOSURE GRN V-LOC 180 VLOCL0315

## (undated) DEVICE — SOLUTION IRRIG 1000ML 0.9% SOD CHL USP POUR PLAS BTL

## (undated) DEVICE — SET GRAV CK VLV NEEDLESS ST 3 GANGED 4WAY STPCOCK HI FLO 10

## (undated) DEVICE — STRYKER PERFORMANCE SERIES SAGITTAL BLADE: Brand: STRYKER PERFORMANCE SERIES

## (undated) DEVICE — CEMENT MIXING SYSTEM WITH FEMORAL BREAKWAY NOZZLE: Brand: REVOLUTION

## (undated) DEVICE — APPLIER CLP M/L SHFT DIA5MM 15 LIG LIGAMAX 5

## (undated) DEVICE — TROCAR: Brand: KII® SLEEVE

## (undated) DEVICE — SYR 50ML LR LCK 1ML GRAD NSAF --

## (undated) DEVICE — ENDO CARRY-ON PROCEDURE KIT INCLUDES ENZYMATIC SPONGE, GAUZE, BIOHAZARD LABEL, TRAY, LUBRICANT, DIRTY SCOPE LABEL, WATER LABEL, TRAY, DRAWSTRING PAD, AND DEFENDO 4-PIECE KIT.: Brand: ENDO CARRY-ON PROCEDURE KIT

## (undated) DEVICE — FORCEPS BX L160CM DIA8MM GRSP DISECT CUP TIP NONLOCKING ROT

## (undated) DEVICE — TROCARS: Brand: KII® BLUNT TIP ACCESS SYSTEM

## (undated) DEVICE — HANDPIECE SET WITH COAXIAL HIGH FLOW TIP AND SUCTION TUBE: Brand: INTERPULSE